# Patient Record
Sex: FEMALE | Race: WHITE | Employment: FULL TIME | URBAN - METROPOLITAN AREA
[De-identification: names, ages, dates, MRNs, and addresses within clinical notes are randomized per-mention and may not be internally consistent; named-entity substitution may affect disease eponyms.]

---

## 2018-07-27 ENCOUNTER — HOSPITAL ENCOUNTER (OUTPATIENT)
Age: 68
Discharge: HOME | End: 2018-07-27
Payer: COMMERCIAL

## 2018-07-27 LAB
ALBUMIN SERPL-MCNC: 3.2 G/DL (ref 3.5–5.2)
ALP BLD-CCNC: 81 U/L (ref 35–104)
ALT SERPL-CCNC: 10 U/L (ref 0–32)
ANION GAP SERPL CALCULATED.3IONS-SCNC: 10 MMOL/L (ref 7–16)
AST SERPL-CCNC: 15 U/L (ref 0–31)
BASOPHILS ABSOLUTE: 0.06 E9/L (ref 0–0.2)
BASOPHILS RELATIVE PERCENT: 0.4 % (ref 0–2)
BILIRUB SERPL-MCNC: <0.2 MG/DL (ref 0–1.2)
BUN BLDV-MCNC: 8 MG/DL (ref 8–23)
CA 125: 14.9 U/ML (ref 0–35)
CALCIUM SERPL-MCNC: 9.4 MG/DL (ref 8.6–10.2)
CEA: 4.5 NG/ML (ref 0–5.2)
CHLORIDE BLD-SCNC: 98 MMOL/L (ref 98–107)
CHOLESTEROL, FASTING: 96 MG/DL (ref 0–199)
CO2: 28 MMOL/L (ref 22–29)
CREAT SERPL-MCNC: 0.8 MG/DL (ref 0.5–1)
EOSINOPHILS ABSOLUTE: 0.34 E9/L (ref 0.05–0.5)
EOSINOPHILS RELATIVE PERCENT: 2.4 % (ref 0–6)
GFR AFRICAN AMERICAN: >60
GFR NON-AFRICAN AMERICAN: >60 ML/MIN/1.73
GLUCOSE FASTING: 90 MG/DL (ref 74–109)
HCT VFR BLD CALC: 37.9 % (ref 34–48)
HDLC SERPL-MCNC: 30 MG/DL
HEMOGLOBIN: 12.8 G/DL (ref 11.5–15.5)
IMMATURE GRANULOCYTES #: 0.1 E9/L
IMMATURE GRANULOCYTES %: 0.7 % (ref 0–5)
LDL CHOLESTEROL CALCULATED: 48 MG/DL (ref 0–99)
LYMPHOCYTES ABSOLUTE: 1.42 E9/L (ref 1.5–4)
LYMPHOCYTES RELATIVE PERCENT: 10.1 % (ref 20–42)
MCH RBC QN AUTO: 29.4 PG (ref 26–35)
MCHC RBC AUTO-ENTMCNC: 33.8 % (ref 32–34.5)
MCV RBC AUTO: 86.9 FL (ref 80–99.9)
MONOCYTES ABSOLUTE: 1.5 E9/L (ref 0.1–0.95)
MONOCYTES RELATIVE PERCENT: 10.7 % (ref 2–12)
NEUTROPHILS ABSOLUTE: 10.58 E9/L (ref 1.8–7.3)
NEUTROPHILS RELATIVE PERCENT: 75.7 % (ref 43–80)
PDW BLD-RTO: 13.1 FL (ref 11.5–15)
PLATELET # BLD: 343 E9/L (ref 130–450)
PMV BLD AUTO: 9.8 FL (ref 7–12)
POTASSIUM SERPL-SCNC: 4.4 MMOL/L (ref 3.5–5)
RBC # BLD: 4.36 E12/L (ref 3.5–5.5)
SODIUM BLD-SCNC: 136 MMOL/L (ref 132–146)
T4 TOTAL: 10.5 MCG/DL (ref 4.5–11.7)
TOTAL PROTEIN: 7.5 G/DL (ref 6.4–8.3)
TRIGLYCERIDE, FASTING: 89 MG/DL (ref 0–149)
TSH SERPL DL<=0.05 MIU/L-ACNC: 0.66 UIU/ML (ref 0.27–4.2)
VLDLC SERPL CALC-MCNC: 18 MG/DL
WBC # BLD: 14 E9/L (ref 4.5–11.5)

## 2018-07-27 PROCEDURE — 84436 ASSAY OF TOTAL THYROXINE: CPT

## 2018-07-27 PROCEDURE — 82378 CARCINOEMBRYONIC ANTIGEN: CPT

## 2018-07-27 PROCEDURE — 36415 COLL VENOUS BLD VENIPUNCTURE: CPT

## 2018-07-27 PROCEDURE — 80061 LIPID PANEL: CPT

## 2018-07-27 PROCEDURE — 84443 ASSAY THYROID STIM HORMONE: CPT

## 2018-07-27 PROCEDURE — 85025 COMPLETE CBC W/AUTO DIFF WBC: CPT

## 2018-07-27 PROCEDURE — 86304 IMMUNOASSAY TUMOR CA 125: CPT

## 2018-07-27 PROCEDURE — 80053 COMPREHEN METABOLIC PANEL: CPT

## 2018-08-03 ENCOUNTER — HOSPITAL ENCOUNTER (INPATIENT)
Age: 68
LOS: 6 days | Discharge: OTHER INSTITUTION | DRG: 392 | End: 2018-08-09
Attending: EMERGENCY MEDICINE | Admitting: INTERNAL MEDICINE
Payer: COMMERCIAL

## 2018-08-03 ENCOUNTER — APPOINTMENT (OUTPATIENT)
Dept: CT IMAGING | Age: 68
DRG: 392 | End: 2018-08-03
Payer: COMMERCIAL

## 2018-08-03 DIAGNOSIS — K57.20 DIVERTICULITIS OF LARGE INTESTINE WITH ABSCESS WITHOUT BLEEDING: Primary | ICD-10-CM

## 2018-08-03 DIAGNOSIS — N73.9 PELVIC ABSCESS IN FEMALE: ICD-10-CM

## 2018-08-03 LAB
ALBUMIN SERPL-MCNC: 3.6 G/DL (ref 3.5–5.2)
ALP BLD-CCNC: 85 U/L (ref 35–104)
ALT SERPL-CCNC: 11 U/L (ref 0–32)
ANION GAP SERPL CALCULATED.3IONS-SCNC: 11 MMOL/L (ref 7–16)
AST SERPL-CCNC: 17 U/L (ref 0–31)
BACTERIA: ABNORMAL /HPF
BASOPHILS ABSOLUTE: 0.09 E9/L (ref 0–0.2)
BASOPHILS RELATIVE PERCENT: 0.6 % (ref 0–2)
BILIRUB SERPL-MCNC: 0.2 MG/DL (ref 0–1.2)
BILIRUBIN URINE: ABNORMAL
BLOOD, URINE: NEGATIVE
BUN BLDV-MCNC: 11 MG/DL (ref 8–23)
CALCIUM SERPL-MCNC: 9.8 MG/DL (ref 8.6–10.2)
CHLORIDE BLD-SCNC: 93 MMOL/L (ref 98–107)
CLARITY: ABNORMAL
CO2: 27 MMOL/L (ref 22–29)
COLOR: YELLOW
CREAT SERPL-MCNC: 1 MG/DL (ref 0.5–1)
EOSINOPHILS ABSOLUTE: 0.09 E9/L (ref 0.05–0.5)
EOSINOPHILS RELATIVE PERCENT: 0.6 % (ref 0–6)
EPITHELIAL CELLS, UA: ABNORMAL /HPF
GFR AFRICAN AMERICAN: >60
GFR NON-AFRICAN AMERICAN: 56 ML/MIN/1.73
GLUCOSE BLD-MCNC: 105 MG/DL (ref 74–109)
GLUCOSE URINE: NEGATIVE MG/DL
HCT VFR BLD CALC: 39 % (ref 34–48)
HEMOGLOBIN: 13 G/DL (ref 11.5–15.5)
IMMATURE GRANULOCYTES #: 0.19 E9/L
IMMATURE GRANULOCYTES %: 1.3 % (ref 0–5)
INR BLD: 1.1
KETONES, URINE: ABNORMAL MG/DL
LACTIC ACID: 1.7 MMOL/L (ref 0.5–2.2)
LEUKOCYTE ESTERASE, URINE: NEGATIVE
LYMPHOCYTES ABSOLUTE: 1.43 E9/L (ref 1.5–4)
LYMPHOCYTES RELATIVE PERCENT: 9.9 % (ref 20–42)
MCH RBC QN AUTO: 28.6 PG (ref 26–35)
MCHC RBC AUTO-ENTMCNC: 33.3 % (ref 32–34.5)
MCV RBC AUTO: 85.9 FL (ref 80–99.9)
MONOCYTES ABSOLUTE: 1.01 E9/L (ref 0.1–0.95)
MONOCYTES RELATIVE PERCENT: 7 % (ref 2–12)
NEUTROPHILS ABSOLUTE: 11.6 E9/L (ref 1.8–7.3)
NEUTROPHILS RELATIVE PERCENT: 80.6 % (ref 43–80)
NITRITE, URINE: NEGATIVE
PDW BLD-RTO: 13.2 FL (ref 11.5–15)
PH UA: 5.5 (ref 5–9)
PLATELET # BLD: 382 E9/L (ref 130–450)
PMV BLD AUTO: 9.6 FL (ref 7–12)
POTASSIUM SERPL-SCNC: 5.4 MMOL/L (ref 3.5–5)
PROTEIN UA: ABNORMAL MG/DL
PROTHROMBIN TIME: 12.6 SEC (ref 9.3–12.4)
RBC # BLD: 4.54 E12/L (ref 3.5–5.5)
RBC UA: ABNORMAL /HPF (ref 0–2)
SODIUM BLD-SCNC: 131 MMOL/L (ref 132–146)
SPECIFIC GRAVITY UA: >=1.03 (ref 1–1.03)
TOTAL PROTEIN: 7.9 G/DL (ref 6.4–8.3)
UROBILINOGEN, URINE: 0.2 E.U./DL
WBC # BLD: 14.4 E9/L (ref 4.5–11.5)
WBC UA: ABNORMAL /HPF (ref 0–5)

## 2018-08-03 PROCEDURE — 81001 URINALYSIS AUTO W/SCOPE: CPT

## 2018-08-03 PROCEDURE — 80053 COMPREHEN METABOLIC PANEL: CPT

## 2018-08-03 PROCEDURE — 74176 CT ABD & PELVIS W/O CONTRAST: CPT

## 2018-08-03 PROCEDURE — 2580000003 HC RX 258: Performed by: EMERGENCY MEDICINE

## 2018-08-03 PROCEDURE — 85025 COMPLETE CBC W/AUTO DIFF WBC: CPT

## 2018-08-03 PROCEDURE — 85610 PROTHROMBIN TIME: CPT

## 2018-08-03 PROCEDURE — 96365 THER/PROPH/DIAG IV INF INIT: CPT

## 2018-08-03 PROCEDURE — 6360000004 HC RX CONTRAST MEDICATION: Performed by: RADIOLOGY

## 2018-08-03 PROCEDURE — 6360000002 HC RX W HCPCS: Performed by: EMERGENCY MEDICINE

## 2018-08-03 PROCEDURE — 1200000000 HC SEMI PRIVATE

## 2018-08-03 PROCEDURE — 87088 URINE BACTERIA CULTURE: CPT

## 2018-08-03 PROCEDURE — C9113 INJ PANTOPRAZOLE SODIUM, VIA: HCPCS | Performed by: INTERNAL MEDICINE

## 2018-08-03 PROCEDURE — 2580000003 HC RX 258: Performed by: INTERNAL MEDICINE

## 2018-08-03 PROCEDURE — 99285 EMERGENCY DEPT VISIT HI MDM: CPT

## 2018-08-03 PROCEDURE — 99254 IP/OBS CNSLTJ NEW/EST MOD 60: CPT | Performed by: SURGERY

## 2018-08-03 PROCEDURE — 96368 THER/DIAG CONCURRENT INF: CPT

## 2018-08-03 PROCEDURE — 6360000002 HC RX W HCPCS: Performed by: INTERNAL MEDICINE

## 2018-08-03 PROCEDURE — 74177 CT ABD & PELVIS W/CONTRAST: CPT

## 2018-08-03 PROCEDURE — 6370000000 HC RX 637 (ALT 250 FOR IP): Performed by: INTERNAL MEDICINE

## 2018-08-03 PROCEDURE — 36415 COLL VENOUS BLD VENIPUNCTURE: CPT

## 2018-08-03 PROCEDURE — 2500000003 HC RX 250 WO HCPCS: Performed by: INTERNAL MEDICINE

## 2018-08-03 PROCEDURE — 96375 TX/PRO/DX INJ NEW DRUG ADDON: CPT

## 2018-08-03 PROCEDURE — 83605 ASSAY OF LACTIC ACID: CPT

## 2018-08-03 PROCEDURE — 2580000003 HC RX 258

## 2018-08-03 PROCEDURE — 2500000003 HC RX 250 WO HCPCS: Performed by: EMERGENCY MEDICINE

## 2018-08-03 RX ORDER — ATORVASTATIN CALCIUM 20 MG/1
20 TABLET, FILM COATED ORAL NIGHTLY
Status: DISCONTINUED | OUTPATIENT
Start: 2018-08-03 | End: 2018-08-09 | Stop reason: HOSPADM

## 2018-08-03 RX ORDER — CIPROFLOXACIN 2 MG/ML
400 INJECTION, SOLUTION INTRAVENOUS EVERY 12 HOURS
Status: DISCONTINUED | OUTPATIENT
Start: 2018-08-03 | End: 2018-08-07

## 2018-08-03 RX ORDER — SODIUM CHLORIDE 0.9 % (FLUSH) 0.9 %
SYRINGE (ML) INJECTION
Status: COMPLETED
Start: 2018-08-03 | End: 2018-08-03

## 2018-08-03 RX ORDER — SODIUM CHLORIDE 9 MG/ML
INJECTION, SOLUTION INTRAVENOUS CONTINUOUS
Status: DISCONTINUED | OUTPATIENT
Start: 2018-08-03 | End: 2018-08-03

## 2018-08-03 RX ORDER — CIPROFLOXACIN 2 MG/ML
400 INJECTION, SOLUTION INTRAVENOUS ONCE
Status: COMPLETED | OUTPATIENT
Start: 2018-08-03 | End: 2018-08-03

## 2018-08-03 RX ORDER — KETOROLAC TROMETHAMINE 15 MG/ML
15 INJECTION, SOLUTION INTRAMUSCULAR; INTRAVENOUS ONCE
Status: COMPLETED | OUTPATIENT
Start: 2018-08-03 | End: 2018-08-03

## 2018-08-03 RX ORDER — PANTOPRAZOLE SODIUM 40 MG/10ML
40 INJECTION, POWDER, LYOPHILIZED, FOR SOLUTION INTRAVENOUS DAILY
Status: DISCONTINUED | OUTPATIENT
Start: 2018-08-03 | End: 2018-08-09 | Stop reason: HOSPADM

## 2018-08-03 RX ORDER — ASPIRIN 325 MG
325 TABLET ORAL DAILY
Status: ON HOLD | COMMUNITY
End: 2018-08-08 | Stop reason: HOSPADM

## 2018-08-03 RX ORDER — ONDANSETRON 2 MG/ML
4 INJECTION INTRAMUSCULAR; INTRAVENOUS ONCE
Status: COMPLETED | OUTPATIENT
Start: 2018-08-03 | End: 2018-08-03

## 2018-08-03 RX ORDER — LEVOTHYROXINE SODIUM 0.1 MG/1
100 TABLET ORAL DAILY
COMMUNITY
End: 2021-05-10 | Stop reason: ALTCHOICE

## 2018-08-03 RX ORDER — SODIUM CHLORIDE, SODIUM LACTATE, POTASSIUM CHLORIDE, CALCIUM CHLORIDE 600; 310; 30; 20 MG/100ML; MG/100ML; MG/100ML; MG/100ML
INJECTION, SOLUTION INTRAVENOUS CONTINUOUS
Status: DISCONTINUED | OUTPATIENT
Start: 2018-08-03 | End: 2018-08-06

## 2018-08-03 RX ORDER — LEVOTHYROXINE SODIUM 0.1 MG/1
100 TABLET ORAL DAILY
Status: DISCONTINUED | OUTPATIENT
Start: 2018-08-04 | End: 2018-08-09 | Stop reason: HOSPADM

## 2018-08-03 RX ORDER — 0.9 % SODIUM CHLORIDE 0.9 %
1000 INTRAVENOUS SOLUTION INTRAVENOUS ONCE
Status: COMPLETED | OUTPATIENT
Start: 2018-08-03 | End: 2018-08-03

## 2018-08-03 RX ORDER — ONDANSETRON 2 MG/ML
4 INJECTION INTRAMUSCULAR; INTRAVENOUS EVERY 6 HOURS PRN
Status: DISCONTINUED | OUTPATIENT
Start: 2018-08-03 | End: 2018-08-09 | Stop reason: HOSPADM

## 2018-08-03 RX ORDER — CHLORAL HYDRATE 500 MG
1000 CAPSULE ORAL 2 TIMES DAILY
COMMUNITY

## 2018-08-03 RX ORDER — CHLORAL HYDRATE 500 MG
1000 CAPSULE ORAL DAILY
Status: DISCONTINUED | OUTPATIENT
Start: 2018-08-03 | End: 2018-08-03 | Stop reason: RX

## 2018-08-03 RX ORDER — MORPHINE SULFATE 2 MG/ML
2 INJECTION, SOLUTION INTRAMUSCULAR; INTRAVENOUS EVERY 4 HOURS PRN
Status: DISCONTINUED | OUTPATIENT
Start: 2018-08-03 | End: 2018-08-09 | Stop reason: HOSPADM

## 2018-08-03 RX ORDER — LISINOPRIL 10 MG/1
10 TABLET ORAL DAILY
Status: DISCONTINUED | OUTPATIENT
Start: 2018-08-04 | End: 2018-08-09 | Stop reason: HOSPADM

## 2018-08-03 RX ORDER — ASPIRIN 325 MG
325 TABLET ORAL DAILY
Status: DISCONTINUED | OUTPATIENT
Start: 2018-08-04 | End: 2018-08-09 | Stop reason: HOSPADM

## 2018-08-03 RX ADMIN — ONDANSETRON 4 MG: 2 INJECTION INTRAMUSCULAR; INTRAVENOUS at 15:00

## 2018-08-03 RX ADMIN — SODIUM CHLORIDE: 9 INJECTION, SOLUTION INTRAVENOUS at 10:14

## 2018-08-03 RX ADMIN — IOHEXOL 50 ML: 240 INJECTION, SOLUTION INTRATHECAL; INTRAVASCULAR; INTRAVENOUS; ORAL at 09:17

## 2018-08-03 RX ADMIN — IOHEXOL 100 ML: 240 INJECTION, SOLUTION INTRATHECAL; INTRAVASCULAR; INTRAVENOUS; ORAL at 15:50

## 2018-08-03 RX ADMIN — SODIUM CHLORIDE, POTASSIUM CHLORIDE, SODIUM LACTATE AND CALCIUM CHLORIDE: 600; 310; 30; 20 INJECTION, SOLUTION INTRAVENOUS at 14:55

## 2018-08-03 RX ADMIN — Medication 10 ML: at 14:55

## 2018-08-03 RX ADMIN — MORPHINE SULFATE 2 MG: 2 INJECTION, SOLUTION INTRAMUSCULAR; INTRAVENOUS at 20:13

## 2018-08-03 RX ADMIN — SODIUM CHLORIDE 1000 ML: 9 INJECTION, SOLUTION INTRAVENOUS at 07:32

## 2018-08-03 RX ADMIN — METRONIDAZOLE 500 MG: 500 INJECTION, SOLUTION INTRAVENOUS at 10:30

## 2018-08-03 RX ADMIN — ONDANSETRON 4 MG: 2 INJECTION INTRAMUSCULAR; INTRAVENOUS at 07:32

## 2018-08-03 RX ADMIN — MORPHINE SULFATE 2 MG: 2 INJECTION, SOLUTION INTRAMUSCULAR; INTRAVENOUS at 15:59

## 2018-08-03 RX ADMIN — ATORVASTATIN CALCIUM 20 MG: 20 TABLET, FILM COATED ORAL at 20:08

## 2018-08-03 RX ADMIN — CIPROFLOXACIN 400 MG: 2 INJECTION, SOLUTION INTRAVENOUS at 10:13

## 2018-08-03 RX ADMIN — METRONIDAZOLE 500 MG: 500 INJECTION, SOLUTION INTRAVENOUS at 18:56

## 2018-08-03 RX ADMIN — CIPROFLOXACIN 400 MG: 2 INJECTION, SOLUTION INTRAVENOUS at 22:05

## 2018-08-03 RX ADMIN — PANTOPRAZOLE SODIUM 40 MG: 40 INJECTION, POWDER, FOR SOLUTION INTRAVENOUS at 14:55

## 2018-08-03 RX ADMIN — IOPAMIDOL 80 ML: 755 INJECTION, SOLUTION INTRAVENOUS at 09:17

## 2018-08-03 RX ADMIN — KETOROLAC TROMETHAMINE 15 MG: 15 INJECTION, SOLUTION INTRAMUSCULAR; INTRAVENOUS at 07:32

## 2018-08-03 RX ADMIN — ENOXAPARIN SODIUM 40 MG: 40 INJECTION, SOLUTION INTRAVENOUS; SUBCUTANEOUS at 14:56

## 2018-08-03 ASSESSMENT — ENCOUNTER SYMPTOMS
BACK PAIN: 0
CONSTIPATION: 0
VOMITING: 0
DIARRHEA: 1
ABDOMINAL DISTENTION: 0
SHORTNESS OF BREATH: 0
BLOOD IN STOOL: 0
EYES NEGATIVE: 1
NAUSEA: 1
ABDOMINAL PAIN: 1

## 2018-08-03 ASSESSMENT — PAIN DESCRIPTION - LOCATION
LOCATION: ABDOMEN

## 2018-08-03 ASSESSMENT — PAIN DESCRIPTION - ONSET
ONSET: ON-GOING
ONSET: ON-GOING

## 2018-08-03 ASSESSMENT — PAIN DESCRIPTION - PROGRESSION
CLINICAL_PROGRESSION: NOT CHANGED
CLINICAL_PROGRESSION: NOT CHANGED

## 2018-08-03 ASSESSMENT — PAIN SCALES - GENERAL
PAINLEVEL_OUTOF10: 5
PAINLEVEL_OUTOF10: 3
PAINLEVEL_OUTOF10: 7
PAINLEVEL_OUTOF10: 5
PAINLEVEL_OUTOF10: 5
PAINLEVEL_OUTOF10: 7
PAINLEVEL_OUTOF10: 0

## 2018-08-03 ASSESSMENT — PAIN DESCRIPTION - PAIN TYPE
TYPE: ACUTE PAIN

## 2018-08-03 ASSESSMENT — PAIN DESCRIPTION - FREQUENCY
FREQUENCY: INTERMITTENT
FREQUENCY: INTERMITTENT
FREQUENCY: CONTINUOUS

## 2018-08-03 ASSESSMENT — PAIN DESCRIPTION - ORIENTATION
ORIENTATION: LOWER
ORIENTATION: LOWER;RIGHT

## 2018-08-03 ASSESSMENT — PAIN DESCRIPTION - DESCRIPTORS
DESCRIPTORS: CRAMPING;DISCOMFORT;TENDER
DESCRIPTORS: BURNING;CRAMPING
DESCRIPTORS: DISCOMFORT;ACHING

## 2018-08-03 NOTE — H&P
excoriations. Denies bruising. Extremities:   Denies any lower extremity swelling or edema. PHYSICAL EXAM:  VITALS:  Vitals:    08/03/18 1120   BP: 107/64   Pulse: 58   Resp: 16   Temp: 97.7 °F (36.5 °C)   SpO2: 96%         CONSTITUTIONAL:    Awake, alert, cooperative, no apparent distress, and appears stated age    EYES:    PERRL, EOMI, sclera clear, conjunctiva normal    ENT:    Normocephalic, atraumatic, sinuses nontender on palpation. External ears without lesions. Oral pharynx with moist mucus membranes. Tonsils without erythema or exudates. NECK:    Supple, symmetrical, trachea midline, no adenopathy, thyroid symmetric, not enlarged and no tenderness, skin normal, no bruits, no JVD    HEMATOLOGIC/LYMPHATICS:    No cervical lymphadenopathy and no supraclavicular lymphadenopathy    LUNGS:    Symmetric. No increased work of breathing, good air exchange, clear to auscultation bilaterally, no wheezes, rhonchi, or rales,     CARDIOVASCULAR:    Normal apical impulse, regular rate and rhythm, normal S1 and S2, no S3 or S4, and no murmur noted    ABDOMEN:    Soft. Tender to palpation diffusely with some localization to the left lower quadrant. Voluntary guarding is elicited. No rebound tenderness. MUSCULOSKELETAL:    There is no redness, warmth, or swelling of the joints. Full range of motion noted. Motor strength is 5 out of 5 all extremities bilaterally. Tone is normal.    NEUROLOGIC:    Awake, alert, oriented to name, place and time. Cranial nerves II-XII are grossly intact. Motor is 5 out of 5 bilaterally. SKIN:    No bruising or bleeding. No redness, warmth, or swelling    EXTREMITIES:    Peripheral pulses present. No edema, cyanosis, or swelling. OSTEOPATHIC:    Examined in seated and supine positions. Normal thoracic kyphosis and lumbar lordosis. No acute somatic dysfunction.     LABORATORY DATA:  CBC with Differential:    Lab Results   Component Value Date    WBC 14.4 08/03/2018    RBC 4.54 08/03/2018    HGB 13.0 08/03/2018    HCT 39.0 08/03/2018     08/03/2018    MCV 85.9 08/03/2018    MCH 28.6 08/03/2018    MCHC 33.3 08/03/2018    RDW 13.2 08/03/2018    SEGSPCT 64 10/31/2013    LYMPHOPCT 9.9 08/03/2018    MONOPCT 7.0 08/03/2018    BASOPCT 0.6 08/03/2018    MONOSABS 1.01 08/03/2018    LYMPHSABS 1.43 08/03/2018    EOSABS 0.09 08/03/2018    BASOSABS 0.09 08/03/2018     BMP:    Lab Results   Component Value Date     08/03/2018    K 5.4 08/03/2018    CL 93 08/03/2018    CO2 27 08/03/2018    BUN 11 08/03/2018    LABALBU 3.6 08/03/2018    LABALBU 4.3 12/13/2011    CREATININE 1.0 08/03/2018    CALCIUM 9.8 08/03/2018    GFRAA >60 08/03/2018    LABGLOM 56 08/03/2018    GLUCOSE 105 08/03/2018    GLUCOSE 94 12/13/2011       ASSESSMENT:  1. Perforated diverticulitis with contained abscess  2. Essential hypertension  3. Hypothyroidism  4. Hyperlipidemia    PLAN:  The patient deals with chronic constipation and I suspect this has led to the progression of her diverticulosis. IV antibiotic therapy will be employed in the interventional radiology team has been asked to move forward with drainage if possible. No plans for surgical intervention currently. IV fluid resuscitation is being undertaken. The patient has been made nothing by mouth. Following resolution of the infection and inflammation, the patient will require colonoscopic evaluation as an outpatient to assess the affected region. We will monitor her chronic comorbidities accordingly. Pain medications are being administered. I anticipate advancing the patient's diet tomorrow.     David Cisneros D.O., FACOI  1:59 PM  8/3/2018    Electronically signed by David Cisneros DO on 8/3/18 at 1:59 PM

## 2018-08-03 NOTE — CONSULTS
GENERAL SURGERY  CONSULT NOTE  8/3/2018    Physician Consulted: Dr. Yrn Us  Reason for Consult: Diverticulitis w/ pelvic abscess  Referring Physician: Dr. Neelam TRAN  Ginger Amezquita is a 74 y.o. female who presents for evaluation of RLQ, suprapubic abdominal pain intermittently for the past six months. She has been treated multiple times as an outpatient for diverticulitis during this time frame. She just completed a week of antibiotics one day ago. Constant, dull ache located in the RLQ. No relieving or worsening factors. Non-radiating. Tolerating PO intake. Passing flatus with daily loose bowel movements. Reported a 10 lb weight loss in the past 2 weeks. No endoscopy hx. Current smoker. She denied fever, chills, cp, sob, n/v.       Past Medical History:   Diagnosis Date    HIGH CHOLESTEROL     Hypertension     Thyroid disease        Past Surgical History:   Procedure Laterality Date    NERVE BLOCK  12 27 2011   [address] BLOCK  1/24/12    lumbar epidural    NERVE BLOCK  02/21/12    lumbar epidural with flouro       Medications Prior to Admission:    Prior to Admission medications    Medication Sig Start Date End Date Taking? Authorizing Provider   aspirin 325 MG tablet Take 325 mg by mouth daily   Yes Historical Provider, MD   levothyroxine (SYNTHROID) 100 MCG tablet Take 100 mcg by mouth Daily   Yes Historical Provider, MD   ZINC PO Take 1 tablet by mouth daily   Yes Historical Provider, MD   Omega-3 Fatty Acids (FISH OIL) 1000 MG CAPS Take 1,000 mg by mouth daily   Yes Historical Provider, MD   lisinopril (PRINIVIL;ZESTRIL) 10 MG tablet Take 10 mg by mouth daily. Yes Historical Provider, MD   bisoprolol-hydrochlorothiazide (ZIAC) 5-6.25 MG per tablet Take 1 tablet by mouth daily. Yes Historical Provider, MD   rosuvastatin (CRESTOR) 10 MG tablet Take 10 mg by mouth daily.      Yes Historical Provider, MD       No Known Allergies    Family History   Problem Relation Age of Onset    Cancer Other     reformatted images were obtained. Automated dose exposure control was used for this exam. FINDINGS: Visualized portion bilateral lung bases are clear. There is no pleural effusion. There is no evidence of pneumoperitoneum. The liver is normal in morphology. There is mild diffuse hypoattenuation, suggestive fatty infiltration. The gallbladder, pancreas, and spleen are unremarkable. There is a small hiatal hernia. The remainder the stomach and duodenum are normal in appearance. The small bowel loops are normal in caliber. The appendix is identified and is unremarkable. There are diverticula of the descending and sigmoid colon. There is a short segment of wall thickening with adjacent fat stranding along the sigmoid colon. There is a peripherally enhancing collection adjacent to the sigmoid colon with a tiny focus of air (series 2, image 69), which measures approximately 26 mm x 36 mm, which also abuts the uterus. Redemonstrated is a calcified right adrenal nodule. The left adrenal gland is unremarkable. Bilateral kidneys are normal morphology and demonstrate symmetric enhancement. There is a fluid attenuation cysts at the superior and lower poles of the right kidney. There is no hydronephrosis or hydroureter bilaterally. Urinary bladder is grossly unremarkable. There is diffuse episodic calcification of the abdominal aorta, which is normal in caliber. There is no abdominal, retroperitoneal, or pelvic lymphadenopathy. There is no suspicious lytic or blastic osseous lesion. There is lower lumbar spondylosis with grade 1 anterolisthesis of L4 on L5.     1. Short segment of diffuse wall thickening of the sigmoid colon with adjacent fat stranding, possibly related to acute diverticulitis versus underlying neoplasm. There is an adjacent loculated collection with tiny focus of air, suggestive of an abscess, which abuts the uterine fundus. 2. Mild diffuse fatty infiltration of the liver.  3. Stable calcified right adrenal consciousness, murmur or orthopnea  Gastrointestinal ROS: negative for - constipation, diarrhea, gas/bloating, heartburn or hematemesis  Genito-Urinary ROS: negative for -  genital discharge, genital ulcers or hematuria  Musculoskeletal ROS: negative for - gait disturbance, muscle pain or muscular weakness  Psych/Soc ROS: Neg for suicidal ideation, auditory/visual hallucinations      General appearance:  NAD  Head: NCAT, PERRLA, EOMI, red conjunctiva  Neck: supple, no masses  Lungs: Equal chest rise bilateral  Heart: Reg rate  Abdomen: soft, nondistended, tender minimal Lower abd  Skin; no lesions  Gu: no cva tenderness  Extremities: extremities normal, atraumatic, no cyanosis or edema  Psych: no tremor, visual or auditory hallucinations    Imaging: CT abd:   Impression   1. Short segment of diffuse wall thickening of the sigmoid colon with   adjacent fat stranding, possibly related to acute diverticulitis   versus underlying neoplasm. There is an adjacent loculated collection   with tiny focus of air, suggestive of an abscess, which abuts the   uterine fundus. 2. Mild diffuse fatty infiltration of the liver. 3. Stable calcified right adrenal nodule. Assessment/Plan:  Magdalena Porter is a 74 y.o. female with complicated perforated diverticulitis with abscess  Patient Active Problem List   Diagnosis    Spinal stenosis    Degeneration of lumbosacral intervertebral disc    Lumbago    Colonic diverticular abscess       IR for drainage  If cannot drain would continue IV abx  Ok for clears after IR drainage  Cont IV abx  No surgery unless worsenign clinically  I have personally participated in a face-to-face history and physical exam on the date of service. Reviewed chart, vitals, labs and radiologic studies. I also participated in medical decision making with the resident/NP on the date of service and I agree with all of the pertinent clinical information, assessment and treatment plan.  I have reviewed and

## 2018-08-03 NOTE — ED PROVIDER NOTES
Mouth/Throat: Oropharynx is clear and moist.   Eyes: Conjunctivae and EOM are normal. Pupils are equal, round, and reactive to light. Neck: Normal range of motion. Neck supple. Cardiovascular: Normal rate, regular rhythm, normal heart sounds and intact distal pulses. No murmur heard. Pulmonary/Chest: Effort normal and breath sounds normal. No respiratory distress. She has no wheezes. She has no rales. Abdominal: Soft. Bowel sounds are normal. There is tenderness (RLQ and LLQ). There is no rebound and no guarding. Musculoskeletal: She exhibits no edema. Lymphadenopathy:     She has no cervical adenopathy. Neurological: She is alert and oriented to person, place, and time. No cranial nerve deficit. Coordination normal.   Skin: Skin is warm and dry. She is not diaphoretic. No pallor. Psychiatric: She has a normal mood and affect. Her behavior is normal. Judgment and thought content normal.   Nursing note and vitals reviewed. Procedures    MDM     8:20 AM  Patient states she is feeling well. She is drinking contrast for her CT. K was elevated and will be rechecked after liter of IVF. 9:50 AM  Patient is comfortable at this time. She has not seen a surgeon before.       --------------------------------------------- PAST HISTORY ---------------------------------------------  Past Medical History:  has a past medical history of HIGH CHOLESTEROL and Hypertension. Past Surgical History:  has a past surgical history that includes Nerve Block (12 27 2011); Nerve Block (1/24/12); and Nerve Block (02/21/12). Social History:  reports that she has been smoking Cigarettes. She has been smoking about 1.00 pack per day. She has never used smokeless tobacco. She reports that she does not drink alcohol or use drugs. Family History: family history includes Cancer in an other family member; Heart Disease in an other family member. The patients home medications have been reviewed.     Allergies: Patient has no known allergies.     -------------------------------------------------- RESULTS -------------------------------------------------    Lab  Results for orders placed or performed during the hospital encounter of 08/03/18   CBC Auto Differential   Result Value Ref Range    WBC 14.4 (H) 4.5 - 11.5 E9/L    RBC 4.54 3.50 - 5.50 E12/L    Hemoglobin 13.0 11.5 - 15.5 g/dL    Hematocrit 39.0 34.0 - 48.0 %    MCV 85.9 80.0 - 99.9 fL    MCH 28.6 26.0 - 35.0 pg    MCHC 33.3 32.0 - 34.5 %    RDW 13.2 11.5 - 15.0 fL    Platelets 990 653 - 220 E9/L    MPV 9.6 7.0 - 12.0 fL    Neutrophils % 80.6 (H) 43.0 - 80.0 %    Immature Granulocytes % 1.3 0.0 - 5.0 %    Lymphocytes % 9.9 (L) 20.0 - 42.0 %    Monocytes % 7.0 2.0 - 12.0 %    Eosinophils % 0.6 0.0 - 6.0 %    Basophils % 0.6 0.0 - 2.0 %    Neutrophils # 11.60 (H) 1.80 - 7.30 E9/L    Immature Granulocytes # 0.19 E9/L    Lymphocytes # 1.43 (L) 1.50 - 4.00 E9/L    Monocytes # 1.01 (H) 0.10 - 0.95 E9/L    Eosinophils # 0.09 0.05 - 0.50 E9/L    Basophils # 0.09 0.00 - 0.20 E9/L   Comprehensive Metabolic Panel   Result Value Ref Range    Sodium 131 (L) 132 - 146 mmol/L    Potassium 5.4 (H) 3.5 - 5.0 mmol/L    Chloride 93 (L) 98 - 107 mmol/L    CO2 27 22 - 29 mmol/L    Anion Gap 11 7 - 16 mmol/L    Glucose 105 74 - 109 mg/dL    BUN 11 8 - 23 mg/dL    CREATININE 1.0 0.5 - 1.0 mg/dL    GFR Non-African American 56 >=60 mL/min/1.73    GFR African American >60     Calcium 9.8 8.6 - 10.2 mg/dL    Total Protein 7.9 6.4 - 8.3 g/dL    Alb 3.6 3.5 - 5.2 g/dL    Total Bilirubin 0.2 0.0 - 1.2 mg/dL    Alkaline Phosphatase 85 35 - 104 U/L    ALT 11 0 - 32 U/L    AST 17 0 - 31 U/L   Lactic Acid, Plasma   Result Value Ref Range    Lactic Acid 1.7 0.5 - 2.2 mmol/L   Urinalysis   Result Value Ref Range    Color, UA Yellow Straw/Yellow    Clarity, UA CLOUDY (A) Clear    Glucose, Ur Negative Negative mg/dL    Bilirubin Urine SMALL (A) Negative    Ketones, Urine TRACE (A) Negative mg/dL Specific Gravity, UA >=1.030 1.005 - 1.030    Blood, Urine Negative Negative    pH, UA 5.5 5.0 - 9.0    Protein, UA TRACE Negative mg/dL    Urobilinogen, Urine 0.2 <2.0 E.U./dL    Nitrite, Urine Negative Negative    Leukocyte Esterase, Urine Negative Negative   Microscopic Urinalysis   Result Value Ref Range    WBC, UA 0-1 0 - 5 /HPF    RBC, UA NONE 0 - 2 /HPF    Epi Cells FEW /HPF    Bacteria, UA RARE (A) /HPF       Radiology  CT ABDOMEN PELVIS W IV CONTRAST Additional Contrast? Oral   Final Result   1. Short segment of diffuse wall thickening of the sigmoid colon with   adjacent fat stranding, possibly related to acute diverticulitis   versus underlying neoplasm. There is an adjacent loculated collection   with tiny focus of air, suggestive of an abscess, which abuts the   uterine fundus. 2. Mild diffuse fatty infiltration of the liver. 3. Stable calcified right adrenal nodule. EKG: This EKG is signed and interpreted by me.          ------------------------- NURSING NOTES AND VITALS REVIEWED ---------------------------  Date / Time Roomed:  8/3/2018  6:48 AM  ED Bed Assignment:  01/01    The nursing notes within the ED encounter and vital signs as below have been reviewed. Patient Vitals for the past 24 hrs:   BP Temp Pulse Resp SpO2 Height Weight   08/03/18 0942 113/63 - 59 15 96 % - -   08/03/18 0600 116/78 97.9 °F (36.6 °C) 83 18 99 % 5' 3\" (1.6 m) 135 lb (61.2 kg)       Oxygen Saturation Interpretation: Normal      ------------------------------------------ PROGRESS NOTES ------------------------------------------  Re-evaluation(s):      I have spoken with the patient and discussed todays results, in addition to providing specific details for the plan of care and counseling regarding the diagnosis and prognosis.   Their questions are answered at this time and they are agreeable with the plan.      --------------------------------- ADDITIONAL PROVIDER NOTES

## 2018-08-03 NOTE — ED NOTES
Floor called, report given to Mary Turner.  Transport for patient to room 340 arranged     Latricia Olguin RN  08/03/18 9849

## 2018-08-04 LAB
ANION GAP SERPL CALCULATED.3IONS-SCNC: 11 MMOL/L (ref 7–16)
BASOPHILS ABSOLUTE: 0 E9/L (ref 0–0.2)
BASOPHILS RELATIVE PERCENT: 0.3 % (ref 0–2)
BUN BLDV-MCNC: 7 MG/DL (ref 8–23)
CALCIUM SERPL-MCNC: 8.3 MG/DL (ref 8.6–10.2)
CHLORIDE BLD-SCNC: 93 MMOL/L (ref 98–107)
CO2: 21 MMOL/L (ref 22–29)
CREAT SERPL-MCNC: 0.7 MG/DL (ref 0.5–1)
EOSINOPHILS ABSOLUTE: 0 E9/L (ref 0.05–0.5)
EOSINOPHILS RELATIVE PERCENT: 0.1 % (ref 0–6)
GFR AFRICAN AMERICAN: >60
GFR NON-AFRICAN AMERICAN: >60 ML/MIN/1.73
GLUCOSE BLD-MCNC: 106 MG/DL (ref 74–109)
HCT VFR BLD CALC: 31.9 % (ref 34–48)
HEMOGLOBIN: 10.9 G/DL (ref 11.5–15.5)
LYMPHOCYTES ABSOLUTE: 0.7 E9/L (ref 1.5–4)
LYMPHOCYTES RELATIVE PERCENT: 2.7 % (ref 20–42)
MAGNESIUM: 1.7 MG/DL (ref 1.6–2.6)
MCH RBC QN AUTO: 28.8 PG (ref 26–35)
MCHC RBC AUTO-ENTMCNC: 34.2 % (ref 32–34.5)
MCV RBC AUTO: 84.2 FL (ref 80–99.9)
MONOCYTES ABSOLUTE: 0.93 E9/L (ref 0.1–0.95)
MONOCYTES RELATIVE PERCENT: 3.6 % (ref 2–12)
NEUTROPHILS ABSOLUTE: 21.81 E9/L (ref 1.8–7.3)
NEUTROPHILS RELATIVE PERCENT: 93.8 % (ref 43–80)
PDW BLD-RTO: 13.2 FL (ref 11.5–15)
PHOSPHORUS: 2.6 MG/DL (ref 2.5–4.5)
PLATELET # BLD: 317 E9/L (ref 130–450)
PMV BLD AUTO: 9.8 FL (ref 7–12)
POTASSIUM SERPL-SCNC: 3.8 MMOL/L (ref 3.5–5)
RBC # BLD: 3.79 E12/L (ref 3.5–5.5)
SODIUM BLD-SCNC: 125 MMOL/L (ref 132–146)
WBC # BLD: 23.2 E9/L (ref 4.5–11.5)

## 2018-08-04 PROCEDURE — 83735 ASSAY OF MAGNESIUM: CPT

## 2018-08-04 PROCEDURE — 2500000003 HC RX 250 WO HCPCS: Performed by: INTERNAL MEDICINE

## 2018-08-04 PROCEDURE — 80048 BASIC METABOLIC PNL TOTAL CA: CPT

## 2018-08-04 PROCEDURE — C9113 INJ PANTOPRAZOLE SODIUM, VIA: HCPCS | Performed by: INTERNAL MEDICINE

## 2018-08-04 PROCEDURE — 6370000000 HC RX 637 (ALT 250 FOR IP): Performed by: INTERNAL MEDICINE

## 2018-08-04 PROCEDURE — 1200000000 HC SEMI PRIVATE

## 2018-08-04 PROCEDURE — 2580000003 HC RX 258: Performed by: INTERNAL MEDICINE

## 2018-08-04 PROCEDURE — 85025 COMPLETE CBC W/AUTO DIFF WBC: CPT

## 2018-08-04 PROCEDURE — 6360000002 HC RX W HCPCS: Performed by: INTERNAL MEDICINE

## 2018-08-04 PROCEDURE — 36415 COLL VENOUS BLD VENIPUNCTURE: CPT

## 2018-08-04 PROCEDURE — 99232 SBSQ HOSP IP/OBS MODERATE 35: CPT | Performed by: SURGERY

## 2018-08-04 PROCEDURE — 84100 ASSAY OF PHOSPHORUS: CPT

## 2018-08-04 RX ADMIN — CIPROFLOXACIN 400 MG: 2 INJECTION, SOLUTION INTRAVENOUS at 10:07

## 2018-08-04 RX ADMIN — SODIUM CHLORIDE, POTASSIUM CHLORIDE, SODIUM LACTATE AND CALCIUM CHLORIDE: 600; 310; 30; 20 INJECTION, SOLUTION INTRAVENOUS at 03:23

## 2018-08-04 RX ADMIN — ONDANSETRON 4 MG: 2 INJECTION INTRAMUSCULAR; INTRAVENOUS at 15:24

## 2018-08-04 RX ADMIN — METRONIDAZOLE 500 MG: 500 INJECTION, SOLUTION INTRAVENOUS at 11:40

## 2018-08-04 RX ADMIN — SODIUM CHLORIDE, POTASSIUM CHLORIDE, SODIUM LACTATE AND CALCIUM CHLORIDE: 600; 310; 30; 20 INJECTION, SOLUTION INTRAVENOUS at 16:08

## 2018-08-04 RX ADMIN — LEVOTHYROXINE SODIUM 100 MCG: 100 TABLET ORAL at 05:47

## 2018-08-04 RX ADMIN — LISINOPRIL 10 MG: 10 TABLET ORAL at 10:10

## 2018-08-04 RX ADMIN — ATORVASTATIN CALCIUM 20 MG: 20 TABLET, FILM COATED ORAL at 20:30

## 2018-08-04 RX ADMIN — CIPROFLOXACIN 400 MG: 2 INJECTION, SOLUTION INTRAVENOUS at 21:01

## 2018-08-04 RX ADMIN — METOPROLOL TARTRATE 25 MG: 25 TABLET ORAL at 10:10

## 2018-08-04 RX ADMIN — PANTOPRAZOLE SODIUM 40 MG: 40 INJECTION, POWDER, FOR SOLUTION INTRAVENOUS at 08:31

## 2018-08-04 RX ADMIN — METOPROLOL TARTRATE 25 MG: 25 TABLET ORAL at 20:30

## 2018-08-04 RX ADMIN — MORPHINE SULFATE 2 MG: 2 INJECTION, SOLUTION INTRAMUSCULAR; INTRAVENOUS at 08:31

## 2018-08-04 RX ADMIN — ENOXAPARIN SODIUM 40 MG: 40 INJECTION, SOLUTION INTRAVENOUS; SUBCUTANEOUS at 10:09

## 2018-08-04 RX ADMIN — ASPIRIN 325 MG ORAL TABLET 325 MG: 325 PILL ORAL at 10:10

## 2018-08-04 RX ADMIN — METRONIDAZOLE 500 MG: 500 INJECTION, SOLUTION INTRAVENOUS at 03:23

## 2018-08-04 RX ADMIN — ONDANSETRON 4 MG: 2 INJECTION INTRAMUSCULAR; INTRAVENOUS at 08:31

## 2018-08-04 RX ADMIN — METRONIDAZOLE 500 MG: 500 INJECTION, SOLUTION INTRAVENOUS at 18:55

## 2018-08-04 ASSESSMENT — PAIN SCALES - GENERAL
PAINLEVEL_OUTOF10: 5
PAINLEVEL_OUTOF10: 0

## 2018-08-04 NOTE — PROGRESS NOTES
supine position, prone position and possibly introduce rectal contrast. The abscess catheter placement procedure was discussed and informed consent was obtained. The patient was initially placed in the supine position and axial images through the pelvis were obtained. Note is made of a deep pelvic abscess along the posterior left lateral aspect of the urinary bladder. On the supine images the traversing sigmoid colon prohibited Access to the abscess cavity. The patient was then placed in the prone position and axial images through the pelvis were obtained. Prior to obtain the axial images I introduced rectal contrast the rectal tube. The abscess is surrounded by the redundant sigmoid colon which prohibits access. I explained these findings the patient and that there is no safe entry into the abscess cavity. A call was placed to Dr. Kelsea Hernandez at 4:15 PM and I explained that I could not access the abscess cavity and asked him to review the supine and prone axial images which included rectal contrast. I discussed Dr. Kelsea Hernandez that if the abscess cavity increases over the weekend we can rescan the patient on Monday a.m. and possibly perform an abscess drainage catheter procedure if the abscess cavity enlarges or moves more peripherally. 1. Given the location and size the abscess which is surrounded by the urinary bladder and sigmoid colon I could not access the abscess cavity under CT guidance. These findings were discussed with Dr. Kelsea Hernandez as stated above. Ct Abdomen Pelvis W Iv Contrast Additional Contrast? Oral    Result Date: 8/3/2018  Location: [facility] Indication: Lower abdominal pain. Comparison: Outside CT abdomen/pelvis from 3/9/2018. Technique: Multidetector CT imaging of the abdomen and pelvis was performed after the administration of intravenous contrast (80 cc of [id number]-[id number]). Oral contrast was administered. Coronal and sagittal reformatted images were obtained.  Automated dose exposure control was used for

## 2018-08-05 ENCOUNTER — APPOINTMENT (OUTPATIENT)
Dept: CT IMAGING | Age: 68
DRG: 392 | End: 2018-08-05
Payer: COMMERCIAL

## 2018-08-05 LAB
ANION GAP SERPL CALCULATED.3IONS-SCNC: 10 MMOL/L (ref 7–16)
BASOPHILS ABSOLUTE: 0 E9/L (ref 0–0.2)
BASOPHILS RELATIVE PERCENT: 0.2 % (ref 0–2)
BUN BLDV-MCNC: 5 MG/DL (ref 8–23)
CALCIUM SERPL-MCNC: 8.2 MG/DL (ref 8.6–10.2)
CHLORIDE BLD-SCNC: 97 MMOL/L (ref 98–107)
CK MB: 4.2 NG/ML (ref 0–4.3)
CK MB: 4.4 NG/ML (ref 0–4.3)
CO2: 25 MMOL/L (ref 22–29)
CREAT SERPL-MCNC: 0.6 MG/DL (ref 0.5–1)
EOSINOPHILS ABSOLUTE: 0 E9/L (ref 0.05–0.5)
EOSINOPHILS RELATIVE PERCENT: 0.1 % (ref 0–6)
GFR AFRICAN AMERICAN: >60
GFR NON-AFRICAN AMERICAN: >60 ML/MIN/1.73
GLUCOSE BLD-MCNC: 104 MG/DL (ref 74–109)
HCT VFR BLD CALC: 31.9 % (ref 34–48)
HEMOGLOBIN: 10.8 G/DL (ref 11.5–15.5)
LYMPHOCYTES ABSOLUTE: 1.64 E9/L (ref 1.5–4)
LYMPHOCYTES RELATIVE PERCENT: 6.1 % (ref 20–42)
MCH RBC QN AUTO: 28.9 PG (ref 26–35)
MCHC RBC AUTO-ENTMCNC: 33.9 % (ref 32–34.5)
MCV RBC AUTO: 85.3 FL (ref 80–99.9)
MONOCYTES ABSOLUTE: 1.36 E9/L (ref 0.1–0.95)
MONOCYTES RELATIVE PERCENT: 5.2 % (ref 2–12)
NEUTROPHILS ABSOLUTE: 24.3 E9/L (ref 1.8–7.3)
NEUTROPHILS RELATIVE PERCENT: 88.7 % (ref 43–80)
PDW BLD-RTO: 13.2 FL (ref 11.5–15)
PLATELET # BLD: 260 E9/L (ref 130–450)
PMV BLD AUTO: 9.8 FL (ref 7–12)
POTASSIUM SERPL-SCNC: 3.8 MMOL/L (ref 3.5–5)
RBC # BLD: 3.74 E12/L (ref 3.5–5.5)
SODIUM BLD-SCNC: 132 MMOL/L (ref 132–146)
TOTAL CK: 48 U/L (ref 20–180)
TOTAL CK: 52 U/L (ref 20–180)
TROPONIN: <0.01 NG/ML (ref 0–0.03)
TROPONIN: <0.01 NG/ML (ref 0–0.03)
URINE CULTURE, ROUTINE: NORMAL
WBC # BLD: 27.3 E9/L (ref 4.5–11.5)

## 2018-08-05 PROCEDURE — C9113 INJ PANTOPRAZOLE SODIUM, VIA: HCPCS | Performed by: INTERNAL MEDICINE

## 2018-08-05 PROCEDURE — 2500000003 HC RX 250 WO HCPCS: Performed by: INTERNAL MEDICINE

## 2018-08-05 PROCEDURE — 6360000002 HC RX W HCPCS: Performed by: INTERNAL MEDICINE

## 2018-08-05 PROCEDURE — 6370000000 HC RX 637 (ALT 250 FOR IP): Performed by: INTERNAL MEDICINE

## 2018-08-05 PROCEDURE — 82550 ASSAY OF CK (CPK): CPT

## 2018-08-05 PROCEDURE — 36415 COLL VENOUS BLD VENIPUNCTURE: CPT

## 2018-08-05 PROCEDURE — 2060000000 HC ICU INTERMEDIATE R&B

## 2018-08-05 PROCEDURE — 2580000003 HC RX 258: Performed by: SURGERY

## 2018-08-05 PROCEDURE — 82553 CREATINE MB FRACTION: CPT

## 2018-08-05 PROCEDURE — 99255 IP/OBS CONSLTJ NEW/EST HI 80: CPT | Performed by: SURGERY

## 2018-08-05 PROCEDURE — 85025 COMPLETE CBC W/AUTO DIFF WBC: CPT

## 2018-08-05 PROCEDURE — 80048 BASIC METABOLIC PNL TOTAL CA: CPT

## 2018-08-05 PROCEDURE — 74176 CT ABD & PELVIS W/O CONTRAST: CPT

## 2018-08-05 PROCEDURE — 84484 ASSAY OF TROPONIN QUANT: CPT

## 2018-08-05 PROCEDURE — 6360000004 HC RX CONTRAST MEDICATION: Performed by: RADIOLOGY

## 2018-08-05 PROCEDURE — 6360000002 HC RX W HCPCS: Performed by: SURGERY

## 2018-08-05 PROCEDURE — 2580000003 HC RX 258: Performed by: INTERNAL MEDICINE

## 2018-08-05 RX ORDER — METOPROLOL TARTRATE 5 MG/5ML
5 INJECTION INTRAVENOUS ONCE
Status: COMPLETED | OUTPATIENT
Start: 2018-08-05 | End: 2018-08-05

## 2018-08-05 RX ORDER — DIGOXIN 0.25 MG/ML
125 INJECTION INTRAMUSCULAR; INTRAVENOUS ONCE
Status: COMPLETED | OUTPATIENT
Start: 2018-08-05 | End: 2018-08-05

## 2018-08-05 RX ADMIN — ATORVASTATIN CALCIUM 20 MG: 20 TABLET, FILM COATED ORAL at 21:05

## 2018-08-05 RX ADMIN — ENOXAPARIN SODIUM 40 MG: 40 INJECTION, SOLUTION INTRAVENOUS; SUBCUTANEOUS at 08:32

## 2018-08-05 RX ADMIN — METRONIDAZOLE 500 MG: 500 INJECTION, SOLUTION INTRAVENOUS at 10:55

## 2018-08-05 RX ADMIN — LEVOTHYROXINE SODIUM 100 MCG: 100 TABLET ORAL at 06:19

## 2018-08-05 RX ADMIN — LISINOPRIL 10 MG: 10 TABLET ORAL at 08:32

## 2018-08-05 RX ADMIN — METRONIDAZOLE 500 MG: 500 INJECTION, SOLUTION INTRAVENOUS at 17:52

## 2018-08-05 RX ADMIN — METOPROLOL TARTRATE 25 MG: 25 TABLET ORAL at 08:32

## 2018-08-05 RX ADMIN — ASPIRIN 325 MG ORAL TABLET 325 MG: 325 PILL ORAL at 08:32

## 2018-08-05 RX ADMIN — PANTOPRAZOLE SODIUM 40 MG: 40 INJECTION, POWDER, FOR SOLUTION INTRAVENOUS at 08:32

## 2018-08-05 RX ADMIN — DIGOXIN 125 MCG: 250 INJECTION, SOLUTION INTRAMUSCULAR; INTRAVENOUS at 17:37

## 2018-08-05 RX ADMIN — PIPERACILLIN SODIUM AND TAZOBACTAM SODIUM 3.38 G: 3; .375 INJECTION, POWDER, LYOPHILIZED, FOR SOLUTION INTRAVENOUS at 14:27

## 2018-08-05 RX ADMIN — SODIUM CHLORIDE, POTASSIUM CHLORIDE, SODIUM LACTATE AND CALCIUM CHLORIDE: 600; 310; 30; 20 INJECTION, SOLUTION INTRAVENOUS at 16:29

## 2018-08-05 RX ADMIN — CIPROFLOXACIN 400 MG: 2 INJECTION, SOLUTION INTRAVENOUS at 09:59

## 2018-08-05 RX ADMIN — PIPERACILLIN SODIUM AND TAZOBACTAM SODIUM 3.38 G: 3; .375 INJECTION, POWDER, LYOPHILIZED, FOR SOLUTION INTRAVENOUS at 21:05

## 2018-08-05 RX ADMIN — METRONIDAZOLE 500 MG: 500 INJECTION, SOLUTION INTRAVENOUS at 01:58

## 2018-08-05 RX ADMIN — METOPROLOL TARTRATE 5 MG: 5 INJECTION, SOLUTION INTRAVENOUS at 16:33

## 2018-08-05 RX ADMIN — METOPROLOL TARTRATE 25 MG: 25 TABLET ORAL at 17:39

## 2018-08-05 RX ADMIN — IOHEXOL 50 ML: 240 INJECTION, SOLUTION INTRATHECAL; INTRAVASCULAR; INTRAVENOUS; ORAL at 18:41

## 2018-08-05 ASSESSMENT — PAIN SCALES - GENERAL
PAINLEVEL_OUTOF10: 0
PAINLEVEL_OUTOF10: 0
PAINLEVEL_OUTOF10: 2
PAINLEVEL_OUTOF10: 0

## 2018-08-05 ASSESSMENT — PAIN DESCRIPTION - PAIN TYPE: TYPE: ACUTE PAIN

## 2018-08-05 ASSESSMENT — PAIN DESCRIPTION - LOCATION: LOCATION: ABDOMEN

## 2018-08-05 ASSESSMENT — PAIN DESCRIPTION - ONSET: ONSET: ON-GOING

## 2018-08-05 ASSESSMENT — PAIN DESCRIPTION - FREQUENCY: FREQUENCY: CONTINUOUS

## 2018-08-05 ASSESSMENT — PAIN DESCRIPTION - PROGRESSION: CLINICAL_PROGRESSION: NOT CHANGED

## 2018-08-05 ASSESSMENT — PAIN DESCRIPTION - DESCRIPTORS: DESCRIPTORS: ACHING;DISCOMFORT;SORE;TENDER

## 2018-08-05 NOTE — PROGRESS NOTES
08/03/18   1130  08/04/18   0501  08/05/18   0447   WBC  14.4*   --   23.2*  27.3*   HGB  13.0   --   10.9*  10.8*   PLT  382   --   317  260   NA  131*   --   125*  132   CL  93*   --   93*  97*   K  5.4*   --   3.8  3.8   BUN  11   --   7*  5*   CREATININE  1.0   --   0.7  0.6   GLUCOSE  105   --   106  104   PROTIME   --   12.6*   --    --    INR   --   1.1   --    --    LABALBU  3.6   --    --    --    PROT  7.9   --    --    --    CALCIUM  9.8   --   8.3*  8.2*   MG   --    --   1.7   --    PHOS   --    --   2.6   --    BILITOT  0.2   --    --    --    ALKPHOS  85   --    --    --    AST  17   --    --    --    ALT  11   --    --    --        Physical exam:   VITALS:  Blood pressure 100/62, pulse 72, temperature 97.9 °F (36.6 °C), resp. rate 16, height 5' 3\" (1.6 m), weight 135 lb (61.2 kg), SpO2 95 %. General appearance: alert, appears stated age and cooperative  Head: Normocephalic, without obvious abnormality, atraumatic  Eyes: PERRL, EOMI  Neck: no adenopathy, no carotid bruit, no JVD, supple, symmetrical, trachea midline and thyroid not enlarged, symmetric, no tenderness/mass/nodules  Lungs: clear to auscultation bilaterally  Heart: regular rate and rhythm,   Abdomen: soft,  Tender LLQ; bowel sounds hypoactive; no hernias palpable  Extremities: extremities normal, atraumatic, no cyanosis or edema    ASSESSMENT: diverticular abscess, leukocytosis worsening    PLAN: will add Zosyn for the abscess. If the bowels don't start working, or the pain gets worse, she will need surgery with colostomy.     Signed: Dr. Payal Ordoñez M.D.

## 2018-08-06 ENCOUNTER — APPOINTMENT (OUTPATIENT)
Dept: CT IMAGING | Age: 68
DRG: 392 | End: 2018-08-06
Payer: COMMERCIAL

## 2018-08-06 LAB
ANION GAP SERPL CALCULATED.3IONS-SCNC: 11 MMOL/L (ref 7–16)
BASOPHILS ABSOLUTE: 0.06 E9/L (ref 0–0.2)
BASOPHILS RELATIVE PERCENT: 0.3 % (ref 0–2)
BUN BLDV-MCNC: 6 MG/DL (ref 8–23)
CALCIUM SERPL-MCNC: 7.9 MG/DL (ref 8.6–10.2)
CHLORIDE BLD-SCNC: 100 MMOL/L (ref 98–107)
CK MB: 4.4 NG/ML (ref 0–4.3)
CO2: 22 MMOL/L (ref 22–29)
CREAT SERPL-MCNC: 0.6 MG/DL (ref 0.5–1)
EKG ATRIAL RATE: 64 BPM
EKG P AXIS: 70 DEGREES
EKG P-R INTERVAL: 144 MS
EKG Q-T INTERVAL: 460 MS
EKG QRS DURATION: 96 MS
EKG QTC CALCULATION (BAZETT): 474 MS
EKG R AXIS: 67 DEGREES
EKG T AXIS: 29 DEGREES
EKG VENTRICULAR RATE: 64 BPM
EOSINOPHILS ABSOLUTE: 0.04 E9/L (ref 0.05–0.5)
EOSINOPHILS RELATIVE PERCENT: 0.2 % (ref 0–6)
GFR AFRICAN AMERICAN: >60
GFR NON-AFRICAN AMERICAN: >60 ML/MIN/1.73
GLUCOSE BLD-MCNC: 78 MG/DL (ref 74–109)
HCT VFR BLD CALC: 31.1 % (ref 34–48)
HEMOGLOBIN: 10.4 G/DL (ref 11.5–15.5)
IMMATURE GRANULOCYTES #: 0.18 E9/L
IMMATURE GRANULOCYTES %: 0.9 % (ref 0–5)
LV EF: 65 %
LVEF MODALITY: NORMAL
LYMPHOCYTES ABSOLUTE: 1.32 E9/L (ref 1.5–4)
LYMPHOCYTES RELATIVE PERCENT: 6.4 % (ref 20–42)
MCH RBC QN AUTO: 28.4 PG (ref 26–35)
MCHC RBC AUTO-ENTMCNC: 33.4 % (ref 32–34.5)
MCV RBC AUTO: 85 FL (ref 80–99.9)
MONOCYTES ABSOLUTE: 1.36 E9/L (ref 0.1–0.95)
MONOCYTES RELATIVE PERCENT: 6.6 % (ref 2–12)
NEUTROPHILS ABSOLUTE: 17.76 E9/L (ref 1.8–7.3)
NEUTROPHILS RELATIVE PERCENT: 85.6 % (ref 43–80)
PDW BLD-RTO: 13.2 FL (ref 11.5–15)
PLATELET # BLD: 274 E9/L (ref 130–450)
PMV BLD AUTO: 9.9 FL (ref 7–12)
POTASSIUM SERPL-SCNC: 3.1 MMOL/L (ref 3.5–5)
RBC # BLD: 3.66 E12/L (ref 3.5–5.5)
SODIUM BLD-SCNC: 133 MMOL/L (ref 132–146)
TOTAL CK: 44 U/L (ref 20–180)
TROPONIN: <0.01 NG/ML (ref 0–0.03)
WBC # BLD: 20.7 E9/L (ref 4.5–11.5)

## 2018-08-06 PROCEDURE — 2580000003 HC RX 258: Performed by: SURGERY

## 2018-08-06 PROCEDURE — 2500000003 HC RX 250 WO HCPCS: Performed by: INTERNAL MEDICINE

## 2018-08-06 PROCEDURE — 6370000000 HC RX 637 (ALT 250 FOR IP): Performed by: INTERNAL MEDICINE

## 2018-08-06 PROCEDURE — 71275 CT ANGIOGRAPHY CHEST: CPT

## 2018-08-06 PROCEDURE — 84484 ASSAY OF TROPONIN QUANT: CPT

## 2018-08-06 PROCEDURE — C9113 INJ PANTOPRAZOLE SODIUM, VIA: HCPCS | Performed by: INTERNAL MEDICINE

## 2018-08-06 PROCEDURE — 93306 TTE W/DOPPLER COMPLETE: CPT

## 2018-08-06 PROCEDURE — 82553 CREATINE MB FRACTION: CPT

## 2018-08-06 PROCEDURE — 2060000000 HC ICU INTERMEDIATE R&B

## 2018-08-06 PROCEDURE — 36415 COLL VENOUS BLD VENIPUNCTURE: CPT

## 2018-08-06 PROCEDURE — 82550 ASSAY OF CK (CPK): CPT

## 2018-08-06 PROCEDURE — 6360000002 HC RX W HCPCS: Performed by: SURGERY

## 2018-08-06 PROCEDURE — 80048 BASIC METABOLIC PNL TOTAL CA: CPT

## 2018-08-06 PROCEDURE — 93005 ELECTROCARDIOGRAM TRACING: CPT | Performed by: INTERNAL MEDICINE

## 2018-08-06 PROCEDURE — 85025 COMPLETE CBC W/AUTO DIFF WBC: CPT

## 2018-08-06 PROCEDURE — 6360000004 HC RX CONTRAST MEDICATION: Performed by: RADIOLOGY

## 2018-08-06 PROCEDURE — 99232 SBSQ HOSP IP/OBS MODERATE 35: CPT | Performed by: SURGERY

## 2018-08-06 PROCEDURE — 6360000002 HC RX W HCPCS: Performed by: INTERNAL MEDICINE

## 2018-08-06 RX ORDER — POTASSIUM CHLORIDE 20 MEQ/1
40 TABLET, EXTENDED RELEASE ORAL ONCE
Status: COMPLETED | OUTPATIENT
Start: 2018-08-06 | End: 2018-08-06

## 2018-08-06 RX ADMIN — ENOXAPARIN SODIUM 40 MG: 40 INJECTION, SOLUTION INTRAVENOUS; SUBCUTANEOUS at 10:00

## 2018-08-06 RX ADMIN — PIPERACILLIN SODIUM AND TAZOBACTAM SODIUM 3.38 G: 3; .375 INJECTION, POWDER, LYOPHILIZED, FOR SOLUTION INTRAVENOUS at 05:30

## 2018-08-06 RX ADMIN — METRONIDAZOLE 500 MG: 500 INJECTION, SOLUTION INTRAVENOUS at 10:36

## 2018-08-06 RX ADMIN — CIPROFLOXACIN 400 MG: 2 INJECTION, SOLUTION INTRAVENOUS at 13:55

## 2018-08-06 RX ADMIN — METOPROLOL TARTRATE 25 MG: 25 TABLET ORAL at 17:41

## 2018-08-06 RX ADMIN — METOPROLOL TARTRATE 25 MG: 25 TABLET ORAL at 01:14

## 2018-08-06 RX ADMIN — POTASSIUM CHLORIDE 40 MEQ: 1500 TABLET, EXTENDED RELEASE ORAL at 13:56

## 2018-08-06 RX ADMIN — METRONIDAZOLE 500 MG: 500 INJECTION, SOLUTION INTRAVENOUS at 02:30

## 2018-08-06 RX ADMIN — PANTOPRAZOLE SODIUM 40 MG: 40 INJECTION, POWDER, FOR SOLUTION INTRAVENOUS at 09:27

## 2018-08-06 RX ADMIN — CIPROFLOXACIN 400 MG: 2 INJECTION, SOLUTION INTRAVENOUS at 01:14

## 2018-08-06 RX ADMIN — PIPERACILLIN SODIUM AND TAZOBACTAM SODIUM 3.38 G: 3; .375 INJECTION, POWDER, LYOPHILIZED, FOR SOLUTION INTRAVENOUS at 21:55

## 2018-08-06 RX ADMIN — METRONIDAZOLE 500 MG: 500 INJECTION, SOLUTION INTRAVENOUS at 17:41

## 2018-08-06 RX ADMIN — PIPERACILLIN SODIUM AND TAZOBACTAM SODIUM 3.38 G: 3; .375 INJECTION, POWDER, LYOPHILIZED, FOR SOLUTION INTRAVENOUS at 13:56

## 2018-08-06 RX ADMIN — ATORVASTATIN CALCIUM 20 MG: 20 TABLET, FILM COATED ORAL at 21:55

## 2018-08-06 RX ADMIN — IOPAMIDOL 80 ML: 755 INJECTION, SOLUTION INTRAVENOUS at 11:22

## 2018-08-06 ASSESSMENT — PAIN DESCRIPTION - ORIENTATION: ORIENTATION: LOWER

## 2018-08-06 ASSESSMENT — PAIN DESCRIPTION - FREQUENCY: FREQUENCY: INTERMITTENT

## 2018-08-06 ASSESSMENT — PAIN DESCRIPTION - PAIN TYPE: TYPE: ACUTE PAIN

## 2018-08-06 ASSESSMENT — PAIN SCALES - GENERAL
PAINLEVEL_OUTOF10: 2
PAINLEVEL_OUTOF10: 0
PAINLEVEL_OUTOF10: 0

## 2018-08-06 ASSESSMENT — PAIN DESCRIPTION - DESCRIPTORS: DESCRIPTORS: SHARP

## 2018-08-06 ASSESSMENT — PAIN DESCRIPTION - LOCATION: LOCATION: ABDOMEN

## 2018-08-06 NOTE — PROGRESS NOTES
Olman Kolb from [address] [place] [address] notified RN that patient's rhythm converted from a-fib to sinus rhythm. RN notified Dr. Hero Mishra. No new orders received at this time.

## 2018-08-06 NOTE — PROGRESS NOTES
Internal Medicine Progress Note    David Forte. González Bernard., & [address] González Bernard., F.A.C.O.I. Dorothea Rowan D.O., F.A.C.O.I. Primary Care Physician: Jen Peck DO   Admitting Physician:  Luisa Parra DO  Admission date and time: 8/3/2018  6:48 AM    Room:  [address] [place] [address]  Admitting diagnosis: Colonic diverticular abscess [K57.20]    Patient Name: Melissa Augustine  MRN: [id number]    Date of Service: 8/6/2018     Subjective:    Arley Castro is a 74 y. o.  female who was seen and examined today,8/6/2018, at the bedside. Yesterday the patient did have new onset paroxysmal atrial fibrillation. She was asymptomatic. Transferred to the 6th floor for closer monitoring and telemetry. Patient is sinus rhythm today. Denies any lightheadedness or dizziness. States she's never had atrial fibrillation before. Regard to abdominal pain she states that is much less and at times she has no pain. We discussed her white blood cell count has trended down. Patient states she didn't have a bowel movement but it was liquid with no formed stool. No family present during my examination. I reviewed the patient's past medical, surgical history and medication. I reviewed the  course of events since last visit. Review of System:  Constitutional:   Negative for fever and chills. HEENT:   Negative for ear pain, sore throat, rhinorrhea and sinus pressure. Negative for eye pain, discharge and redness. Psch:   Denies depression or anxiety. Cardiovascular:   Denies any chest pain, irregular heartbeats, or palpitations. Respiratory:   Denies shortness of breath, coughing, sputum production, hemoptysis, or wheezing. Gastrointestinal:   Denies nausea, vomiting, diarrhea, or constipation. Denies any abdominal pain. Positive for flatus and a liquid bowel movement. Extremities:   Denies any lower extremity swelling or edema.   Neurology:    Denies any headache or focal neurological

## 2018-08-06 NOTE — PROGRESS NOTES
RN spoke with Dr. Cris Miller regarding a diet order for patient. Dr. Dulce Trevino stated she would enter an order for clear liquid diet.

## 2018-08-06 NOTE — CONSULTS
at 08/05/18 2105    ciprofloxacin (CIPRO) IVPB 400 mg, 400 mg, Intravenous, Q12H, Dmitri Roland DO, Stopped at 08/06/18 1500    metronidazole (FLAGYL) 500 mg in NaCl 100 mL IVPB premix, 500 mg, Intravenous, Q8H, Dmitri Roland DO, Stopped at 08/06/18 1136    ondansetron [facility]) injection 4 mg, 4 mg, Intravenous, Q6H PRN, Dmitri Roland DO, 4 mg at 08/04/18 1524    pantoprazole (PROTONIX) injection 40 mg, 40 mg, Intravenous, Daily, Dmitri Roland DO, 40 mg at 08/06/18 9124    enoxaparin (LOVENOX) injection 40 mg, 40 mg, Subcutaneous, Daily, Dmitri Roland DO, 40 mg at 08/06/18 1000    morphine (PF) injection 2 mg, 2 mg, Intravenous, Q4H PRN, Dmitri Roland DO, 2 mg at 08/04/18 0831    Allergies as of 08/03/2018    (No Known Allergies)       Social History     Social History    Marital status:      Spouse name: N/A    Number of children: N/A    Years of education: N/A     Occupational History    Not on file.      Social History Main Topics    Smoking status: Current Every Day Smoker     Packs/day: 1.00     Types: Cigarettes    Smokeless tobacco: Never Used    Alcohol use No    Drug use: No    Sexual activity: Not on file     Other Topics Concern    Not on file     Social History Narrative    No narrative on file       Family History   Problem Relation Age of Onset    Cancer Other     Heart Disease Other        REVIEW OF SYSTEMS:     CONSTITUTIONAL:  negative for  fevers, chills, sweats and fatigue  EYES:  negative for  double vision, blurred vision and blind spots  HEENT:  negative for  tinnitus, earaches, nasal congestion and epistaxis  RESPIRATORY:  negative for  dry cough, cough with sputum, dyspnea, wheezing and hemoptysis  CARDIOVASCULAR: as per HPI  GASTROINTESTINAL:  negative for nausea, vomiting, diarrhea, constipation, pruritus and jaundice  GENITOURINARY:  negative for frequency, dysuria, nocturia, urinary incontinence and hesitancy  HEMATOLOGIC/LYMPHATIC:  negative for easy bruising, pending patients clinical progression and diagnostic test findings     I have reviewed my findings and recommendations with patient  Will discuss with Dr. Elizabeth Burns     Thank you for the consult! Electronically signed by WAQAR Wilder CNP on 8/6/2018 at 3:39 PM  I personally reviewed the history,and reviewed labs, radiology and monitor strips. I have reviewed the key elements of all parts of the encounter with  I agree with the assessment, plan and orders as documented by Marcello Potter CNP. NOTE: This report was transcribed using voice recognition software.  Every effort was made to ensure accuracy; however, inadvertent computerized transcription errors may be present

## 2018-08-06 NOTE — PROGRESS NOTES
GENERAL SURGERY  DAILY PROGRESS NOTE  8/6/2018    Subjective:  No issues overnight. Pain improving. WBC down slightly but still elevated. Was tolerating liquids. NPO this am. AFVSS    Objective:  BP (!) 118/58   Pulse 74   Temp 98.2 °F (36.8 °C) (Oral)   Resp 18   Ht 5' 3\" (1.6 m)   Wt 135 lb (61.2 kg)   SpO2 94%   BMI 23.91 kg/m²     GENERAL:  Sitting up at side of bed, awake, alert, cooperative, no apparent distress  HEAD: Normocephalic, atraumatic  EYES: No sclera icterus, pupils equal  LUNGS:  No increased work of breathing  CARDIOVASCULAR:  Reg rate  ABDOMEN:  Soft, non-tender, non-distended  EXTREMITIES: No edema or swelling  SKIN: Warm and dry    Assessment/Plan:  74 y.o. female with Hinchey II diverticulitis. - trend labs: leukocytosis slightly improved, however still elevated  - continue IV abx, added zosyn  - NPO this morning  - will discuss with IR possible drainage of abscess        Electronically signed by Comfort Lombardo MD on 8/6/2018 at 6:46 AM     General Surgery Progress Note  Megan Ann MD, MS    Patient's Name/Date of Birth: Axel Padilla / 1950    Date: August 6, 2018     Surgeon: Jurgen Hess MD    Chief Complaint: abd pain    Patient Active Problem List   Diagnosis    Spinal stenosis    Degeneration of lumbosacral intervertebral disc    Lumbago    Colonic diverticular abscess       Subjective: As above.  No pain, having diarrhea    Objective:  BP (!) 117/55   Pulse 66   Temp 98.1 °F (36.7 °C) (Oral)   Resp 16   Ht 5' 3\" (1.6 m)   Wt 135 lb (61.2 kg)   SpO2 95%   BMI 23.91 kg/m²   Labs:  Recent Labs      08/04/18   0501  08/05/18   0447  08/06/18   0507   WBC  23.2*  27.3*  20.7*   HGB  10.9*  10.8*  10.4*   HCT  31.9*  31.9*  31.1*     Lab Results   Component Value Date    CREATININE 0.6 08/06/2018    BUN 6 (L) 08/06/2018     08/06/2018    K 3.1 (L) 08/06/2018     08/06/2018    CO2 22 08/06/2018     No results for

## 2018-08-07 ENCOUNTER — APPOINTMENT (OUTPATIENT)
Dept: NUCLEAR MEDICINE | Age: 68
DRG: 392 | End: 2018-08-07
Payer: COMMERCIAL

## 2018-08-07 LAB
ANION GAP SERPL CALCULATED.3IONS-SCNC: 6 MMOL/L (ref 7–16)
BASOPHILS ABSOLUTE: 0.07 E9/L (ref 0–0.2)
BASOPHILS RELATIVE PERCENT: 0.6 % (ref 0–2)
BUN BLDV-MCNC: 4 MG/DL (ref 8–23)
CALCIUM SERPL-MCNC: 7.9 MG/DL (ref 8.6–10.2)
CHLORIDE BLD-SCNC: 101 MMOL/L (ref 98–107)
CO2: 25 MMOL/L (ref 22–29)
CREAT SERPL-MCNC: 0.6 MG/DL (ref 0.5–1)
EOSINOPHILS ABSOLUTE: 0.21 E9/L (ref 0.05–0.5)
EOSINOPHILS RELATIVE PERCENT: 1.7 % (ref 0–6)
GFR AFRICAN AMERICAN: >60
GFR NON-AFRICAN AMERICAN: >60 ML/MIN/1.73
GLUCOSE BLD-MCNC: 89 MG/DL (ref 74–109)
HCT VFR BLD CALC: 30.4 % (ref 34–48)
HEMOGLOBIN: 10.4 G/DL (ref 11.5–15.5)
IMMATURE GRANULOCYTES #: 0.16 E9/L
IMMATURE GRANULOCYTES %: 1.3 % (ref 0–5)
LV EF: 92 %
LVEF MODALITY: NORMAL
LYMPHOCYTES ABSOLUTE: 1.26 E9/L (ref 1.5–4)
LYMPHOCYTES RELATIVE PERCENT: 10.2 % (ref 20–42)
MCH RBC QN AUTO: 28.8 PG (ref 26–35)
MCHC RBC AUTO-ENTMCNC: 34.2 % (ref 32–34.5)
MCV RBC AUTO: 84.2 FL (ref 80–99.9)
MONOCYTES ABSOLUTE: 1.08 E9/L (ref 0.1–0.95)
MONOCYTES RELATIVE PERCENT: 8.8 % (ref 2–12)
NEUTROPHILS ABSOLUTE: 9.56 E9/L (ref 1.8–7.3)
NEUTROPHILS RELATIVE PERCENT: 77.4 % (ref 43–80)
PDW BLD-RTO: 13.3 FL (ref 11.5–15)
PLATELET # BLD: 277 E9/L (ref 130–450)
PMV BLD AUTO: 9.5 FL (ref 7–12)
POTASSIUM SERPL-SCNC: 3.1 MMOL/L (ref 3.5–5)
RBC # BLD: 3.61 E12/L (ref 3.5–5.5)
SODIUM BLD-SCNC: 132 MMOL/L (ref 132–146)
WBC # BLD: 12.3 E9/L (ref 4.5–11.5)

## 2018-08-07 PROCEDURE — 36415 COLL VENOUS BLD VENIPUNCTURE: CPT

## 2018-08-07 PROCEDURE — 2060000000 HC ICU INTERMEDIATE R&B

## 2018-08-07 PROCEDURE — 78452 HT MUSCLE IMAGE SPECT MULT: CPT

## 2018-08-07 PROCEDURE — 85025 COMPLETE CBC W/AUTO DIFF WBC: CPT

## 2018-08-07 PROCEDURE — A9500 TC99M SESTAMIBI: HCPCS | Performed by: RADIOLOGY

## 2018-08-07 PROCEDURE — 80048 BASIC METABOLIC PNL TOTAL CA: CPT

## 2018-08-07 PROCEDURE — 6360000002 HC RX W HCPCS: Performed by: SURGERY

## 2018-08-07 PROCEDURE — 6360000002 HC RX W HCPCS: Performed by: INTERNAL MEDICINE

## 2018-08-07 PROCEDURE — 93017 CV STRESS TEST TRACING ONLY: CPT

## 2018-08-07 PROCEDURE — 3430000000 HC RX DIAGNOSTIC RADIOPHARMACEUTICAL: Performed by: RADIOLOGY

## 2018-08-07 PROCEDURE — 6370000000 HC RX 637 (ALT 250 FOR IP): Performed by: INTERNAL MEDICINE

## 2018-08-07 PROCEDURE — 2580000003 HC RX 258: Performed by: SURGERY

## 2018-08-07 PROCEDURE — C9113 INJ PANTOPRAZOLE SODIUM, VIA: HCPCS | Performed by: INTERNAL MEDICINE

## 2018-08-07 PROCEDURE — 6360000002 HC RX W HCPCS: Performed by: NURSE PRACTITIONER

## 2018-08-07 PROCEDURE — 2500000003 HC RX 250 WO HCPCS: Performed by: INTERNAL MEDICINE

## 2018-08-07 RX ORDER — POTASSIUM CHLORIDE 20 MEQ/1
40 TABLET, EXTENDED RELEASE ORAL ONCE
Status: COMPLETED | OUTPATIENT
Start: 2018-08-07 | End: 2018-08-07

## 2018-08-07 RX ADMIN — PIPERACILLIN SODIUM AND TAZOBACTAM SODIUM 3.38 G: 3; .375 INJECTION, POWDER, LYOPHILIZED, FOR SOLUTION INTRAVENOUS at 20:45

## 2018-08-07 RX ADMIN — PIPERACILLIN SODIUM AND TAZOBACTAM SODIUM 3.38 G: 3; .375 INJECTION, POWDER, LYOPHILIZED, FOR SOLUTION INTRAVENOUS at 06:22

## 2018-08-07 RX ADMIN — ATORVASTATIN CALCIUM 20 MG: 20 TABLET, FILM COATED ORAL at 20:46

## 2018-08-07 RX ADMIN — PANTOPRAZOLE SODIUM 40 MG: 40 INJECTION, POWDER, FOR SOLUTION INTRAVENOUS at 08:19

## 2018-08-07 RX ADMIN — POTASSIUM CHLORIDE 40 MEQ: 20 TABLET, EXTENDED RELEASE ORAL at 17:45

## 2018-08-07 RX ADMIN — REGADENOSON 0.4 MG: 0.08 INJECTION, SOLUTION INTRAVENOUS at 14:55

## 2018-08-07 RX ADMIN — METRONIDAZOLE 500 MG: 500 INJECTION, SOLUTION INTRAVENOUS at 10:37

## 2018-08-07 RX ADMIN — CIPROFLOXACIN 400 MG: 2 INJECTION, SOLUTION INTRAVENOUS at 02:59

## 2018-08-07 RX ADMIN — Medication 10 MILLICURIE: at 08:49

## 2018-08-07 RX ADMIN — METRONIDAZOLE 500 MG: 500 INJECTION, SOLUTION INTRAVENOUS at 03:00

## 2018-08-07 RX ADMIN — METOPROLOL TARTRATE 25 MG: 25 TABLET ORAL at 23:44

## 2018-08-07 RX ADMIN — ENOXAPARIN SODIUM 40 MG: 40 INJECTION, SOLUTION INTRAVENOUS; SUBCUTANEOUS at 08:19

## 2018-08-07 RX ADMIN — METOPROLOL TARTRATE 25 MG: 25 TABLET ORAL at 17:41

## 2018-08-07 RX ADMIN — Medication 30 MILLICURIE: at 14:49

## 2018-08-07 ASSESSMENT — PAIN SCALES - GENERAL
PAINLEVEL_OUTOF10: 0

## 2018-08-07 NOTE — PROGRESS NOTES
Internal Medicine Progress Note    [facility]. Ingris Al, & [address] [facility] [address] Ingris Al, F.A.C.O.I. Kim Barker D.O., F.A.C.O.I. Primary Care Physician: Tobi Parson DO   Admitting Physician:  Tiffani Anderson DO  Admission date and time: 8/3/2018  6:48 AM    Room:  [address] [place] [address]  Admitting diagnosis: Colonic diverticular abscess [K57.20]    Patient Name: Anna Sevilla  MRN: [id number]    Date of Service: 8/7/2018     Subjective:    Senia Alas is a 74 y. o.  female who was seen and examined today,8/7/2018, at the bedside. Patient remains in sinus rhythm- no further atrial fibrillation. For stress test today. States she has no abdominal pain. Stools are more pudding texture an dnot as loose. No melena or hematochezia. Denies any lightheadedness or dizziness. No family present during my examination. I reviewed the patient's past medical, surgical history and medication. I reviewed the  course of events since last visit. Review of System:  Constitutional:   Negative for fever and chills. HEENT:   Negative for ear pain, sore throat, rhinorrhea and sinus pressure. Negative for eye pain, discharge and redness. Psch:   Denies depression or anxiety. Cardiovascular:   Denies any chest pain, irregular heartbeats, or palpitations. Respiratory:   Denies shortness of breath, coughing, sputum production, hemoptysis, or wheezing. Gastrointestinal:   Denies nausea, vomiting, diarrhea, or constipation. Denies any abdominal pain. See subjective. Extremities:   Denies any lower extremity swelling or edema. Neurology:    Denies any headache or focal neurological deficits. No weakness or paresthesia. Derm:   Denies any rashes, ulcers, or excoriations. Denies bruising. Genitourinary:    Denies any urgency, frequency, hematuria. Voiding without difficulty.   Musculoskeletal:  Negative for myalgias, back pain and arthralgias      Allergy:  No Known Allergies Medication:  Scheduled Meds:   piperacillin-tazobactam  3.375 g Intravenous Q8H    metoprolol tartrate  25 mg Oral Q8H    aspirin  325 mg Oral Daily    levothyroxine  100 mcg Oral Daily    lisinopril  10 mg Oral Daily    atorvastatin  20 mg Oral Nightly    ciprofloxacin  400 mg Intravenous Q12H    metroNIDAZOLE  500 mg Intravenous Q8H    pantoprazole  40 mg Intravenous Daily    enoxaparin  40 mg Subcutaneous Daily     Continuous Infusions:      Objective Data:  CBC:   Recent Labs      08/05/18   0447  08/06/18   0507  08/07/18   0514   WBC  27.3*  20.7*  12.3*   HGB  10.8*  10.4*  10.4*   PLT  260  274  277     BMP:    Recent Labs      08/05/18   0447  08/06/18   0507  08/07/18   0514   NA  132  133  132   K  3.8  3.1*  3.1*   CL  97*  100  101   CO2  25  22  25   BUN  5*  6*  4*   CREATININE  0.6  0.6  0.6   GLUCOSE  104  78  89     CMP:    Lab Results   Component Value Date     08/07/2018    K 3.1 08/07/2018     08/07/2018    CO2 25 08/07/2018    BUN 4 08/07/2018    CREATININE 0.6 08/07/2018    GFRAA >60 08/07/2018    LABGLOM >60 08/07/2018    GLUCOSE 89 08/07/2018    GLUCOSE 94 12/13/2011    PROT 7.9 08/03/2018    LABALBU 3.6 08/03/2018    LABALBU 4.3 12/13/2011    CALCIUM 7.9 08/07/2018    BILITOT 0.2 08/03/2018    ALKPHOS 85 08/03/2018    AST 17 08/03/2018    ALT 11 08/03/2018     Hepatic:   No results for input(s): AST, ALT, ALB, BILITOT, ALKPHOS in the last 72 hours. Troponin:   Recent Labs      08/05/18   1743  08/05/18   2219  08/06/18   0507   TROPONINI  <0.01  <0.01  <0.01     BNP: No results for input(s): BNP in the last 72 hours. Lipids: No results for input(s): CHOL, HDL in the last 72 hours. Invalid input(s): LDLCALCU  ABGs: No results found for: PHART, PO2ART, OSA8ODF  INR:   No results for input(s): INR, PROTIME in the last 72 hours.   RAD: Ct Abdomen Pelvis Wo Contrast Additional Contrast? Rectal    Result Date: 8/3/2018  Reading location:  21 : 44-year-old female

## 2018-08-07 NOTE — PROGRESS NOTES
Patient is seen in follow-up for atrial fibrillation    Subjective:     Ms. Chelsey Escudero  denies any chest pain, no shortness of breath  Laying in bed in no apparent distress    ROS:  CONSTITUTIONAL:  negative for  fevers, chills  HEENT:  negative for earaches, nasal congestion and epistaxis  RESPIRATORY:  negative for  dry cough, cough with sputum,wheezing and hemoptysis  GASTROINTESTINAL:  negative for nausea, vomiting  MUSCULOSKELETAL:  negative for  myalgias, arthralgias  NEUROLOGICAL:  negative for visual disturbance, dysphagia    Medication side effects: None    Scheduled Meds:   potassium chloride  40 mEq Oral Once    piperacillin-tazobactam  3.375 g Intravenous Q8H    metoprolol tartrate  25 mg Oral Q8H    aspirin  325 mg Oral Daily    levothyroxine  100 mcg Oral Daily    lisinopril  10 mg Oral Daily    atorvastatin  20 mg Oral Nightly    pantoprazole  40 mg Intravenous Daily    enoxaparin  40 mg Subcutaneous Daily     Continuous Infusions:  PRN Meds:perflutren lipid microspheres, regadenoson, ondansetron, morphine      Objective:      Physical Exam:   /62   Pulse 77   Temp 98.1 °F (36.7 °C) (Oral)   Resp 16   Ht 5' 3\" (1.6 m)   Wt 135 lb (61.2 kg)   SpO2 95%   BMI 23.91 kg/m²   CONSTITUTIONAL:  awake, alert, cooperative, no apparent distress, and appears stated age  HEAD:  normocepalic, without obvious abnormality, atraumatic  NECK:  Supple, symmetrical, trachea midline, no adenopathy, thyroid symmetric, not enlarged and no tenderness, skin normal  LUNGS:  No increased work of breathing, good air exchange, clear to auscultation bilaterally, no crackles or wheezing  CARDIOVASCULAR:  Normal apical impulse, regular rate and rhythm, normal S1 and S2, no S3 or S4, and no murmur noted, no edema, no JVD, no carotid bruit. ABDOMEN:  Soft, no masses, no hepatomegaly, no splenomegaly, BS+  MUSCULOSKELETAL:  No clubbing no cyanosis. there is no redness, warmth, or swelling of the joints  full range of adjacent loculated collection   with tiny focus of air, suggestive of an abscess, which abuts the   uterine fundus. 2. Mild diffuse fatty infiltration of the liver. 3. Stable calcified right adrenal nodule. IR ABSCESS DRAINAGE PERC    (Results Pending)   Stress/Rest NM Myocardial SPECT RX    (Results Pending)       Lab Review   Lab Results   Component Value Date     2018    K 3.1 2018     2018    CO2 25 2018    BUN 4 2018    CREATININE 0.6 2018    GLUCOSE 89 2018    GLUCOSE 94 2011    CALCIUM 7.9 2018     Lab Results   Component Value Date    WBC 12.3 2018    HGB 10.4 2018    HCT 30.4 2018    MCV 84.2 2018     2018     I have personally reviewed the laboratory, cardiac diagnostic and radiographic testing as outlined above:    Assessment:   1. Atrial Fibrillation: Paroxsymal. Currently in SR. Will continue current treatment. [id number] VASc  score of 3, will need to be chronically anticoagulated once okay with general surgery and rest of care team.   2. Hypertension: Essential. Controled. 3. Hypothyroid: TSH normal. On synthroid replacement   4. Abdominal Pain: Colonic diverticular abscess, will follow general surgery  5. Tobacco abuse:  6. Significant family history for early CAD: Father and sister  of heart disease in their early 46s      Recommendations:     1. Continue current treatment  2. For Lexiscan stress test today  3. Further cardiac recommendations will be forthcoming pending her clinical course and diagnostic test findings  Discussed with patient    Electronically signed by Maria Alejandra Malone MD on 2018 at 3:06 PM  NOTE: This report was transcribed using voice recognition software.  Every effort was made to ensure accuracy; however, inadvertent computerized transcription errors may be present

## 2018-08-07 NOTE — PROCEDURES
Procedure: Armin Mealy stress test     Ordering physician: Dr. Oh Penaloza  Referring physician: Dr. Beryle Blakes    Indication: Abnormal EKG  Pretest evaluation no chest pain, no shortness of breath  Resting EKG showed: sinus rhythm with nonspecific ST changes     Protocol: Patient was given 0.4mg of Lexiscan followed by Cardiolite injection    Heart rate response:   Resting heart rate: 87 BPM   Stress heart rate: 116 BPM     Blood pressure response:   Resting blood pressure: 116/67 mmHg   Stress blood pressure: 115/66 mmHg     Symptoms and signs:feeling weird.      EKG changes:  Resting EKG nonischemic   Stress EKG : 1 mm horizontal ST depression in the inferior leads    Impression:   Clinical: nonischemic   EKG: Ischemic    Cardiolite injected and nuclear images are pending

## 2018-08-08 LAB
ANION GAP SERPL CALCULATED.3IONS-SCNC: 7 MMOL/L (ref 7–16)
BASOPHILS ABSOLUTE: 0.09 E9/L (ref 0–0.2)
BASOPHILS RELATIVE PERCENT: 0.8 % (ref 0–2)
BUN BLDV-MCNC: 3 MG/DL (ref 8–23)
CALCIUM SERPL-MCNC: 7.7 MG/DL (ref 8.6–10.2)
CHLORIDE BLD-SCNC: 106 MMOL/L (ref 98–107)
CO2: 24 MMOL/L (ref 22–29)
CREAT SERPL-MCNC: 0.6 MG/DL (ref 0.5–1)
EOSINOPHILS ABSOLUTE: 0.4 E9/L (ref 0.05–0.5)
EOSINOPHILS RELATIVE PERCENT: 3.7 % (ref 0–6)
GFR AFRICAN AMERICAN: >60
GFR NON-AFRICAN AMERICAN: >60 ML/MIN/1.73
GLUCOSE BLD-MCNC: 87 MG/DL (ref 74–109)
HCT VFR BLD CALC: 31.1 % (ref 34–48)
HEMOGLOBIN: 10.5 G/DL (ref 11.5–15.5)
IMMATURE GRANULOCYTES #: 0.26 E9/L
IMMATURE GRANULOCYTES %: 2.4 % (ref 0–5)
LYMPHOCYTES ABSOLUTE: 1.4 E9/L (ref 1.5–4)
LYMPHOCYTES RELATIVE PERCENT: 13.1 % (ref 20–42)
MCH RBC QN AUTO: 28.6 PG (ref 26–35)
MCHC RBC AUTO-ENTMCNC: 33.8 % (ref 32–34.5)
MCV RBC AUTO: 84.7 FL (ref 80–99.9)
MONOCYTES ABSOLUTE: 1.09 E9/L (ref 0.1–0.95)
MONOCYTES RELATIVE PERCENT: 10.2 % (ref 2–12)
NEUTROPHILS ABSOLUTE: 7.47 E9/L (ref 1.8–7.3)
NEUTROPHILS RELATIVE PERCENT: 69.8 % (ref 43–80)
PDW BLD-RTO: 13.4 FL (ref 11.5–15)
PLATELET # BLD: 344 E9/L (ref 130–450)
PMV BLD AUTO: 9.6 FL (ref 7–12)
POTASSIUM SERPL-SCNC: 3.4 MMOL/L (ref 3.5–5)
RBC # BLD: 3.67 E12/L (ref 3.5–5.5)
SODIUM BLD-SCNC: 137 MMOL/L (ref 132–146)
WBC # BLD: 10.7 E9/L (ref 4.5–11.5)

## 2018-08-08 PROCEDURE — 6370000000 HC RX 637 (ALT 250 FOR IP): Performed by: INTERNAL MEDICINE

## 2018-08-08 PROCEDURE — 2580000003 HC RX 258: Performed by: SURGERY

## 2018-08-08 PROCEDURE — 6360000002 HC RX W HCPCS: Performed by: INTERNAL MEDICINE

## 2018-08-08 PROCEDURE — 36415 COLL VENOUS BLD VENIPUNCTURE: CPT

## 2018-08-08 PROCEDURE — 2580000003 HC RX 258: Performed by: INTERNAL MEDICINE

## 2018-08-08 PROCEDURE — 6360000002 HC RX W HCPCS: Performed by: SURGERY

## 2018-08-08 PROCEDURE — 80048 BASIC METABOLIC PNL TOTAL CA: CPT

## 2018-08-08 PROCEDURE — 2060000000 HC ICU INTERMEDIATE R&B

## 2018-08-08 PROCEDURE — C9113 INJ PANTOPRAZOLE SODIUM, VIA: HCPCS | Performed by: INTERNAL MEDICINE

## 2018-08-08 PROCEDURE — 85025 COMPLETE CBC W/AUTO DIFF WBC: CPT

## 2018-08-08 RX ORDER — POTASSIUM CHLORIDE 20 MEQ/1
40 TABLET, EXTENDED RELEASE ORAL ONCE
Status: COMPLETED | OUTPATIENT
Start: 2018-08-08 | End: 2018-08-08

## 2018-08-08 RX ORDER — SODIUM CHLORIDE 9 MG/ML
INJECTION, SOLUTION INTRAVENOUS CONTINUOUS
Status: CANCELLED | OUTPATIENT
Start: 2018-08-09

## 2018-08-08 RX ORDER — SODIUM CHLORIDE 0.9 % (FLUSH) 0.9 %
10 SYRINGE (ML) INJECTION 2 TIMES DAILY
Status: DISCONTINUED | OUTPATIENT
Start: 2018-08-08 | End: 2018-08-09 | Stop reason: HOSPADM

## 2018-08-08 RX ORDER — AMOXICILLIN AND CLAVULANATE POTASSIUM 875; 125 MG/1; MG/1
1 TABLET, FILM COATED ORAL 2 TIMES DAILY
Qty: 10 TABLET | Refills: 0 | Status: SHIPPED | OUTPATIENT
Start: 2018-08-08 | End: 2018-08-13

## 2018-08-08 RX ORDER — SODIUM CHLORIDE 0.9 % (FLUSH) 0.9 %
10 SYRINGE (ML) INJECTION EVERY 12 HOURS SCHEDULED
Status: CANCELLED | OUTPATIENT
Start: 2018-08-08

## 2018-08-08 RX ORDER — SODIUM CHLORIDE 0.9 % (FLUSH) 0.9 %
10 SYRINGE (ML) INJECTION PRN
Status: CANCELLED | OUTPATIENT
Start: 2018-08-08

## 2018-08-08 RX ADMIN — ENOXAPARIN SODIUM 40 MG: 40 INJECTION, SOLUTION INTRAVENOUS; SUBCUTANEOUS at 09:24

## 2018-08-08 RX ADMIN — LISINOPRIL 10 MG: 10 TABLET ORAL at 09:25

## 2018-08-08 RX ADMIN — METOPROLOL TARTRATE 25 MG: 25 TABLET ORAL at 18:12

## 2018-08-08 RX ADMIN — PANTOPRAZOLE SODIUM 40 MG: 40 INJECTION, POWDER, FOR SOLUTION INTRAVENOUS at 09:24

## 2018-08-08 RX ADMIN — ATORVASTATIN CALCIUM 20 MG: 20 TABLET, FILM COATED ORAL at 21:25

## 2018-08-08 RX ADMIN — PIPERACILLIN SODIUM AND TAZOBACTAM SODIUM 3.38 G: 3; .375 INJECTION, POWDER, LYOPHILIZED, FOR SOLUTION INTRAVENOUS at 13:13

## 2018-08-08 RX ADMIN — METOPROLOL TARTRATE 25 MG: 25 TABLET ORAL at 09:24

## 2018-08-08 RX ADMIN — ASPIRIN 325 MG ORAL TABLET 325 MG: 325 PILL ORAL at 09:25

## 2018-08-08 RX ADMIN — Medication 10 ML: at 21:26

## 2018-08-08 RX ADMIN — POTASSIUM CHLORIDE 40 MEQ: 20 TABLET, EXTENDED RELEASE ORAL at 09:10

## 2018-08-08 RX ADMIN — PIPERACILLIN SODIUM AND TAZOBACTAM SODIUM 3.38 G: 3; .375 INJECTION, POWDER, LYOPHILIZED, FOR SOLUTION INTRAVENOUS at 21:25

## 2018-08-08 RX ADMIN — PIPERACILLIN SODIUM AND TAZOBACTAM SODIUM 3.38 G: 3; .375 INJECTION, POWDER, LYOPHILIZED, FOR SOLUTION INTRAVENOUS at 05:51

## 2018-08-08 RX ADMIN — LEVOTHYROXINE SODIUM 100 MCG: 100 TABLET ORAL at 05:51

## 2018-08-08 RX ADMIN — Medication 10 ML: at 13:13

## 2018-08-08 ASSESSMENT — PAIN SCALES - GENERAL: PAINLEVEL_OUTOF10: 0

## 2018-08-08 NOTE — PLAN OF CARE
Problem: ELIMINATION  Goal: Ability to achieve and maintain adequate urine output will improve to an amount within specified parameters  Ability to achieve and maintain adequate urine output will improve to an amount within specified parameters    Outcome: Ongoing
Problem: Falls - Risk of:  Goal: Absence of physical injury  Absence of physical injury   Outcome: Ongoing
Problem: Falls - Risk of:  Goal: Will remain free from falls  Will remain free from falls   Outcome: Met This Shift      Problem: Activity:  Goal: Risk for activity intolerance will decrease  Risk for activity intolerance will decrease  Outcome: Met This Shift      Problem:  Bowel/Gastric:  Goal: Bowel function will improve  Bowel function will improve  Outcome: Ongoing      Problem: Fluid Volume:  Goal: Maintenance of adequate hydration will improve  Maintenance of adequate hydration will improve  Outcome: Met This Shift
Problem: Falls - Risk of:  Goal: Will remain free from falls  Will remain free from falls   Outcome: Met This Shift    Goal: Absence of physical injury  Absence of physical injury   Outcome: Met This Shift      Problem: Activity:  Goal: Risk for activity intolerance will decrease  Risk for activity intolerance will decrease   Outcome: Ongoing      Problem:  Bowel/Gastric:  Goal: Bowel function will improve  Bowel function will improve   Outcome: Ongoing    Goal: Diagnostic test results will improve  Diagnostic test results will improve   Outcome: Ongoing    Goal: Occurrences of nausea will decrease  Occurrences of nausea will decrease   Outcome: Ongoing    Goal: Occurrences of vomiting will decrease  Occurrences of vomiting will decrease   Outcome: Ongoing      Problem: Fluid Volume:  Goal: Maintenance of adequate hydration will improve  Maintenance of adequate hydration will improve   Outcome: Ongoing      Problem: Health Behavior:  Goal: Ability to state signs and symptoms to report to health care provider will improve  Ability to state signs and symptoms to report to health care provider will improve   Outcome: Ongoing      Problem: Physical Regulation:  Goal: Complications related to the disease process, condition or treatment will be avoided or minimized  Complications related to the disease process, condition or treatment will be avoided or minimized   Outcome: Ongoing      Problem: Sensory:  Goal: Ability to identify factors that increase the pain will improve  Ability to identify factors that increase the pain will improve   Outcome: Ongoing    Goal: Ability to notify healthcare provider of pain before it becomes unmanageable or unbearable will improve  Ability to notify healthcare provider of pain before it becomes unmanageable or unbearable will improve   Outcome: Ongoing    Goal: Pain level will decrease  Pain level will decrease    Outcome: Ongoing      Problem: Safety/Fall Precautions  Goal: Knowledge -
Problem: Safety/Fall Precautions  Goal: Knowledge - personal safety  Outcome: Ongoing
to notify healthcare provider of pain before it becomes unmanageable or unbearable will improve   Outcome: Met This Shift    Goal: Pain level will decrease  Pain level will decrease    Outcome: Met This Shift      Problem: Safety/Fall Precautions  Goal: Knowledge - personal safety  Outcome: Met This Shift    Goal: Knowledge - personal safety  Outcome: Met This Shift      Problem: Pain:  Goal: Control of acute pain  Control of acute pain   Outcome: Met This Shift    Goal: Control of chronic pain  Control of chronic pain   Outcome: Met This Shift    Goal: Pain level will decrease  Pain level will decrease    Outcome: Met This Shift      Problem: ELIMINATION  Goal: Ability to achieve and maintain adequate urine output will improve to an amount within specified parameters  Ability to achieve and maintain adequate urine output will improve to an amount within specified parameters     Outcome: Met This Shift

## 2018-08-08 NOTE — PROGRESS NOTES
Isovue-370). Oral contrast was administered. Coronal and sagittal reformatted images were obtained. Automated dose exposure control was used for this exam. FINDINGS: Visualized portion bilateral lung bases are clear. There is no pleural effusion. There is no evidence of pneumoperitoneum. The liver is normal in morphology. There is mild diffuse hypoattenuation, suggestive fatty infiltration. The gallbladder, pancreas, and spleen are unremarkable. There is a small hiatal hernia. The remainder the stomach and duodenum are normal in appearance. The small bowel loops are normal in caliber. The appendix is identified and is unremarkable. There are diverticula of the descending and sigmoid colon. There is a short segment of wall thickening with adjacent fat stranding along the sigmoid colon. There is a peripherally enhancing collection adjacent to the sigmoid colon with a tiny focus of air (series 2, image 69), which measures approximately 26 mm x 36 mm, which also abuts the uterus. Redemonstrated is a calcified right adrenal nodule. The left adrenal gland is unremarkable. Bilateral kidneys are normal morphology and demonstrate symmetric enhancement. There is a fluid attenuation cysts at the superior and lower poles of the right kidney. There is no hydronephrosis or hydroureter bilaterally. Urinary bladder is grossly unremarkable. There is diffuse episodic calcification of the abdominal aorta, which is normal in caliber. There is no abdominal, retroperitoneal, or pelvic lymphadenopathy. There is no suspicious lytic or blastic osseous lesion. There is lower lumbar spondylosis with grade 1 anterolisthesis of L4 on L5.     1. Short segment of diffuse wall thickening of the sigmoid colon with adjacent fat stranding, possibly related to acute diverticulitis versus underlying neoplasm. There is an adjacent loculated collection with tiny focus of air, suggestive of an abscess, which abuts the uterine fundus.  2. Mild diffuse fatty hypertension  4. Hypothyroidism  5. Hyperlipidemia  6. Hyponatremia  7. Leukocytosis secondary to #1  8. Chronic diastolic heart failure  9. Mild aortic stenosis    Plan:   Remains in sinus rhythm. Cardiac stress test discussed with cardiology. Dr. Dick Rahman reviewed and feels there were abnormalities therefore will proceed with cardiac catheterization. Tolerating diet and having bowel movements. Abdominal pain is resolved. General surgery is following the patient recommendations have been reviewed. Patient's medications were reviewed/continued/adjusted. Labs as ordered. Please see orders for further plan of care. More than 50% of my  time was spent counseling and/or coordination of care with the patient and/or family with face to face contact. Time was spent reviewing notes and laboratory data, instructing and counseling the patient  regarding my findings and recommendations and reviewing and answer questions. Mark Llamas DO, F.A.C.O.I.   On 8/8/2018  3:00 PM

## 2018-08-08 NOTE — PROGRESS NOTES
Patient is seen in follow-up for atrial fibrillation    Subjective:     Ms. Erick Otto  denies any chest pain, no shortness of breath. Laying in bed in no apparent distress    ROS:  CONSTITUTIONAL:  negative for  fevers, chills  HEENT:  negative for earaches, nasal congestion and epistaxis  RESPIRATORY:  negative for  dry cough, cough with sputum,wheezing and hemoptysis  GASTROINTESTINAL:  negative for nausea, vomiting  MUSCULOSKELETAL:  negative for  myalgias, arthralgias  NEUROLOGICAL:  negative for visual disturbance, dysphagia    Medication side effects: None    Scheduled Meds:   piperacillin-tazobactam  3.375 g Intravenous Q8H    metoprolol tartrate  25 mg Oral Q8H    aspirin  325 mg Oral Daily    levothyroxine  100 mcg Oral Daily    lisinopril  10 mg Oral Daily    atorvastatin  20 mg Oral Nightly    pantoprazole  40 mg Intravenous Daily    enoxaparin  40 mg Subcutaneous Daily     Continuous Infusions:  PRN Meds:perflutren lipid microspheres, ondansetron, morphine      Objective:      Physical Exam:   /64   Pulse 73   Temp 98.4 °F (36.9 °C)   Resp 18   Ht 5' 3\" (1.6 m)   Wt 135 lb (61.2 kg)   SpO2 97%   BMI 23.91 kg/m²   CONSTITUTIONAL:  awake, alert, cooperative, no apparent distress, and appears stated age  HEAD:  normocepalic, without obvious abnormality, atraumatic  NECK:  Supple, symmetrical, trachea midline, no adenopathy, thyroid symmetric, not enlarged and no tenderness, skin normal  LUNGS:  No increased work of breathing, good air exchange, clear to auscultation bilaterally, no crackles or wheezing  CARDIOVASCULAR:  Normal apical impulse, regular rate and rhythm, normal S1 and S2, no S3 or S4, and no murmur noted, no edema, no JVD, no carotid bruit. ABDOMEN:  Soft, no masses, no hepatomegaly, no splenomegaly, BS+  MUSCULOSKELETAL:  No clubbing no cyanosis. there is no redness, warmth, or swelling of the joints  full range of motion noted  NEUROLOGIC:  Alert, awake,oriented x3  SKIN:

## 2018-08-09 ENCOUNTER — HOSPITAL ENCOUNTER (OUTPATIENT)
Age: 68
Discharge: HOME | End: 2018-08-09
Attending: INTERNAL MEDICINE | Admitting: INTERNAL MEDICINE
Payer: COMMERCIAL

## 2018-08-09 ENCOUNTER — HOSPITAL ENCOUNTER (OUTPATIENT)
Age: 68
Discharge: HOME | End: 2018-08-09
Payer: COMMERCIAL

## 2018-08-09 VITALS
SYSTOLIC BLOOD PRESSURE: 120 MMHG | HEIGHT: 63 IN | OXYGEN SATURATION: 98 % | DIASTOLIC BLOOD PRESSURE: 62 MMHG | TEMPERATURE: 98.4 F | BODY MASS INDEX: 23.92 KG/M2 | HEART RATE: 66 BPM | WEIGHT: 135 LBS | RESPIRATION RATE: 18 BRPM

## 2018-08-09 VITALS
DIASTOLIC BLOOD PRESSURE: 70 MMHG | HEART RATE: 66 BPM | RESPIRATION RATE: 18 BRPM | OXYGEN SATURATION: 98 % | WEIGHT: 135 LBS | SYSTOLIC BLOOD PRESSURE: 139 MMHG | HEIGHT: 63 IN | TEMPERATURE: 96.9 F | BODY MASS INDEX: 23.92 KG/M2

## 2018-08-09 DIAGNOSIS — I25.10 CAD IN NATIVE ARTERY: ICD-10-CM

## 2018-08-09 LAB
ABO/RH: NORMAL
ANION GAP SERPL CALCULATED.3IONS-SCNC: 10 MMOL/L (ref 7–16)
ANTIBODY SCREEN: NORMAL
BASOPHILS ABSOLUTE: 0.1 E9/L (ref 0–0.2)
BASOPHILS RELATIVE PERCENT: 1 % (ref 0–2)
BUN BLDV-MCNC: 4 MG/DL (ref 8–23)
CALCIUM SERPL-MCNC: 8.1 MG/DL (ref 8.6–10.2)
CHLORIDE BLD-SCNC: 104 MMOL/L (ref 98–107)
CO2: 27 MMOL/L (ref 22–29)
CREAT SERPL-MCNC: 0.7 MG/DL (ref 0.5–1)
EOSINOPHILS ABSOLUTE: 0.43 E9/L (ref 0.05–0.5)
EOSINOPHILS RELATIVE PERCENT: 4.3 % (ref 0–6)
GFR AFRICAN AMERICAN: >60
GFR NON-AFRICAN AMERICAN: >60 ML/MIN/1.73
GLUCOSE BLD-MCNC: 103 MG/DL (ref 74–109)
HCT VFR BLD CALC: 33.2 % (ref 34–48)
HEMOGLOBIN: 10.9 G/DL (ref 11.5–15.5)
IMMATURE GRANULOCYTES #: 0.35 E9/L
IMMATURE GRANULOCYTES %: 3.5 % (ref 0–5)
LYMPHOCYTES ABSOLUTE: 1.41 E9/L (ref 1.5–4)
LYMPHOCYTES RELATIVE PERCENT: 14.1 % (ref 20–42)
MCH RBC QN AUTO: 28.2 PG (ref 26–35)
MCHC RBC AUTO-ENTMCNC: 32.8 % (ref 32–34.5)
MCV RBC AUTO: 85.8 FL (ref 80–99.9)
MONOCYTES ABSOLUTE: 0.91 E9/L (ref 0.1–0.95)
MONOCYTES RELATIVE PERCENT: 9.1 % (ref 2–12)
NEUTROPHILS ABSOLUTE: 6.8 E9/L (ref 1.8–7.3)
NEUTROPHILS RELATIVE PERCENT: 68 % (ref 43–80)
PDW BLD-RTO: 13.6 FL (ref 11.5–15)
PLATELET # BLD: 345 E9/L (ref 130–450)
PMV BLD AUTO: 9.3 FL (ref 7–12)
POTASSIUM SERPL-SCNC: 3.2 MMOL/L (ref 3.5–5)
RBC # BLD: 3.87 E12/L (ref 3.5–5.5)
SODIUM BLD-SCNC: 141 MMOL/L (ref 132–146)
WBC # BLD: 10 E9/L (ref 4.5–11.5)

## 2018-08-09 PROCEDURE — 85025 COMPLETE CBC W/AUTO DIFF WBC: CPT

## 2018-08-09 PROCEDURE — 80048 BASIC METABOLIC PNL TOTAL CA: CPT

## 2018-08-09 PROCEDURE — 6370000000 HC RX 637 (ALT 250 FOR IP): Performed by: INTERNAL MEDICINE

## 2018-08-09 PROCEDURE — 2580000003 HC RX 258: Performed by: SURGERY

## 2018-08-09 PROCEDURE — 86900 BLOOD TYPING SEROLOGIC ABO: CPT

## 2018-08-09 PROCEDURE — 2500000003 HC RX 250 WO HCPCS

## 2018-08-09 PROCEDURE — C1769 GUIDE WIRE: HCPCS

## 2018-08-09 PROCEDURE — C1894 INTRO/SHEATH, NON-LASER: HCPCS

## 2018-08-09 PROCEDURE — 86901 BLOOD TYPING SEROLOGIC RH(D): CPT

## 2018-08-09 PROCEDURE — A0425 GROUND MILEAGE: HCPCS

## 2018-08-09 PROCEDURE — 2709999900 HC NON-CHARGEABLE SUPPLY

## 2018-08-09 PROCEDURE — 93458 L HRT ARTERY/VENTRICLE ANGIO: CPT | Performed by: INTERNAL MEDICINE

## 2018-08-09 PROCEDURE — 36415 COLL VENOUS BLD VENIPUNCTURE: CPT

## 2018-08-09 PROCEDURE — A0426 ALS 1: HCPCS

## 2018-08-09 PROCEDURE — 2580000003 HC RX 258: Performed by: INTERNAL MEDICINE

## 2018-08-09 PROCEDURE — 6360000002 HC RX W HCPCS: Performed by: INTERNAL MEDICINE

## 2018-08-09 PROCEDURE — 86850 RBC ANTIBODY SCREEN: CPT

## 2018-08-09 PROCEDURE — 6360000002 HC RX W HCPCS: Performed by: SURGERY

## 2018-08-09 PROCEDURE — 6360000002 HC RX W HCPCS

## 2018-08-09 PROCEDURE — C9113 INJ PANTOPRAZOLE SODIUM, VIA: HCPCS | Performed by: INTERNAL MEDICINE

## 2018-08-09 RX ORDER — ACETAMINOPHEN 325 MG/1
650 TABLET ORAL EVERY 4 HOURS PRN
Status: DISCONTINUED | OUTPATIENT
Start: 2018-08-09 | End: 2018-08-10 | Stop reason: HOSPADM

## 2018-08-09 RX ORDER — SODIUM CHLORIDE 9 MG/ML
INJECTION, SOLUTION INTRAVENOUS ONCE
Status: COMPLETED | OUTPATIENT
Start: 2018-08-09 | End: 2018-08-09

## 2018-08-09 RX ORDER — ONDANSETRON 2 MG/ML
4 INJECTION INTRAMUSCULAR; INTRAVENOUS EVERY 6 HOURS PRN
Status: DISCONTINUED | OUTPATIENT
Start: 2018-08-09 | End: 2018-08-10 | Stop reason: HOSPADM

## 2018-08-09 RX ORDER — SODIUM CHLORIDE 0.9 % (FLUSH) 0.9 %
10 SYRINGE (ML) INJECTION EVERY 12 HOURS SCHEDULED
Status: DISCONTINUED | OUTPATIENT
Start: 2018-08-09 | End: 2018-08-10 | Stop reason: HOSPADM

## 2018-08-09 RX ORDER — SODIUM CHLORIDE 0.9 % (FLUSH) 0.9 %
10 SYRINGE (ML) INJECTION PRN
Status: DISCONTINUED | OUTPATIENT
Start: 2018-08-09 | End: 2018-08-10 | Stop reason: HOSPADM

## 2018-08-09 RX ORDER — POTASSIUM CHLORIDE 20 MEQ/1
40 TABLET, EXTENDED RELEASE ORAL ONCE
Status: COMPLETED | OUTPATIENT
Start: 2018-08-09 | End: 2018-08-09

## 2018-08-09 RX ORDER — SODIUM CHLORIDE 9 MG/ML
INJECTION, SOLUTION INTRAVENOUS CONTINUOUS
Status: DISCONTINUED | OUTPATIENT
Start: 2018-08-09 | End: 2018-08-10 | Stop reason: HOSPADM

## 2018-08-09 RX ORDER — ASPIRIN 81 MG/1
81 TABLET ORAL DAILY
Qty: 30 TABLET | Refills: 3 | Status: SHIPPED | OUTPATIENT
Start: 2018-08-09

## 2018-08-09 RX ORDER — PANTOPRAZOLE SODIUM 40 MG/1
40 TABLET, DELAYED RELEASE ORAL DAILY
Qty: 30 TABLET | Refills: 3 | Status: ON HOLD | OUTPATIENT
Start: 2018-08-09 | End: 2021-09-03 | Stop reason: HOSPADM

## 2018-08-09 RX ORDER — SODIUM CHLORIDE 0.9 % (FLUSH) 0.9 %
10 SYRINGE (ML) INJECTION PRN
Status: DISCONTINUED | OUTPATIENT
Start: 2018-08-09 | End: 2018-08-09 | Stop reason: SDUPTHER

## 2018-08-09 RX ORDER — SODIUM CHLORIDE 0.9 % (FLUSH) 0.9 %
10 SYRINGE (ML) INJECTION EVERY 12 HOURS SCHEDULED
Status: DISCONTINUED | OUTPATIENT
Start: 2018-08-09 | End: 2018-08-09 | Stop reason: SDUPTHER

## 2018-08-09 RX ADMIN — PIPERACILLIN SODIUM AND TAZOBACTAM SODIUM 3.38 G: 3; .375 INJECTION, POWDER, LYOPHILIZED, FOR SOLUTION INTRAVENOUS at 05:20

## 2018-08-09 RX ADMIN — SODIUM CHLORIDE: 9 INJECTION, SOLUTION INTRAVENOUS at 15:17

## 2018-08-09 RX ADMIN — METOPROLOL TARTRATE 25 MG: 25 TABLET ORAL at 08:50

## 2018-08-09 RX ADMIN — Medication 10 ML: at 08:50

## 2018-08-09 RX ADMIN — LISINOPRIL 10 MG: 10 TABLET ORAL at 08:50

## 2018-08-09 RX ADMIN — METOPROLOL TARTRATE 25 MG: 25 TABLET ORAL at 01:22

## 2018-08-09 RX ADMIN — PANTOPRAZOLE SODIUM 40 MG: 40 INJECTION, POWDER, FOR SOLUTION INTRAVENOUS at 08:50

## 2018-08-09 RX ADMIN — ASPIRIN 325 MG ORAL TABLET 325 MG: 325 PILL ORAL at 08:50

## 2018-08-09 RX ADMIN — POTASSIUM CHLORIDE 40 MEQ: 1500 TABLET, EXTENDED RELEASE ORAL at 08:50

## 2018-08-09 RX ADMIN — LEVOTHYROXINE SODIUM 100 MCG: 100 TABLET ORAL at 08:50

## 2018-08-09 ASSESSMENT — PAIN SCALES - GENERAL
PAINLEVEL_OUTOF10: 0
PAINLEVEL_OUTOF10: 0

## 2018-08-09 NOTE — OP NOTE
Indication:  1. Abnormal stress test, shortness of breath  2. AUC score 7  3. AUC indication :  4    Procedure: coronary angiography  Anesthesia: Versed, Fentanyl  Time sedation was administered: 1755. I was present in the room when sedation was administered. Procedure end time: 1815  Time spent with face to face monitoring of moderate sedation: 20 minutes      Findings: Very tortuous coronary arteries  Left main: 0 % stenosis  LAD: 0 % stenosis  Circumflex: Mild disease  RCA: Chronic total occlusion with grade 3 left-to-right collaterals    Hemodynamics:   Ao: 440/71 mmHg      Complication: None   Blood loss: Minimal  Contrast used: 50 mL    Post Op diagnosis:  Chronic total occlusion of RCA    PLAN:  Medical therapy

## 2018-08-10 NOTE — PROCEDURES
[address]                   [address] [facility],  [facility]                              CARDIAC CATHETERIZATION    PATIENT NAME: Sherice Turner                      :        1950  MED REC NO:   [id number]                            ROOM:       7884  ACCOUNT NO:   [de-identified]                           ADMIT DATE: 2018  PROVIDER:     Casey Mcgrath MD    DATE OF PROCEDURE:  2018    PROCEDURES:  1. Coronary angiography. 2.  Right radial approach. 3.  Conscious sedation using Versed and fentanyl. INDICATION:  #4 with score of seven. HISTORY:  The patient is a 45-year-old lady who presented to the hospital  with shortness of breath. She was found to have stress test that was read  as normal, upon personal review of the stress test showed significant  inferior and lateral wall reversible defect, the patient was brought to the  cath lab to evaluate the anatomy of her coronary arteries with the  intention to intervene as warranted. DESCRIPTION OF PROCEDURE:  After the appropriate informed consent, the  right wrist area was prepped and draped in the usual sterile fashion. A  time-out was called. The right wrist area was locally anesthetized with 80  ml of 2% lidocaine. A 21-gauge needle was used to access the right radial  artery. A 6-Angolan introducer sheath was used to cannulate the right  radial artery. A 5-Angolan JL-3.5 and 5-Angolan JR-4 catheter were used for  coronary angiography in multiple projections. ANGIOGRAPHIC FINDINGS:  The left main coronary artery arising from the left  sinus of Valsalva. It is a large vessel that has no significant disease. It trifurcates into left anterior descending artery, left circumflex artery  and ramus intermedius artery.     The left anterior descending artery is a very tortuous vessel, actually all  of her vessels are very tortuous and the overlapped significantly, the LAD  has no significant disease. It gives off a two large diagonal branches. The LAD and its diagonal branches have no significant disease. The second  diagonal branch may be has like 30% disease. The ramus intermedius artery also a tortuous vessel without any significant  disease. The left circumflex artery is a large vessel that gives off a  large OM branch and that has like 30% proximal disease. There was grade 3 left-to-right collaterals. The right coronary artery arising from the right sinus of Valsalva. It has  proximal total occlusion. At the end of the procedure, the catheter and the wire were pulled out from  the body, Vasc band was applied to the right wrist area with effective  hemostasis. MEDICATIONS USED DURING THE PROCEDURE:  Intraarterial verapamil to prevent  vasospasm, intra-arterial heparin for anticoagulation. We used Versed and fentanyl for conscious sedation. First dose was given  at [phone], procedure ended at 1815, there was 20 minutes of direct  face-to-face supervision during conscious sedation administration. Total fluoroscopy time was 1.7 minutes. Total contrast used was 50 mL. IMPRESSION:  1. 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 2.  Significantly tortuous and overlapping left coronary arteries. 3.  Mild disease involving a second diagonal and first OM branch. RECOMMENDATIONS:  1. Aggressive coronary artery disease risk-factor modification. 2.  Smoking cessation. 3.  The patient will be observed for 2 hours, discharged home if she meets  discharge criteria.         Dar Baker MD    D: 08/09/2018 18:44:50       T: 08/09/2018 18:47:18     [email or web address]  Job#: [id number]     Doc#: [id number]    CC:  Katy Nair DO

## 2018-08-10 NOTE — PROGRESS NOTES
Patient verbalized understanding of home going instructions and care of her puncture sites with antibacterial soap. Restrictions on lifting, pushing, pulling, flexing and extending her right wrist for a minimum of 3 days. No pets near her puncture site. Gave her a cardiac diet and talked about stopping smoking. She seemed very positive about everything and I gave her some bandaids to protect her puncture site for the next week or so. Gathered all personal items together and got dressed and left via the [address] exit to a private vehicle parked in the Cardiac Rehab building. Will be picking up her new scripts tomorrow and staying at her sisters overnight. Patient left in stable condition.

## 2018-08-10 NOTE — DISCHARGE SUMMARY
Dictate [id number]
history of hyperlipidemia,  hypertension, and hypothyroidism. PHYSICAL EXAMINATION:  VITAL SIGNS:  Showed blood pressure to be 107/64, pulse 58, respirations  18. Afebrile. GENERAL APPEARANCE:  This is a 59-year-old white female who is alert,  responsive; oriented to person, place, and time. HEENT:  Normal.  LUNGS:  Clear. HEART:  Fairly regular. ABDOMEN:  Soft. Tenderness noted in the left quadrant. EXTREMITIES:  No edema. While the patient was in the hospital, she had the following laboratory  studies performed: At the time of discharge, BUN and creatinine were 4 and  0.7 respectively. Electrolytes were satisfactory. Potassium 3.2. Hemoglobin and hematocrit 10.9 and 33.2 respectively, white count 10,000. HOSPITAL COURSE OF STAY:  The patient presented to the hospital here. It  was noted at this time that a CAT scan performed did show abnormalities on  the CAT scan suggesting evidence of diverticular disease with possible  early abscess formation. The patient was admitted to the hospital to the  general surgical floor. Consultation was obtained with Dr. Nelda Santiago and Dr. Israel Loya. The patient was treated with IV hydration therapy and antibiotic  therapy. Over the next day or two, the patient seemed to be stable, but  worsening leukocytosis was noted. At this time, we obtained a repeat CAT  scan thinking this possibly might require surgery. Repeat CAT scan at this  time was improved. For the most part, the patient at this time  did relatively well. On routine exam on rounds on 08/05, it was noted she  was tachycardic. EKG obtained did show new-onset atrial fibrillation, at  which time she was transferred to the telemetry floor. The patient  underwent further diagnostic workup including echocardiogram in addition to  beta-blocker. She did convert to sinus rhythm. The patient was seen by  Dr. Monika Fontenot.   Stress test was obtained, which was felt to be ischemic in  nature, and she was

## 2018-08-14 NOTE — H&P
Department of Medicine  Section of Cardiology  Attending Procedure History and Physical        Pre-Procedural Diagnosis:  Abnormal stress test, shortness of breath    Indications:  Abnormal stress test and shortness of breath    Procedure Planned:  Cardiac catheterization    History obtained from patient    History of Present Illness: The patient is a 74 y.o. female who presents for the above procedure. History of smoking, family history for CAD, has been having shortness of breath, had abnormal stress test, patient is here for cardiac catheterization    Past Medical History:        Diagnosis Date    CAD in native artery 8/9/2018    HIGH CHOLESTEROL     History of stress test 08/07/2018    Lexiscan stress test    Hypertension     Thyroid disease        Past Surgical History:        Procedure Laterality Date    NERVE BLOCK  12 27 2011    NERVE BLOCK  1/24/12    lumbar epidural    NERVE BLOCK  02/21/12    lumbar epidural with flouro     Medications:    Current Outpatient Rx   Medication Sig Dispense Refill    aspirin EC 81 MG EC tablet Take 1 tablet by mouth daily 30 tablet 3    pantoprazole (PROTONIX) 40 MG tablet Take 1 tablet by mouth daily 30 tablet 3    metoprolol tartrate (LOPRESSOR) 25 MG tablet Take 1 tablet by mouth every 8 hours 90 tablet 0    apixaban (ELIQUIS) 5 MG TABS tablet Take 1 tablet by mouth 2 times daily 60 tablet 0    levothyroxine (SYNTHROID) 100 MCG tablet Take 100 mcg by mouth Daily      ZINC PO Take 1 tablet by mouth daily      Omega-3 Fatty Acids (FISH OIL) 1000 MG CAPS Take 1,000 mg by mouth daily      lisinopril (PRINIVIL;ZESTRIL) 10 MG tablet Take 10 mg by mouth daily.  rosuvastatin (CRESTOR) 10 MG tablet Take 10 mg by mouth daily. Allergies:  Patient has no known allergies.     History of allergic reaction to anesthesia:  no    Social History  Current smoker    REVIEW OF SYSTEMS    CONSTITUTIONAL:  negative for  fevers, chills, sweats and fatigue  EYES:  negative for  double vision, blurred vision and blind spots  HEENT:  negative for  tinnitus, earaches, nasal congestion and epistaxis  RESPIRATORY:  negative for  dry cough, cough with sputum, wheezing and hemoptysis  CARDIOVASCULAR: as per HPI  GASTROINTESTINAL:  negative for nausea, vomiting, diarrhea, constipation, pruritus and jaundice  GENITOURINARY:  negative for frequency, dysuria, nocturia, urinary incontinence and hesitancy  HEMATOLOGIC/LYMPHATIC:  negative for easy bruising, bleeding, lymphadenopathy and petechiae  ALLERGIC/IMMUNOLOGIC:  negative for urticaria, hay fever and angioedema  ENDOCRINE:  negative for heat intolerance, cold intolerance, tremor, hair loss and diabetic symptoms including neither polyuria nor polydipsia nor blurred vision  MUSCULOSKELETAL:  negative for  myalgias, arthralgias, joint swelling, stiff joints and decreased range of motion  NEUROLOGICAL:  negative for memory problems, speech problems, visual disturbance, dysphagia, weakness and numbness      PHYSICAL EXAM    /70   Pulse 66   Temp 96.9 °F (36.1 °C) (Oral)   Resp 18   Ht 5' 3\" (1.6 m)   Wt 135 lb (61.2 kg)   SpO2 98%   BMI 23.91 kg/m²   CONSTITUTIONAL:  awake, alert, cooperative, no apparent distress, and appears stated age  HEAD:  normocepalic, without obvious abnormality, atraumatic  NECK:  Supple, symmetrical, trachea midline, no adenopathy, thyroid symmetric, not enlarged and no tenderness, skin normal  LUNGS:  No increased work of breathing, good air exchange, clear to auscultation bilaterally, no crackles or wheezing  CARDIOVASCULAR:  Normal apical impulse, regular rate and rhythm, normal S1 and S2, no S3 or S4, and no murmur noted, no edema, no JVD, no carotid bruit. ABDOMEN:  Soft, nontender, no masses, no hepatomegaly, no splenomegaly, BS+  MUSCULOSKELETAL:  No clubbing no cyanosis. there is no redness, warmth, or swelling of the joints  full range of motion noted  NEUROLOGIC:  Alert, awake,oriented x3  SKIN:  no bruising or bleeding, normal skin color, texture, turgor and no redness, warmth, or swelling    DATA    CBC:    Lab Results   Component Value Date    WBC 10.0 08/09/2018    RBC 3.87 08/09/2018    HGB 10.9 08/09/2018    HCT 33.2 08/09/2018    MCV 85.8 08/09/2018    RDW 13.6 08/09/2018     08/09/2018     Platelet:    Lab Results   Component Value Date     08/09/2018     BUN/Cr:    Lab Results   Component Value Date    BUN 4 08/09/2018     Potassium:    Lab Results   Component Value Date    K 3.2 08/09/2018     PT/INR:  No results found for: PTINR  PTT:  No results found for: APTT      ASSESSMENT AND PLAN    1. Patient is a 74 y.o. female with above specified procedure planned cardiac catheterization under conscious sedation  Expected Sedation/Anesthesia Type:  conscious sedation    2. ASA ([address] Anesthesiology) Anesthesia Status:  1    3. Procedure options, risks and benefits reviewed with Patient. Patient expresses understanding    4. Patient has not been on anticoagulation medications    5. Consent has been signed:   Yes

## 2018-08-22 ENCOUNTER — INITIAL CONSULT (OUTPATIENT)
Dept: SURGERY | Age: 68
End: 2018-08-22
Payer: COMMERCIAL

## 2018-08-22 ENCOUNTER — TELEPHONE (OUTPATIENT)
Dept: SURGERY | Age: 68
End: 2018-08-22

## 2018-08-22 VITALS
TEMPERATURE: 98.8 F | HEART RATE: 61 BPM | RESPIRATION RATE: 14 BRPM | SYSTOLIC BLOOD PRESSURE: 145 MMHG | DIASTOLIC BLOOD PRESSURE: 86 MMHG | HEIGHT: 63 IN | BODY MASS INDEX: 24.45 KG/M2 | WEIGHT: 138 LBS

## 2018-08-22 DIAGNOSIS — K57.20 DIVERTICULITIS OF LARGE INTESTINE WITH PERFORATION AND ABSCESS WITHOUT BLEEDING: Primary | ICD-10-CM

## 2018-08-22 PROCEDURE — 99213 OFFICE O/P EST LOW 20 MIN: CPT | Performed by: SURGERY

## 2018-08-22 NOTE — PROGRESS NOTES
General Surgery History and Physical  [facility]    Patient's Name/Date of Birth: Edwige Nguyen / 1950    Date: August 22, 2018     Surgeon: Marie Singh M.D.    PCP: Patrick Crowe DO     Chief Complaint: LOwer abdominal pain    HPI:   Edwige Nguyen is a 74 y.o. female who presents for evaluation of lower abdomina lpain. Timing is resolving, was constant, radiation to lower abd, alleviated by time and abx and started several weeks ago and severity is 7/10. History of admission for complicated diverticulitis two weeks ago. Has completed her course of abx and feeling better. Denies hx of colonoscopy in the past. No bleeding. Her appetite has improved. Patient Active Problem List   Diagnosis    Spinal stenosis    Degeneration of lumbosacral intervertebral disc    Lumbago    Colonic diverticular abscess    CAD in native artery       Past Medical History:   Diagnosis Date    CAD in native artery 8/9/2018    HIGH CHOLESTEROL     History of stress test 08/07/2018    Lexiscan stress test    Hypertension     Thyroid disease        Past Surgical History:   Procedure Laterality Date    NERVE BLOCK  12 27 2011   [address] BLOCK  1/24/12    lumbar epidural    NERVE BLOCK  02/21/12    lumbar epidural with flouro       No Known Allergies    The patient has a family history that is negative for severe cardiovascular or respiratory issues, negative for reaction to anesthesia. Time spent reviewing past medical, surgical, social and family history, vitals, nursing assessment and images. No changes from above documented history. Social History     Social History    Marital status:      Spouse name: N/A    Number of children: N/A    Years of education: N/A     Occupational History    Not on file.      Social History Main Topics    Smoking status: Current Every Day Smoker     Packs/day: 1.00     Types: Cigarettes    Smokeless tobacco: Never Used    Alcohol use No    Drug There is, however,, residual fat stranding in the region of the   sigmoid colon but no free air, new abnormal fluid collection, or other   complicating feature   3. No new intra-abdominal or intrapelvic abnormality.          Assessment:  Brandon Ames is a 74 y.o. female with complicated diverticulitis, resolving  Patient Active Problem List   Diagnosis    Spinal stenosis    Degeneration of lumbosacral intervertebral disc    Lumbago    Colonic diverticular abscess    CAD in native artery         Plan:  Colonoscopy in 6 weeks for diagnostic purposes  Resume high fiber diet  Resume miralax prn  We discussed pending colonoscopy regarding elective sigmoid resection  Golytely prep        Estee Ceron MD  3:42 PM  8/22/2018

## 2018-08-22 NOTE — TELEPHONE ENCOUNTER
Per the order of Dr. Dima Ambrocio, patient has been scheduled for Colonoscopy on 10.4. 18. Patient provided with verbal instructions during office visit and verbalized understanding. Patient informed that the hospital will be contacting them with the exact time to arrive day of surgery. Patient instructed to please contact our office with any questions. Surgery scheduling form faxed to [facility] surgery scheduling and fax confirmation received. Dr. Dima Ambrocio to enter orders. Chart forwarded to MA to follow up on surgery scheduling, check for pre cert and to schedule post op appointment. Patient instructed by Dr. Dima Ambrocio to hold eliquis on October 2nd and October 3rd and informed that she may restart her eliquis on 10.5. 18. Patient provided this information and verbalized understanding.    Electronically signed by Korey Quarles on 8/22/18 at 3:15 PM

## 2018-09-05 ENCOUNTER — OFFICE VISIT (OUTPATIENT)
Dept: SURGERY | Age: 68
End: 2018-09-05
Payer: COMMERCIAL

## 2018-09-05 VITALS — HEART RATE: 65 BPM | OXYGEN SATURATION: 97 % | SYSTOLIC BLOOD PRESSURE: 130 MMHG | DIASTOLIC BLOOD PRESSURE: 78 MMHG

## 2018-09-05 DIAGNOSIS — K57.20 DIVERTICULITIS OF LARGE INTESTINE WITH PERFORATION AND ABSCESS WITHOUT BLEEDING: Primary | ICD-10-CM

## 2018-09-05 PROCEDURE — 99214 OFFICE O/P EST MOD 30 MIN: CPT | Performed by: SURGERY

## 2018-10-02 RX ORDER — FERROUS SULFATE 325(65) MG
325 TABLET ORAL
COMMUNITY
End: 2019-03-20

## 2018-10-02 NOTE — PROGRESS NOTES
_x__Antibacterial soap ___Hibiclens. 15. Remember to bring Blood Bank bracelet to the hospital on the day of surgery. 14. If you have a Living Will and Durable Power of  for Healthcare, please bring in a copy. 15. If appropriate bring crutches, inspirex, etc... 12. Notify your Surgeon if you develop any illness between now and surgery time, cough, cold, fever, sore throat, nausea, vomiting, etc.  Please notify your surgeon if you experience dizziness, shortness of breath or blurred vision between now & the time of your surgery. 17. If you have _x__dentures, they will be removed before going to the OR; we will provide you a container. If you wear ___contact lenses or ___glasses, they will be removed; please bring a case for them. 18. To provide excellent care visitors will be limited to one in the room at any given time. 19. Please bring picture ID and insurance card. 20. Sleep apnea patients need to bring CPAP to hospital on day of surgery. 21. Visit our web site for additional information: [email or web address]    22. During flu season no children under the age of 15 are permitted in the hospital for the safety of all patients. 23. Other               Please call pre admission testing if you have any further questions.    [address] [facility]     [address]

## 2018-10-04 ENCOUNTER — TELEPHONE (OUTPATIENT)
Dept: SURGERY | Age: 68
End: 2018-10-04

## 2018-10-04 ENCOUNTER — ANESTHESIA (OUTPATIENT)
Dept: ENDOSCOPY | Age: 68
End: 2018-10-04
Payer: COMMERCIAL

## 2018-10-04 ENCOUNTER — HOSPITAL ENCOUNTER (OUTPATIENT)
Age: 68
Setting detail: OUTPATIENT SURGERY
Discharge: HOME | End: 2018-10-04
Attending: SURGERY | Admitting: SURGERY
Payer: COMMERCIAL

## 2018-10-04 ENCOUNTER — ANESTHESIA EVENT (OUTPATIENT)
Dept: ENDOSCOPY | Age: 68
End: 2018-10-04
Payer: COMMERCIAL

## 2018-10-04 VITALS
BODY MASS INDEX: 22.92 KG/M2 | RESPIRATION RATE: 14 BRPM | SYSTOLIC BLOOD PRESSURE: 138 MMHG | TEMPERATURE: 97.1 F | DIASTOLIC BLOOD PRESSURE: 76 MMHG | HEIGHT: 64 IN | WEIGHT: 134.25 LBS | HEART RATE: 80 BPM | OXYGEN SATURATION: 97 %

## 2018-10-04 VITALS — DIASTOLIC BLOOD PRESSURE: 63 MMHG | OXYGEN SATURATION: 95 % | SYSTOLIC BLOOD PRESSURE: 93 MMHG

## 2018-10-04 DIAGNOSIS — K57.20 DIVERTICULITIS OF LARGE INTESTINE WITH PERFORATION AND ABSCESS WITHOUT BLEEDING: Primary | ICD-10-CM

## 2018-10-04 PROCEDURE — 2709999900 HC NON-CHARGEABLE SUPPLY: Performed by: SURGERY

## 2018-10-04 PROCEDURE — 7100000010 HC PHASE II RECOVERY - FIRST 15 MIN: Performed by: SURGERY

## 2018-10-04 PROCEDURE — 2580000003 HC RX 258: Performed by: SURGERY

## 2018-10-04 PROCEDURE — 45378 DIAGNOSTIC COLONOSCOPY: CPT | Performed by: SURGERY

## 2018-10-04 PROCEDURE — 3700000001 HC ADD 15 MINUTES (ANESTHESIA): Performed by: SURGERY

## 2018-10-04 PROCEDURE — 6360000002 HC RX W HCPCS: Performed by: NURSE ANESTHETIST, CERTIFIED REGISTERED

## 2018-10-04 PROCEDURE — 3609027000 HC COLONOSCOPY: Performed by: SURGERY

## 2018-10-04 PROCEDURE — 3700000000 HC ANESTHESIA ATTENDED CARE: Performed by: SURGERY

## 2018-10-04 PROCEDURE — 7100000011 HC PHASE II RECOVERY - ADDTL 15 MIN: Performed by: SURGERY

## 2018-10-04 RX ORDER — SODIUM CHLORIDE 9 MG/ML
INJECTION, SOLUTION INTRAVENOUS CONTINUOUS
Status: DISCONTINUED | OUTPATIENT
Start: 2018-10-04 | End: 2018-10-04 | Stop reason: HOSPADM

## 2018-10-04 RX ORDER — SODIUM CHLORIDE 0.9 % (FLUSH) 0.9 %
10 SYRINGE (ML) INJECTION EVERY 12 HOURS SCHEDULED
Status: DISCONTINUED | OUTPATIENT
Start: 2018-10-04 | End: 2018-10-04 | Stop reason: HOSPADM

## 2018-10-04 RX ORDER — SODIUM CHLORIDE 0.9 % (FLUSH) 0.9 %
10 SYRINGE (ML) INJECTION PRN
Status: DISCONTINUED | OUTPATIENT
Start: 2018-10-04 | End: 2018-10-04 | Stop reason: HOSPADM

## 2018-10-04 RX ORDER — FENTANYL CITRATE 50 UG/ML
INJECTION, SOLUTION INTRAMUSCULAR; INTRAVENOUS PRN
Status: DISCONTINUED | OUTPATIENT
Start: 2018-10-04 | End: 2018-10-04 | Stop reason: SDUPTHER

## 2018-10-04 RX ORDER — PROPOFOL 10 MG/ML
INJECTION, EMULSION INTRAVENOUS PRN
Status: DISCONTINUED | OUTPATIENT
Start: 2018-10-04 | End: 2018-10-04 | Stop reason: SDUPTHER

## 2018-10-04 RX ADMIN — SODIUM CHLORIDE: 9 INJECTION, SOLUTION INTRAVENOUS at 09:40

## 2018-10-04 RX ADMIN — PROPOFOL 100 MG: 10 INJECTION, EMULSION INTRAVENOUS at 11:45

## 2018-10-04 RX ADMIN — FENTANYL CITRATE 50 MCG: 50 INJECTION, SOLUTION INTRAMUSCULAR; INTRAVENOUS at 11:40

## 2018-10-04 RX ADMIN — PROPOFOL 40 MG: 10 INJECTION, EMULSION INTRAVENOUS at 11:40

## 2018-10-04 RX ADMIN — FENTANYL CITRATE 50 MCG: 50 INJECTION, SOLUTION INTRAMUSCULAR; INTRAVENOUS at 11:38

## 2018-10-04 RX ADMIN — PROPOFOL 100 MG: 10 INJECTION, EMULSION INTRAVENOUS at 11:38

## 2018-10-04 ASSESSMENT — PAIN SCALES - GENERAL
PAINLEVEL_OUTOF10: 0
PAINLEVEL_OUTOF10: 0

## 2018-10-04 ASSESSMENT — PAIN - FUNCTIONAL ASSESSMENT: PAIN_FUNCTIONAL_ASSESSMENT: 0-10

## 2018-10-04 ASSESSMENT — LIFESTYLE VARIABLES: SMOKING_STATUS: 1

## 2018-10-04 ASSESSMENT — COPD QUESTIONNAIRES: CAT_SEVERITY: MODERATE

## 2018-10-04 ASSESSMENT — ENCOUNTER SYMPTOMS: SHORTNESS OF BREATH: 0

## 2018-10-04 NOTE — ANESTHESIA PRE PROCEDURE
Department of Anesthesiology  Preprocedure Note       Name:  Conway Lesches   Age:  74 y.o.  :  1950                                          MRN:  [id number]         Date:  10/4/2018      Surgeon: Miguel Alegre):  Julia Villegas MD    Procedure: Procedure(s):  COLONOSCOPY    Medications prior to admission:   Prior to Admission medications    Medication Sig Start Date End Date Taking? Authorizing Provider   ferrous sulfate 325 (65 Fe) MG tablet Take 325 mg by mouth daily (with breakfast)   Yes Historical Provider, MD   aspirin EC 81 MG EC tablet Take 1 tablet by mouth daily 18  Yes Annemarie Chung MD   pantoprazole (PROTONIX) 40 MG tablet Take 1 tablet by mouth daily 18  Yes Annemarie Chung MD   metoprolol tartrate (LOPRESSOR) 25 MG tablet Take 1 tablet by mouth every 8 hours 18  Yes Selene Llamas DO   apixaban (ELIQUIS) 5 MG TABS tablet Take 1 tablet by mouth 2 times daily 18  Yes Selene Llamas DO   levothyroxine (SYNTHROID) 100 MCG tablet Take 100 mcg by mouth Daily   Yes Historical Provider, MD   ZINC PO Take 1 tablet by mouth daily   Yes Historical Provider, MD   Omega-3 Fatty Acids (FISH OIL) 1000 MG CAPS Take 1,000 mg by mouth daily   Yes Historical Provider, MD   lisinopril (PRINIVIL;ZESTRIL) 10 MG tablet Take 10 mg by mouth daily. Yes Historical Provider, MD   rosuvastatin (CRESTOR) 10 MG tablet Take 10 mg by mouth daily.      Yes Historical Provider, MD       Current medications:    Current Facility-Administered Medications   Medication Dose Route Frequency Provider Last Rate Last Dose    0.9 % sodium chloride infusion   Intravenous Continuous Julia Villegas MD        sodium chloride flush 0.9 % injection 10 mL  10 mL Intravenous 2 times per day Julia Villegas MD        sodium chloride flush 0.9 % injection 10 mL  10 mL Intravenous PRN Julia Villegas MD           Allergies:  No Known Allergies    Problem List:    Patient Active Problem List   Diagnosis Code    Spinal

## 2018-10-04 NOTE — ANESTHESIA POSTPROCEDURE EVALUATION
Department of Anesthesiology  Postprocedure Note    Patient: Mason Villegas  MRN: [id number]  YOB: 1950  Date of evaluation: 10/4/2018  Time:  1:50 PM     Procedure Summary     Date:  10/04/18 Room / Location:  [place] [address] / [facility] ENDOSCOPY    Anesthesia Start:  1134 Anesthesia Stop:  5228    Procedure:  COLONOSCOPY (N/A ) Diagnosis:  (DIVERTICULITIS)    Surgeon:  Jaz Pool MD Responsible Provider:  Eden Acosta MD    Anesthesia Type:  MAC ASA Status:  3          Anesthesia Type: MAC    Brett Phase I: Brett Score: 10    Brett Phase II: Brett Score: 10    Last vitals: Reviewed and per EMR flowsheets.        Anesthesia Post Evaluation    Patient location during evaluation: PACU  Patient participation: complete - patient participated  Level of consciousness: awake and alert  Airway patency: patent  Nausea & Vomiting: no vomiting and no nausea  Complications: no  Cardiovascular status: hemodynamically stable  Respiratory status: acceptable  Hydration status: stable

## 2018-10-04 NOTE — H&P
negative for severe cardiovascular or respiratory issues, negative for reaction to anesthesia.      Time spent reviewing past medical, surgical, social and family history, vitals, nursing assessment and images.  No changes from above documented history.     Social History   Social History            Social History    Marital status:        Spouse name: N/A    Number of children: N/A    Years of education: N/A          Occupational History    Not on file.            Social History Main Topics    Smoking status: Current Every Day Smoker       Packs/day: 0.50       Types: Cigarettes    Smokeless tobacco: Never Used    Alcohol use No    Drug use: No    Sexual activity: Not on file           Other Topics Concern    Not on file          Social History Narrative    No narrative on file                  Review of Systems  Review of Systems -  General ROS: negative for - chills, fatigue or malaise  ENT ROS: negative for - hearing change, nasal congestion or nasal discharge  Allergy and Immunology ROS: negative for - hives, itchy/watery eyes or nasal congestion  Hematological and Lymphatic ROS: negative for - blood clots, blood transfusions, bruising or fatigue  Endocrine ROS: negative for - malaise/lethargy, mood swings, palpitations or polydipsia/polyuria  Respiratory ROS: negative for - sputum changes, stridor, tachypnea or wheezing  Cardiovascular ROS: negative for - irregular heartbeat, loss of consciousness, murmur or orthopnea  Gastrointestinal ROS: negative for - constipation, diarrhea, gas/bloating, heartburn or hematemesis  Genito-Urinary ROS: negative for -  genital discharge, genital ulcers or hematuria  Musculoskeletal ROS: negative for - gait disturbance, muscle pain or muscular weakness  Psych/Soc ROS: Neg suicidal ideation or visual/auditory hallucinations     Physical exam:   /78   Pulse 65   SpO2 97%   General appearance:  NAD  Head: NCAT, PERRLA, EOMI, red conjunctiva  Neck:

## 2018-10-05 ENCOUNTER — HOSPITAL ENCOUNTER (OUTPATIENT)
Dept: GENERAL RADIOLOGY | Age: 68
Discharge: HOME | End: 2018-10-07
Payer: COMMERCIAL

## 2018-10-05 DIAGNOSIS — K57.20 DIVERTICULITIS OF LARGE INTESTINE WITH PERFORATION AND ABSCESS WITHOUT BLEEDING: ICD-10-CM

## 2018-10-05 PROCEDURE — 74018 RADEX ABDOMEN 1 VIEW: CPT

## 2018-10-17 ENCOUNTER — OFFICE VISIT (OUTPATIENT)
Dept: SURGERY | Age: 68
End: 2018-10-17
Payer: COMMERCIAL

## 2018-10-17 VITALS
HEIGHT: 64 IN | BODY MASS INDEX: 22.88 KG/M2 | TEMPERATURE: 97.6 F | RESPIRATION RATE: 14 BRPM | DIASTOLIC BLOOD PRESSURE: 82 MMHG | SYSTOLIC BLOOD PRESSURE: 122 MMHG | WEIGHT: 134 LBS | HEART RATE: 84 BPM

## 2018-10-17 DIAGNOSIS — K56.699 SIGMOID STRICTURE (HCC): Primary | ICD-10-CM

## 2018-10-17 PROCEDURE — 99213 OFFICE O/P EST LOW 20 MIN: CPT | Performed by: SURGERY

## 2018-10-17 RX ORDER — DOCUSATE SODIUM 100 MG/1
100 CAPSULE, LIQUID FILLED ORAL DAILY PRN
Qty: 30 CAPSULE | Refills: 0 | Status: SHIPPED | OUTPATIENT
Start: 2018-10-17 | End: 2019-03-20

## 2018-10-17 RX ORDER — NEOMYCIN SULFATE 500 MG/1
1000 TABLET ORAL 3 TIMES DAILY
Qty: 3 TABLET | Refills: 0 | Status: SHIPPED | OUTPATIENT
Start: 2018-10-17 | End: 2018-10-18

## 2018-10-17 RX ORDER — ERYTHROMYCIN 500 MG/1
500 TABLET, COATED ORAL 3 TIMES DAILY
Qty: 6 TABLET | Refills: 0 | Status: SHIPPED | OUTPATIENT
Start: 2018-10-17 | End: 2018-10-18

## 2018-10-17 NOTE — PROGRESS NOTES
conjunctiva  Neck: supple, no masses  Lungs:Equal chest rise bilateral  Heart: Reg rate  Abdomen: soft, nontender, nondistended  Rectal: No bleeding  Skin; no lesions  Gu: no cva tenderness  Extremities: extremities normal, atraumatic, no cyanosis or edema  Psychosocial: No auditory or visual hallucinations    Cscope: Incomplete due to stricture at sigmoid    BE:   Impression   Relative high grade obstruction mid sigmoid segment of   colon. Malignant versus benign stricture. Assessment:  Alvaro Batres is a 74 y.o. female with sigmoid stricture  Patient Active Problem List   Diagnosis    Spinal stenosis    Degeneration of lumbosacral intervertebral disc    Lumbago    Colonic diverticular abscess    CAD in native artery         Plan:  OR for lap robot assisted sigmoid colon resection  Discussed the risk, benefits and alternatives of surgery including wound infections, bleeding, scar, leak, repeat surgery, colostomy and hernia formation and the risks of general anesthetic including MI, CVA, sudden death or reactions to anesthetic medications. The patient understands the risks and alternatives and the possibility of converting to an open procedure. All questions were answered to the patient's satisfaction and they freely signed the consent.      Mag citrate and dulcolax prep  Neomycin and Emycin prep    She would like to wait until after holidays  As she is asymtomatic now and feeling well we will schedule for later   Advised to return if develops signs of pain or obstruction      Daisy Lan MD  5:27 PM  10/17/2018

## 2018-10-19 ENCOUNTER — TELEPHONE (OUTPATIENT)
Dept: SURGERY | Age: 68
End: 2018-10-19

## 2018-10-19 NOTE — TELEPHONE ENCOUNTER
Per Lizette Espitia at [facility] ([phone]) Prior Auth required for procedure Lap robot assisted sigmoid colectomy, CPT # C3678132, at [facility] on 11/13/18. Auth number [id number]. Three day stay has been approved. If more days is needed info is to be faxed to [phone]. Information faxed to Liban Drake 2 Week post op appointment scheduled for 11/28/18.  Electronically signed by Aline Don on 10/19/2018 at 1:29 PM

## 2018-11-09 ENCOUNTER — HOSPITAL ENCOUNTER (OUTPATIENT)
Dept: PREADMISSION TESTING | Age: 68
Discharge: HOME | End: 2018-11-09
Payer: COMMERCIAL

## 2018-11-09 ENCOUNTER — TELEPHONE (OUTPATIENT)
Dept: SURGERY | Age: 68
End: 2018-11-09

## 2018-11-09 VITALS
SYSTOLIC BLOOD PRESSURE: 126 MMHG | HEIGHT: 64 IN | WEIGHT: 132.2 LBS | DIASTOLIC BLOOD PRESSURE: 80 MMHG | RESPIRATION RATE: 24 BRPM | OXYGEN SATURATION: 99 % | BODY MASS INDEX: 22.57 KG/M2 | HEART RATE: 77 BPM | TEMPERATURE: 98.1 F

## 2018-11-09 DIAGNOSIS — Z01.818 PRE-OP TESTING: Primary | ICD-10-CM

## 2018-11-09 LAB
ANION GAP SERPL CALCULATED.3IONS-SCNC: 12 MMOL/L (ref 7–16)
BUN BLDV-MCNC: 8 MG/DL (ref 8–23)
CALCIUM SERPL-MCNC: 8.7 MG/DL (ref 8.6–10.2)
CHLORIDE BLD-SCNC: 101 MMOL/L (ref 98–107)
CO2: 25 MMOL/L (ref 22–29)
CREAT SERPL-MCNC: 0.5 MG/DL (ref 0.5–1)
GFR AFRICAN AMERICAN: >60
GFR NON-AFRICAN AMERICAN: >60 ML/MIN/1.73
GLUCOSE BLD-MCNC: 98 MG/DL (ref 74–99)
HCT VFR BLD CALC: 36.2 % (ref 34–48)
HEMOGLOBIN: 11.9 G/DL (ref 11.5–15.5)
MAGNESIUM: 1.9 MG/DL (ref 1.6–2.6)
MCH RBC QN AUTO: 28.4 PG (ref 26–35)
MCHC RBC AUTO-ENTMCNC: 32.9 % (ref 32–34.5)
MCV RBC AUTO: 86.4 FL (ref 80–99.9)
PDW BLD-RTO: 16.4 FL (ref 11.5–15)
PLATELET # BLD: 236 E9/L (ref 130–450)
PMV BLD AUTO: 10.8 FL (ref 7–12)
POTASSIUM REFLEX MAGNESIUM: 3.5 MMOL/L (ref 3.5–5)
RBC # BLD: 4.19 E12/L (ref 3.5–5.5)
SODIUM BLD-SCNC: 138 MMOL/L (ref 132–146)
WBC # BLD: 15.3 E9/L (ref 4.5–11.5)

## 2018-11-09 PROCEDURE — 87081 CULTURE SCREEN ONLY: CPT

## 2018-11-09 PROCEDURE — 83735 ASSAY OF MAGNESIUM: CPT

## 2018-11-09 PROCEDURE — 85027 COMPLETE CBC AUTOMATED: CPT

## 2018-11-09 PROCEDURE — 36415 COLL VENOUS BLD VENIPUNCTURE: CPT

## 2018-11-09 PROCEDURE — 80048 BASIC METABOLIC PNL TOTAL CA: CPT

## 2018-11-09 NOTE — TELEPHONE ENCOUNTER
Jeri from [facility] called in stating patient wanted to know if patient should stop taking Aspirin 81mg or Eliquis. Pt states she took the 5am dose but has not taken the 5pm dose. Per Dr Licea Care is to be stopped 5 days before surgery and Ok to stop today for surgery Tuesday. Izabela Hernandez from pretesting was reached and states Erlin Matthew is away from her desk, message was given to her to give to Erlin Castro in regards to patient.  Electronically signed by Star Collins MA on 11/9/18 at 2:47 PM

## 2018-11-11 LAB — MRSA CULTURE ONLY: NORMAL

## 2018-11-13 ENCOUNTER — HOSPITAL ENCOUNTER (INPATIENT)
Age: 68
LOS: 3 days | Discharge: HOME HEALTH | DRG: 854 | End: 2018-11-16
Attending: SURGERY | Admitting: SURGERY
Payer: COMMERCIAL

## 2018-11-13 ENCOUNTER — ANESTHESIA EVENT (OUTPATIENT)
Dept: OPERATING ROOM | Age: 68
DRG: 854 | End: 2018-11-13
Payer: COMMERCIAL

## 2018-11-13 ENCOUNTER — ANESTHESIA (OUTPATIENT)
Dept: OPERATING ROOM | Age: 68
DRG: 854 | End: 2018-11-13
Payer: COMMERCIAL

## 2018-11-13 VITALS
RESPIRATION RATE: 10 BRPM | DIASTOLIC BLOOD PRESSURE: 54 MMHG | OXYGEN SATURATION: 100 % | TEMPERATURE: 97 F | SYSTOLIC BLOOD PRESSURE: 100 MMHG

## 2018-11-13 DIAGNOSIS — G89.18 POSTOPERATIVE PAIN: Primary | ICD-10-CM

## 2018-11-13 PROBLEM — Z90.49 S/P COLON RESECTION: Status: ACTIVE | Noted: 2018-11-13

## 2018-11-13 LAB
BASOPHILS ABSOLUTE: 0 E9/L (ref 0–0.2)
BASOPHILS RELATIVE PERCENT: 0.2 % (ref 0–2)
EOSINOPHILS ABSOLUTE: 0 E9/L (ref 0.05–0.5)
EOSINOPHILS RELATIVE PERCENT: 0 % (ref 0–6)
HCT VFR BLD CALC: 38.3 % (ref 34–48)
HEMOGLOBIN: 13 G/DL (ref 11.5–15.5)
LYMPHOCYTES ABSOLUTE: 0.98 E9/L (ref 1.5–4)
LYMPHOCYTES RELATIVE PERCENT: 4.3 % (ref 20–42)
MCH RBC QN AUTO: 29 PG (ref 26–35)
MCHC RBC AUTO-ENTMCNC: 33.9 % (ref 32–34.5)
MCV RBC AUTO: 85.5 FL (ref 80–99.9)
METAMYELOCYTES RELATIVE PERCENT: 0.9 % (ref 0–1)
MONOCYTES ABSOLUTE: 0.98 E9/L (ref 0.1–0.95)
MONOCYTES RELATIVE PERCENT: 4.3 % (ref 2–12)
NEUTROPHILS ABSOLUTE: 22.3 E9/L (ref 1.8–7.3)
NEUTROPHILS RELATIVE PERCENT: 90.5 % (ref 43–80)
PDW BLD-RTO: 16.2 FL (ref 11.5–15)
PLATELET # BLD: 334 E9/L (ref 130–450)
PMV BLD AUTO: 9.7 FL (ref 7–12)
RBC # BLD: 4.48 E12/L (ref 3.5–5.5)
RBC # BLD: NORMAL 10*6/UL
WBC # BLD: 24.5 E9/L (ref 4.5–11.5)

## 2018-11-13 PROCEDURE — 0DTN4ZZ RESECTION OF SIGMOID COLON, PERCUTANEOUS ENDOSCOPIC APPROACH: ICD-10-PCS | Performed by: SURGERY

## 2018-11-13 PROCEDURE — 88307 TISSUE EXAM BY PATHOLOGIST: CPT

## 2018-11-13 PROCEDURE — 3600000009 HC SURGERY ROBOT BASE: Performed by: SURGERY

## 2018-11-13 PROCEDURE — C9113 INJ PANTOPRAZOLE SODIUM, VIA: HCPCS | Performed by: SURGERY

## 2018-11-13 PROCEDURE — 3600000019 HC SURGERY ROBOT ADDTL 15MIN: Performed by: SURGERY

## 2018-11-13 PROCEDURE — 87186 SC STD MICRODIL/AGAR DIL: CPT

## 2018-11-13 PROCEDURE — 8E0W4CZ ROBOTIC ASSISTED PROCEDURE OF TRUNK REGION, PERCUTANEOUS ENDOSCOPIC APPROACH: ICD-10-PCS | Performed by: SURGERY

## 2018-11-13 PROCEDURE — 2780000010 HC IMPLANT OTHER: Performed by: SURGERY

## 2018-11-13 PROCEDURE — 2700000000 HC OXYGEN THERAPY PER DAY

## 2018-11-13 PROCEDURE — 3700000000 HC ANESTHESIA ATTENDED CARE: Performed by: SURGERY

## 2018-11-13 PROCEDURE — 2709999900 HC NON-CHARGEABLE SUPPLY: Performed by: SURGERY

## 2018-11-13 PROCEDURE — 7100000000 HC PACU RECOVERY - FIRST 15 MIN: Performed by: SURGERY

## 2018-11-13 PROCEDURE — 2580000003 HC RX 258: Performed by: SURGERY

## 2018-11-13 PROCEDURE — 85025 COMPLETE CBC W/AUTO DIFF WBC: CPT

## 2018-11-13 PROCEDURE — 2500000003 HC RX 250 WO HCPCS: Performed by: NURSE ANESTHETIST, CERTIFIED REGISTERED

## 2018-11-13 PROCEDURE — 6360000002 HC RX W HCPCS: Performed by: ANESTHESIOLOGY

## 2018-11-13 PROCEDURE — 6360000002 HC RX W HCPCS: Performed by: NURSE ANESTHETIST, CERTIFIED REGISTERED

## 2018-11-13 PROCEDURE — 7100000001 HC PACU RECOVERY - ADDTL 15 MIN: Performed by: SURGERY

## 2018-11-13 PROCEDURE — 1200000000 HC SEMI PRIVATE

## 2018-11-13 PROCEDURE — 0BH17EZ INSERTION OF ENDOTRACHEAL AIRWAY INTO TRACHEA, VIA NATURAL OR ARTIFICIAL OPENING: ICD-10-PCS | Performed by: ANESTHESIOLOGY

## 2018-11-13 PROCEDURE — 6360000002 HC RX W HCPCS: Performed by: SURGERY

## 2018-11-13 PROCEDURE — 2500000003 HC RX 250 WO HCPCS: Performed by: SURGERY

## 2018-11-13 PROCEDURE — 0W9J4ZZ DRAINAGE OF PELVIC CAVITY, PERCUTANEOUS ENDOSCOPIC APPROACH: ICD-10-PCS | Performed by: SURGERY

## 2018-11-13 PROCEDURE — 87077 CULTURE AEROBIC IDENTIFY: CPT

## 2018-11-13 PROCEDURE — 6370000000 HC RX 637 (ALT 250 FOR IP): Performed by: SURGERY

## 2018-11-13 PROCEDURE — 87070 CULTURE OTHR SPECIMN AEROBIC: CPT

## 2018-11-13 PROCEDURE — S2900 ROBOTIC SURGICAL SYSTEM: HCPCS | Performed by: SURGERY

## 2018-11-13 PROCEDURE — 2720000010 HC SURG SUPPLY STERILE: Performed by: SURGERY

## 2018-11-13 PROCEDURE — 36415 COLL VENOUS BLD VENIPUNCTURE: CPT

## 2018-11-13 PROCEDURE — 87205 SMEAR GRAM STAIN: CPT

## 2018-11-13 PROCEDURE — 6360000002 HC RX W HCPCS

## 2018-11-13 PROCEDURE — 0D1M0Z4 BYPASS DESCENDING COLON TO CUTANEOUS, OPEN APPROACH: ICD-10-PCS | Performed by: SURGERY

## 2018-11-13 PROCEDURE — 3700000001 HC ADD 15 MINUTES (ANESTHESIA): Performed by: SURGERY

## 2018-11-13 PROCEDURE — 5A1935Z RESPIRATORY VENTILATION, LESS THAN 24 CONSECUTIVE HOURS: ICD-10-PCS | Performed by: ANESTHESIOLOGY

## 2018-11-13 RX ORDER — SODIUM CHLORIDE 0.9 % (FLUSH) 0.9 %
10 SYRINGE (ML) INJECTION PRN
Status: DISCONTINUED | OUTPATIENT
Start: 2018-11-13 | End: 2018-11-16 | Stop reason: HOSPADM

## 2018-11-13 RX ORDER — GLYCOPYRROLATE 1 MG/5 ML
SYRINGE (ML) INTRAVENOUS PRN
Status: DISCONTINUED | OUTPATIENT
Start: 2018-11-13 | End: 2018-11-13 | Stop reason: SDUPTHER

## 2018-11-13 RX ORDER — LABETALOL HYDROCHLORIDE 5 MG/ML
5 INJECTION, SOLUTION INTRAVENOUS EVERY 10 MIN PRN
Status: DISCONTINUED | OUTPATIENT
Start: 2018-11-13 | End: 2018-11-13 | Stop reason: HOSPADM

## 2018-11-13 RX ORDER — BUPIVACAINE HYDROCHLORIDE AND EPINEPHRINE 2.5; 5 MG/ML; UG/ML
INJECTION, SOLUTION EPIDURAL; INFILTRATION; INTRACAUDAL; PERINEURAL PRN
Status: DISCONTINUED | OUTPATIENT
Start: 2018-11-13 | End: 2018-11-13 | Stop reason: HOSPADM

## 2018-11-13 RX ORDER — NALOXONE HYDROCHLORIDE 0.4 MG/ML
0.06 INJECTION, SOLUTION INTRAMUSCULAR; INTRAVENOUS; SUBCUTANEOUS ONCE
Status: DISCONTINUED | OUTPATIENT
Start: 2018-11-13 | End: 2018-11-13 | Stop reason: ALTCHOICE

## 2018-11-13 RX ORDER — ACETAMINOPHEN 325 MG/1
650 TABLET ORAL EVERY 4 HOURS PRN
Status: DISCONTINUED | OUTPATIENT
Start: 2018-11-13 | End: 2018-11-16 | Stop reason: HOSPADM

## 2018-11-13 RX ORDER — SODIUM CHLORIDE, SODIUM LACTATE, POTASSIUM CHLORIDE, CALCIUM CHLORIDE 600; 310; 30; 20 MG/100ML; MG/100ML; MG/100ML; MG/100ML
INJECTION, SOLUTION INTRAVENOUS CONTINUOUS
Status: DISCONTINUED | OUTPATIENT
Start: 2018-11-13 | End: 2018-11-15

## 2018-11-13 RX ORDER — OXYCODONE HYDROCHLORIDE AND ACETAMINOPHEN 5; 325 MG/1; MG/1
1 TABLET ORAL EVERY 4 HOURS PRN
Status: DISCONTINUED | OUTPATIENT
Start: 2018-11-13 | End: 2018-11-16 | Stop reason: HOSPADM

## 2018-11-13 RX ORDER — LEVOTHYROXINE SODIUM 0.1 MG/1
100 TABLET ORAL DAILY
Status: DISCONTINUED | OUTPATIENT
Start: 2018-11-14 | End: 2018-11-16 | Stop reason: HOSPADM

## 2018-11-13 RX ORDER — MEPERIDINE HYDROCHLORIDE 25 MG/ML
12.5 INJECTION INTRAMUSCULAR; INTRAVENOUS; SUBCUTANEOUS EVERY 5 MIN PRN
Status: DISCONTINUED | OUTPATIENT
Start: 2018-11-13 | End: 2018-11-13 | Stop reason: HOSPADM

## 2018-11-13 RX ORDER — FERROUS SULFATE 325(65) MG
325 TABLET ORAL
Status: DISCONTINUED | OUTPATIENT
Start: 2018-11-14 | End: 2018-11-16 | Stop reason: HOSPADM

## 2018-11-13 RX ORDER — PROPOFOL 10 MG/ML
INJECTION, EMULSION INTRAVENOUS PRN
Status: DISCONTINUED | OUTPATIENT
Start: 2018-11-13 | End: 2018-11-13 | Stop reason: SDUPTHER

## 2018-11-13 RX ORDER — KETOROLAC TROMETHAMINE 15 MG/ML
15 INJECTION, SOLUTION INTRAMUSCULAR; INTRAVENOUS EVERY 6 HOURS
Status: COMPLETED | OUTPATIENT
Start: 2018-11-13 | End: 2018-11-15

## 2018-11-13 RX ORDER — OXYCODONE HYDROCHLORIDE AND ACETAMINOPHEN 5; 325 MG/1; MG/1
2 TABLET ORAL EVERY 4 HOURS PRN
Status: DISCONTINUED | OUTPATIENT
Start: 2018-11-13 | End: 2018-11-16 | Stop reason: HOSPADM

## 2018-11-13 RX ORDER — SODIUM CHLORIDE 0.9 % (FLUSH) 0.9 %
SYRINGE (ML) INJECTION
Status: DISPENSED
Start: 2018-11-13 | End: 2018-11-14

## 2018-11-13 RX ORDER — ONDANSETRON 2 MG/ML
4 INJECTION INTRAMUSCULAR; INTRAVENOUS EVERY 6 HOURS PRN
Status: DISCONTINUED | OUTPATIENT
Start: 2018-11-13 | End: 2018-11-16 | Stop reason: HOSPADM

## 2018-11-13 RX ORDER — HEPARIN SODIUM 5000 [USP'U]/ML
5000 INJECTION, SOLUTION INTRAVENOUS; SUBCUTANEOUS ONCE
Status: COMPLETED | OUTPATIENT
Start: 2018-11-13 | End: 2018-11-13

## 2018-11-13 RX ORDER — ONDANSETRON 2 MG/ML
INJECTION INTRAMUSCULAR; INTRAVENOUS PRN
Status: DISCONTINUED | OUTPATIENT
Start: 2018-11-13 | End: 2018-11-13 | Stop reason: SDUPTHER

## 2018-11-13 RX ORDER — SODIUM CHLORIDE 0.9 % (FLUSH) 0.9 %
10 SYRINGE (ML) INJECTION EVERY 12 HOURS SCHEDULED
Status: DISCONTINUED | OUTPATIENT
Start: 2018-11-13 | End: 2018-11-13 | Stop reason: HOSPADM

## 2018-11-13 RX ORDER — LISINOPRIL 10 MG/1
10 TABLET ORAL DAILY
Status: DISCONTINUED | OUTPATIENT
Start: 2018-11-13 | End: 2018-11-14

## 2018-11-13 RX ORDER — PANTOPRAZOLE SODIUM 40 MG/10ML
40 INJECTION, POWDER, LYOPHILIZED, FOR SOLUTION INTRAVENOUS DAILY
Status: DISCONTINUED | OUTPATIENT
Start: 2018-11-13 | End: 2018-11-16 | Stop reason: HOSPADM

## 2018-11-13 RX ORDER — SODIUM CHLORIDE 0.9 % (FLUSH) 0.9 %
10 SYRINGE (ML) INJECTION EVERY 12 HOURS SCHEDULED
Status: DISCONTINUED | OUTPATIENT
Start: 2018-11-13 | End: 2018-11-16 | Stop reason: HOSPADM

## 2018-11-13 RX ORDER — ROCURONIUM BROMIDE 10 MG/ML
INJECTION, SOLUTION INTRAVENOUS PRN
Status: DISCONTINUED | OUTPATIENT
Start: 2018-11-13 | End: 2018-11-13 | Stop reason: SDUPTHER

## 2018-11-13 RX ORDER — FENTANYL CITRATE 50 UG/ML
INJECTION, SOLUTION INTRAMUSCULAR; INTRAVENOUS PRN
Status: DISCONTINUED | OUTPATIENT
Start: 2018-11-13 | End: 2018-11-13 | Stop reason: SDUPTHER

## 2018-11-13 RX ORDER — NALOXONE HYDROCHLORIDE 0.4 MG/ML
INJECTION, SOLUTION INTRAMUSCULAR; INTRAVENOUS; SUBCUTANEOUS
Status: COMPLETED
Start: 2018-11-13 | End: 2018-11-13

## 2018-11-13 RX ORDER — DEXAMETHASONE SODIUM PHOSPHATE 10 MG/ML
4 INJECTION, SOLUTION INTRAMUSCULAR; INTRAVENOUS ONCE
Status: COMPLETED | OUTPATIENT
Start: 2018-11-13 | End: 2018-11-13

## 2018-11-13 RX ORDER — MORPHINE SULFATE 2 MG/ML
2 INJECTION, SOLUTION INTRAMUSCULAR; INTRAVENOUS
Status: DISCONTINUED | OUTPATIENT
Start: 2018-11-13 | End: 2018-11-16 | Stop reason: HOSPADM

## 2018-11-13 RX ORDER — DEXAMETHASONE SODIUM PHOSPHATE 10 MG/ML
INJECTION, SOLUTION INTRAMUSCULAR; INTRAVENOUS
Status: COMPLETED
Start: 2018-11-13 | End: 2018-11-13

## 2018-11-13 RX ORDER — NEOSTIGMINE METHYLSULFATE 0.5 MG/ML
INJECTION, SOLUTION INTRAVENOUS PRN
Status: DISCONTINUED | OUTPATIENT
Start: 2018-11-13 | End: 2018-11-13 | Stop reason: SDUPTHER

## 2018-11-13 RX ORDER — MIDAZOLAM HYDROCHLORIDE 1 MG/ML
INJECTION INTRAMUSCULAR; INTRAVENOUS PRN
Status: DISCONTINUED | OUTPATIENT
Start: 2018-11-13 | End: 2018-11-13 | Stop reason: SDUPTHER

## 2018-11-13 RX ORDER — CIPROFLOXACIN 2 MG/ML
400 INJECTION, SOLUTION INTRAVENOUS ONCE
Status: COMPLETED | OUTPATIENT
Start: 2018-11-13 | End: 2018-11-13

## 2018-11-13 RX ORDER — DEXAMETHASONE SODIUM PHOSPHATE 10 MG/ML
INJECTION INTRAMUSCULAR; INTRAVENOUS PRN
Status: DISCONTINUED | OUTPATIENT
Start: 2018-11-13 | End: 2018-11-13 | Stop reason: SDUPTHER

## 2018-11-13 RX ORDER — PROMETHAZINE HYDROCHLORIDE 25 MG/ML
25 INJECTION, SOLUTION INTRAMUSCULAR; INTRAVENOUS PRN
Status: DISCONTINUED | OUTPATIENT
Start: 2018-11-13 | End: 2018-11-13 | Stop reason: HOSPADM

## 2018-11-13 RX ORDER — SODIUM CHLORIDE, SODIUM LACTATE, POTASSIUM CHLORIDE, CALCIUM CHLORIDE 600; 310; 30; 20 MG/100ML; MG/100ML; MG/100ML; MG/100ML
INJECTION, SOLUTION INTRAVENOUS CONTINUOUS
Status: DISCONTINUED | OUTPATIENT
Start: 2018-11-13 | End: 2018-11-13

## 2018-11-13 RX ORDER — MORPHINE SULFATE 4 MG/ML
4 INJECTION, SOLUTION INTRAMUSCULAR; INTRAVENOUS
Status: DISCONTINUED | OUTPATIENT
Start: 2018-11-13 | End: 2018-11-16 | Stop reason: HOSPADM

## 2018-11-13 RX ORDER — 0.9 % SODIUM CHLORIDE 0.9 %
10 VIAL (ML) INJECTION DAILY
Status: DISCONTINUED | OUTPATIENT
Start: 2018-11-13 | End: 2018-11-16 | Stop reason: HOSPADM

## 2018-11-13 RX ORDER — ROSUVASTATIN CALCIUM 10 MG/1
10 TABLET, COATED ORAL DAILY
Status: DISCONTINUED | OUTPATIENT
Start: 2018-11-13 | End: 2018-11-16 | Stop reason: HOSPADM

## 2018-11-13 RX ORDER — SODIUM CHLORIDE 0.9 % (FLUSH) 0.9 %
10 SYRINGE (ML) INJECTION PRN
Status: DISCONTINUED | OUTPATIENT
Start: 2018-11-13 | End: 2018-11-13 | Stop reason: HOSPADM

## 2018-11-13 RX ORDER — NALOXONE HYDROCHLORIDE 0.4 MG/ML
0.2 INJECTION, SOLUTION INTRAMUSCULAR; INTRAVENOUS; SUBCUTANEOUS PRN
Status: DISCONTINUED | OUTPATIENT
Start: 2018-11-13 | End: 2018-11-16 | Stop reason: HOSPADM

## 2018-11-13 RX ADMIN — NALOXONE HYDROCHLORIDE 0.2 MG: 0.4 INJECTION, SOLUTION INTRAMUSCULAR; INTRAVENOUS; SUBCUTANEOUS at 14:40

## 2018-11-13 RX ADMIN — MIDAZOLAM 2 MG: 1 INJECTION INTRAMUSCULAR; INTRAVENOUS at 15:00

## 2018-11-13 RX ADMIN — HYDROMORPHONE HYDROCHLORIDE 0.25 MG: 1 INJECTION, SOLUTION INTRAMUSCULAR; INTRAVENOUS; SUBCUTANEOUS at 18:38

## 2018-11-13 RX ADMIN — HEPARIN SODIUM 5000 UNITS: 5000 INJECTION, SOLUTION INTRAVENOUS; SUBCUTANEOUS at 13:36

## 2018-11-13 RX ADMIN — NALOXONE HYDROCHLORIDE 0.06 MG: 0.4 INJECTION, SOLUTION INTRAMUSCULAR; INTRAVENOUS; SUBCUTANEOUS at 14:45

## 2018-11-13 RX ADMIN — SODIUM CHLORIDE, POTASSIUM CHLORIDE, SODIUM LACTATE AND CALCIUM CHLORIDE: 600; 310; 30; 20 INJECTION, SOLUTION INTRAVENOUS at 21:45

## 2018-11-13 RX ADMIN — DEXAMETHASONE SODIUM PHOSPHATE 4 MG: 10 INJECTION INTRAMUSCULAR; INTRAVENOUS at 18:00

## 2018-11-13 RX ADMIN — METRONIDAZOLE 500 MG: 500 INJECTION, SOLUTION INTRAVENOUS at 13:13

## 2018-11-13 RX ADMIN — SODIUM CHLORIDE, POTASSIUM CHLORIDE, SODIUM LACTATE AND CALCIUM CHLORIDE: 600; 310; 30; 20 INJECTION, SOLUTION INTRAVENOUS at 13:04

## 2018-11-13 RX ADMIN — LIDOCAINE HYDROCHLORIDE 80 MG: 20 INJECTION, SOLUTION INTRAVENOUS at 15:02

## 2018-11-13 RX ADMIN — PANTOPRAZOLE SODIUM 40 MG: 40 INJECTION, POWDER, FOR SOLUTION INTRAVENOUS at 21:32

## 2018-11-13 RX ADMIN — SODIUM CHLORIDE, POTASSIUM CHLORIDE, SODIUM LACTATE AND CALCIUM CHLORIDE: 600; 310; 30; 20 INJECTION, SOLUTION INTRAVENOUS at 15:00

## 2018-11-13 RX ADMIN — NEOSTIGMINE METHYLSULFATE 3 MG: 0.5 INJECTION INTRAVENOUS at 17:10

## 2018-11-13 RX ADMIN — ROSUVASTATIN CALCIUM 10 MG: 10 TABLET, FILM COATED ORAL at 21:32

## 2018-11-13 RX ADMIN — Medication 0.6 MG: at 17:10

## 2018-11-13 RX ADMIN — PROPOFOL 180 MG: 10 INJECTION, EMULSION INTRAVENOUS at 15:02

## 2018-11-13 RX ADMIN — TAZOBACTAM SODIUM AND PIPERACILLIN SODIUM 3.38 G: 375; 3 INJECTION, SOLUTION INTRAVENOUS at 21:32

## 2018-11-13 RX ADMIN — HYDROMORPHONE HYDROCHLORIDE 0.25 MG: 1 INJECTION, SOLUTION INTRAMUSCULAR; INTRAVENOUS; SUBCUTANEOUS at 18:48

## 2018-11-13 RX ADMIN — FENTANYL CITRATE 100 MCG: 50 INJECTION, SOLUTION INTRAMUSCULAR; INTRAVENOUS at 15:02

## 2018-11-13 RX ADMIN — DEXAMETHASONE SODIUM PHOSPHATE 10 MG: 10 INJECTION INTRAMUSCULAR; INTRAVENOUS at 15:02

## 2018-11-13 RX ADMIN — ONDANSETRON 4 MG: 2 INJECTION INTRAMUSCULAR; INTRAVENOUS at 16:43

## 2018-11-13 RX ADMIN — CIPROFLOXACIN 400 MG: 2 INJECTION INTRAVENOUS at 15:00

## 2018-11-13 RX ADMIN — ROCURONIUM BROMIDE 40 MG: 10 INJECTION, SOLUTION INTRAVENOUS at 15:02

## 2018-11-13 RX ADMIN — DEXAMETHASONE SODIUM PHOSPHATE 4 MG: 10 INJECTION, SOLUTION INTRAMUSCULAR; INTRAVENOUS at 18:00

## 2018-11-13 RX ADMIN — KETOROLAC TROMETHAMINE 15 MG: 15 INJECTION, SOLUTION INTRAMUSCULAR; INTRAVENOUS at 21:32

## 2018-11-13 RX ADMIN — Medication 10 ML: at 21:45

## 2018-11-13 ASSESSMENT — PAIN SCALES - GENERAL
PAINLEVEL_OUTOF10: 0
PAINLEVEL_OUTOF10: 3
PAINLEVEL_OUTOF10: 6
PAINLEVEL_OUTOF10: 0
PAINLEVEL_OUTOF10: 5

## 2018-11-13 ASSESSMENT — PULMONARY FUNCTION TESTS
PIF_VALUE: 24
PIF_VALUE: 33
PIF_VALUE: 39
PIF_VALUE: 27
PIF_VALUE: 25
PIF_VALUE: 19
PIF_VALUE: 29
PIF_VALUE: 20
PIF_VALUE: 25
PIF_VALUE: 27
PIF_VALUE: 34
PIF_VALUE: 25
PIF_VALUE: 28
PIF_VALUE: 18
PIF_VALUE: 29
PIF_VALUE: 32
PIF_VALUE: 27
PIF_VALUE: 28
PIF_VALUE: 30
PIF_VALUE: 26
PIF_VALUE: 32
PIF_VALUE: 31
PIF_VALUE: 25
PIF_VALUE: 27
PIF_VALUE: 19
PIF_VALUE: 35
PIF_VALUE: 29
PIF_VALUE: 30
PIF_VALUE: 29
PIF_VALUE: 27
PIF_VALUE: 25
PIF_VALUE: 26
PIF_VALUE: 36
PIF_VALUE: 27
PIF_VALUE: 28
PIF_VALUE: 29
PIF_VALUE: 35
PIF_VALUE: 25
PIF_VALUE: 27
PIF_VALUE: 25
PIF_VALUE: 19
PIF_VALUE: 0
PIF_VALUE: 19
PIF_VALUE: 29
PIF_VALUE: 32
PIF_VALUE: 26
PIF_VALUE: 27
PIF_VALUE: 25
PIF_VALUE: 27
PIF_VALUE: 28
PIF_VALUE: 26
PIF_VALUE: 27
PIF_VALUE: 29
PIF_VALUE: 41
PIF_VALUE: 25
PIF_VALUE: 29
PIF_VALUE: 22
PIF_VALUE: 25
PIF_VALUE: 21
PIF_VALUE: 2
PIF_VALUE: 41
PIF_VALUE: 29
PIF_VALUE: 32
PIF_VALUE: 20
PIF_VALUE: 28
PIF_VALUE: 27
PIF_VALUE: 25
PIF_VALUE: 27
PIF_VALUE: 27
PIF_VALUE: 24
PIF_VALUE: 29
PIF_VALUE: 27
PIF_VALUE: 33
PIF_VALUE: 19
PIF_VALUE: 23
PIF_VALUE: 39
PIF_VALUE: 29
PIF_VALUE: 33
PIF_VALUE: 25
PIF_VALUE: 33
PIF_VALUE: 27
PIF_VALUE: 27
PIF_VALUE: 26
PIF_VALUE: 21
PIF_VALUE: 35
PIF_VALUE: 26
PIF_VALUE: 24
PIF_VALUE: 0
PIF_VALUE: 31
PIF_VALUE: 19
PIF_VALUE: 32
PIF_VALUE: 27
PIF_VALUE: 29
PIF_VALUE: 29
PIF_VALUE: 26
PIF_VALUE: 27
PIF_VALUE: 25
PIF_VALUE: 27
PIF_VALUE: 29
PIF_VALUE: 27
PIF_VALUE: 2
PIF_VALUE: 3
PIF_VALUE: 27
PIF_VALUE: 27
PIF_VALUE: 34
PIF_VALUE: 20
PIF_VALUE: 29
PIF_VALUE: 33
PIF_VALUE: 33
PIF_VALUE: 25
PIF_VALUE: 27
PIF_VALUE: 29
PIF_VALUE: 28
PIF_VALUE: 18
PIF_VALUE: 27
PIF_VALUE: 29
PIF_VALUE: 25
PIF_VALUE: 0
PIF_VALUE: 24
PIF_VALUE: 25
PIF_VALUE: 27
PIF_VALUE: 34
PIF_VALUE: 33
PIF_VALUE: 27
PIF_VALUE: 19
PIF_VALUE: 19
PIF_VALUE: 0
PIF_VALUE: 27
PIF_VALUE: 30
PIF_VALUE: 20
PIF_VALUE: 27
PIF_VALUE: 27
PIF_VALUE: 24
PIF_VALUE: 29
PIF_VALUE: 18
PIF_VALUE: 34
PIF_VALUE: 29

## 2018-11-13 ASSESSMENT — COPD QUESTIONNAIRES: CAT_SEVERITY: MODERATE

## 2018-11-13 ASSESSMENT — PAIN DESCRIPTION - ORIENTATION: ORIENTATION: LOWER

## 2018-11-13 ASSESSMENT — PAIN DESCRIPTION - PAIN TYPE: TYPE: CHRONIC PAIN

## 2018-11-13 ASSESSMENT — ENCOUNTER SYMPTOMS: SHORTNESS OF BREATH: 0

## 2018-11-13 ASSESSMENT — PAIN DESCRIPTION - DESCRIPTORS: DESCRIPTORS: ACHING;DISCOMFORT

## 2018-11-13 ASSESSMENT — PAIN DESCRIPTION - LOCATION: LOCATION: BACK

## 2018-11-13 ASSESSMENT — PAIN - FUNCTIONAL ASSESSMENT: PAIN_FUNCTIONAL_ASSESSMENT: 0-10

## 2018-11-13 ASSESSMENT — LIFESTYLE VARIABLES: SMOKING_STATUS: 1

## 2018-11-13 NOTE — ANESTHESIA PRE PROCEDURE
Department of Anesthesiology  Preprocedure Note       Name:  Irl Liner   Age:  79 y.o.  :  1950                                          MRN:  [id number]         Date:  2018      Surgeon: Darlynn Oppenheim):  Scott Santos MD    Procedure: Procedure(s):  LAPAROSCOPIC ROBOT XI ASSISTED SIGMOID COLECTOMY    Medications prior to admission:   Prior to Admission medications    Medication Sig Start Date End Date Taking? Authorizing Provider   magnesium citrate solution Take 296 mLs by mouth once for 1 dose 10/17/18 10/17/18  Scott Santos MD   docusate sodium (COLACE) 100 MG capsule Take 1 capsule by mouth daily as needed for Constipation 10/17/18   Scott Santos MD   ferrous sulfate 325 (65 Fe) MG tablet Take 325 mg by mouth daily (with breakfast)    Historical Provider, MD   aspirin EC 81 MG EC tablet Take 1 tablet by mouth daily 18   Vincent Junior MD   pantoprazole (PROTONIX) 40 MG tablet Take 1 tablet by mouth daily 18   Vincent Junior MD   metoprolol tartrate (LOPRESSOR) 25 MG tablet Take 1 tablet by mouth every 8 hours 18   Vinicio Shelia Bisel, DO   apixaban (ELIQUIS) 5 MG TABS tablet Take 1 tablet by mouth 2 times daily 18   Vinicio Shelia Bisel, DO   levothyroxine (SYNTHROID) 100 MCG tablet Take 100 mcg by mouth Daily    Historical Provider, MD   ZINC PO Take 1 tablet by mouth daily    Historical Provider, MD   Omega-3 Fatty Acids (FISH OIL) 1000 MG CAPS Take 1,000 mg by mouth daily    Historical Provider, MD   lisinopril (PRINIVIL;ZESTRIL) 10 MG tablet Take 10 mg by mouth daily. Historical Provider, MD   rosuvastatin (CRESTOR) 10 MG tablet Take 10 mg by mouth daily. Historical Provider, MD       Current medications:    No current outpatient prescriptions on file. No current facility-administered medications for this visit.         Allergies:  No Known Allergies    Problem List:    Patient Active Problem List   Diagnosis Code    Spinal stenosis M48.00    Degeneration of 11/09/2018    LABGLOM >60 11/09/2018    GLUCOSE 98 11/09/2018    GLUCOSE 94 12/13/2011    PROT 7.9 08/03/2018    CALCIUM 8.7 11/09/2018    BILITOT 0.2 08/03/2018    ALKPHOS 85 08/03/2018    AST 17 08/03/2018    ALT 11 08/03/2018       POC Tests: No results for input(s): POCGLU, POCNA, POCK, POCCL, POCBUN, POCHEMO, POCHCT in the last 72 hours. Coags:   Lab Results   Component Value Date    PROTIME 12.6 08/03/2018    INR 1.1 08/03/2018       HCG (If Applicable): No results found for: PREGTESTUR, PREGSERUM, HCG, HCGQUANT     ABGs: No results found for: PHART, PO2ART, RLW1BVD, IJE2ZRV, BEART, Z7DYDGGA     Type & Screen (If Applicable):  No results found for: LABABO, [address]    Anesthesia Evaluation  Patient summary reviewed no history of anesthetic complications:   Airway: Mallampati: II  TM distance: >3 FB   Neck ROM: full  Mouth opening: > = 3 FB Dental:    (+) upper dentures      Pulmonary: breath sounds clear to auscultation  (+) COPD: moderate,  current smoker    (-) shortness of breath                           Cardiovascular:  Exercise tolerance: good (>4 METS),   (+) hypertension: moderate, CAD: obstructive, CABG/stent: no interval change, dysrhythmias: atrial fibrillation, hyperlipidemia      ECG reviewed  Rhythm: regular  Rate: normal  Echocardiogram reviewed  Stress test reviewed                Neuro/Psych:   (+) neuromuscular disease:,             GI/Hepatic/Renal:        (-) liver disease and no renal disease       Endo/Other:    (+) hypothyroidism, blood dyscrasia: anticoagulation therapy:., .    (-) diabetes mellitus               Abdominal:         (-) obese     Vascular:                                          Anesthesia Plan      general     ASA 3       Induction: intravenous. MIPS: Postoperative opioids intended and Prophylactic antiemetics administered. Anesthetic plan and risks discussed with patient. Plan discussed with CRNA.                   Veronica Lion MD   11/13/2018

## 2018-11-13 NOTE — H&P
loss of consciousness, murmur or orthopnea  Gastrointestinal ROS: negative for - constipation, diarrhea, gas/bloating, heartburn or hematemesis  Genito-Urinary ROS: negative for -  genital discharge, genital ulcers or hematuria  Musculoskeletal ROS: negative for - gait disturbance, muscle pain or muscular weakness  Psych/Soc ROS: Neg suicidal ideation or visual/auditory hallucinations     Physical exam:   /82 (Site: Right Upper Arm, Position: Sitting, Cuff Size: Medium Adult)   Pulse 84   Temp 97.6 °F (36.4 °C) (Temporal)   Resp 14   Ht 5' 3.5\" (1.613 m)   Wt 134 lb (60.8 kg)   BMI 23.36 kg/m²   General appearance:  NAD  Head: NCAT, PERRLA, EOMI, red conjunctiva  Neck: supple, no masses  Lungs:Equal chest rise bilateral  Heart: Reg rate  Abdomen: soft, nontender, nondistended  Rectal: No bleeding  Skin; no lesions  Gu: no cva tenderness  Extremities: extremities normal, atraumatic, no cyanosis or edema  Psychosocial: No auditory or visual hallucinations     Cscope: Incomplete due to stricture at sigmoid     BE:   Impression   Relative high grade obstruction mid sigmoid segment of   colon. Malignant versus benign stricture.            Assessment:  Odell Oliveros is a 74 y.o. female with sigmoid stricture      Patient Active Problem List   Diagnosis    Spinal stenosis    Degeneration of lumbosacral intervertebral disc    Lumbago    Colonic diverticular abscess    CAD in native artery            Plan:  OR for lap robot assisted sigmoid colon resection  Discussed the risk, benefits and alternatives of surgery including wound infections, bleeding, scar, leak, repeat surgery, colostomy and hernia formation and the risks of general anesthetic including MI, CVA, sudden death or reactions to anesthetic medications. The patient understands the risks and alternatives and the possibility of converting to an open procedure.  All questions were answered to the patient's satisfaction and they freely signed the

## 2018-11-14 LAB
ANION GAP SERPL CALCULATED.3IONS-SCNC: 13 MMOL/L (ref 7–16)
ANISOCYTOSIS: ABNORMAL
BASOPHILS ABSOLUTE: 0 E9/L (ref 0–0.2)
BASOPHILS RELATIVE PERCENT: 0.2 % (ref 0–2)
BUN BLDV-MCNC: 22 MG/DL (ref 8–23)
BURR CELLS: ABNORMAL
CALCIUM SERPL-MCNC: 8.5 MG/DL (ref 8.6–10.2)
CHLORIDE BLD-SCNC: 97 MMOL/L (ref 98–107)
CO2: 25 MMOL/L (ref 22–29)
CREAT SERPL-MCNC: 0.8 MG/DL (ref 0.5–1)
EOSINOPHILS ABSOLUTE: 0 E9/L (ref 0.05–0.5)
EOSINOPHILS RELATIVE PERCENT: 0 % (ref 0–6)
GFR AFRICAN AMERICAN: >60
GFR NON-AFRICAN AMERICAN: >60 ML/MIN/1.73
GLUCOSE BLD-MCNC: 122 MG/DL (ref 74–99)
HCT VFR BLD CALC: 33 % (ref 34–48)
HEMOGLOBIN: 10.8 G/DL (ref 11.5–15.5)
LYMPHOCYTES ABSOLUTE: 0.37 E9/L (ref 1.5–4)
LYMPHOCYTES RELATIVE PERCENT: 1.7 % (ref 20–42)
MCH RBC QN AUTO: 28 PG (ref 26–35)
MCHC RBC AUTO-ENTMCNC: 32.7 % (ref 32–34.5)
MCV RBC AUTO: 85.5 FL (ref 80–99.9)
MONOCYTES ABSOLUTE: 0 E9/L (ref 0.1–0.95)
MONOCYTES RELATIVE PERCENT: 1.6 % (ref 2–12)
NEUTROPHILS ABSOLUTE: 18.03 E9/L (ref 1.8–7.3)
NEUTROPHILS RELATIVE PERCENT: 98.3 % (ref 43–80)
OVALOCYTES: ABNORMAL
PDW BLD-RTO: 16 FL (ref 11.5–15)
PLATELET # BLD: 253 E9/L (ref 130–450)
PMV BLD AUTO: 10 FL (ref 7–12)
POIKILOCYTES: ABNORMAL
POTASSIUM REFLEX MAGNESIUM: 3.7 MMOL/L (ref 3.5–5)
RBC # BLD: 3.86 E12/L (ref 3.5–5.5)
SODIUM BLD-SCNC: 135 MMOL/L (ref 132–146)
TOXIC GRANULATION: ABNORMAL
VACUOLATED NEUTROPHILS: ABNORMAL
WBC # BLD: 18.4 E9/L (ref 4.5–11.5)

## 2018-11-14 PROCEDURE — 49020 DRAINAGE ABDOM ABSCESS OPEN: CPT | Performed by: SURGERY

## 2018-11-14 PROCEDURE — 36415 COLL VENOUS BLD VENIPUNCTURE: CPT

## 2018-11-14 PROCEDURE — 2580000003 HC RX 258: Performed by: SURGERY

## 2018-11-14 PROCEDURE — 6370000000 HC RX 637 (ALT 250 FOR IP): Performed by: INTERNAL MEDICINE

## 2018-11-14 PROCEDURE — 1200000000 HC SEMI PRIVATE

## 2018-11-14 PROCEDURE — 85025 COMPLETE CBC W/AUTO DIFF WBC: CPT

## 2018-11-14 PROCEDURE — 2500000003 HC RX 250 WO HCPCS: Performed by: SURGERY

## 2018-11-14 PROCEDURE — 6370000000 HC RX 637 (ALT 250 FOR IP): Performed by: SURGERY

## 2018-11-14 PROCEDURE — 6360000002 HC RX W HCPCS: Performed by: SURGERY

## 2018-11-14 PROCEDURE — 80048 BASIC METABOLIC PNL TOTAL CA: CPT

## 2018-11-14 PROCEDURE — 44206 LAP PART COLECTOMY W/STOMA: CPT | Performed by: SURGERY

## 2018-11-14 PROCEDURE — 2700000000 HC OXYGEN THERAPY PER DAY

## 2018-11-14 PROCEDURE — C9113 INJ PANTOPRAZOLE SODIUM, VIA: HCPCS | Performed by: SURGERY

## 2018-11-14 RX ORDER — ASPIRIN 81 MG/1
81 TABLET ORAL DAILY
Status: DISCONTINUED | OUTPATIENT
Start: 2018-11-14 | End: 2018-11-16 | Stop reason: HOSPADM

## 2018-11-14 RX ADMIN — POLYETHYLENE GLYCOL AND PROPYLENE GLYCOL 1 DROP: 4; 3 SOLUTION/ DROPS OPHTHALMIC at 04:09

## 2018-11-14 RX ADMIN — TAZOBACTAM SODIUM AND PIPERACILLIN SODIUM 3.38 G: 375; 3 INJECTION, SOLUTION INTRAVENOUS at 04:07

## 2018-11-14 RX ADMIN — LEVOTHYROXINE SODIUM 100 MCG: 100 TABLET ORAL at 06:24

## 2018-11-14 RX ADMIN — POLYETHYLENE GLYCOL AND PROPYLENE GLYCOL 1 DROP: 4; 3 SOLUTION/ DROPS OPHTHALMIC at 10:08

## 2018-11-14 RX ADMIN — ACETAMINOPHEN 650 MG: 325 TABLET, FILM COATED ORAL at 17:27

## 2018-11-14 RX ADMIN — APIXABAN 5 MG: 5 TABLET, FILM COATED ORAL at 20:11

## 2018-11-14 RX ADMIN — KETOROLAC TROMETHAMINE 15 MG: 15 INJECTION, SOLUTION INTRAMUSCULAR; INTRAVENOUS at 14:59

## 2018-11-14 RX ADMIN — ROSUVASTATIN CALCIUM 10 MG: 10 TABLET, FILM COATED ORAL at 09:08

## 2018-11-14 RX ADMIN — KETOROLAC TROMETHAMINE 15 MG: 15 INJECTION, SOLUTION INTRAMUSCULAR; INTRAVENOUS at 08:38

## 2018-11-14 RX ADMIN — TAZOBACTAM SODIUM AND PIPERACILLIN SODIUM 3.38 G: 375; 3 INJECTION, SOLUTION INTRAVENOUS at 14:59

## 2018-11-14 RX ADMIN — TAZOBACTAM SODIUM AND PIPERACILLIN SODIUM 3.38 G: 375; 3 INJECTION, SOLUTION INTRAVENOUS at 22:33

## 2018-11-14 RX ADMIN — PANTOPRAZOLE SODIUM 40 MG: 40 INJECTION, POWDER, FOR SOLUTION INTRAVENOUS at 08:38

## 2018-11-14 RX ADMIN — KETOROLAC TROMETHAMINE 15 MG: 15 INJECTION, SOLUTION INTRAMUSCULAR; INTRAVENOUS at 20:11

## 2018-11-14 RX ADMIN — SODIUM CHLORIDE, POTASSIUM CHLORIDE, SODIUM LACTATE AND CALCIUM CHLORIDE: 600; 310; 30; 20 INJECTION, SOLUTION INTRAVENOUS at 04:07

## 2018-11-14 RX ADMIN — FERROUS SULFATE TAB 325 MG (65 MG ELEMENTAL FE) 325 MG: 325 (65 FE) TAB at 09:12

## 2018-11-14 RX ADMIN — ASPIRIN 81 MG: 81 TABLET ORAL at 09:09

## 2018-11-14 RX ADMIN — Medication 10 ML: at 08:38

## 2018-11-14 RX ADMIN — KETOROLAC TROMETHAMINE 15 MG: 15 INJECTION, SOLUTION INTRAMUSCULAR; INTRAVENOUS at 02:28

## 2018-11-14 RX ADMIN — APIXABAN 5 MG: 5 TABLET, FILM COATED ORAL at 09:09

## 2018-11-14 ASSESSMENT — PAIN DESCRIPTION - ORIENTATION
ORIENTATION: LOWER
ORIENTATION: MID;LOWER

## 2018-11-14 ASSESSMENT — PAIN SCALES - GENERAL
PAINLEVEL_OUTOF10: 3
PAINLEVEL_OUTOF10: 3
PAINLEVEL_OUTOF10: 2
PAINLEVEL_OUTOF10: 3

## 2018-11-14 ASSESSMENT — PAIN DESCRIPTION - PROGRESSION
CLINICAL_PROGRESSION: NOT CHANGED
CLINICAL_PROGRESSION: GRADUALLY WORSENING

## 2018-11-14 ASSESSMENT — PAIN DESCRIPTION - FREQUENCY
FREQUENCY: CONTINUOUS
FREQUENCY: INTERMITTENT

## 2018-11-14 ASSESSMENT — PAIN DESCRIPTION - DESCRIPTORS
DESCRIPTORS: ACHING;DISCOMFORT;SORE;TENDER
DESCRIPTORS: ACHING

## 2018-11-14 ASSESSMENT — PAIN DESCRIPTION - ONSET
ONSET: GRADUAL
ONSET: ON-GOING

## 2018-11-14 ASSESSMENT — PAIN DESCRIPTION - LOCATION
LOCATION: ABDOMEN
LOCATION: BACK

## 2018-11-14 ASSESSMENT — PAIN DESCRIPTION - PAIN TYPE
TYPE: CHRONIC PAIN
TYPE: SURGICAL PAIN

## 2018-11-14 NOTE — CONSULTS
appears stated age and cooperative; no apparent distress no labored breathing 3L NC O2  HEENT:  NCAT; PERRL; conjunctiva pink, sclera clear  Neck:  no adenopathy, bruit, JVD, tenderness, masses, or nodules; supple, symmetrical, trachea midline, thyroid not enlarged  Lung:  clear to auscultation bilaterally; no use of accessory muscles; no rhonchi, rales, or crackles  Heart:  regular rate and regular rhythm without murmur, rub, or gallop  Abdomen:  soft, nontender, nondistended; normoactive bowel sounds; no organomegaly postoperative abdomen with ostomy  Extremities:  extremities normal, atraumatic, no cyanosis or edema  Musculokeletal:  no joint swelling, no muscle tenderness. ROM normal in all joints of extremities.    Neurologic:  mental status A&Ox3, thought content appropriate; CN II-XII grossly intact; sensation intact, motor strength 5/5 globally; no slurred speech  Osteopathic:  Patient examined in seated position; normal lumbar lordosis and thoracic kyphosis; no TART changes to paraspinal musculature    Laboratory Data  Recent Results (from the past 24 hour(s))   CBC Auto Differential    Collection Time: 11/13/18  1:00 PM   Result Value Ref Range    WBC 24.5 (H) 4.5 - 11.5 E9/L    RBC 4.48 3.50 - 5.50 E12/L    Hemoglobin 13.0 11.5 - 15.5 g/dL    Hematocrit 38.3 34.0 - 48.0 %    MCV 85.5 80.0 - 99.9 fL    MCH 29.0 26.0 - 35.0 pg    MCHC 33.9 32.0 - 34.5 %    RDW 16.2 (H) 11.5 - 15.0 fL    Platelets 274 866 - 477 E9/L    MPV 9.7 7.0 - 12.0 fL    Neutrophils % 90.5 (H) 43.0 - 80.0 %    Lymphocytes % 4.3 (L) 20.0 - 42.0 %    Monocytes % 4.3 2.0 - 12.0 %    Eosinophils % 0.0 0.0 - 6.0 %    Basophils % 0.2 0.0 - 2.0 %    Neutrophils # 22.30 (H) 1.80 - 7.30 E9/L    Lymphocytes # 0.98 (L) 1.50 - 4.00 E9/L    Monocytes # 0.98 (H) 0.10 - 0.95 E9/L    Eosinophils # 0.00 (L) 0.05 - 0.50 E9/L    Basophils # 0.00 0.00 - 0.20 E9/L    Metamyelocytes Relative 0.9 0.0 - 1.0 %    RBC Morphology Normal        Imaging  No results

## 2018-11-14 NOTE — OP NOTE
[address]                                OPERATIVE REPORT    PATIENT NAME: More Mcdonough                      :        1950  MED REC NO:   [id number]                            ROOM:       0321  ACCOUNT NO:   [de-identified]                           ADMIT DATE: 2018  PROVIDER:     Dayna Persaud MD    DATE OF PROCEDURE:  2018    PREOPERATIVE DIAGNOSES:  Recurrent complicated diverticulitis, sigmoid  stricture, inability to perform barium enema, colonoscopy, and  leukocytosis. POSTOPERATIVE DIAGNOSIS:  Perforated recurrent complicated  diverticulitis with pelvic abscess. PROCEDURE PERFORMED:  A laparoscopic robotic-assisted low anterior  resection with drainage of pelvic abscess, and end colostomy. ANESTHESIA:  General endotracheal.    SURGEON:  Dayna Persaud MD    ASSISTANT:  None. COMPLICATION:  None. ESTIMATED BLOOD LOSS:  50 mL. FLUIDS:  Crystalloid. SPECIMEN:  Culture and sigmoid colon. DESCRIPTION OF PROCEDURE:  This 61-year-old female with a history of  recurrent diverticulitis. She had admission and CT scan showing  interloop abscesses, which appeared to have resolved and then she six  weeks later had attempted colonoscopy. We were unable to pass the scope  past 40 cm. She had barium enema, which confirmed a tight junction and  possible near obstruction of the sigmoid colon. After being explained  the risks, benefits, and alternatives of procedure, she agreed to  proceed for surgical intervention. We explained extensively with  regards to the risk of colostomy secondary to the persistent elevation  of her white blood cells and CT findings. She was taken to the  operating room, placed supine and administered general anesthesia and  intubated. Once the airway was secured and she was adequately sedated,  she was prepped and draped in a normal sterile fashion.

## 2018-11-14 NOTE — PLAN OF CARE
Problem: Falls - Risk of:  Goal: Will remain free from falls  Will remain free from falls   Outcome: Ongoing      Problem: Pain - Acute:  Goal: Pain control  Patient will demonstrate personal actions to control pain.     Outcome: Ongoing

## 2018-11-15 LAB
ANION GAP SERPL CALCULATED.3IONS-SCNC: 12 MMOL/L (ref 7–16)
ANISOCYTOSIS: ABNORMAL
BASOPHILS ABSOLUTE: 0 E9/L (ref 0–0.2)
BASOPHILS RELATIVE PERCENT: 0.1 % (ref 0–2)
BUN BLDV-MCNC: 25 MG/DL (ref 8–23)
CALCIUM SERPL-MCNC: 8.4 MG/DL (ref 8.6–10.2)
CHLORIDE BLD-SCNC: 95 MMOL/L (ref 98–107)
CO2: 26 MMOL/L (ref 22–29)
CREAT SERPL-MCNC: 1 MG/DL (ref 0.5–1)
CULTURE SURGICAL: ABNORMAL
EOSINOPHILS ABSOLUTE: 0 E9/L (ref 0.05–0.5)
EOSINOPHILS RELATIVE PERCENT: 0 % (ref 0–6)
GFR AFRICAN AMERICAN: >60
GFR NON-AFRICAN AMERICAN: 55 ML/MIN/1.73
GLUCOSE BLD-MCNC: 119 MG/DL (ref 74–99)
GRAM STAIN RESULT: ABNORMAL
HCT VFR BLD CALC: 28.8 % (ref 34–48)
HEMOGLOBIN: 9.7 G/DL (ref 11.5–15.5)
HYPOCHROMIA: ABNORMAL
LYMPHOCYTES ABSOLUTE: 0.74 E9/L (ref 1.5–4)
LYMPHOCYTES RELATIVE PERCENT: 2.6 % (ref 20–42)
MCH RBC QN AUTO: 28.4 PG (ref 26–35)
MCHC RBC AUTO-ENTMCNC: 33.7 % (ref 32–34.5)
MCV RBC AUTO: 84.2 FL (ref 80–99.9)
MONOCYTES ABSOLUTE: 0.99 E9/L (ref 0.1–0.95)
MONOCYTES RELATIVE PERCENT: 4.3 % (ref 2–12)
NEUTROPHILS ABSOLUTE: 23.06 E9/L (ref 1.8–7.3)
NEUTROPHILS RELATIVE PERCENT: 93 % (ref 43–80)
ORGANISM: ABNORMAL
ORGANISM: ABNORMAL
OVALOCYTES: ABNORMAL
PDW BLD-RTO: 16.2 FL (ref 11.5–15)
PLATELET # BLD: 255 E9/L (ref 130–450)
PMV BLD AUTO: 10.4 FL (ref 7–12)
POIKILOCYTES: ABNORMAL
POLYCHROMASIA: ABNORMAL
POTASSIUM SERPL-SCNC: 4 MMOL/L (ref 3.5–5)
RBC # BLD: 3.42 E12/L (ref 3.5–5.5)
SODIUM BLD-SCNC: 133 MMOL/L (ref 132–146)
WBC # BLD: 24.8 E9/L (ref 4.5–11.5)

## 2018-11-15 PROCEDURE — 1200000000 HC SEMI PRIVATE

## 2018-11-15 PROCEDURE — 85025 COMPLETE CBC W/AUTO DIFF WBC: CPT

## 2018-11-15 PROCEDURE — 80048 BASIC METABOLIC PNL TOTAL CA: CPT

## 2018-11-15 PROCEDURE — C9113 INJ PANTOPRAZOLE SODIUM, VIA: HCPCS | Performed by: SURGERY

## 2018-11-15 PROCEDURE — 2500000003 HC RX 250 WO HCPCS: Performed by: SURGERY

## 2018-11-15 PROCEDURE — 6370000000 HC RX 637 (ALT 250 FOR IP): Performed by: INTERNAL MEDICINE

## 2018-11-15 PROCEDURE — 2580000003 HC RX 258: Performed by: SURGERY

## 2018-11-15 PROCEDURE — 6370000000 HC RX 637 (ALT 250 FOR IP): Performed by: SURGERY

## 2018-11-15 PROCEDURE — 87040 BLOOD CULTURE FOR BACTERIA: CPT

## 2018-11-15 PROCEDURE — 6360000002 HC RX W HCPCS: Performed by: SURGERY

## 2018-11-15 PROCEDURE — 36415 COLL VENOUS BLD VENIPUNCTURE: CPT

## 2018-11-15 RX ADMIN — ASPIRIN 81 MG: 81 TABLET ORAL at 08:38

## 2018-11-15 RX ADMIN — LEVOTHYROXINE SODIUM 100 MCG: 100 TABLET ORAL at 07:01

## 2018-11-15 RX ADMIN — KETOROLAC TROMETHAMINE 15 MG: 15 INJECTION, SOLUTION INTRAMUSCULAR; INTRAVENOUS at 14:32

## 2018-11-15 RX ADMIN — SODIUM CHLORIDE, POTASSIUM CHLORIDE, SODIUM LACTATE AND CALCIUM CHLORIDE: 600; 310; 30; 20 INJECTION, SOLUTION INTRAVENOUS at 04:47

## 2018-11-15 RX ADMIN — APIXABAN 5 MG: 5 TABLET, FILM COATED ORAL at 08:38

## 2018-11-15 RX ADMIN — Medication 10 ML: at 21:04

## 2018-11-15 RX ADMIN — FERROUS SULFATE TAB 325 MG (65 MG ELEMENTAL FE) 325 MG: 325 (65 FE) TAB at 08:38

## 2018-11-15 RX ADMIN — ROSUVASTATIN CALCIUM 10 MG: 10 TABLET, FILM COATED ORAL at 09:05

## 2018-11-15 RX ADMIN — KETOROLAC TROMETHAMINE 15 MG: 15 INJECTION, SOLUTION INTRAMUSCULAR; INTRAVENOUS at 02:31

## 2018-11-15 RX ADMIN — Medication 10 ML: at 08:39

## 2018-11-15 RX ADMIN — APIXABAN 5 MG: 5 TABLET, FILM COATED ORAL at 21:03

## 2018-11-15 RX ADMIN — TAZOBACTAM SODIUM AND PIPERACILLIN SODIUM 3.38 G: 375; 3 INJECTION, SOLUTION INTRAVENOUS at 14:38

## 2018-11-15 RX ADMIN — KETOROLAC TROMETHAMINE 15 MG: 15 INJECTION, SOLUTION INTRAMUSCULAR; INTRAVENOUS at 08:52

## 2018-11-15 RX ADMIN — PANTOPRAZOLE SODIUM 40 MG: 40 INJECTION, POWDER, FOR SOLUTION INTRAVENOUS at 08:38

## 2018-11-15 RX ADMIN — TAZOBACTAM SODIUM AND PIPERACILLIN SODIUM 3.38 G: 375; 3 INJECTION, SOLUTION INTRAVENOUS at 07:01

## 2018-11-15 RX ADMIN — OXYCODONE AND ACETAMINOPHEN 1 TABLET: 5; 325 TABLET ORAL at 00:29

## 2018-11-15 ASSESSMENT — ENCOUNTER SYMPTOMS
NAUSEA: 0
WHEEZING: 0
EYE DISCHARGE: 0
SINUS PRESSURE: 0
SHORTNESS OF BREATH: 0
DIARRHEA: 0
CONSTIPATION: 0
COUGH: 0
ABDOMINAL PAIN: 1
BACK PAIN: 0
VOMITING: 0
EYE PAIN: 0
SORE THROAT: 0

## 2018-11-15 ASSESSMENT — PAIN DESCRIPTION - ONSET
ONSET: ON-GOING

## 2018-11-15 ASSESSMENT — PAIN DESCRIPTION - PAIN TYPE
TYPE: CHRONIC PAIN

## 2018-11-15 ASSESSMENT — PAIN DESCRIPTION - DESCRIPTORS
DESCRIPTORS: ACHING;DISCOMFORT
DESCRIPTORS: CONSTANT;DISCOMFORT;ACHING
DESCRIPTORS: ACHING;DISCOMFORT;DULL

## 2018-11-15 ASSESSMENT — PAIN DESCRIPTION - LOCATION
LOCATION: BACK

## 2018-11-15 ASSESSMENT — PAIN DESCRIPTION - FREQUENCY
FREQUENCY: CONTINUOUS

## 2018-11-15 ASSESSMENT — PAIN SCALES - GENERAL
PAINLEVEL_OUTOF10: 2
PAINLEVEL_OUTOF10: 1
PAINLEVEL_OUTOF10: 1
PAINLEVEL_OUTOF10: 0
PAINLEVEL_OUTOF10: 5
PAINLEVEL_OUTOF10: 1
PAINLEVEL_OUTOF10: 5

## 2018-11-15 ASSESSMENT — PAIN DESCRIPTION - PROGRESSION
CLINICAL_PROGRESSION: NOT CHANGED
CLINICAL_PROGRESSION: GRADUALLY IMPROVING
CLINICAL_PROGRESSION: GRADUALLY IMPROVING

## 2018-11-15 NOTE — PROGRESS NOTES
Patient unresponsive narcan given at 1440   Dr byrd intubated patient at 21  Sats %  patient awake and alert Patient extubated at [phone] placed on 3l nc sats 96-98%
findings and recommendations and reviewing and answerquestions. Mark Llamas DO, F.A.C.OFRIEDA   On 11/15/2018  2:49 PM

## 2018-11-16 VITALS
TEMPERATURE: 97.7 F | SYSTOLIC BLOOD PRESSURE: 98 MMHG | HEIGHT: 64 IN | WEIGHT: 129 LBS | HEART RATE: 65 BPM | RESPIRATION RATE: 18 BRPM | BODY MASS INDEX: 22.02 KG/M2 | OXYGEN SATURATION: 95 % | DIASTOLIC BLOOD PRESSURE: 64 MMHG

## 2018-11-16 LAB
ANION GAP SERPL CALCULATED.3IONS-SCNC: 9 MMOL/L (ref 7–16)
BASOPHILS ABSOLUTE: 0.01 E9/L (ref 0–0.2)
BASOPHILS RELATIVE PERCENT: 0.1 % (ref 0–2)
BUN BLDV-MCNC: 18 MG/DL (ref 8–23)
CALCIUM SERPL-MCNC: 8.4 MG/DL (ref 8.6–10.2)
CHLORIDE BLD-SCNC: 103 MMOL/L (ref 98–107)
CO2: 29 MMOL/L (ref 22–29)
CREAT SERPL-MCNC: 0.9 MG/DL (ref 0.5–1)
EOSINOPHILS ABSOLUTE: 0 E9/L (ref 0.05–0.5)
EOSINOPHILS RELATIVE PERCENT: 0 % (ref 0–6)
GFR AFRICAN AMERICAN: >60
GFR NON-AFRICAN AMERICAN: >60 ML/MIN/1.73
GLUCOSE BLD-MCNC: 91 MG/DL (ref 74–99)
HCT VFR BLD CALC: 29.7 % (ref 34–48)
HEMOGLOBIN: 9.9 G/DL (ref 11.5–15.5)
IMMATURE GRANULOCYTES #: 0.16 E9/L
IMMATURE GRANULOCYTES %: 1 % (ref 0–5)
LYMPHOCYTES ABSOLUTE: 1.08 E9/L (ref 1.5–4)
LYMPHOCYTES RELATIVE PERCENT: 6.5 % (ref 20–42)
MCH RBC QN AUTO: 28.4 PG (ref 26–35)
MCHC RBC AUTO-ENTMCNC: 33.3 % (ref 32–34.5)
MCV RBC AUTO: 85.1 FL (ref 80–99.9)
MONOCYTES ABSOLUTE: 1.19 E9/L (ref 0.1–0.95)
MONOCYTES RELATIVE PERCENT: 7.1 % (ref 2–12)
NEUTROPHILS ABSOLUTE: 14.29 E9/L (ref 1.8–7.3)
NEUTROPHILS RELATIVE PERCENT: 85.3 % (ref 43–80)
PDW BLD-RTO: 16.6 FL (ref 11.5–15)
PLATELET # BLD: 287 E9/L (ref 130–450)
PMV BLD AUTO: 9.4 FL (ref 7–12)
POTASSIUM SERPL-SCNC: 3.8 MMOL/L (ref 3.5–5)
RBC # BLD: 3.49 E12/L (ref 3.5–5.5)
SODIUM BLD-SCNC: 141 MMOL/L (ref 132–146)
WBC # BLD: 16.7 E9/L (ref 4.5–11.5)

## 2018-11-16 PROCEDURE — 2580000003 HC RX 258: Performed by: SURGERY

## 2018-11-16 PROCEDURE — 6370000000 HC RX 637 (ALT 250 FOR IP): Performed by: INTERNAL MEDICINE

## 2018-11-16 PROCEDURE — 85025 COMPLETE CBC W/AUTO DIFF WBC: CPT

## 2018-11-16 PROCEDURE — C9113 INJ PANTOPRAZOLE SODIUM, VIA: HCPCS | Performed by: SURGERY

## 2018-11-16 PROCEDURE — 36415 COLL VENOUS BLD VENIPUNCTURE: CPT

## 2018-11-16 PROCEDURE — 6370000000 HC RX 637 (ALT 250 FOR IP): Performed by: SURGERY

## 2018-11-16 PROCEDURE — 6360000002 HC RX W HCPCS: Performed by: SURGERY

## 2018-11-16 PROCEDURE — 80048 BASIC METABOLIC PNL TOTAL CA: CPT

## 2018-11-16 PROCEDURE — 2500000003 HC RX 250 WO HCPCS: Performed by: SURGERY

## 2018-11-16 RX ORDER — OXYCODONE HYDROCHLORIDE AND ACETAMINOPHEN 5; 325 MG/1; MG/1
1 TABLET ORAL EVERY 6 HOURS PRN
Qty: 20 TABLET | Refills: 0 | Status: SHIPPED | OUTPATIENT
Start: 2018-11-16 | End: 2018-11-21

## 2018-11-16 RX ORDER — METRONIDAZOLE 500 MG/1
500 TABLET ORAL 3 TIMES DAILY
Qty: 30 TABLET | Refills: 0 | Status: SHIPPED | OUTPATIENT
Start: 2018-11-16 | End: 2018-11-26

## 2018-11-16 RX ORDER — LEVOFLOXACIN 500 MG/1
500 TABLET, FILM COATED ORAL DAILY
Qty: 10 TABLET | Refills: 0 | Status: SHIPPED | OUTPATIENT
Start: 2018-11-16 | End: 2018-11-26

## 2018-11-16 RX ORDER — IBUPROFEN 200 MG
800 TABLET ORAL EVERY 6 HOURS PRN
Qty: 90 TABLET | Refills: 0 | Status: SHIPPED | OUTPATIENT
Start: 2018-11-16 | End: 2019-03-20

## 2018-11-16 RX ADMIN — ROSUVASTATIN CALCIUM 10 MG: 10 TABLET, FILM COATED ORAL at 09:14

## 2018-11-16 RX ADMIN — TAZOBACTAM SODIUM AND PIPERACILLIN SODIUM 3.38 G: 375; 3 INJECTION, SOLUTION INTRAVENOUS at 00:03

## 2018-11-16 RX ADMIN — TAZOBACTAM SODIUM AND PIPERACILLIN SODIUM 3.38 G: 375; 3 INJECTION, SOLUTION INTRAVENOUS at 06:21

## 2018-11-16 RX ADMIN — FERROUS SULFATE TAB 325 MG (65 MG ELEMENTAL FE) 325 MG: 325 (65 FE) TAB at 08:54

## 2018-11-16 RX ADMIN — APIXABAN 5 MG: 5 TABLET, FILM COATED ORAL at 08:54

## 2018-11-16 RX ADMIN — PANTOPRAZOLE SODIUM 40 MG: 40 INJECTION, POWDER, FOR SOLUTION INTRAVENOUS at 08:54

## 2018-11-16 RX ADMIN — LEVOTHYROXINE SODIUM 100 MCG: 100 TABLET ORAL at 06:21

## 2018-11-16 RX ADMIN — ASPIRIN 81 MG: 81 TABLET ORAL at 08:54

## 2018-11-16 RX ADMIN — Medication 10 ML: at 08:55

## 2018-11-16 ASSESSMENT — PAIN DESCRIPTION - LOCATION: LOCATION: ABDOMEN

## 2018-11-16 ASSESSMENT — ENCOUNTER SYMPTOMS
SINUS PRESSURE: 0
COUGH: 0
WHEEZING: 0
EYE DISCHARGE: 0
VOMITING: 0
SHORTNESS OF BREATH: 0
SORE THROAT: 0
CONSTIPATION: 0
EYE PAIN: 0
ABDOMINAL PAIN: 1
DIARRHEA: 0
NAUSEA: 0
BACK PAIN: 0

## 2018-11-16 ASSESSMENT — PAIN SCALES - GENERAL
PAINLEVEL_OUTOF10: 2
PAINLEVEL_OUTOF10: 0

## 2018-11-16 ASSESSMENT — PAIN DESCRIPTION - DESCRIPTORS: DESCRIPTORS: ACHING

## 2018-11-16 ASSESSMENT — PAIN DESCRIPTION - PAIN TYPE: TYPE: SURGICAL PAIN

## 2018-11-16 NOTE — PLAN OF CARE
Problem: Infection - Surgical Site:  Goal: Will show no infection signs and symptoms  Will show no infection signs and symptoms   Outcome: Ongoing      Problem:  Bowel/Gastric:  Goal: Complications related to the disease process, condition or treatment will be avoided or minimized  Outcome: Ongoing

## 2018-11-20 LAB
BLOOD CULTURE, ROUTINE: NORMAL
CULTURE, BLOOD 2: NORMAL

## 2018-11-28 ENCOUNTER — OFFICE VISIT (OUTPATIENT)
Dept: SURGERY | Age: 68
End: 2018-11-28

## 2018-11-28 VITALS
RESPIRATION RATE: 14 BRPM | WEIGHT: 128 LBS | BODY MASS INDEX: 22.68 KG/M2 | HEIGHT: 63 IN | HEART RATE: 84 BPM | SYSTOLIC BLOOD PRESSURE: 118 MMHG | DIASTOLIC BLOOD PRESSURE: 83 MMHG

## 2018-11-28 DIAGNOSIS — K56.699 SIGMOID STRICTURE (HCC): ICD-10-CM

## 2018-11-28 DIAGNOSIS — K57.20 DIVERTICULITIS OF LARGE INTESTINE WITH PERFORATION AND ABSCESS WITHOUT BLEEDING: Primary | ICD-10-CM

## 2018-11-28 PROCEDURE — 99024 POSTOP FOLLOW-UP VISIT: CPT | Performed by: SURGERY

## 2018-12-20 ENCOUNTER — TELEPHONE (OUTPATIENT)
Dept: SURGERY | Age: 68
End: 2018-12-20

## 2019-01-17 ENCOUNTER — HOSPITAL ENCOUNTER (OUTPATIENT)
Dept: WOUND CARE | Age: 69
Discharge: HOME | End: 2019-01-17
Payer: COMMERCIAL

## 2019-01-17 PROCEDURE — 99212 OFFICE O/P EST SF 10 MIN: CPT

## 2019-01-25 ENCOUNTER — HOSPITAL ENCOUNTER (EMERGENCY)
Age: 69
Discharge: HOME | End: 2019-01-26
Attending: EMERGENCY MEDICINE
Payer: COMMERCIAL

## 2019-01-25 DIAGNOSIS — I48.91 ATRIAL FIBRILLATION WITH RAPID VENTRICULAR RESPONSE (HCC): Primary | ICD-10-CM

## 2019-01-25 DIAGNOSIS — E03.9 HYPOTHYROIDISM, UNSPECIFIED TYPE: ICD-10-CM

## 2019-01-25 LAB
EKG ATRIAL RATE: 129 BPM
EKG Q-T INTERVAL: 320 MS
EKG QRS DURATION: 92 MS
EKG QTC CALCULATION (BAZETT): 474 MS
EKG R AXIS: 61 DEGREES
EKG T AXIS: 26 DEGREES
EKG VENTRICULAR RATE: 132 BPM

## 2019-01-25 PROCEDURE — 99285 EMERGENCY DEPT VISIT HI MDM: CPT

## 2019-01-25 PROCEDURE — 93005 ELECTROCARDIOGRAM TRACING: CPT | Performed by: EMERGENCY MEDICINE

## 2019-01-25 RX ORDER — DILTIAZEM HYDROCHLORIDE 5 MG/ML
10 INJECTION INTRAVENOUS ONCE
Status: DISCONTINUED | OUTPATIENT
Start: 2019-01-26 | End: 2019-01-25

## 2019-01-25 RX ORDER — DILTIAZEM HYDROCHLORIDE 5 MG/ML
20 INJECTION INTRAVENOUS ONCE
Status: DISCONTINUED | OUTPATIENT
Start: 2019-01-26 | End: 2019-01-26

## 2019-01-25 RX ORDER — 0.9 % SODIUM CHLORIDE 0.9 %
1000 INTRAVENOUS SOLUTION INTRAVENOUS ONCE
Status: COMPLETED | OUTPATIENT
Start: 2019-01-26 | End: 2019-01-26

## 2019-01-26 ENCOUNTER — APPOINTMENT (OUTPATIENT)
Dept: GENERAL RADIOLOGY | Age: 69
End: 2019-01-26
Payer: COMMERCIAL

## 2019-01-26 VITALS
BODY MASS INDEX: 22.67 KG/M2 | OXYGEN SATURATION: 100 % | WEIGHT: 128 LBS | TEMPERATURE: 98 F | RESPIRATION RATE: 12 BRPM | SYSTOLIC BLOOD PRESSURE: 161 MMHG | HEART RATE: 73 BPM | DIASTOLIC BLOOD PRESSURE: 91 MMHG

## 2019-01-26 LAB
ANION GAP SERPL CALCULATED.3IONS-SCNC: 14 MMOL/L (ref 7–16)
BUN BLDV-MCNC: 6 MG/DL (ref 8–23)
CALCIUM SERPL-MCNC: 9.3 MG/DL (ref 8.6–10.2)
CHLORIDE BLD-SCNC: 103 MMOL/L (ref 98–107)
CO2: 28 MMOL/L (ref 22–29)
CREAT SERPL-MCNC: 0.7 MG/DL (ref 0.5–1)
GFR AFRICAN AMERICAN: >60
GFR NON-AFRICAN AMERICAN: >60 ML/MIN/1.73
GLUCOSE BLD-MCNC: 109 MG/DL (ref 74–99)
HCT VFR BLD CALC: 45.6 % (ref 34–48)
HEMOGLOBIN: 14.9 G/DL (ref 11.5–15.5)
MCH RBC QN AUTO: 29.6 PG (ref 26–35)
MCHC RBC AUTO-ENTMCNC: 32.7 % (ref 32–34.5)
MCV RBC AUTO: 90.7 FL (ref 80–99.9)
PDW BLD-RTO: 14.6 FL (ref 11.5–15)
PLATELET # BLD: 243 E9/L (ref 130–450)
PMV BLD AUTO: 11.5 FL (ref 7–12)
POTASSIUM SERPL-SCNC: 3.2 MMOL/L (ref 3.5–5)
RBC # BLD: 5.03 E12/L (ref 3.5–5.5)
SODIUM BLD-SCNC: 145 MMOL/L (ref 132–146)
TROPONIN: <0.01 NG/ML (ref 0–0.03)
TSH SERPL DL<=0.05 MIU/L-ACNC: 14.04 UIU/ML (ref 0.27–4.2)
WBC # BLD: 10.4 E9/L (ref 4.5–11.5)

## 2019-01-26 PROCEDURE — 71046 X-RAY EXAM CHEST 2 VIEWS: CPT

## 2019-01-26 PROCEDURE — 84443 ASSAY THYROID STIM HORMONE: CPT

## 2019-01-26 PROCEDURE — 84484 ASSAY OF TROPONIN QUANT: CPT

## 2019-01-26 PROCEDURE — 80048 BASIC METABOLIC PNL TOTAL CA: CPT

## 2019-01-26 PROCEDURE — 36415 COLL VENOUS BLD VENIPUNCTURE: CPT

## 2019-01-26 PROCEDURE — 6370000000 HC RX 637 (ALT 250 FOR IP): Performed by: STUDENT IN AN ORGANIZED HEALTH CARE EDUCATION/TRAINING PROGRAM

## 2019-01-26 PROCEDURE — 85027 COMPLETE CBC AUTOMATED: CPT

## 2019-01-26 PROCEDURE — 2580000003 HC RX 258: Performed by: STUDENT IN AN ORGANIZED HEALTH CARE EDUCATION/TRAINING PROGRAM

## 2019-01-26 RX ORDER — POTASSIUM CHLORIDE 20 MEQ/1
20 TABLET, EXTENDED RELEASE ORAL ONCE
Status: COMPLETED | OUTPATIENT
Start: 2019-01-26 | End: 2019-01-26

## 2019-01-26 RX ADMIN — SODIUM CHLORIDE 1000 ML: 9 INJECTION, SOLUTION INTRAVENOUS at 00:08

## 2019-01-26 RX ADMIN — POTASSIUM CHLORIDE 20 MEQ: 20 TABLET, EXTENDED RELEASE ORAL at 01:46

## 2019-01-26 ASSESSMENT — ENCOUNTER SYMPTOMS
BACK PAIN: 0
ORTHOPNEA: 0
COUGH: 0
EYE REDNESS: 0
DIARRHEA: 0
TROUBLE SWALLOWING: 0
SHORTNESS OF BREATH: 0
HEMOPTYSIS: 0
VOMITING: 0
ABDOMINAL PAIN: 0
SORE THROAT: 0
NAUSEA: 0

## 2019-03-20 ENCOUNTER — TELEPHONE (OUTPATIENT)
Dept: SURGERY | Age: 69
End: 2019-03-20

## 2019-03-20 ENCOUNTER — OFFICE VISIT (OUTPATIENT)
Dept: SURGERY | Age: 69
End: 2019-03-20
Payer: COMMERCIAL

## 2019-03-20 VITALS
SYSTOLIC BLOOD PRESSURE: 142 MMHG | HEIGHT: 64 IN | HEART RATE: 54 BPM | WEIGHT: 130 LBS | OXYGEN SATURATION: 100 % | DIASTOLIC BLOOD PRESSURE: 84 MMHG | BODY MASS INDEX: 22.2 KG/M2

## 2019-03-20 DIAGNOSIS — Z93.3 COLOSTOMY IN PLACE (HCC): Primary | ICD-10-CM

## 2019-03-20 DIAGNOSIS — K57.20 DIVERTICULITIS OF LARGE INTESTINE WITH PERFORATION AND ABSCESS WITHOUT BLEEDING: ICD-10-CM

## 2019-03-20 DIAGNOSIS — K56.699 SIGMOID STRICTURE (HCC): ICD-10-CM

## 2019-03-20 PROCEDURE — 99214 OFFICE O/P EST MOD 30 MIN: CPT | Performed by: SURGERY

## 2019-03-20 RX ORDER — NEOMYCIN SULFATE 500 MG/1
1000 TABLET ORAL 3 TIMES DAILY
Qty: 6 TABLET | Refills: 0 | Status: SHIPPED | OUTPATIENT
Start: 2019-03-20 | End: 2019-03-21

## 2019-03-20 RX ORDER — DOCUSATE SODIUM 100 MG/1
100 CAPSULE, LIQUID FILLED ORAL DAILY PRN
Qty: 30 CAPSULE | Refills: 0 | Status: SHIPPED | OUTPATIENT
Start: 2019-03-20 | End: 2019-04-19

## 2019-03-20 RX ORDER — ERYTHROMYCIN 500 MG/1
1000 TABLET, COATED ORAL 3 TIMES DAILY
Qty: 6 TABLET | Refills: 0 | Status: SHIPPED | OUTPATIENT
Start: 2019-03-20 | End: 2019-03-21

## 2019-03-22 RX ORDER — SODIUM CHLORIDE 0.9 % (FLUSH) 0.9 %
10 SYRINGE (ML) INJECTION PRN
Status: CANCELLED | OUTPATIENT
Start: 2019-03-22

## 2019-03-22 RX ORDER — SODIUM CHLORIDE, SODIUM LACTATE, POTASSIUM CHLORIDE, CALCIUM CHLORIDE 600; 310; 30; 20 MG/100ML; MG/100ML; MG/100ML; MG/100ML
INJECTION, SOLUTION INTRAVENOUS CONTINUOUS
Status: CANCELLED | OUTPATIENT
Start: 2019-03-22

## 2019-03-22 RX ORDER — HEPARIN SODIUM 5000 [USP'U]/ML
5000 INJECTION, SOLUTION INTRAVENOUS; SUBCUTANEOUS ONCE
Status: CANCELLED | OUTPATIENT
Start: 2019-03-22 | End: 2019-03-22

## 2019-03-22 RX ORDER — SODIUM CHLORIDE 0.9 % (FLUSH) 0.9 %
10 SYRINGE (ML) INJECTION EVERY 12 HOURS SCHEDULED
Status: CANCELLED | OUTPATIENT
Start: 2019-03-22

## 2019-04-01 ENCOUNTER — HOSPITAL ENCOUNTER (OUTPATIENT)
Dept: PREADMISSION TESTING | Age: 69
Discharge: HOME | End: 2019-04-01
Payer: COMMERCIAL

## 2019-04-01 VITALS
DIASTOLIC BLOOD PRESSURE: 90 MMHG | HEART RATE: 60 BPM | BODY MASS INDEX: 22.25 KG/M2 | HEIGHT: 64 IN | WEIGHT: 130.31 LBS | SYSTOLIC BLOOD PRESSURE: 170 MMHG | OXYGEN SATURATION: 98 % | RESPIRATION RATE: 16 BRPM | TEMPERATURE: 98 F

## 2019-04-01 DIAGNOSIS — Z93.3 COLOSTOMY IN PLACE (HCC): Primary | ICD-10-CM

## 2019-04-01 LAB
ANION GAP SERPL CALCULATED.3IONS-SCNC: 10 MMOL/L (ref 7–16)
BUN BLDV-MCNC: 9 MG/DL (ref 8–23)
CALCIUM SERPL-MCNC: 9.4 MG/DL (ref 8.6–10.2)
CHLORIDE BLD-SCNC: 104 MMOL/L (ref 98–107)
CO2: 28 MMOL/L (ref 22–29)
CREAT SERPL-MCNC: 0.7 MG/DL (ref 0.5–1)
GFR AFRICAN AMERICAN: >60
GFR NON-AFRICAN AMERICAN: >60 ML/MIN/1.73
GLUCOSE BLD-MCNC: 84 MG/DL (ref 74–99)
HCT VFR BLD CALC: 41.5 % (ref 34–48)
HEMOGLOBIN: 13.8 G/DL (ref 11.5–15.5)
MCH RBC QN AUTO: 30.4 PG (ref 26–35)
MCHC RBC AUTO-ENTMCNC: 33.3 % (ref 32–34.5)
MCV RBC AUTO: 91.4 FL (ref 80–99.9)
PDW BLD-RTO: 13.3 FL (ref 11.5–15)
PLATELET # BLD: 215 E9/L (ref 130–450)
PMV BLD AUTO: 10.8 FL (ref 7–12)
POTASSIUM REFLEX MAGNESIUM: 4 MMOL/L (ref 3.5–5)
RBC # BLD: 4.54 E12/L (ref 3.5–5.5)
SODIUM BLD-SCNC: 142 MMOL/L (ref 132–146)
WBC # BLD: 7.9 E9/L (ref 4.5–11.5)

## 2019-04-01 PROCEDURE — 80048 BASIC METABOLIC PNL TOTAL CA: CPT

## 2019-04-01 PROCEDURE — 36415 COLL VENOUS BLD VENIPUNCTURE: CPT

## 2019-04-01 PROCEDURE — 87081 CULTURE SCREEN ONLY: CPT

## 2019-04-01 PROCEDURE — 85027 COMPLETE CBC AUTOMATED: CPT

## 2019-04-01 NOTE — PROGRESS NOTES
[address] [facility]                                                                                                                    PRE OP INSTRUCTIONS FOR  Denisse Day        Date: 4/1/2019    Date of surgery: 4/9/19   Arrival Time: Hospital will call you between 5pm and 7pm with your final arrival time for surgery    1. Do not  drink anything after midnight  prior to surgery. This includes no water, chewing gum, mints or ice chips. Do bowel prep and take antibiotics as instructed    2. Take the following medications with a small sip of water on the morning of Surgery: pantoprazole, metoprolol     3. Diabetics may take evening dose of insulin but none after midnight. If you feel symptomatic or low blood sugar morning of surgery drink 1-2 ounces of apple juice only. 4. Aspirin, Ibuprofen, Advil, Naproxen, Vitamin E and other Anti-inflammatory products should be stopped  before surgery  as directed by your physician. Take Tylenol only unless instructed otherwise by your surgeon. 5. Check with your Doctor regarding stopping Plavix, Coumadin, Lovenox, Eliquis, Effient, or other blood thinners. 6. Do not smoke,use illicit drugs and do not drink any alcoholic beverages 24 hours prior to surgery. 7. You may brush your teeth the morning of surgery. DO NOT SWALLOW WATER    8. You MUST make arrangements for a responsible adult to take you home after your surgery. You will not be allowed to leave alone or drive yourself home. It is strongly suggested someone stay with you the first 24 hrs. Your surgery will be cancelled if you do not have a ride home. 9. PEDIATRIC PATIENTS ONLY:  A parent/legal guardian must accompany a child scheduled for surgery and plan to stay at the hospital until the child is discharged. Please do not bring other children with you.     10. Please wear simple, loose fitting clothing to the hospital.  Do not bring valuables (money, credit cards, checkbooks, etc.) Do not wear any makeup (including no eye makeup) or nail polish on your fingers or toes. 11. DO NOT wear any jewelry or piercings on day of surgery. All body piercing jewelry must be removed. 12. Shower the night before surgery with _x__Antibacterial soap /SKY WIPES___x_____    13. TOTAL JOINT REPLACEMENT/HYSTERECTOMY PATIENTS ONLY---Remember to bring Blood Bank bracelet to the hospital on the day of surgery. 14. If you have a Living Will and Durable Power of  for Healthcare, please bring in a copy. 15. If appropriate bring crutches, inspirex, WALKER, CANE etc... 12. Notify your Surgeon if you develop any illness between now and surgery time, cough, cold, fever, sore throat, nausea, vomiting, etc.  Please notify your surgeon if you experience dizziness, shortness of breath or blurred vision between now & the time of your surgery. 17. If you have _x__dentures, they will be removed before going to the OR; we will provide you a container. If you wear ___contact lenses or ___glasses, they will be removed; please bring a case for them. 18. To provide excellent care visitors will be limited to 2 in the room at any given time. 19. Please bring picture ID and insurance card. 20. Sleep apnea patients need to bring CPAP AND SETTINGS to hospital on day of surgery. 21. During flu season no children under the age of 15 are permitted in the hospital for the safety of all patients. 22. Other come in main lobby and stop at                  Please call AMBULATORY CARE if you have any further questions.    [address] [facility]     [address]

## 2019-04-03 LAB — MRSA CULTURE ONLY: NORMAL

## 2019-04-05 NOTE — PROGRESS NOTES
[address] [facility]                                                                                                                    PRE OP INSTRUCTIONS FOR  Becky Hernandez        Date: 4/5/2019    Date of surgery: 4/8/19   Arrival Time: Hospital will call you between 5pm and 7pm with your final arrival time for surgery    1. Do not eat or drink anything after  prior to surgery. This includes no water, chewing gum, mints or ice chips. 2. Take the following medications with a small sip of water on the morning of Surgery:  Metoprolol    3. Diabetics may take evening dose of insulin but none after midnight. If you feel symptomatic or low blood sugar morning of surgery drink 1-2 ounces of apple juice only. 4. Aspirin, Ibuprofen, Advil, Naproxen, Vitamin E and other Anti-inflammatory products should be stopped  before surgery  as directed by your physician. Take Tylenol only unless instructed otherwise by your surgeon. 5. Check with your Doctor regarding stopping Plavix, Coumadin, Lovenox, Eliquis, Effient, or other blood thinners. 6. Do not smoke,use illicit drugs and do not drink any alcoholic beverages 24 hours prior to surgery. 7. You may brush your teeth the morning of surgery. DO NOT SWALLOW WATER    8. You MUST make arrangements for a responsible adult to take you home after your surgery. You will not be allowed to leave alone or drive yourself home. It is strongly suggested someone stay with you the first 24 hrs. Your surgery will be cancelled if you do not have a ride home. 9. PEDIATRIC PATIENTS ONLY:  A parent/legal guardian must accompany a child scheduled for surgery and plan to stay at the hospital until the child is discharged. Please do not bring other children with you.     10. Please wear simple, loose fitting clothing to the hospital.  Debe Sprain not bring valuables (money, credit cards, checkbooks, etc.) Do not wear any makeup (including no eye makeup) or nail polish on your fingers or toes. 11. DO NOT wear any jewelry or piercings on day of surgery. All body piercing jewelry must be removed. 12. Shower the night before surgery with _x__Antibacterial soap /SKY WIPES________    13. TOTAL JOINT REPLACEMENT/HYSTERECTOMY PATIENTS ONLY---Remember to bring Blood Bank bracelet to the hospital on the day of surgery. 14. If you have a Living Will and Durable Power of  for Healthcare, please bring in a copy. 15. If appropriate bring crutches, inspirex, WALKER, CANE etc... 12. Notify your Surgeon if you develop any illness between now and surgery time, cough, cold, fever, sore throat, nausea, vomiting, etc.  Please notify your surgeon if you experience dizziness, shortness of breath or blurred vision between now & the time of your surgery. 17. If you have _x__dentures, they will be removed before going to the OR; we will provide you a container. If you wear ___contact lenses or ___glasses, they will be removed; please bring a case for them. 18. To provide excellent care visitors will be limited to 2 in the room at any given time. 19. Please bring picture ID and insurance card. 20. Sleep apnea patients need to bring CPAP AND SETTINGS to hospital on day of surgery. 21. During flu season no children under the age of 15 are permitted in the hospital for the safety of all patients. 22. Other                  Please call AMBULATORY CARE if you have any further questions.    [address] [facility]     [address]

## 2019-04-08 ENCOUNTER — ANESTHESIA EVENT (OUTPATIENT)
Dept: ENDOSCOPY | Age: 69
DRG: 346 | End: 2019-04-08
Payer: COMMERCIAL

## 2019-04-08 ENCOUNTER — ANESTHESIA (OUTPATIENT)
Dept: ENDOSCOPY | Age: 69
DRG: 346 | End: 2019-04-08
Payer: COMMERCIAL

## 2019-04-08 ENCOUNTER — HOSPITAL ENCOUNTER (OUTPATIENT)
Age: 69
Setting detail: OUTPATIENT SURGERY
Discharge: HOME | DRG: 346 | End: 2019-04-08
Attending: SURGERY | Admitting: SURGERY
Payer: COMMERCIAL

## 2019-04-08 VITALS
DIASTOLIC BLOOD PRESSURE: 80 MMHG | RESPIRATION RATE: 16 BRPM | TEMPERATURE: 96.4 F | HEART RATE: 57 BPM | WEIGHT: 125.8 LBS | SYSTOLIC BLOOD PRESSURE: 132 MMHG | OXYGEN SATURATION: 97 % | BODY MASS INDEX: 21.93 KG/M2

## 2019-04-08 VITALS
RESPIRATION RATE: 21 BRPM | DIASTOLIC BLOOD PRESSURE: 57 MMHG | OXYGEN SATURATION: 95 % | SYSTOLIC BLOOD PRESSURE: 97 MMHG

## 2019-04-08 PROCEDURE — 6360000002 HC RX W HCPCS: Performed by: NURSE ANESTHETIST, CERTIFIED REGISTERED

## 2019-04-08 PROCEDURE — 7100000010 HC PHASE II RECOVERY - FIRST 15 MIN: Performed by: SURGERY

## 2019-04-08 PROCEDURE — 3700000000 HC ANESTHESIA ATTENDED CARE: Performed by: SURGERY

## 2019-04-08 PROCEDURE — 7100000011 HC PHASE II RECOVERY - ADDTL 15 MIN: Performed by: SURGERY

## 2019-04-08 PROCEDURE — 2580000003 HC RX 258: Performed by: SURGERY

## 2019-04-08 PROCEDURE — 3609027000 HC COLONOSCOPY: Performed by: SURGERY

## 2019-04-08 PROCEDURE — 2709999900 HC NON-CHARGEABLE SUPPLY: Performed by: SURGERY

## 2019-04-08 PROCEDURE — 3700000001 HC ADD 15 MINUTES (ANESTHESIA): Performed by: SURGERY

## 2019-04-08 PROCEDURE — 45378 DIAGNOSTIC COLONOSCOPY: CPT | Performed by: SURGERY

## 2019-04-08 PROCEDURE — 0DJD8ZZ INSPECTION OF LOWER INTESTINAL TRACT, VIA NATURAL OR ARTIFICIAL OPENING ENDOSCOPIC: ICD-10-PCS | Performed by: SURGERY

## 2019-04-08 RX ORDER — SODIUM CHLORIDE 0.9 % (FLUSH) 0.9 %
10 SYRINGE (ML) INJECTION PRN
Status: DISCONTINUED | OUTPATIENT
Start: 2019-04-08 | End: 2019-04-08 | Stop reason: HOSPADM

## 2019-04-08 RX ORDER — SODIUM CHLORIDE 9 MG/ML
INJECTION, SOLUTION INTRAVENOUS CONTINUOUS
Status: DISCONTINUED | OUTPATIENT
Start: 2019-04-08 | End: 2019-04-08 | Stop reason: SDUPTHER

## 2019-04-08 RX ORDER — SODIUM PHOSPHATE, DIBASIC AND SODIUM PHOSPHATE, MONOBASIC 7; 19 G/133ML; G/133ML
1 ENEMA RECTAL 2 TIMES DAILY
Qty: 2 BOTTLE | Refills: 0 | Status: SHIPPED | OUTPATIENT
Start: 2019-04-08 | End: 2019-04-22 | Stop reason: ALTCHOICE

## 2019-04-08 RX ORDER — SODIUM CHLORIDE 0.9 % (FLUSH) 0.9 %
10 SYRINGE (ML) INJECTION EVERY 12 HOURS SCHEDULED
Status: DISCONTINUED | OUTPATIENT
Start: 2019-04-08 | End: 2019-04-08 | Stop reason: SDUPTHER

## 2019-04-08 RX ORDER — SODIUM CHLORIDE 0.9 % (FLUSH) 0.9 %
10 SYRINGE (ML) INJECTION EVERY 12 HOURS SCHEDULED
Status: DISCONTINUED | OUTPATIENT
Start: 2019-04-08 | End: 2019-04-08 | Stop reason: HOSPADM

## 2019-04-08 RX ORDER — FENTANYL CITRATE 50 UG/ML
INJECTION, SOLUTION INTRAMUSCULAR; INTRAVENOUS PRN
Status: DISCONTINUED | OUTPATIENT
Start: 2019-04-08 | End: 2019-04-08 | Stop reason: SDUPTHER

## 2019-04-08 RX ORDER — PROPOFOL 10 MG/ML
INJECTION, EMULSION INTRAVENOUS PRN
Status: DISCONTINUED | OUTPATIENT
Start: 2019-04-08 | End: 2019-04-08 | Stop reason: SDUPTHER

## 2019-04-08 RX ORDER — MIDAZOLAM HYDROCHLORIDE 1 MG/ML
INJECTION INTRAMUSCULAR; INTRAVENOUS PRN
Status: DISCONTINUED | OUTPATIENT
Start: 2019-04-08 | End: 2019-04-08 | Stop reason: SDUPTHER

## 2019-04-08 RX ORDER — SODIUM CHLORIDE 9 MG/ML
INJECTION, SOLUTION INTRAVENOUS CONTINUOUS
Status: DISCONTINUED | OUTPATIENT
Start: 2019-04-08 | End: 2019-04-08 | Stop reason: HOSPADM

## 2019-04-08 RX ORDER — PROPOFOL 10 MG/ML
INJECTION, EMULSION INTRAVENOUS CONTINUOUS PRN
Status: DISCONTINUED | OUTPATIENT
Start: 2019-04-08 | End: 2019-04-08 | Stop reason: SDUPTHER

## 2019-04-08 RX ORDER — SODIUM CHLORIDE 0.9 % (FLUSH) 0.9 %
10 SYRINGE (ML) INJECTION PRN
Status: DISCONTINUED | OUTPATIENT
Start: 2019-04-08 | End: 2019-04-08 | Stop reason: SDUPTHER

## 2019-04-08 RX ADMIN — PROPOFOL 100 MCG/KG/MIN: 10 INJECTION, EMULSION INTRAVENOUS at 13:28

## 2019-04-08 RX ADMIN — MIDAZOLAM 2 MG: 1 INJECTION INTRAMUSCULAR; INTRAVENOUS at 13:25

## 2019-04-08 RX ADMIN — FENTANYL CITRATE 50 MCG: 50 INJECTION, SOLUTION INTRAMUSCULAR; INTRAVENOUS at 13:33

## 2019-04-08 RX ADMIN — SODIUM CHLORIDE: 9 INJECTION, SOLUTION INTRAVENOUS at 13:25

## 2019-04-08 RX ADMIN — PROPOFOL 70 MG: 10 INJECTION, EMULSION INTRAVENOUS at 13:28

## 2019-04-08 RX ADMIN — FENTANYL CITRATE 50 MCG: 50 INJECTION, SOLUTION INTRAMUSCULAR; INTRAVENOUS at 13:28

## 2019-04-08 RX ADMIN — SODIUM CHLORIDE: 9 INJECTION, SOLUTION INTRAVENOUS at 12:23

## 2019-04-08 ASSESSMENT — ENCOUNTER SYMPTOMS: SHORTNESS OF BREATH: 0

## 2019-04-08 ASSESSMENT — PAIN - FUNCTIONAL ASSESSMENT: PAIN_FUNCTIONAL_ASSESSMENT: 0-10

## 2019-04-08 ASSESSMENT — COPD QUESTIONNAIRES: CAT_SEVERITY: MODERATE

## 2019-04-08 ASSESSMENT — LIFESTYLE VARIABLES: SMOKING_STATUS: 1

## 2019-04-08 ASSESSMENT — PAIN SCALES - GENERAL
PAINLEVEL_OUTOF10: 0
PAINLEVEL_OUTOF10: 0

## 2019-04-08 NOTE — OP NOTE
[place] [address] [facility]  Colonoscopy Operative Report    DATE OF PROCEDURE: 4/8/2019     PREOPERATIVE DIAGNOSIS: Colostomy in place, Diverticulitis    POSTOPERATIVE DIAGNOSIS/FINDINGS: Rectal length 15cm; pandiverticulosis    SURGEON: Sravani Art MD    ASSISTANT: None    OPERATION: Total Colonoscopy to the cecum with rectal pouchoscopy    ANESTHESIA: Local monitored anesthesia. COMPLICATIONS: None. PRIOR TO THE EXAM: Gen: comfortable, no distress, awake and alert; Lungs: Clear;  Heart:regular rate and rhythm, normal S1S2     BRIEF HISTORY:  This is a 79 y.o. female who presents for eval for closure of colostomy. The patient has no personal and family history of colon cancer. The patient never had a colonoscopy. My recommendation is to proceed with colonoscopy. The patient was advised of the risks, benefits, complications and options including the risk of bleeding and perforation. The patient understood and agreed to proceed. PROCEDURE:  In the left side down decubitus position, a digital rectal exam was performed. There were no masses or abnormalities noted. The scope was lubricated and inserted into the anus. The scope was then passed under direct visualization in a retrograde fashion to the end of the pouch at 15cm. There was moderate mucus in the rectal vault. No active colitis or polyps. The scope is retroflexed at the anal verge. No abnormalities are seen at the anal verge. The scope was then straightened and removed. The patient was then placed supine and the scope . The scope was passed in retrograde fashion to the cecum. The terminal ileum was intubated. The light was seen in the patient's right lower quadrant. The ileocecal valve was photographed. The prep was adequate. No pressure/positioning manuevers were required for complete passage. As the scope is withdrawn, visualization of the ascending and transverse colon were unremarkable.  She had pandiverticulosis throughout the colon. .The scope was straightened and removed.     The patient tolerated the procedure well        SPECIMENS:  non    PLAN:  OR tomorrow for reversal of her colostomy  Enema x2 for tonight  Wallace prep tonight  Clear liquids for remainder of the day  NPO after midnight    Nile Lord MD, MS  Minimally Invasive and Bariatric Surgeon  4/8/2019  1:46 PM

## 2019-04-08 NOTE — ANESTHESIA POSTPROCEDURE EVALUATION
Department of Anesthesiology  Postprocedure Note    Patient: Uday Burnett  MRN: [id number]  YOB: 1950  Date of evaluation: 4/8/2019  Time:  2:38 PM     Procedure Summary     Date:  04/08/19 Room / Location:  [facility] ENDO 01 / [facility] ENDOSCOPY    Anesthesia Start:  1325 Anesthesia Stop:  6786    Procedure:  COLONOSCOPY (N/A ) Diagnosis:  (COLOSTOMY IN PLACE)    Surgeon:  Mendez Yeh MD Responsible Provider:  Raymond Spencer MD    Anesthesia Type:  MAC ASA Status:  3          Anesthesia Type: MAC    Brett Phase I: Brett Score: 10    Brett Phase II: Brett Score: 10    Last vitals: Reviewed and per EMR flowsheets.        Anesthesia Post Evaluation    Patient location during evaluation: PACU  Patient participation: complete - patient participated  Level of consciousness: awake and alert  Airway patency: patent  Nausea & Vomiting: no nausea and no vomiting  Complications: no  Cardiovascular status: hemodynamically stable  Respiratory status: acceptable  Hydration status: euvolemic

## 2019-04-08 NOTE — ANESTHESIA PRE PROCEDURE
per day Rakel Butcher MD        sodium chloride flush 0.9 % injection 10 mL  10 mL Intravenous PRN Rakel Butcher MD           Allergies:  No Known Allergies    Problem List:    Patient Active Problem List   Diagnosis Code    Spinal stenosis M48.00    Degeneration of lumbosacral intervertebral disc M51.37    Lumbago M54.5    Colonic diverticular abscess K57.20    CAD in native artery I25.10    S/P colon resection Z90.49    Diverticulitis of large intestine with perforation and abscess without bleeding K57.20    Pelvic abscess in female N73.9       Past Medical History:        Diagnosis Date    CAD in native artery 8/9/2018    Diverticulitis 08/03/2018    HIGH CHOLESTEROL     History of atrial fibrillation 08/2018    with episode of diverticulosis    History of stress test 08/07/2018    Lexiscan stress test    Hypertension     Thyroid disease        Past Surgical History:        Procedure Laterality Date    CARDIAC SURGERY  08/11/2018    cardiac cath  Elena Ale[facility] E's    COLONOSCOPY      NERVE BLOCK  12 27 2011    NERVE BLOCK  1/24/12    lumbar epidural    NERVE BLOCK  02/21/12    lumbar epidural with flouro    ND COLONOSCOPY FLX DX W/COLLJ SPEC WHEN PFRMD N/A 10/4/2018    COLONOSCOPY performed by Rakel Butcher MD at [facility], [facility], Amanda Oquendo N/A 11/13/2018    LAPAROSCOPIC ROBOT XI ASSISTED SIGMOID COLON RESECTION WITH OSTOMY performed by Rakel Butcher MD at [facility] History:    Social History     Tobacco Use    Smoking status: Current Every Day Smoker     Packs/day: 0.50     Types: Cigarettes    Smokeless tobacco: Never Used   Substance Use Topics    Alcohol use:  No                                Ready to quit: Not Answered  Counseling given: Not Answered      Vital Signs (Current):   Vitals:    04/08/19 1205   BP: (!) 164/87   Pulse: 73   Resp: 16   Temp: 96.9 °F (36.1 °C)   TempSrc: Temporal   SpO2: 98%   Weight: 125 lb 12.8 oz (57.1 kg)                                              BP Readings from Last 3 Encounters:   04/08/19 (!) 164/87   04/01/19 (!) 170/90   03/20/19 (!) 142/84       NPO Status: Time of last liquid consumption: 2100                        Time of last solid consumption: 1800                        Date of last liquid consumption: 04/07/19                        Date of last solid food consumption: 04/05/19    BMI:   Wt Readings from Last 3 Encounters:   04/08/19 125 lb 12.8 oz (57.1 kg)   04/01/19 130 lb 5 oz (59.1 kg)   03/20/19 130 lb (59 kg)     Body mass index is 21.93 kg/m². CBC:   Lab Results   Component Value Date    WBC 7.9 04/01/2019    RBC 4.54 04/01/2019    HGB 13.8 04/01/2019    HCT 41.5 04/01/2019    MCV 91.4 04/01/2019    RDW 13.3 04/01/2019     04/01/2019       CMP:   Lab Results   Component Value Date     04/01/2019    K 4.0 04/01/2019     04/01/2019    CO2 28 04/01/2019    BUN 9 04/01/2019    CREATININE 0.7 04/01/2019    GFRAA >60 04/01/2019    LABGLOM >60 04/01/2019    GLUCOSE 84 04/01/2019    GLUCOSE 94 12/13/2011    PROT 7.9 08/03/2018    CALCIUM 9.4 04/01/2019    BILITOT 0.2 08/03/2018    ALKPHOS 85 08/03/2018    AST 17 08/03/2018    ALT 11 08/03/2018       POC Tests: No results for input(s): POCGLU, POCNA, POCK, POCCL, POCBUN, POCHEMO, POCHCT in the last 72 hours.     Coags:   Lab Results   Component Value Date    PROTIME 12.6 08/03/2018    INR 1.1 08/03/2018       HCG (If Applicable): No results found for: PREGTESTUR, PREGSERUM, HCG, HCGQUANT     ABGs: No results found for: PHART, PO2ART, OCL7HCK, GXG8EKP, BEART, Z3MKLSUW     Type & Screen (If Applicable):  No results found for: KIKE [facility]    Anesthesia Evaluation  Patient summary reviewed no history of anesthetic complications:   Airway: Mallampati: II  TM distance: >3 FB   Neck ROM: full  Mouth opening: > = 3 FB Dental:    (+) upper dentures      Pulmonary: breath sounds clear to auscultation  (+) COPD: moderate, current smoker    (-) shortness of breath                           Cardiovascular:  Exercise tolerance: good (>4 METS),   (+) hypertension: moderate, CAD: obstructive, CABG/stent: no interval change, dysrhythmias: atrial fibrillation, hyperlipidemia      ECG reviewed  Rhythm: regular  Rate: normal  Echocardiogram reviewed  Stress test reviewed                Neuro/Psych:   (+) neuromuscular disease:,             GI/Hepatic/Renal:   (+) bowel prep,      (-) liver disease and no renal disease       Endo/Other:    (+) hypothyroidism, blood dyscrasia: anticoagulation therapy:., .    (-) diabetes mellitus               Abdominal:         (-) obese     Vascular: negative vascular ROS. Anesthesia Plan      MAC     ASA 3       Induction: intravenous. Anesthetic plan and risks discussed with patient. Plan discussed with CRNA. DOS STAFF ADDENDUM:    Pt seen and examined, chart reviewed (including anesthesia, drug and allergy history). Anesthetic plan, risks, benefits, alternatives, and personnel involved discussed with patient. Patient verbalized an understanding and agrees to proceed. Plan discussed with care team members and agreed upon.     Lauren Robledo MD  Staff Anesthesiologist  12:46 PM      Lauren Robledo MD   4/8/2019

## 2019-04-08 NOTE — H&P
General Surgery History and Physical  [facility]    Patient's Name/Date of Birth: Swati Trevino / 1950    Date: April 8, 2019     Surgeon: Ant Schultz M.D.    PCP: Mychal Rain DO     Chief Complaint: colostomy in place    HPI:   Swati Trevino is a 79 y.o. female who presents for evaluation of colostomy in place. Timing is constant, radiation to LLQ, alleviated by none and started november and severity is 8/10. Hx perforated diverticulitis with abscess in Nov with lap robot Hartmanns procedure. Now wants reversed. Has been doing well. Patient Active Problem List   Diagnosis    Spinal stenosis    Degeneration of lumbosacral intervertebral disc    Lumbago    Colonic diverticular abscess    CAD in native artery    S/P colon resection    Diverticulitis of large intestine with perforation and abscess without bleeding    Pelvic abscess in female       Past Medical History:   Diagnosis Date    CAD in native artery 8/9/2018    Diverticulitis 08/03/2018    HIGH CHOLESTEROL     History of atrial fibrillation 08/2018    with episode of diverticulosis    History of stress test 08/07/2018    Lexiscan stress test    Hypertension     Thyroid disease        Past Surgical History:   Procedure Laterality Date    CARDIAC SURGERY  08/11/2018    cardiac cath  Nilsa Franc[facility] E's    COLONOSCOPY      NERVE BLOCK  12 27 2011    NERVE BLOCK  1/24/12    lumbar epidural    NERVE BLOCK  02/21/12    lumbar epidural with flouro    VT COLONOSCOPY FLX DX W/COLLJ SPEC WHEN PFRMD N/A 10/4/2018    COLONOSCOPY performed by Ant Schultz MD at [facility], PARTIAL, W/ANAST N/A 11/13/2018    LAPAROSCOPIC ROBOT XI ASSISTED SIGMOID COLON RESECTION WITH OSTOMY performed by Ant Schultz MD at [address]       No Known Allergies    The patient has a family history that is negative for severe cardiovascular or respiratory issues, negative for reaction to anesthesia. Time spent reviewing past medical, surgical, social and family history, vitals, nursing assessment and images. No changes from above documented history. Social History     Socioeconomic History    Marital status:      Spouse name: Not on file    Number of children: Not on file    Years of education: Not on file    Highest education level: Not on file   Occupational History    Not on file   Social Needs    Financial resource strain: Not on file    Food insecurity:     Worry: Not on file     Inability: Not on file    Transportation needs:     Medical: Not on file     Non-medical: Not on file   Tobacco Use    Smoking status: Current Every Day Smoker     Packs/day: 0.50     Types: Cigarettes    Smokeless tobacco: Never Used   Substance and Sexual Activity    Alcohol use: No    Drug use: No    Sexual activity: Not on file   Lifestyle    Physical activity:     Days per week: Not on file     Minutes per session: Not on file    Stress: Not on file   Relationships    Social connections:     Talks on phone: Not on file     Gets together: Not on file     Attends Hinduism service: Not on file     Active member of club or organization: Not on file     Attends meetings of clubs or organizations: Not on file     Relationship status: Not on file    Intimate partner violence:     Fear of current or ex partner: Not on file     Emotionally abused: Not on file     Physically abused: Not on file     Forced sexual activity: Not on file   Other Topics Concern    Not on file   Social History Narrative    Not on file       I have reviewed relevant labs from this admission and interpretation is included in my assessment and plan    Review of Systems    A complete 10 system review was performed and are otherwise negative unless mentioned in the above HPI. Specific negatives are listed below but may not include all those reviewed.     General ROS: negative obtundation, AMS  ENT ROS: negative rhinorrhea, to reversal  Nicols prep  OR for lap robot assist takedown colostomy, poss stoma  Discussed the risk, benefits and alternatives of surgery including wound infections, bleeding, scar and hernia formation, stoma permanent and temporary, repeat procedures and the risks of general anesthetic including MI, CVA, sudden death or reactions to anesthetic medications. The patient understands the risks and alternatives and the possibility of converting to an open procedure. All questions were answered to the patient's satisfaction and they freely signed the consent.            Madi Christensen MD  12:06 PM  4/8/2019

## 2019-04-09 ENCOUNTER — ANESTHESIA EVENT (OUTPATIENT)
Dept: OPERATING ROOM | Age: 69
DRG: 346 | End: 2019-04-09
Payer: COMMERCIAL

## 2019-04-09 ENCOUNTER — ANESTHESIA (OUTPATIENT)
Dept: OPERATING ROOM | Age: 69
DRG: 346 | End: 2019-04-09
Payer: COMMERCIAL

## 2019-04-09 ENCOUNTER — HOSPITAL ENCOUNTER (INPATIENT)
Age: 69
LOS: 2 days | Discharge: HOME | DRG: 346 | End: 2019-04-11
Attending: SURGERY | Admitting: SURGERY
Payer: COMMERCIAL

## 2019-04-09 VITALS
RESPIRATION RATE: 2 BRPM | TEMPERATURE: 97 F | SYSTOLIC BLOOD PRESSURE: 111 MMHG | DIASTOLIC BLOOD PRESSURE: 69 MMHG | OXYGEN SATURATION: 100 %

## 2019-04-09 DIAGNOSIS — G89.18 POSTOPERATIVE PAIN: Primary | ICD-10-CM

## 2019-04-09 PROBLEM — K57.92 DIVERTICULITIS: Status: ACTIVE | Noted: 2019-04-09

## 2019-04-09 PROCEDURE — 6360000002 HC RX W HCPCS: Performed by: SURGERY

## 2019-04-09 PROCEDURE — 0DSM4ZZ REPOSITION DESCENDING COLON, PERCUTANEOUS ENDOSCOPIC APPROACH: ICD-10-PCS | Performed by: SURGERY

## 2019-04-09 PROCEDURE — 6360000002 HC RX W HCPCS: Performed by: NURSE ANESTHETIST, CERTIFIED REGISTERED

## 2019-04-09 PROCEDURE — 2500000003 HC RX 250 WO HCPCS: Performed by: SURGERY

## 2019-04-09 PROCEDURE — 2580000003 HC RX 258: Performed by: SURGERY

## 2019-04-09 PROCEDURE — 7100000000 HC PACU RECOVERY - FIRST 15 MIN: Performed by: SURGERY

## 2019-04-09 PROCEDURE — 2500000003 HC RX 250 WO HCPCS: Performed by: NURSE ANESTHETIST, CERTIFIED REGISTERED

## 2019-04-09 PROCEDURE — 1200000000 HC SEMI PRIVATE

## 2019-04-09 PROCEDURE — 2709999900 HC NON-CHARGEABLE SUPPLY: Performed by: SURGERY

## 2019-04-09 PROCEDURE — 3600000019 HC SURGERY ROBOT ADDTL 15MIN: Performed by: SURGERY

## 2019-04-09 PROCEDURE — 88304 TISSUE EXAM BY PATHOLOGIST: CPT

## 2019-04-09 PROCEDURE — 7100000001 HC PACU RECOVERY - ADDTL 15 MIN: Performed by: SURGERY

## 2019-04-09 PROCEDURE — S2900 ROBOTIC SURGICAL SYSTEM: HCPCS | Performed by: SURGERY

## 2019-04-09 PROCEDURE — 3700000001 HC ADD 15 MINUTES (ANESTHESIA): Performed by: SURGERY

## 2019-04-09 PROCEDURE — C9113 INJ PANTOPRAZOLE SODIUM, VIA: HCPCS | Performed by: SURGERY

## 2019-04-09 PROCEDURE — 44227 LAP CLOSE ENTEROSTOMY: CPT | Performed by: SURGERY

## 2019-04-09 PROCEDURE — 3600000009 HC SURGERY ROBOT BASE: Performed by: SURGERY

## 2019-04-09 PROCEDURE — 3700000000 HC ANESTHESIA ATTENDED CARE: Performed by: SURGERY

## 2019-04-09 PROCEDURE — 8E0W4CZ ROBOTIC ASSISTED PROCEDURE OF TRUNK REGION, PERCUTANEOUS ENDOSCOPIC APPROACH: ICD-10-PCS | Performed by: SURGERY

## 2019-04-09 PROCEDURE — 2720000010 HC SURG SUPPLY STERILE: Performed by: SURGERY

## 2019-04-09 PROCEDURE — 6370000000 HC RX 637 (ALT 250 FOR IP): Performed by: SURGERY

## 2019-04-09 PROCEDURE — 6360000002 HC RX W HCPCS: Performed by: ANESTHESIOLOGY

## 2019-04-09 RX ORDER — ONDANSETRON 2 MG/ML
4 INJECTION INTRAMUSCULAR; INTRAVENOUS EVERY 6 HOURS PRN
Status: DISCONTINUED | OUTPATIENT
Start: 2019-04-09 | End: 2019-04-11 | Stop reason: HOSPADM

## 2019-04-09 RX ORDER — DEXAMETHASONE SODIUM PHOSPHATE 10 MG/ML
INJECTION, SOLUTION INTRAMUSCULAR; INTRAVENOUS PRN
Status: DISCONTINUED | OUTPATIENT
Start: 2019-04-09 | End: 2019-04-09 | Stop reason: SDUPTHER

## 2019-04-09 RX ORDER — OXYCODONE HYDROCHLORIDE AND ACETAMINOPHEN 5; 325 MG/1; MG/1
2 TABLET ORAL EVERY 4 HOURS PRN
Status: DISCONTINUED | OUTPATIENT
Start: 2019-04-09 | End: 2019-04-11 | Stop reason: HOSPADM

## 2019-04-09 RX ORDER — SODIUM CHLORIDE, SODIUM LACTATE, POTASSIUM CHLORIDE, CALCIUM CHLORIDE 600; 310; 30; 20 MG/100ML; MG/100ML; MG/100ML; MG/100ML
INJECTION, SOLUTION INTRAVENOUS CONTINUOUS
Status: DISCONTINUED | OUTPATIENT
Start: 2019-04-09 | End: 2019-04-10

## 2019-04-09 RX ORDER — ROSUVASTATIN CALCIUM 10 MG/1
10 TABLET, COATED ORAL DAILY
Status: DISCONTINUED | OUTPATIENT
Start: 2019-04-09 | End: 2019-04-11 | Stop reason: HOSPADM

## 2019-04-09 RX ORDER — FENTANYL CITRATE 50 UG/ML
INJECTION, SOLUTION INTRAMUSCULAR; INTRAVENOUS PRN
Status: DISCONTINUED | OUTPATIENT
Start: 2019-04-09 | End: 2019-04-09 | Stop reason: SDUPTHER

## 2019-04-09 RX ORDER — NEOSTIGMINE METHYLSULFATE 1 MG/ML
INJECTION, SOLUTION INTRAVENOUS PRN
Status: DISCONTINUED | OUTPATIENT
Start: 2019-04-09 | End: 2019-04-09 | Stop reason: SDUPTHER

## 2019-04-09 RX ORDER — SODIUM CHLORIDE, SODIUM LACTATE, POTASSIUM CHLORIDE, CALCIUM CHLORIDE 600; 310; 30; 20 MG/100ML; MG/100ML; MG/100ML; MG/100ML
INJECTION, SOLUTION INTRAVENOUS CONTINUOUS
Status: DISCONTINUED | OUTPATIENT
Start: 2019-04-09 | End: 2019-04-09

## 2019-04-09 RX ORDER — HEPARIN SODIUM 5000 [USP'U]/ML
5000 INJECTION, SOLUTION INTRAVENOUS; SUBCUTANEOUS ONCE
Status: COMPLETED | OUTPATIENT
Start: 2019-04-09 | End: 2019-04-09

## 2019-04-09 RX ORDER — PANTOPRAZOLE SODIUM 40 MG/10ML
40 INJECTION, POWDER, LYOPHILIZED, FOR SOLUTION INTRAVENOUS DAILY
Status: DISCONTINUED | OUTPATIENT
Start: 2019-04-09 | End: 2019-04-10

## 2019-04-09 RX ORDER — BUPIVACAINE HYDROCHLORIDE 5 MG/ML
INJECTION, SOLUTION EPIDURAL; INTRACAUDAL PRN
Status: DISCONTINUED | OUTPATIENT
Start: 2019-04-09 | End: 2019-04-09 | Stop reason: ALTCHOICE

## 2019-04-09 RX ORDER — SODIUM CHLORIDE 0.9 % (FLUSH) 0.9 %
10 SYRINGE (ML) INJECTION EVERY 12 HOURS SCHEDULED
Status: DISCONTINUED | OUTPATIENT
Start: 2019-04-09 | End: 2019-04-11 | Stop reason: HOSPADM

## 2019-04-09 RX ORDER — MEPERIDINE HYDROCHLORIDE 50 MG/ML
12.5 INJECTION INTRAMUSCULAR; INTRAVENOUS; SUBCUTANEOUS EVERY 5 MIN PRN
Status: DISCONTINUED | OUTPATIENT
Start: 2019-04-09 | End: 2019-04-09 | Stop reason: HOSPADM

## 2019-04-09 RX ORDER — HYDROMORPHONE HYDROCHLORIDE 1 MG/ML
0.5 INJECTION, SOLUTION INTRAMUSCULAR; INTRAVENOUS; SUBCUTANEOUS EVERY 5 MIN PRN
Status: DISCONTINUED | OUTPATIENT
Start: 2019-04-09 | End: 2019-04-09 | Stop reason: HOSPADM

## 2019-04-09 RX ORDER — MORPHINE SULFATE 2 MG/ML
2 INJECTION, SOLUTION INTRAMUSCULAR; INTRAVENOUS EVERY 4 HOURS PRN
Status: DISCONTINUED | OUTPATIENT
Start: 2019-04-09 | End: 2019-04-11 | Stop reason: HOSPADM

## 2019-04-09 RX ORDER — GLYCOPYRROLATE 1 MG/5 ML
SYRINGE (ML) INTRAVENOUS PRN
Status: DISCONTINUED | OUTPATIENT
Start: 2019-04-09 | End: 2019-04-09 | Stop reason: SDUPTHER

## 2019-04-09 RX ORDER — PROPOFOL 10 MG/ML
INJECTION, EMULSION INTRAVENOUS PRN
Status: DISCONTINUED | OUTPATIENT
Start: 2019-04-09 | End: 2019-04-09 | Stop reason: SDUPTHER

## 2019-04-09 RX ORDER — OXYCODONE HYDROCHLORIDE AND ACETAMINOPHEN 5; 325 MG/1; MG/1
1 TABLET ORAL EVERY 4 HOURS PRN
Status: DISCONTINUED | OUTPATIENT
Start: 2019-04-09 | End: 2019-04-11 | Stop reason: HOSPADM

## 2019-04-09 RX ORDER — SODIUM CHLORIDE 0.9 % (FLUSH) 0.9 %
10 SYRINGE (ML) INJECTION PRN
Status: DISCONTINUED | OUTPATIENT
Start: 2019-04-09 | End: 2019-04-11 | Stop reason: HOSPADM

## 2019-04-09 RX ORDER — SODIUM CHLORIDE 0.9 % (FLUSH) 0.9 %
10 SYRINGE (ML) INJECTION EVERY 12 HOURS SCHEDULED
Status: DISCONTINUED | OUTPATIENT
Start: 2019-04-09 | End: 2019-04-09 | Stop reason: HOSPADM

## 2019-04-09 RX ORDER — MIDAZOLAM HYDROCHLORIDE 1 MG/ML
INJECTION INTRAMUSCULAR; INTRAVENOUS PRN
Status: DISCONTINUED | OUTPATIENT
Start: 2019-04-09 | End: 2019-04-09 | Stop reason: SDUPTHER

## 2019-04-09 RX ORDER — MORPHINE SULFATE 4 MG/ML
4 INJECTION, SOLUTION INTRAMUSCULAR; INTRAVENOUS EVERY 4 HOURS PRN
Status: DISCONTINUED | OUTPATIENT
Start: 2019-04-09 | End: 2019-04-10

## 2019-04-09 RX ORDER — 0.9 % SODIUM CHLORIDE 0.9 %
10 VIAL (ML) INJECTION DAILY
Status: DISCONTINUED | OUTPATIENT
Start: 2019-04-09 | End: 2019-04-10

## 2019-04-09 RX ORDER — ONDANSETRON 2 MG/ML
INJECTION INTRAMUSCULAR; INTRAVENOUS PRN
Status: DISCONTINUED | OUTPATIENT
Start: 2019-04-09 | End: 2019-04-09 | Stop reason: SDUPTHER

## 2019-04-09 RX ORDER — ROCURONIUM BROMIDE 10 MG/ML
INJECTION, SOLUTION INTRAVENOUS PRN
Status: DISCONTINUED | OUTPATIENT
Start: 2019-04-09 | End: 2019-04-09 | Stop reason: SDUPTHER

## 2019-04-09 RX ORDER — LEVOTHYROXINE SODIUM 0.1 MG/1
100 TABLET ORAL DAILY
Status: DISCONTINUED | OUTPATIENT
Start: 2019-04-09 | End: 2019-04-11 | Stop reason: HOSPADM

## 2019-04-09 RX ORDER — SODIUM CHLORIDE 0.9 % (FLUSH) 0.9 %
10 SYRINGE (ML) INJECTION PRN
Status: DISCONTINUED | OUTPATIENT
Start: 2019-04-09 | End: 2019-04-09 | Stop reason: HOSPADM

## 2019-04-09 RX ORDER — MEPERIDINE HYDROCHLORIDE 25 MG/ML
INJECTION INTRAMUSCULAR; INTRAVENOUS; SUBCUTANEOUS
Status: DISCONTINUED
Start: 2019-04-09 | End: 2019-04-10

## 2019-04-09 RX ADMIN — METRONIDAZOLE 500 MG: 500 INJECTION, SOLUTION INTRAVENOUS at 13:32

## 2019-04-09 RX ADMIN — METOPROLOL TARTRATE 25 MG: 25 TABLET ORAL at 21:20

## 2019-04-09 RX ADMIN — Medication 3 MG: at 15:15

## 2019-04-09 RX ADMIN — SODIUM CHLORIDE, POTASSIUM CHLORIDE, SODIUM LACTATE AND CALCIUM CHLORIDE: 600; 310; 30; 20 INJECTION, SOLUTION INTRAVENOUS at 09:54

## 2019-04-09 RX ADMIN — FENTANYL CITRATE 50 MCG: 50 INJECTION, SOLUTION INTRAMUSCULAR; INTRAVENOUS at 14:00

## 2019-04-09 RX ADMIN — MIDAZOLAM 2 MG: 1 INJECTION INTRAMUSCULAR; INTRAVENOUS at 13:27

## 2019-04-09 RX ADMIN — SODIUM CHLORIDE, POTASSIUM CHLORIDE, SODIUM LACTATE AND CALCIUM CHLORIDE: 600; 310; 30; 20 INJECTION, SOLUTION INTRAVENOUS at 13:27

## 2019-04-09 RX ADMIN — Medication 35 MG: at 13:32

## 2019-04-09 RX ADMIN — Medication 0.6 MG: at 15:15

## 2019-04-09 RX ADMIN — MEPERIDINE HYDROCHLORIDE 12.5 MG: 50 INJECTION, SOLUTION INTRAMUSCULAR; INTRAVENOUS; SUBCUTANEOUS at 16:10

## 2019-04-09 RX ADMIN — ROSUVASTATIN CALCIUM 10 MG: 10 TABLET, FILM COATED ORAL at 17:29

## 2019-04-09 RX ADMIN — DEXAMETHASONE SODIUM PHOSPHATE 10 MG: 10 INJECTION, SOLUTION INTRAMUSCULAR; INTRAVENOUS at 13:47

## 2019-04-09 RX ADMIN — LEVOTHYROXINE SODIUM 100 MCG: 100 TABLET ORAL at 17:29

## 2019-04-09 RX ADMIN — FENTANYL CITRATE 50 MCG: 50 INJECTION, SOLUTION INTRAMUSCULAR; INTRAVENOUS at 14:31

## 2019-04-09 RX ADMIN — HEPARIN SODIUM 5000 UNITS: 5000 INJECTION, SOLUTION INTRAVENOUS; SUBCUTANEOUS at 10:00

## 2019-04-09 RX ADMIN — ONDANSETRON HYDROCHLORIDE 4 MG: 2 INJECTION, SOLUTION INTRAMUSCULAR; INTRAVENOUS at 13:47

## 2019-04-09 RX ADMIN — SODIUM CHLORIDE, POTASSIUM CHLORIDE, SODIUM LACTATE AND CALCIUM CHLORIDE: 600; 310; 30; 20 INJECTION, SOLUTION INTRAVENOUS at 17:30

## 2019-04-09 RX ADMIN — OXYCODONE AND ACETAMINOPHEN 2 TABLET: 5; 325 TABLET ORAL at 17:29

## 2019-04-09 RX ADMIN — PROPOFOL 180 MG: 10 INJECTION, EMULSION INTRAVENOUS at 13:32

## 2019-04-09 RX ADMIN — OXYCODONE AND ACETAMINOPHEN 1 TABLET: 5; 325 TABLET ORAL at 21:27

## 2019-04-09 RX ADMIN — PANTOPRAZOLE SODIUM 40 MG: 40 INJECTION, POWDER, LYOPHILIZED, FOR SOLUTION INTRAVENOUS at 17:33

## 2019-04-09 RX ADMIN — FENTANYL CITRATE 150 MCG: 50 INJECTION, SOLUTION INTRAMUSCULAR; INTRAVENOUS at 13:32

## 2019-04-09 RX ADMIN — ENOXAPARIN SODIUM 40 MG: 100 INJECTION SUBCUTANEOUS at 17:29

## 2019-04-09 RX ADMIN — Medication 10 ML: at 17:33

## 2019-04-09 RX ADMIN — LIDOCAINE HYDROCHLORIDE 60 MG: 20 INJECTION INTRAVENOUS at 13:32

## 2019-04-09 RX ADMIN — CEFTRIAXONE SODIUM 1 G: 1 INJECTION, POWDER, FOR SOLUTION INTRAMUSCULAR; INTRAVENOUS at 13:27

## 2019-04-09 RX ADMIN — PHENYLEPHRINE HYDROCHLORIDE 200 MCG: 10 INJECTION INTRAVENOUS at 13:52

## 2019-04-09 ASSESSMENT — PULMONARY FUNCTION TESTS
PIF_VALUE: 18
PIF_VALUE: 30
PIF_VALUE: 14
PIF_VALUE: 33
PIF_VALUE: 0
PIF_VALUE: 20
PIF_VALUE: 31
PIF_VALUE: 31
PIF_VALUE: 32
PIF_VALUE: 19
PIF_VALUE: 20
PIF_VALUE: 33
PIF_VALUE: 20
PIF_VALUE: 33
PIF_VALUE: 30
PIF_VALUE: 15
PIF_VALUE: 16
PIF_VALUE: 32
PIF_VALUE: 32
PIF_VALUE: 20
PIF_VALUE: 2
PIF_VALUE: 12
PIF_VALUE: 32
PIF_VALUE: 1
PIF_VALUE: 33
PIF_VALUE: 12
PIF_VALUE: 0
PIF_VALUE: 33
PIF_VALUE: 13
PIF_VALUE: 20
PIF_VALUE: 31
PIF_VALUE: 28
PIF_VALUE: 16
PIF_VALUE: 20
PIF_VALUE: 20
PIF_VALUE: 32
PIF_VALUE: 13
PIF_VALUE: 16
PIF_VALUE: 34
PIF_VALUE: 19
PIF_VALUE: 13
PIF_VALUE: 20
PIF_VALUE: 26
PIF_VALUE: 29
PIF_VALUE: 26
PIF_VALUE: 30
PIF_VALUE: 13
PIF_VALUE: 13
PIF_VALUE: 25
PIF_VALUE: 18
PIF_VALUE: 15
PIF_VALUE: 31
PIF_VALUE: 12
PIF_VALUE: 31
PIF_VALUE: 19
PIF_VALUE: 12
PIF_VALUE: 32
PIF_VALUE: 26
PIF_VALUE: 19
PIF_VALUE: 22
PIF_VALUE: 19
PIF_VALUE: 30
PIF_VALUE: 26
PIF_VALUE: 19
PIF_VALUE: 30
PIF_VALUE: 25
PIF_VALUE: 19
PIF_VALUE: 19
PIF_VALUE: 32
PIF_VALUE: 15
PIF_VALUE: 12
PIF_VALUE: 32
PIF_VALUE: 32
PIF_VALUE: 20
PIF_VALUE: 30
PIF_VALUE: 0
PIF_VALUE: 29
PIF_VALUE: 20
PIF_VALUE: 12
PIF_VALUE: 32
PIF_VALUE: 19
PIF_VALUE: 12
PIF_VALUE: 19
PIF_VALUE: 26
PIF_VALUE: 32
PIF_VALUE: 32
PIF_VALUE: 2
PIF_VALUE: 13
PIF_VALUE: 33
PIF_VALUE: 14
PIF_VALUE: 20
PIF_VALUE: 13
PIF_VALUE: 31
PIF_VALUE: 32
PIF_VALUE: 0
PIF_VALUE: 32
PIF_VALUE: 33
PIF_VALUE: 19
PIF_VALUE: 22
PIF_VALUE: 3
PIF_VALUE: 18
PIF_VALUE: 0
PIF_VALUE: 13
PIF_VALUE: 1
PIF_VALUE: 0
PIF_VALUE: 18
PIF_VALUE: 20
PIF_VALUE: 19
PIF_VALUE: 32
PIF_VALUE: 17

## 2019-04-09 ASSESSMENT — PAIN SCALES - GENERAL
PAINLEVEL_OUTOF10: 9
PAINLEVEL_OUTOF10: 0
PAINLEVEL_OUTOF10: 7
PAINLEVEL_OUTOF10: 4
PAINLEVEL_OUTOF10: 0
PAINLEVEL_OUTOF10: 7
PAINLEVEL_OUTOF10: 9
PAINLEVEL_OUTOF10: 0

## 2019-04-09 ASSESSMENT — PAIN DESCRIPTION - LOCATION
LOCATION: ABDOMEN
LOCATION: ABDOMEN

## 2019-04-09 ASSESSMENT — PAIN DESCRIPTION - PAIN TYPE
TYPE: SURGICAL PAIN
TYPE: ACUTE PAIN

## 2019-04-09 ASSESSMENT — LIFESTYLE VARIABLES: SMOKING_STATUS: 1

## 2019-04-09 ASSESSMENT — PAIN - FUNCTIONAL ASSESSMENT: PAIN_FUNCTIONAL_ASSESSMENT: 0-10

## 2019-04-09 ASSESSMENT — PAIN DESCRIPTION - DESCRIPTORS: DESCRIPTORS: ACHING;DISCOMFORT;SORE

## 2019-04-09 NOTE — H&P
Time spent reviewing past medical, surgical, social and family history, vitals, nursing assessment and images. No changes from above documented history. Social History     Socioeconomic History    Marital status:      Spouse name: Not on file    Number of children: Not on file    Years of education: Not on file    Highest education level: Not on file   Occupational History    Not on file   Social Needs    Financial resource strain: Not on file    Food insecurity:     Worry: Not on file     Inability: Not on file    Transportation needs:     Medical: Not on file     Non-medical: Not on file   Tobacco Use    Smoking status: Current Every Day Smoker     Packs/day: 0.50     Types: Cigarettes    Smokeless tobacco: Never Used   Substance and Sexual Activity    Alcohol use: No    Drug use: No    Sexual activity: Not on file   Lifestyle    Physical activity:     Days per week: Not on file     Minutes per session: Not on file    Stress: Not on file   Relationships    Social connections:     Talks on phone: Not on file     Gets together: Not on file     Attends Restorationist service: Not on file     Active member of club or organization: Not on file     Attends meetings of clubs or organizations: Not on file     Relationship status: Not on file    Intimate partner violence:     Fear of current or ex partner: Not on file     Emotionally abused: Not on file     Physically abused: Not on file     Forced sexual activity: Not on file   Other Topics Concern    Not on file   Social History Narrative    Not on file       I have reviewed relevant labs from this admission and interpretation is included in my assessment and plan    Review of Systems    A complete 10 system review was performed and are otherwise negative unless mentioned in the above HPI. Specific negatives are listed below but may not include all those reviewed.     General ROS: negative obtundation, AMS  ENT ROS: negative rhinorrhea, epistaxis  Allergy and Immunology ROS: negative itchy/watery eyes or nasal congestion  Hematological and Lymphatic ROS: negative spontaneous bleeding or bruising  Endocrine ROS: negative  lethargy, mood swings, palpitations or polydipsia/polyuria  Respiratory ROS: negative sputum changes, stridor, tachypnea or wheezing  Cardiovascular ROS: negative for - loss of consciousness, murmur or orthopnea  Gastrointestinal ROS: negative for - hematochezia or hematemesis  Genito-Urinary ROS: negative for -  genital discharge or hematuria  Musculoskeletal ROS: negative for - focal weakness, gangrene  Psych/Neuro ROS: negative for - visual or auditory hallucinations, suicidal ideation    Physical exam:   There were no vitals taken for this visit.   General appearance:  NAD, appears stated age  Head: NCAT, PERRLA, EOMI, red conjunctiva  Neck: supple, no masses, trachea midline  Lungs: Equal chest rise bilateral, no retractions, no wheezing  Heart: Reg rate  Abdomen: soft, nontender, colstomy LLQ clean and pink, stool in bag, nondistended  Skin; warm and dry, no cyanosis  Gu: no cva tenderness  Extremities: atraumatic, no focal motor deficits, no open wounds  Psych: No tremor, visual hallucinations      Radiology: I reviewed relevant abdominal imaging from this admission and that available in the EMR including CT abd/pel from Nov. My assessment is colstomy in place    Assessment:  Osamn Borrero is a 79 y.o. female with colostomy in place, hx pelvic abscess and sigmoid diverticulitis  Patient Active Problem List   Diagnosis    Spinal stenosis    Degeneration of lumbosacral intervertebral disc    Lumbago    Colonic diverticular abscess    CAD in native artery    S/P colon resection    Diverticulitis of large intestine with perforation and abscess without bleeding    Pelvic abscess in female         Plan:  Golytely prep  Cscope with 15cm distal rectum  Nicols prep done  OR for lap robot assist takedown colostomy, poss stoma  Discussed the risk, benefits and alternatives of surgery including wound infections, bleeding, scar and hernia formation, stoma permanent and temporary, repeat procedures and the risks of general anesthetic including MI, CVA, sudden death or reactions to anesthetic medications. The patient understands the risks and alternatives and the possibility of converting to an open procedure. All questions were answered to the patient's satisfaction and they freely signed the consent.            Leticia Boss MD  7:33 AM  4/9/2019

## 2019-04-09 NOTE — ANESTHESIA PRE PROCEDURE
Department of Anesthesiology  Preprocedure Note       Name:  Jose J Capone   Age:  79 y.o.  :  1950                                          MRN:  [id number]         Date:  2019      Surgeon: Raiza Reyes):  Latasha Morales MD    Procedure: COLOSTOMY CLOSURE LAPAROSCOPIC ROBOTIC XI (N/A Abdomen)    Medications prior to admission:   Prior to Admission medications    Medication Sig Start Date End Date Taking? Authorizing Provider   pantoprazole (PROTONIX) 40 MG tablet Take 1 tablet by mouth daily 18  Yes Brennen Lemos MD   metoprolol tartrate (LOPRESSOR) 25 MG tablet Take 1 tablet by mouth every 8 hours 18  Yes Kee Blbenjamin,    Sodium Phosphates (FLEET) 7-19 GM/118ML Place 1 enema rectally 2 times daily 19   Latasha Morales MD   magnesium citrate solution Take 296 mLs by mouth 2 times daily for 1 dose 3/20/19 3/21/19  Latasha Morales MD   docusate sodium (COLACE) 100 MG capsule Take 1 capsule by mouth daily as needed for Constipation 3/20/19 4/19/19  Latasha Morales MD   aspirin EC 81 MG EC tablet Take 1 tablet by mouth daily 18   Brennen Lemos MD   apixaban (ELIQUIS) 5 MG TABS tablet Take 1 tablet by mouth 2 times daily  Patient taking differently: Take 5 mg by mouth 2 times daily Indications: last dose 19  pm  18   Mark Llamas DO   levothyroxine (SYNTHROID) 100 MCG tablet Take 100 mcg by mouth Daily    Historical Provider, MD   ZINC PO Take 1 tablet by mouth daily    Historical Provider, MD   Omega-3 Fatty Acids (FISH OIL) 1000 MG CAPS Take 1,000 mg by mouth daily    Historical Provider, MD   lisinopril (PRINIVIL;ZESTRIL) 10 MG tablet Take 10 mg by mouth daily. Historical Provider, MD   rosuvastatin (CRESTOR) 10 MG tablet Take 10 mg by mouth daily.       Historical Provider, MD       Current medications:    Current Facility-Administered Medications   Medication Dose Route Frequency Provider Last Rate Last Dose    lactated ringers infusion   Intravenous Continuous Social History     Tobacco Use    Smoking status: Current Every Day Smoker     Packs/day: 0.50     Types: Cigarettes    Smokeless tobacco: Never Used   Substance Use Topics    Alcohol use: No                                Ready to quit: Not Answered  Counseling given: Not Answered      Vital Signs (Current):   Vitals:    04/09/19 0937   BP: (!) 141/82   Pulse: 62   Resp: 16   Temp: 97.7 °F (36.5 °C)   TempSrc: Temporal   SpO2: 97%   Weight: 127 lb (57.6 kg)   Height: 5' 3.5\" (1.613 m)                                              BP Readings from Last 3 Encounters:   04/09/19 (!) 141/82   04/08/19 132/80   04/08/19 (!) 97/57       NPO Status: Time of last liquid consumption: 2200                        Time of last solid consumption: 1800                        Date of last liquid consumption: 04/08/19                        Date of last solid food consumption: 04/05/19    BMI:   Wt Readings from Last 3 Encounters:   04/09/19 127 lb (57.6 kg)   04/08/19 125 lb 12.8 oz (57.1 kg)   04/01/19 130 lb 5 oz (59.1 kg)     Body mass index is 22.14 kg/m². CBC:   Lab Results   Component Value Date    WBC 7.9 04/01/2019    RBC 4.54 04/01/2019    HGB 13.8 04/01/2019    HCT 41.5 04/01/2019    MCV 91.4 04/01/2019    RDW 13.3 04/01/2019     04/01/2019       CMP:   Lab Results   Component Value Date     04/01/2019    K 4.0 04/01/2019     04/01/2019    CO2 28 04/01/2019    BUN 9 04/01/2019    CREATININE 0.7 04/01/2019    GFRAA >60 04/01/2019    LABGLOM >60 04/01/2019    GLUCOSE 84 04/01/2019    GLUCOSE 94 12/13/2011    PROT 7.9 08/03/2018    CALCIUM 9.4 04/01/2019    BILITOT 0.2 08/03/2018    ALKPHOS 85 08/03/2018    AST 17 08/03/2018    ALT 11 08/03/2018       POC Tests: No results for input(s): POCGLU, POCNA, POCK, POCCL, POCBUN, POCHEMO, POCHCT in the last 72 hours.     Coags:   Lab Results   Component Value Date    PROTIME 12.6 08/03/2018    INR 1.1 08/03/2018       HCG (If Applicable): No results found for: PREGTESTUR, PREGSERUM, HCG, HCGQUANT     ABGs: No results found for: PHART, PO2ART, WHJ2MTT, MRB7TGB, BEART, A9NZKCBY     Type & Screen (If Applicable):  No results found for: LABABO, [address]    Anesthesia Evaluation  Patient summary reviewed  Airway: Mallampati: III  TM distance: >3 FB   Neck ROM: full  Mouth opening: > = 3 FB Dental:    (+) upper dentures      Pulmonary: breath sounds clear to auscultation  (+) current smoker (0.50 ppd.)                           Cardiovascular:    (+) hypertension:, CAD:, dysrhythmias: atrial fibrillation, hyperlipidemia        Rhythm: regular  Rate: normal           Beta Blocker:  Dose within 24 Hrs         Neuro/Psych:   Negative Neuro/Psych ROS              GI/Hepatic/Renal: Neg GI/Hepatic/Renal ROS           ROS comment: Diverticulitis. .   Endo/Other:    (+) hypothyroidism::., .                 Abdominal:           Vascular: negative vascular ROS. Anesthesia Plan      general     ASA 3       Induction: intravenous. BIS  MIPS: Postoperative opioids intended. Anesthetic plan and risks discussed with patient. Plan discussed with CRNA.     Attending anesthesiologist reviewed and agrees with Raul Harrison MD   4/9/2019

## 2019-04-09 NOTE — PLAN OF CARE
Problem: Pain:  Goal: Pain level will decrease  Description  Pain level will decrease  Outcome: Met This Shift     Problem: Pain:  Goal: Control of acute pain  Description  Control of acute pain  Outcome: Met This Shift     Problem: Falls - Risk of:  Goal: Will remain free from falls  Description  Will remain free from falls  Outcome: Met This Shift     Problem: Skin Integrity:  Goal: Will show no infection signs and symptoms  Description  Will show no infection signs and symptoms  Outcome: Met This Shift

## 2019-04-09 NOTE — BRIEF OP NOTE
Brief Postoperative Note  ______________________________________________________________    Patient: Emile Leahy  YOB: 1950  MRN: [id number]  Date of Procedure: 4/9/2019    Pre-Op Diagnosis: COLOSTOMY IN PLACE, DIVERTICULITIS    Post-Op Diagnosis: Same       Procedure(s):  Laparoscopic robotic assisted colostomy reversal with mobilization splenix flexure    Anesthesia: General    Surgeon(s):  Марина Holland MD    Assistant: Gael Collier    Estimated Blood Loss (mL): 50    Complications: None    Specimens:   ID Type Source Tests Collected by Time Destination   A : Colostomy Tissue Tissue SURGICAL PATHOLOGY Марина Holland MD 4/9/2019 1415        Implants:  * No implants in log *      Drains:   Colostomy LUQ (Active)       Urethral Catheter Non-latex 16 fr (Active)       Findings: see op note    [id number]    Марина Holland MD  Date: 4/9/2019  Time: 3:06 PM

## 2019-04-09 NOTE — ANESTHESIA POSTPROCEDURE EVALUATION
Department of Anesthesiology  Postprocedure Note    Patient: Fabian David  MRN: [id number]  YOB: 1950  Date of evaluation: 4/9/2019  Time:  3:41 PM     Procedure Summary     Date:  04/09/19 Room / Location:  [facility] / [address]    Anesthesia Start:  1327 Anesthesia Stop:  1525    Procedure:  COLOSTOMY CLOSURE LAPAROSCOPIC ROBOTIC XI (N/A Abdomen) Diagnosis:  (COLOSTOMY IN PLACE)    Surgeon:  Dk Bass MD Responsible Provider:  Davina Steve MD    Anesthesia Type:  general ASA Status:  3          Anesthesia Type: general    Brett Phase I: Brett Score: 8    Brett Phase II:      Last vitals: Reviewed and per EMR flowsheets.        Anesthesia Post Evaluation    Patient location during evaluation: PACU  Patient participation: complete - patient participated  Level of consciousness: awake  Pain score: 0  Airway patency: patent  Nausea & Vomiting: no nausea  Complications: no  Cardiovascular status: blood pressure returned to baseline  Respiratory status: acceptable  Hydration status: euvolemic

## 2019-04-10 LAB
ANION GAP SERPL CALCULATED.3IONS-SCNC: 10 MMOL/L (ref 7–16)
BASOPHILS ABSOLUTE: 0.01 E9/L (ref 0–0.2)
BASOPHILS RELATIVE PERCENT: 0.1 % (ref 0–2)
BUN BLDV-MCNC: 10 MG/DL (ref 8–23)
CALCIUM SERPL-MCNC: 8.6 MG/DL (ref 8.6–10.2)
CHLORIDE BLD-SCNC: 103 MMOL/L (ref 98–107)
CO2: 22 MMOL/L (ref 22–29)
CREAT SERPL-MCNC: 0.7 MG/DL (ref 0.5–1)
EOSINOPHILS ABSOLUTE: 0 E9/L (ref 0.05–0.5)
EOSINOPHILS RELATIVE PERCENT: 0 % (ref 0–6)
GFR AFRICAN AMERICAN: >60
GFR NON-AFRICAN AMERICAN: >60 ML/MIN/1.73
GLUCOSE BLD-MCNC: 121 MG/DL (ref 74–99)
HCT VFR BLD CALC: 35 % (ref 34–48)
HEMOGLOBIN: 11.6 G/DL (ref 11.5–15.5)
IMMATURE GRANULOCYTES #: 0.07 E9/L
IMMATURE GRANULOCYTES %: 0.6 % (ref 0–5)
LYMPHOCYTES ABSOLUTE: 0.65 E9/L (ref 1.5–4)
LYMPHOCYTES RELATIVE PERCENT: 5.6 % (ref 20–42)
MCH RBC QN AUTO: 29.9 PG (ref 26–35)
MCHC RBC AUTO-ENTMCNC: 33.1 % (ref 32–34.5)
MCV RBC AUTO: 90.2 FL (ref 80–99.9)
MONOCYTES ABSOLUTE: 0.29 E9/L (ref 0.1–0.95)
MONOCYTES RELATIVE PERCENT: 2.5 % (ref 2–12)
NEUTROPHILS ABSOLUTE: 10.52 E9/L (ref 1.8–7.3)
NEUTROPHILS RELATIVE PERCENT: 91.2 % (ref 43–80)
PDW BLD-RTO: 13.2 FL (ref 11.5–15)
PLATELET # BLD: 189 E9/L (ref 130–450)
PMV BLD AUTO: 11 FL (ref 7–12)
POTASSIUM REFLEX MAGNESIUM: 4 MMOL/L (ref 3.5–5)
RBC # BLD: 3.88 E12/L (ref 3.5–5.5)
SODIUM BLD-SCNC: 135 MMOL/L (ref 132–146)
WBC # BLD: 11.5 E9/L (ref 4.5–11.5)

## 2019-04-10 PROCEDURE — 6370000000 HC RX 637 (ALT 250 FOR IP): Performed by: INTERNAL MEDICINE

## 2019-04-10 PROCEDURE — 6370000000 HC RX 637 (ALT 250 FOR IP): Performed by: SURGERY

## 2019-04-10 PROCEDURE — 2580000003 HC RX 258: Performed by: SURGERY

## 2019-04-10 PROCEDURE — 36415 COLL VENOUS BLD VENIPUNCTURE: CPT

## 2019-04-10 PROCEDURE — 80048 BASIC METABOLIC PNL TOTAL CA: CPT

## 2019-04-10 PROCEDURE — 6360000002 HC RX W HCPCS: Performed by: INTERNAL MEDICINE

## 2019-04-10 PROCEDURE — 1200000000 HC SEMI PRIVATE

## 2019-04-10 PROCEDURE — 85025 COMPLETE CBC W/AUTO DIFF WBC: CPT

## 2019-04-10 RX ORDER — ASPIRIN 81 MG/1
81 TABLET ORAL DAILY
Status: DISCONTINUED | OUTPATIENT
Start: 2019-04-10 | End: 2019-04-11 | Stop reason: HOSPADM

## 2019-04-10 RX ORDER — DOCUSATE SODIUM 100 MG/1
100 CAPSULE, LIQUID FILLED ORAL DAILY PRN
Status: DISCONTINUED | OUTPATIENT
Start: 2019-04-10 | End: 2019-04-11 | Stop reason: HOSPADM

## 2019-04-10 RX ORDER — DIPHENHYDRAMINE HYDROCHLORIDE 50 MG/ML
25 INJECTION INTRAMUSCULAR; INTRAVENOUS ONCE
Status: COMPLETED | OUTPATIENT
Start: 2019-04-10 | End: 2019-04-10

## 2019-04-10 RX ORDER — LISINOPRIL 10 MG/1
10 TABLET ORAL DAILY
Status: DISCONTINUED | OUTPATIENT
Start: 2019-04-10 | End: 2019-04-11 | Stop reason: HOSPADM

## 2019-04-10 RX ORDER — ACETAMINOPHEN 325 MG/1
650 TABLET ORAL EVERY 4 HOURS PRN
Status: DISCONTINUED | OUTPATIENT
Start: 2019-04-10 | End: 2019-04-11 | Stop reason: HOSPADM

## 2019-04-10 RX ORDER — PANTOPRAZOLE SODIUM 40 MG/1
40 TABLET, DELAYED RELEASE ORAL DAILY
Status: DISCONTINUED | OUTPATIENT
Start: 2019-04-10 | End: 2019-04-11 | Stop reason: HOSPADM

## 2019-04-10 RX ADMIN — METOPROLOL TARTRATE 25 MG: 25 TABLET ORAL at 15:27

## 2019-04-10 RX ADMIN — METOPROLOL TARTRATE 25 MG: 25 TABLET ORAL at 05:29

## 2019-04-10 RX ADMIN — PANTOPRAZOLE SODIUM 40 MG: 40 TABLET, DELAYED RELEASE ORAL at 09:17

## 2019-04-10 RX ADMIN — LEVOTHYROXINE SODIUM 100 MCG: 100 TABLET ORAL at 05:29

## 2019-04-10 RX ADMIN — METOPROLOL TARTRATE 25 MG: 25 TABLET ORAL at 20:51

## 2019-04-10 RX ADMIN — ROSUVASTATIN CALCIUM 10 MG: 10 TABLET, FILM COATED ORAL at 09:17

## 2019-04-10 RX ADMIN — APIXABAN 5 MG: 5 TABLET, FILM COATED ORAL at 20:51

## 2019-04-10 RX ADMIN — ASPIRIN 81 MG: 81 TABLET, COATED ORAL at 09:17

## 2019-04-10 RX ADMIN — OXYCODONE AND ACETAMINOPHEN 1 TABLET: 5; 325 TABLET ORAL at 15:27

## 2019-04-10 RX ADMIN — Medication 10 ML: at 20:51

## 2019-04-10 RX ADMIN — DIPHENHYDRAMINE HYDROCHLORIDE 25 MG: 50 INJECTION INTRAMUSCULAR; INTRAVENOUS at 21:16

## 2019-04-10 RX ADMIN — ACETAMINOPHEN 650 MG: 325 TABLET ORAL at 13:00

## 2019-04-10 RX ADMIN — LISINOPRIL 10 MG: 10 TABLET ORAL at 09:17

## 2019-04-10 RX ADMIN — APIXABAN 5 MG: 5 TABLET, FILM COATED ORAL at 09:17

## 2019-04-10 ASSESSMENT — PAIN DESCRIPTION - DESCRIPTORS: DESCRIPTORS: ACHING;DISCOMFORT

## 2019-04-10 ASSESSMENT — PAIN SCALES - GENERAL
PAINLEVEL_OUTOF10: 5
PAINLEVEL_OUTOF10: 6
PAINLEVEL_OUTOF10: 0

## 2019-04-10 ASSESSMENT — PAIN DESCRIPTION - FREQUENCY: FREQUENCY: INTERMITTENT

## 2019-04-10 ASSESSMENT — PAIN DESCRIPTION - PAIN TYPE: TYPE: ACUTE PAIN;SURGICAL PAIN

## 2019-04-10 ASSESSMENT — PAIN DESCRIPTION - ORIENTATION: ORIENTATION: MID

## 2019-04-10 ASSESSMENT — PAIN DESCRIPTION - LOCATION: LOCATION: ABDOMEN

## 2019-04-10 NOTE — CONSULTS
INTERNAL MEDICINE  CONSULT NOTE  4/9/2019    Physician Consulted: Chirag  Reason for Consult: mm  Referring Physician: Kaylen Singh is a 79 y.o. female who is s/p lap robotic colostomy reversal with Dr. Beth Brock today. She tolerated the procedure well. She denies any chest pain, SOB, shoulder pain. Her abd is appropriately tender. She has a kemp. She has a hx of Hartmanns procedure, drainage of pelvic abscess in Nov. 2018. She appears to be in no acute distress. She is tolerating drinking water. All questions answered. Past Medical History:   Diagnosis Date    CAD in native artery 8/9/2018    Diverticulitis 08/03/2018    HIGH CHOLESTEROL     History of atrial fibrillation 08/2018    with episode of diverticulosis    History of stress test 08/07/2018    Lexiscan stress test    Hypertension     Thyroid disease        Past Surgical History:   Procedure Laterality Date    CARDIAC SURGERY  08/11/2018    cardiac cath  Nolan VARGAS's    COLONOSCOPY      COLONOSCOPY N/A 4/8/2019    COLONOSCOPY performed by Mark Veronica MD at [address] [facility]  12  2011   [address]  1/24/12    lumbar epidural    NERVE BLOCK  02/21/12    lumbar epidural with flouro    DE COLONOSCOPY FLX DX W/COLLJ SPEC WHEN PFRMD N/A 10/4/2018    COLONOSCOPY performed by Mark Veronica MD at [facility], PARTIAL, Yoanna Punch N/A 11/13/2018    LAPAROSCOPIC ROBOT XI ASSISTED SIGMOID COLON RESECTION WITH OSTOMY performed by Mark Veronica MD at [address]       Medications Prior to Admission:    Prior to Admission medications    Medication Sig Start Date End Date Taking?  Authorizing Provider   pantoprazole (PROTONIX) 40 MG tablet Take 1 tablet by mouth daily 8/9/18  Yes Carolyn Wadsworth MD   metoprolol tartrate (LOPRESSOR) 25 MG tablet Take 1 tablet by mouth every 8 hours 8/8/18  Yes Mark Llamas,    Sodium Phosphates (FLEET) 7-19 GM/118ML Place 1 enema rectally 2 times daily 4/8/19   Mendez Yeh MD   magnesium citrate solution Take 296 mLs by mouth 2 times daily for 1 dose 3/20/19 3/21/19  Mendez Yeh MD   docusate sodium (COLACE) 100 MG capsule Take 1 capsule by mouth daily as needed for Constipation 3/20/19 4/19/19  Mendez Yeh MD   aspirin EC 81 MG EC tablet Take 1 tablet by mouth daily 8/9/18   Alan Griffith MD   apixaban (ELIQUIS) 5 MG TABS tablet Take 1 tablet by mouth 2 times daily  Patient taking differently: Take 5 mg by mouth 2 times daily Indications: last dose 4/5/19  pm  8/8/18   Mark Llamas DO   levothyroxine (SYNTHROID) 100 MCG tablet Take 100 mcg by mouth Daily    Historical Provider, MD   ZINC PO Take 1 tablet by mouth daily    Historical Provider, MD   Omega-3 Fatty Acids (FISH OIL) 1000 MG CAPS Take 1,000 mg by mouth daily    Historical Provider, MD   lisinopril (PRINIVIL;ZESTRIL) 10 MG tablet Take 10 mg by mouth daily. Historical Provider, MD   rosuvastatin (CRESTOR) 10 MG tablet Take 10 mg by mouth daily.       Historical Provider, MD       No Known Allergies    Family History   Problem Relation Age of Onset    Cancer Other     Heart Disease Other     Cancer Mother         brain    Heart Attack Father         age 48     Heart Attack Sister         age 46        Social History     Tobacco Use    Smoking status: Current Every Day Smoker     Packs/day: 0.50     Types: Cigarettes    Smokeless tobacco: Never Used   Substance Use Topics    Alcohol use: No    Drug use: No       Review of Systems   General ROS: negative  Hematological and Lymphatic ROS: negative  Respiratory ROS: negative  Cardiovascular ROS: negative  Gastrointestinal ROS: negative  Genito-Urinary ROS: negative  Musculoskeletal ROS: negative    PHYSICAL EXAM:  Vitals:    04/09/19 1645   BP: (!) 142/72   Pulse: 59   Resp: 16   Temp: 97.5 °F (36.4 °C)   SpO2: 97%     General Appearance:  awake, alert, oriented, in no acute distress  Skin:  Skin color, texture, turgor normal. No rashes or lesions. Head/face:  NCAT  Eyes:  No gross abnormalities. and PERRL  Lungs:  Normal expansion. Clear to auscultation. No rales, rhonchi, or wheezing. Heart:  Heart sounds are normal.  Regular rate and rhythm without murmur, gallop or rub. Abdomen:  Incisions c/d/i with dermabond, ABD pad without strike through, appropriately tender, soft  Extremities: Extremities warm to touch, pink, with no edema. RADIOLOGY  No results found.         ASSESSMENT/PLAN:  79 y.o. female s/p lap robotic colostomy reversal with Dr. Hermelinda Obrien  HTN  Thyroid ds  CAD  A fib  Spinal stenosis      Will follow with surgery  AM labs  Monitor abd  Home meds reviewed  Electronically signed by Jolanta Hickey DO on 4/9/2019 at 8:32 PM

## 2019-04-10 NOTE — PROGRESS NOTES
[facility] Quality Flow/Interdisciplinary Rounds Progress Note        Quality Flow Rounds held on April 10, 2019    Disciplines Attending:  Bedside Nurse, ,  and Nursing Unit Leadership    Emile Leahy was admitted on 4/9/2019  9:21 AM    Anticipated Discharge Date:  Expected Discharge Date: 04/10/19    Disposition:    Reid Score:  Reid Scale Score: 20    Readmission Risk              Risk of Unplanned Readmission:        11           Discussed patient goal for the day, patient clinical progression, and barriers to discharge.   The following Goal(s) of the Day/Commitment(s) have been identified:  Continue to monitor      [facility]  April 10, 2019

## 2019-04-10 NOTE — PLAN OF CARE
Problem: Pain:  Goal: Pain level will decrease  Description  Pain level will decrease  4/10/2019 1713 by Mello Anand RN  Outcome: Met This Shift     Problem: Pain:  Goal: Control of acute pain  Description  Control of acute pain  4/10/2019 1713 by Mello Anand RN  Outcome: Met This Shift     Problem: Falls - Risk of:  Goal: Will remain free from falls  Description  Will remain free from falls  4/10/2019 1713 by Mello Anand RN  Outcome: Met This Shift     Problem: Pain:  Goal: Control of chronic pain  Description  Control of chronic pain  Outcome: Met This Shift     Problem: Falls - Risk of:  Goal: Absence of physical injury  Description  Absence of physical injury  Outcome: Met This Shift     Problem: Skin Integrity:  Goal: Will show no infection signs and symptoms  Description  Will show no infection signs and symptoms  Outcome: Met This Shift

## 2019-04-10 NOTE — PLAN OF CARE
Problem: Pain:  Goal: Pain level will decrease  Description  Pain level will decrease  4/10/2019 0111 by René Lombardo RN  Outcome: Met This Shift  4/9/2019 1956 by Jc Carbajal RN  Outcome: Met This Shift  Goal: Control of acute pain  Description  Control of acute pain  4/10/2019 0111 by René Lombardo RN  Outcome: Met This Shift  4/9/2019 1956 by Jc Carbajal RN  Outcome: Met This Shift  Goal: Control of chronic pain  Description  Control of chronic pain  Outcome: Met This Shift     Problem: Falls - Risk of:  Goal: Will remain free from falls  Description  Will remain free from falls  4/10/2019 0111 by René Lombardo RN  Outcome: Met This Shift  4/9/2019 1956 by Jc Carbajal RN  Outcome: Met This Shift  Goal: Absence of physical injury  Description  Absence of physical injury  Outcome: Met This Shift     Problem: Skin Integrity:  Goal: Will show no infection signs and symptoms  Description  Will show no infection signs and symptoms  4/10/2019 0111 by René Lombardo RN  Outcome: Met This Shift  4/9/2019 1956 by Jc Carbajal RN  Outcome: Met This Shift

## 2019-04-10 NOTE — OP NOTE
dressing over the  top. Each one of the trocar sites were closed with 4-0 Monocryl and  surgical glue. The patient was then awoken, extubated and transferred  to the postoperative care unit in stable condition. All instrument  counts, lap counts and needle counts were correct at the completion of  the procedure.         Isaac Sanchez MD    D: 04/09/2019 15:20:14       T: 04/09/2019 15:23:37     [email or web address]  Job#: [id number]     Doc#: [id number]:  William Mcmahon DO

## 2019-04-10 NOTE — PROGRESS NOTES
HPI:  Jolene Granados underwent colostomy reversal yesterday and tolerated this without complication. She admits to very mild postoperative pain today. She has been ambulatory. She is not yet passing flatus nor has she had a bowel movement. She tolerated a clear liquid diet yesterday but admits to a decreased appetite. Patient voiced no new complaints since hospital admission. Questions, answers, and tests reviewed. ROS:  Cardiovascular:   Denies any chest pain, irregular heartbeats, or palpitations. Respiratory:   Denies shortness of breath, coughing, sputum production, hemoptysis, or wheezing. Gastrointestinal:   Very mild postoperative abdominal pain. No significant nausea. Extremities:   Denies any lower extremity swelling or edema. Neurology:    Denies any headache or focal neurological deficits. No weakness or paresthesia. Derm:    Denies any rashes, ulcers, or excoriations. Denies bruising. Genitourinary:    Denies any urgency, frequency, hematuria. Voiding without difficulty. Physical Exam:    Vitals:    04/10/19 0529   BP: 112/64   Pulse: 68   Resp:    Temp:    SpO2:        HEENT:  PERRLA. EOMI. Sclera clear. Buccal mucosa moist.    Neck:  Supple. Trachea midline. No thyromegaly. No JVD. No bruits. Heart:  Rhythm regular, rate controlled. No murmurs. Lungs:  Symmetrical. Clear to auscultation bilaterally. No wheezes. No rhonchi. No rales. Abdomen: Soft. Mildly tender to palpation diffusely. No distention. Bowel sounds are hypoactive. Extremities:  Peripheral pulses present. No peripheral edema. No ulcers. Neurologic:  Alert x 3. No focal deficit. Cranial nerves grossly intact. Skin:  No petechia. No hemorrhage. No wounds.     CBC with Differential:    Lab Results   Component Value Date    WBC 11.5 04/10/2019    RBC 3.88 04/10/2019    HGB 11.6 04/10/2019    HCT 35.0 04/10/2019     04/10/2019    MCV 90.2 04/10/2019    MCH 29.9 04/10/2019    MCHC 33.1 04/10/2019    RDW

## 2019-04-10 NOTE — PROGRESS NOTES
GENERAL SURGERY  DAILY PROGRESS NOTE  4/10/2019    Subjective:  Feels good. Minimal pain. Taking only PO pain medications. Tolerating liquids. No BM or flatus yet.      Objective:  /64   Pulse 68   Temp 97.9 °F (36.6 °C) (Oral)   Resp 16   Ht 5' 3.5\" (1.613 m)   Wt 127 lb (57.6 kg)   SpO2 94%   BMI 22.14 kg/m²     GENERAL:  Laying in bed, awake, alert, cooperative, no apparent distress  LUNGS:  No increased work of breathin  CARDIOVASCULAR:  RR  ABDOMEN:  Soft, minimally tender, dressing CDI  EXTREMITIES: No edema or swelling  SKIN: Warm and dry    Assessment/Plan:  79 y.o. female POD1 robotic colostomy reversal    Clear liquid [place]  DC IVF if continues to take adequate PO  PO pain meds with IV breakthrough as needed  Remove kemp  OOB, ambulate  Awaiting bowel function    Electronically signed by Ami Roa DO on 4/10/2019 at 6:35 AM     As above  Full liquids  Tylenol for pain as requested  Kemp out  Ambulate  Await return of bowel function, low fiber once flatus  Rebekah Holloway MD, MS  Minimally Invasive and Bariatric Surgery  [phone] (p)  4/10/2019  12:46 PM

## 2019-04-11 VITALS
TEMPERATURE: 98.9 F | RESPIRATION RATE: 16 BRPM | DIASTOLIC BLOOD PRESSURE: 87 MMHG | HEART RATE: 63 BPM | OXYGEN SATURATION: 95 % | BODY MASS INDEX: 21.68 KG/M2 | WEIGHT: 127 LBS | SYSTOLIC BLOOD PRESSURE: 150 MMHG | HEIGHT: 64 IN

## 2019-04-11 LAB
ANION GAP SERPL CALCULATED.3IONS-SCNC: 8 MMOL/L (ref 7–16)
BASOPHILS ABSOLUTE: 0.03 E9/L (ref 0–0.2)
BASOPHILS RELATIVE PERCENT: 0.3 % (ref 0–2)
BUN BLDV-MCNC: 8 MG/DL (ref 8–23)
CALCIUM SERPL-MCNC: 8.6 MG/DL (ref 8.6–10.2)
CHLORIDE BLD-SCNC: 108 MMOL/L (ref 98–107)
CO2: 28 MMOL/L (ref 22–29)
CREAT SERPL-MCNC: 0.8 MG/DL (ref 0.5–1)
EOSINOPHILS ABSOLUTE: 0.01 E9/L (ref 0.05–0.5)
EOSINOPHILS RELATIVE PERCENT: 0.1 % (ref 0–6)
GFR AFRICAN AMERICAN: >60
GFR NON-AFRICAN AMERICAN: >60 ML/MIN/1.73
GLUCOSE BLD-MCNC: 87 MG/DL (ref 74–99)
HCT VFR BLD CALC: 34.2 % (ref 34–48)
HEMOGLOBIN: 11.3 G/DL (ref 11.5–15.5)
IMMATURE GRANULOCYTES #: 0.04 E9/L
IMMATURE GRANULOCYTES %: 0.4 % (ref 0–5)
LYMPHOCYTES ABSOLUTE: 2.05 E9/L (ref 1.5–4)
LYMPHOCYTES RELATIVE PERCENT: 18.5 % (ref 20–42)
MCH RBC QN AUTO: 30.1 PG (ref 26–35)
MCHC RBC AUTO-ENTMCNC: 33 % (ref 32–34.5)
MCV RBC AUTO: 91.2 FL (ref 80–99.9)
MONOCYTES ABSOLUTE: 1.09 E9/L (ref 0.1–0.95)
MONOCYTES RELATIVE PERCENT: 9.9 % (ref 2–12)
NEUTROPHILS ABSOLUTE: 7.84 E9/L (ref 1.8–7.3)
NEUTROPHILS RELATIVE PERCENT: 70.8 % (ref 43–80)
PDW BLD-RTO: 13.6 FL (ref 11.5–15)
PLATELET # BLD: 164 E9/L (ref 130–450)
PMV BLD AUTO: 11.3 FL (ref 7–12)
POTASSIUM SERPL-SCNC: 3.6 MMOL/L (ref 3.5–5)
RBC # BLD: 3.75 E12/L (ref 3.5–5.5)
SODIUM BLD-SCNC: 144 MMOL/L (ref 132–146)
WBC # BLD: 11.1 E9/L (ref 4.5–11.5)

## 2019-04-11 PROCEDURE — 80048 BASIC METABOLIC PNL TOTAL CA: CPT

## 2019-04-11 PROCEDURE — 6370000000 HC RX 637 (ALT 250 FOR IP): Performed by: SURGERY

## 2019-04-11 PROCEDURE — 85025 COMPLETE CBC W/AUTO DIFF WBC: CPT

## 2019-04-11 PROCEDURE — 36415 COLL VENOUS BLD VENIPUNCTURE: CPT

## 2019-04-11 PROCEDURE — 2580000003 HC RX 258: Performed by: SURGERY

## 2019-04-11 PROCEDURE — 6370000000 HC RX 637 (ALT 250 FOR IP): Performed by: INTERNAL MEDICINE

## 2019-04-11 RX ORDER — OXYCODONE HYDROCHLORIDE AND ACETAMINOPHEN 5; 325 MG/1; MG/1
1 TABLET ORAL EVERY 6 HOURS PRN
Qty: 15 TABLET | Refills: 0 | Status: SHIPPED | OUTPATIENT
Start: 2019-04-11 | End: 2019-04-16

## 2019-04-11 RX ORDER — DOCUSATE SODIUM 100 MG/1
100 CAPSULE, LIQUID FILLED ORAL 2 TIMES DAILY
Qty: 30 CAPSULE | Refills: 0 | Status: SHIPPED | OUTPATIENT
Start: 2019-04-11 | End: 2019-04-26

## 2019-04-11 RX ADMIN — ASPIRIN 81 MG: 81 TABLET, COATED ORAL at 08:53

## 2019-04-11 RX ADMIN — Medication 10 ML: at 09:00

## 2019-04-11 RX ADMIN — APIXABAN 5 MG: 5 TABLET, FILM COATED ORAL at 08:52

## 2019-04-11 RX ADMIN — METOPROLOL TARTRATE 25 MG: 25 TABLET ORAL at 14:12

## 2019-04-11 RX ADMIN — METOPROLOL TARTRATE 25 MG: 25 TABLET ORAL at 05:41

## 2019-04-11 RX ADMIN — PANTOPRAZOLE SODIUM 40 MG: 40 TABLET, DELAYED RELEASE ORAL at 08:52

## 2019-04-11 RX ADMIN — LEVOTHYROXINE SODIUM 100 MCG: 100 TABLET ORAL at 05:41

## 2019-04-11 RX ADMIN — ROSUVASTATIN CALCIUM 10 MG: 10 TABLET, FILM COATED ORAL at 11:04

## 2019-04-11 RX ADMIN — LISINOPRIL 10 MG: 10 TABLET ORAL at 08:52

## 2019-04-11 RX ADMIN — DOCUSATE SODIUM 100 MG: 100 CAPSULE, LIQUID FILLED ORAL at 08:52

## 2019-04-11 ASSESSMENT — PAIN SCALES - GENERAL
PAINLEVEL_OUTOF10: 3
PAINLEVEL_OUTOF10: 0

## 2019-04-11 ASSESSMENT — PAIN DESCRIPTION - DESCRIPTORS: DESCRIPTORS: DISCOMFORT;DULL

## 2019-04-11 ASSESSMENT — PAIN - FUNCTIONAL ASSESSMENT: PAIN_FUNCTIONAL_ASSESSMENT: ACTIVITIES ARE NOT PREVENTED

## 2019-04-11 ASSESSMENT — PAIN DESCRIPTION - PROGRESSION: CLINICAL_PROGRESSION: GRADUALLY IMPROVING

## 2019-04-11 ASSESSMENT — PAIN DESCRIPTION - FREQUENCY: FREQUENCY: INTERMITTENT

## 2019-04-11 ASSESSMENT — PAIN DESCRIPTION - PAIN TYPE: TYPE: SURGICAL PAIN

## 2019-04-11 ASSESSMENT — PAIN DESCRIPTION - LOCATION: LOCATION: ABDOMEN;BACK

## 2019-04-11 ASSESSMENT — PAIN DESCRIPTION - ONSET: ONSET: GRADUAL

## 2019-04-11 ASSESSMENT — PAIN DESCRIPTION - ORIENTATION: ORIENTATION: UPPER;LOWER

## 2019-04-11 NOTE — PROGRESS NOTES
Internal Medicine Progress Note      Fayne Marbin. Darek Silverio., & [address] [address] [facility] [address] Darek Silverio., F.A.C.O.I. Debora Alves D.O. , ORLY.C.O.I. Primary Care Physician: Pedro Luis Cruz DO   Admitting Physician:  Rakel Butcher MD  Admission date and time: 4/9/2019  9:21 AM    Room:  [address] [place] [address]  Admitting diagnosis: Diverticulitis [K57.92]    Patient Name: Patricia Thomas  MRN: [id number]    Date of Service: 4/11/2019     Subjective:    Damaris Toledo is a 79 y. o.  female who was seen and examined today,4/11/2019, at the bedside. HPI:  Damaris Toledo underwent colostomy reversal this admission. She was sitting up in a chair today. States she is still not had any flatus. States she has been ambulating trying to increase bowel function. Admits to surgical abdominal pain. States relatively well controlled with current pain medication but admits it does worsen with movement and laughing or coughing. No bowel movement. States she is tolerating her diet. Questions, answers, and tests reviewed. ROS:  Cardiovascular:   Denies any chest pain, irregular heartbeats, or palpitations. Respiratory:   Denies shortness of breath, coughing, sputum production, hemoptysis, or wheezing. Gastrointestinal:   Postoperative abdominal pain-C HPI. No significant nausea. Extremities:   Denies any lower extremity swelling or edema. Neurology:    Denies any headache or focal neurological deficits. No weakness or paresthesia. Derm:    Denies any rashes, ulcers, or excoriations. Denies bruising. Genitourinary:    Denies any urgency, frequency, hematuria. Voiding without difficulty. Physical Exam:    Blood pressure (!) 158/90, pulse 62, temperature 98.4 °F (36.9 °C), temperature source Oral, resp. rate 18, height 5' 3.5\" (1.613 m), weight 127 lb (57.6 kg), SpO2 94 %, not currently breastfeeding. HEENT:  PERRLA. EOMI. Sclera clear. Buccal mucosa moist. Impaired vision. Neck:  Supple. Trachea midline.  No thyromegaly. No JVD. No bruits. Heart:  Rhythm regular, rate controlled. No murmurs. Lungs:  Symmetrical. Clear to auscultation bilaterally. No wheezes. No rhonchi. No rales. Abdomen: Soft. Mildly tender to palpation diffusely. Surgical changes noted. Abdominal dressing is dry and intact. Incisions are dry and well intact. Surgical noted. No distention. Bowel sounds are present    Extremities:  Peripheral pulses present. No peripheral edema. No ulcers. Neurologic:  Alert x 3. No focal deficit. Cranial nerves grossly intact. Skin:  No petechia. No hemorrhage. No wounds.     CBC with Differential:    Lab Results   Component Value Date    WBC 11.1 04/11/2019    RBC 3.75 04/11/2019    HGB 11.3 04/11/2019    HCT 34.2 04/11/2019     04/11/2019    MCV 91.2 04/11/2019    MCH 30.1 04/11/2019    MCHC 33.0 04/11/2019    RDW 13.6 04/11/2019    SEGSPCT 64 10/31/2013    METASPCT 0.9 11/13/2018    LYMPHOPCT 18.5 04/11/2019    MONOPCT 9.9 04/11/2019    BASOPCT 0.3 04/11/2019    MONOSABS 1.09 04/11/2019    LYMPHSABS 2.05 04/11/2019    EOSABS 0.01 04/11/2019    BASOSABS 0.03 04/11/2019     BMP:    Lab Results   Component Value Date     04/11/2019    K 3.6 04/11/2019    K 4.0 04/10/2019     04/11/2019    CO2 28 04/11/2019    BUN 8 04/11/2019    LABALBU 3.6 08/03/2018    LABALBU 4.3 12/13/2011    CREATININE 0.8 04/11/2019    CALCIUM 8.6 04/11/2019    GFRAA >60 04/11/2019    LABGLOM >60 04/11/2019    GLUCOSE 87 04/11/2019    GLUCOSE 94 12/13/2011       Other Data:      Intake/Output Summary (Last 24 hours) at 4/11/2019 1212  Last data filed at 4/11/2019 0800  Gross per 24 hour   Intake 720 ml   Output 475 ml   Net 245 ml         Scheduled Medications:   apixaban  5 mg Oral BID    aspirin EC  81 mg Oral Daily    lisinopril  10 mg Oral Daily    pantoprazole  40 mg Oral Daily    levothyroxine  100 mcg Oral Daily    metoprolol tartrate  25 mg Oral 3 times per day    rosuvastatin  10 mg Oral

## 2019-04-11 NOTE — PLAN OF CARE
Problem: Pain:  Goal: Pain level will decrease  Description  Pain level will decrease  4/11/2019 1126 by Ian Ferrara  Outcome: Met This Shift  Note:   Pt stated she has slight discomfort due to surgical incision, but 0 on scale (0-10)  4/10/2019 2327 by Gustavo Bernstein RN  Outcome: Met This Shift  Goal: Control of acute pain  Description  Control of acute pain  4/11/2019 1126 by Ian Ferrara  Outcome: Met This Shift  4/10/2019 2327 by Gustavo Bernstein RN  Outcome: Met This Shift  Goal: Control of chronic pain  Description  Control of chronic pain  4/11/2019 1126 by Ian Ferrara  Outcome: Met This Shift  4/10/2019 2327 by Gustavo Bernstein RN  Outcome: Met This Shift     Problem: Falls - Risk of:  Goal: Will remain free from falls  Description  Will remain free from falls  4/11/2019 1126 by Alanna Ferrara  Outcome: Met This Shift  4/10/2019 2327 by Gustavo Bernstein RN  Outcome: Met This Shift  Goal: Absence of physical injury  Description  Absence of physical injury  4/11/2019 1126 by Ian Ferrara  Outcome: Met This Shift  4/10/2019 2327 by Gustavo Bernstein RN  Outcome: Met This Shift     Problem: Skin Integrity:  Goal: Will show no infection signs and symptoms  Description  Will show no infection signs and symptoms  4/11/2019 1126 by Ian Ferrara  Outcome: Met This Shift  Note:   Pt turns self   4/10/2019 2327 by Gustavo Bernstein RN  Outcome: Met This Shift

## 2019-04-11 NOTE — PLAN OF CARE
Problem: Pain:  Goal: Pain level will decrease  Description  Pain level will decrease  4/10/2019 2327 by Deo Butcher RN  Outcome: Met This Shift  4/10/2019 1713 by Evelia Fong RN  Outcome: Met This Shift  4/10/2019 1052 by Sanya Fenton RN  Outcome: Met This Shift  Goal: Control of acute pain  Description  Control of acute pain  4/10/2019 2327 by Deo Butcher RN  Outcome: Met This Shift  4/10/2019 1713 by Evelia Fong RN  Outcome: Met This Shift  4/10/2019 1052 by Sanya Fenton RN  Outcome: Met This Shift  Goal: Control of chronic pain  Description  Control of chronic pain  4/10/2019 2327 by Deo Butcher RN  Outcome: Met This Shift  4/10/2019 1713 by Evelia Fong RN  Outcome: Met This Shift     Problem: Falls - Risk of:  Goal: Will remain free from falls  Description  Will remain free from falls  4/10/2019 2327 by Deo Butcher RN  Outcome: Met This Shift  4/10/2019 1713 by Evelia Fong RN  Outcome: Met This Shift  4/10/2019 1052 by Sanya Fenton RN  Outcome: Met This Shift  Goal: Absence of physical injury  Description  Absence of physical injury  4/10/2019 2327 by Deo Butcher RN  Outcome: Met This Shift  4/10/2019 1713 by Evelia Fong RN  Outcome: Met This Shift     Problem: Skin Integrity:  Goal: Will show no infection signs and symptoms  Description  Will show no infection signs and symptoms  4/10/2019 2327 by Deo Butcher RN  Outcome: Met This Shift  4/10/2019 1713 by Evelia Fong RN  Outcome: Met This Shift

## 2019-04-11 NOTE — PROGRESS NOTES
General Surgery Progress Note  Seble Cavanaugh MD, MS    Patient's Name/Date of Birth: Kilo Miller / 1950    Date: April 11, 2019     Surgeon: Humaira Casiano MD    Chief Complaint: s/p colostomy reversal    Patient Active Problem List   Diagnosis    Spinal stenosis    Degeneration of lumbosacral intervertebral disc    Lumbago    Colonic diverticular abscess    CAD in native artery    S/P colon resection    Diverticulitis of large intestine with perforation and abscess without bleeding    Pelvic abscess in female    Colostomy reversal       Subjective: doing well, no flatus, min pain    Objective:  BP (!) 140/70   Pulse 60   Temp 98.1 °F (36.7 °C) (Oral)   Resp 16   Ht 5' 3.5\" (1.613 m)   Wt 127 lb (57.6 kg)   SpO2 94%   BMI 22.14 kg/m²   Labs:  Recent Labs     04/10/19  0452 04/11/19  0457   WBC 11.5 11.1   HGB 11.6 11.3*   HCT 35.0 34.2     Lab Results   Component Value Date    CREATININE 0.8 04/11/2019    BUN 8 04/11/2019     04/11/2019    K 3.6 04/11/2019     (H) 04/11/2019    CO2 28 04/11/2019     No results for input(s): LIPASE, AMYLASE in the last 72 hours.       General appearance:  NAD  Head: NCAT, PERRLA, EOMI, red conjunctiva  Neck: supple, no masses  Lungs: CTAB, equal chest rise bilateral  Heart: Reg rate  Abdomen: soft, nondistended, tender appropriately, incision C/D/I  Skin; no lesions  Gu: no cva tenderness  Extremities: extremities normal, atraumatic, no cyanosis or edema      Assessment/Plan:  Kilo Miller is a 79 y.o. female POD 2 lap takedown colostomy    Low fiber  Bowel regimen  Await flatus  Remove sally today and dry dressing    Physician Signature: Electronically signed by Dr. Humaira Casiano  [phone] (p)  4/11/2019  10:43 AM

## 2019-04-11 NOTE — DISCHARGE SUMMARY
Physician Discharge Summary     Patient ID:  Barbara West  [id number]  38 y.o.  1950    Admit date: 4/9/2019    Discharge date and time: 4/11/2019    Admitting Physician: Nile Lord MD     Admission Diagnoses:   Patient Active Problem List   Diagnosis    Spinal stenosis    Degeneration of lumbosacral intervertebral disc    Lumbago    Colonic diverticular abscess    CAD in native artery    S/P colon resection    Diverticulitis of large intestine with perforation and abscess without bleeding    Pelvic abscess in female    Colostomy reversal       Discharge Diagnoses:   Patient Active Problem List   Diagnosis    Spinal stenosis    Degeneration of lumbosacral intervertebral disc    Lumbago    Colonic diverticular abscess    CAD in native artery    S/P colon resection    Diverticulitis of large intestine with perforation and abscess without bleeding    Pelvic abscess in female    Colostomy reversal       Admission Condition: good    Discharged Condition: good    Indication for Admission: s/p takedown colostomy    Hospital Course: Barbara West is a 79 y.o. female admitted following takedown colostomy. She did well postoperatively, diet was advanced, they were ambulating independently and pain was controlled. They were discharged with appropriate medication, instructions and follow up.      Consults: Hospitalist      Treatments: IV hydration, antibiotics: ceftriaxone and metronidazole, analgesia: acetaminophen and Morphine and surgery: Lap robot takedown colostomy    In process/preliminary results:  Outstanding Order Results     Date and Time Order Name Status Description    4/10/2019 0809 Surgical Pathology In process           Patient Instructions:   Current Discharge Medication List      START taking these medications    Details   !! docusate sodium (COLACE) 100 MG capsule Take 1 capsule by mouth 2 times daily for 15 days  Qty: 30 capsule, Refills: 0      oxyCODONE-acetaminophen (PERCOCET) Activity: no lifting, Driving, or Strenuous exercise for 2 weeks  Diet: low fiber  Wound Care: keep wound clean and dry, dry dressing to llq incision    Follow-up with Dr Margarita Erazo in 2 weeks.     Tolu Casillas  4/11/2019  6:31 PM

## 2019-04-22 ENCOUNTER — OFFICE VISIT (OUTPATIENT)
Dept: SURGERY | Age: 69
End: 2019-04-22

## 2019-04-22 VITALS
SYSTOLIC BLOOD PRESSURE: 128 MMHG | BODY MASS INDEX: 22.5 KG/M2 | OXYGEN SATURATION: 95 % | HEIGHT: 63 IN | WEIGHT: 127 LBS | TEMPERATURE: 97.8 F | HEART RATE: 78 BPM | DIASTOLIC BLOOD PRESSURE: 78 MMHG

## 2019-04-22 DIAGNOSIS — Z98.890 HISTORY OF COLOSTOMY REVERSAL: Primary | ICD-10-CM

## 2019-04-22 PROCEDURE — 99024 POSTOP FOLLOW-UP VISIT: CPT | Performed by: SURGERY

## 2019-04-22 NOTE — PROGRESS NOTES
General Surgery Office Note  Toyin Rubio MD, MS    Patient's Name/Date of Birth: Swati Trevino / 1950    Date: April 22, 2019     Surgeon: Lionel Roldan MD    Chief Complaint: s/p reversal colostomy    Patient Active Problem List   Diagnosis    Spinal stenosis    Degeneration of lumbosacral intervertebral disc    Lumbago    Colonic diverticular abscess    CAD in native artery    S/P colon resection    Diverticulitis of large intestine with perforation and abscess without bleeding    Pelvic abscess in female    Colostomy reversal       Subjective: doing well, no pain, no nausea, having regular bowel movements    Objective:  /78 (Site: Left Upper Arm, Position: Sitting, Cuff Size: Small Adult)   Pulse 78   Temp 97.8 °F (36.6 °C) (Oral)   Ht 5' 3\" (1.6 m)   Wt 127 lb (57.6 kg)   SpO2 95%   BMI 22.50 kg/m²   Labs:  No results for input(s): WBC, HGB, HCT in the last 72 hours. Invalid input(s): PLR  Lab Results   Component Value Date    CREATININE 0.8 04/11/2019    BUN 8 04/11/2019     04/11/2019    K 3.6 04/11/2019     (H) 04/11/2019    CO2 28 04/11/2019     No results for input(s): LIPASE, AMYLASE in the last 72 hours. General appearance: AA, NAD  HEENT: NCAT, PERRLA, EOMI  Lungs: Clear, equal rise bilateral  Heart: Reg  Abdomen: soft, nondistended, nontender, incisions well healed, no signs of infection, no cellulitis, no hematoma, sutures removed from stoma incision  Skin: No lesions, incisions well healed  Psych: No distress, conversive, no hallucinations  : No ulcers or lesions  Rectal: No bleeding    A complete 10 system review was performed and are otherwise negative unless mentioned in the above HPI. Specific negatives are listed below but may not include all those reviewed.     General ROS: negative obtundation, AMS  ENT ROS: negative rhinorrhea, epistaxis  Allergy and Immunology ROS: negative itchy/watery eyes or nasal congestion  Hematological and Lymphatic ROS: negative spontaneous bleeding or bruising  Endocrine ROS: negative  lethargy, mood swings, palpitations or polydipsia/polyuria  Respiratory ROS: negative sputum changes, stridor, tachypnea or wheezing  Cardiovascular ROS: negative for - loss of consciousness, murmur or orthopnea  Gastrointestinal ROS: negative for - hematochezia or hematemesis  Genito-Urinary ROS: negative for -  genital discharge or hematuria  Musculoskeletal ROS: negative for - focal weakness, gangrene  Psych/Neuro ROS: negative for - visual or auditory hallucinations, suicidal ideation      Time spent reviewing past medical, surgical, social and family history, vitals, nursing assessment and images. Pathology: Diagnosis:  Colostomy site, excision:     * Histologic findings consistent with colostomy site identified.     * Associated active colitis (see comment).     * Two benign lymph nodes identified with focal multi nucleated giant       cells.     Assessment/Plan:  Candi Mcbride is a 79 y.o. female 2 weeks s/p colostomy reversal, doing well    Resume activity gradually   Follow up as needed  No heavy lifting more than 20lbs for 4 weeks total  Pathology reviewed and copy provided      Physician Signature: Electronically signed by Dr. Nelda Santiago  [phone] (p)  4/22/2019  9:47 AM

## 2019-07-15 ENCOUNTER — HOSPITAL ENCOUNTER (OUTPATIENT)
Age: 69
Discharge: HOME | End: 2019-07-15
Payer: COMMERCIAL

## 2019-07-15 LAB
ALBUMIN SERPL-MCNC: 4.4 G/DL (ref 3.5–5.2)
ALP BLD-CCNC: 94 U/L (ref 35–104)
ALT SERPL-CCNC: 16 U/L (ref 0–32)
ANION GAP SERPL CALCULATED.3IONS-SCNC: 10 MMOL/L (ref 7–16)
AST SERPL-CCNC: 22 U/L (ref 0–31)
BASOPHILS ABSOLUTE: 0.08 E9/L (ref 0–0.2)
BASOPHILS RELATIVE PERCENT: 0.9 % (ref 0–2)
BILIRUB SERPL-MCNC: 0.3 MG/DL (ref 0–1.2)
BUN BLDV-MCNC: 9 MG/DL (ref 8–23)
CA 125: 12.3 U/ML (ref 0–35)
CALCIUM SERPL-MCNC: 9.5 MG/DL (ref 8.6–10.2)
CEA: 3.9 NG/ML (ref 0–5.2)
CHLORIDE BLD-SCNC: 101 MMOL/L (ref 98–107)
CHOLESTEROL, TOTAL: 137 MG/DL (ref 0–199)
CO2: 30 MMOL/L (ref 22–29)
CREAT SERPL-MCNC: 0.8 MG/DL (ref 0.5–1)
EOSINOPHILS ABSOLUTE: 0.21 E9/L (ref 0.05–0.5)
EOSINOPHILS RELATIVE PERCENT: 2.4 % (ref 0–6)
GFR AFRICAN AMERICAN: >60
GFR NON-AFRICAN AMERICAN: >60 ML/MIN/1.73
GLUCOSE BLD-MCNC: 85 MG/DL (ref 74–99)
HCT VFR BLD CALC: 47.3 % (ref 34–48)
HDLC SERPL-MCNC: 49 MG/DL
HEMOGLOBIN: 15.4 G/DL (ref 11.5–15.5)
IMMATURE GRANULOCYTES #: 0.04 E9/L
IMMATURE GRANULOCYTES %: 0.4 % (ref 0–5)
LDL CHOLESTEROL CALCULATED: 71 MG/DL (ref 0–99)
LYMPHOCYTES ABSOLUTE: 1.96 E9/L (ref 1.5–4)
LYMPHOCYTES RELATIVE PERCENT: 21.9 % (ref 20–42)
MCH RBC QN AUTO: 30.3 PG (ref 26–35)
MCHC RBC AUTO-ENTMCNC: 32.6 % (ref 32–34.5)
MCV RBC AUTO: 92.9 FL (ref 80–99.9)
MONOCYTES ABSOLUTE: 0.75 E9/L (ref 0.1–0.95)
MONOCYTES RELATIVE PERCENT: 8.4 % (ref 2–12)
NEUTROPHILS ABSOLUTE: 5.89 E9/L (ref 1.8–7.3)
NEUTROPHILS RELATIVE PERCENT: 66 % (ref 43–80)
PDW BLD-RTO: 14.6 FL (ref 11.5–15)
PLATELET # BLD: 205 E9/L (ref 130–450)
PMV BLD AUTO: 10.2 FL (ref 7–12)
POTASSIUM SERPL-SCNC: 4.1 MMOL/L (ref 3.5–5)
RBC # BLD: 5.09 E12/L (ref 3.5–5.5)
SODIUM BLD-SCNC: 141 MMOL/L (ref 132–146)
T4 TOTAL: 8.7 MCG/DL (ref 4.5–11.7)
TOTAL PROTEIN: 7.4 G/DL (ref 6.4–8.3)
TRIGL SERPL-MCNC: 86 MG/DL (ref 0–149)
TSH SERPL DL<=0.05 MIU/L-ACNC: 9.51 UIU/ML (ref 0.27–4.2)
VLDLC SERPL CALC-MCNC: 17 MG/DL
WBC # BLD: 8.9 E9/L (ref 4.5–11.5)

## 2019-07-15 PROCEDURE — 84436 ASSAY OF TOTAL THYROXINE: CPT

## 2019-07-15 PROCEDURE — 84443 ASSAY THYROID STIM HORMONE: CPT

## 2019-07-15 PROCEDURE — 80053 COMPREHEN METABOLIC PANEL: CPT

## 2019-07-15 PROCEDURE — 36415 COLL VENOUS BLD VENIPUNCTURE: CPT

## 2019-07-15 PROCEDURE — 80061 LIPID PANEL: CPT

## 2019-07-15 PROCEDURE — 86304 IMMUNOASSAY TUMOR CA 125: CPT

## 2019-07-15 PROCEDURE — 85025 COMPLETE CBC W/AUTO DIFF WBC: CPT

## 2019-07-15 PROCEDURE — 82378 CARCINOEMBRYONIC ANTIGEN: CPT

## 2019-08-19 ENCOUNTER — HOSPITAL ENCOUNTER (OUTPATIENT)
Age: 69
Discharge: HOME | End: 2019-08-19
Payer: COMMERCIAL

## 2019-08-19 LAB
T4 FREE: 1.55 NG/DL (ref 0.93–1.7)
TSH SERPL DL<=0.05 MIU/L-ACNC: 3.42 UIU/ML (ref 0.27–4.2)

## 2019-08-19 PROCEDURE — 84443 ASSAY THYROID STIM HORMONE: CPT

## 2019-08-19 PROCEDURE — 84439 ASSAY OF FREE THYROXINE: CPT

## 2019-08-19 PROCEDURE — 36415 COLL VENOUS BLD VENIPUNCTURE: CPT

## 2020-01-24 VITALS
HEIGHT: 63 IN | SYSTOLIC BLOOD PRESSURE: 120 MMHG | WEIGHT: 128 LBS | DIASTOLIC BLOOD PRESSURE: 84 MMHG | BODY MASS INDEX: 22.68 KG/M2 | HEART RATE: 56 BPM

## 2020-03-08 NOTE — PROGRESS NOTES
[facility] Cardiology Progress Note  Dr. Juma Sher      Referring Physician: aNtalia Murdock DO  CHIEF COMPLAINT:   Chief Complaint   Patient presents with    Coronary Artery Disease     6 month follow-up visit. PAtient denies any cp sob or dizziness. HISTORY OF PRESENT ILLNESS:   Patient is 81year old female with CAD, paroxysmal atrial fibrillation hypertension, hypothyroidism, hyperlipidemia, chronic diastolic heart failure, mild aortic stenosis, is here for follow-up appointment. Patient denies any chest pain, no shortness of breath, no lightheadedness, no dizziness, no palpitations, no pedal edema, no PND, no orthopnea, no syncope, no presyncopal episodes.       Past Medical History:   Diagnosis Date    CAD in native artery 0/6/5820    Diastolic heart failure ([address]     Diverticulitis 08/03/2018    HIGH CHOLESTEROL     History of atrial fibrillation 08/2018    with episode of diverticulosis    History of stress test 08/07/2018    Lexiscan stress test    Hypertension     Thyroid disease          Past Surgical History:   Procedure Laterality Date    COLONOSCOPY      COLONOSCOPY N/A 4/8/2019    COLONOSCOPY performed by Lauren Malin MD at [facility] CATH LAB PROCEDURE  08/09/2018    NERVE BLOCK  12 27 2011    NERVE BLOCK  1/24/12    lumbar epidural    NERVE BLOCK  02/21/12    lumbar epidural with flouro    ID COLONOSCOPY FLX DX W/COLLJ SPEC WHEN PFRMD N/A 10/4/2018    COLONOSCOPY performed by Lauren Malin MD at [facility] N/A 11/13/2018    LAPAROSCOPIC ROBOT XI ASSISTED SIGMOID COLON RESECTION WITH OSTOMY performed by Lauren Malin MD at [facility] N/A 4/9/2019    COLOSTOMY CLOSURE LAPAROSCOPIC ROBOTIC XI performed by Lauren Malin MD at [address] [address] [place], [place] [address]e W         Current Outpatient Medications   Medication Sig Dispense Refill    amoxicillin-clavulanate (AUGMENTIN) 875-125 MG per tablet Take 1 tablet by mouth 2 times daily      aspirin EC 81 MG EC tablet Take 1 tablet by mouth daily 30 tablet 3    pantoprazole (PROTONIX) 40 MG tablet Take 1 tablet by mouth daily 30 tablet 3    metoprolol tartrate (LOPRESSOR) 25 MG tablet Take 1 tablet by mouth every 8 hours 90 tablet 0    apixaban (ELIQUIS) 5 MG TABS tablet Take 1 tablet by mouth 2 times daily 60 tablet 0    levothyroxine (SYNTHROID) 100 MCG tablet Take 100 mcg by mouth Daily      ZINC PO Take 1 tablet by mouth daily      Omega-3 Fatty Acids (FISH OIL) 1000 MG CAPS Take 1,000 mg by mouth daily      lisinopril (PRINIVIL;ZESTRIL) 10 MG tablet Take 10 mg by mouth daily.  rosuvastatin (CRESTOR) 10 MG tablet Take 10 mg by mouth daily. No current facility-administered medications for this visit.           Allergies as of 03/12/2020    (No Known Allergies)       Social History     Socioeconomic History    Marital status:      Spouse name: Not on file    Number of children: Not on file    Years of education: Not on file    Highest education level: Not on file   Occupational History    Not on file   Social Needs    Financial resource strain: Not on file    Food insecurity     Worry: Not on file     Inability: Not on file    Transportation needs     Medical: Not on file     Non-medical: Not on file   Tobacco Use    Smoking status: Current Every Day Smoker     Packs/day: 0.50     Types: Cigarettes    Smokeless tobacco: Never Used   Substance and Sexual Activity    Alcohol use: No     Comment: 5 cups of coffee decaf/regular    Drug use: No    Sexual activity: Not on file   Lifestyle    Physical activity     Days per week: Not on file     Minutes per session: Not on file    Stress: Not on file   Relationships    Social connections     Talks on phone: Not on file     Gets together: Not on file     Attends Bahai service: Not on file     Active member of club or organization: Not on file     Attends meetings of clubs or organizations: Not on file     Relationship status: Not on file    Intimate partner violence     Fear of current or ex partner: Not on file     Emotionally abused: Not on file     Physically abused: Not on file     Forced sexual activity: Not on file   Other Topics Concern    Not on file   Social History Narrative    Not on file       Family History   Problem Relation Age of Onset    Cancer Other     Heart Disease Other     Cancer Mother         brain    Heart Attack Father         age 48     Heart Attack Sister         age 46        REVIEW OF SYSTEMS:     CONSTITUTIONAL:  negative for  fevers, chills, sweats and fatigue  HEENT:  negative for  tinnitus, earaches, nasal congestion and epistaxis  RESPIRATORY:  negative for  dry cough, cough with sputum, dyspnea, wheezing and hemoptysis  GASTROINTESTINAL:  negative for nausea, vomiting, diarrhea, constipation, pruritus and jaundice  HEMATOLOGIC/LYMPHATIC:  negative for easy bruising, bleeding, lymphadenopathy and petechiae  ENDOCRINE:  negative for heat intolerance, cold intolerance, tremor, hair loss and diabetic symptoms including neither polyuria nor polydipsia nor blurred vision  MUSCULOSKELETAL:  negative for  myalgias, arthralgias, joint swelling, stiff joints and decreased range of motion  NEUROLOGICAL:  negative for memory problems, speech problems, visual disturbance, dysphagia, weakness and numbness      PHYSICAL EXAM:   CONSTITUTIONAL:  awake, alert, cooperative, no apparent distress, and appears stated age  HEENT:  Moist and pink mucous membranes, normocephalic, without obvious abnormality, atraumatic  NECK:  Supple, symmetrical, trachea midline, no adenopathy, thyroid symmetric, not enlarged and no tenderness, skin normal  LUNGS:  No increased work of breathing, good air exchange, clear to auscultation bilaterally, no crackles or wheezing  CARDIOVASCULAR:  Normal apical impulse, regular rate and rhythm, normal S1 and S2, no S3 or S4, 2 to

## 2020-03-12 ENCOUNTER — OFFICE VISIT (OUTPATIENT)
Dept: CARDIOLOGY CLINIC | Age: 70
End: 2020-03-12
Payer: COMMERCIAL

## 2020-03-12 VITALS
SYSTOLIC BLOOD PRESSURE: 122 MMHG | HEART RATE: 63 BPM | DIASTOLIC BLOOD PRESSURE: 82 MMHG | HEIGHT: 63 IN | WEIGHT: 128 LBS | BODY MASS INDEX: 22.68 KG/M2

## 2020-03-12 PROCEDURE — 99214 OFFICE O/P EST MOD 30 MIN: CPT | Performed by: INTERNAL MEDICINE

## 2020-03-12 PROCEDURE — 93000 ELECTROCARDIOGRAM COMPLETE: CPT | Performed by: INTERNAL MEDICINE

## 2020-03-12 RX ORDER — AMOXICILLIN AND CLAVULANATE POTASSIUM 875; 125 MG/1; MG/1
1 TABLET, FILM COATED ORAL 2 TIMES DAILY
COMMUNITY
End: 2020-08-20 | Stop reason: ALTCHOICE

## 2020-06-26 ENCOUNTER — HOSPITAL ENCOUNTER (OUTPATIENT)
Age: 70
Discharge: HOME | End: 2020-06-26
Payer: COMMERCIAL

## 2020-06-26 LAB
ALBUMIN SERPL-MCNC: 3.9 G/DL (ref 3.5–5.2)
ALP BLD-CCNC: 88 U/L (ref 35–104)
ALT SERPL-CCNC: 15 U/L (ref 0–32)
ANION GAP SERPL CALCULATED.3IONS-SCNC: 10 MMOL/L (ref 7–16)
AST SERPL-CCNC: 20 U/L (ref 0–31)
BASOPHILS ABSOLUTE: 0.1 E9/L (ref 0–0.2)
BASOPHILS RELATIVE PERCENT: 1.1 % (ref 0–2)
BILIRUB SERPL-MCNC: 0.3 MG/DL (ref 0–1.2)
BUN BLDV-MCNC: 8 MG/DL (ref 8–23)
CALCIUM SERPL-MCNC: 9.2 MG/DL (ref 8.6–10.2)
CHLORIDE BLD-SCNC: 104 MMOL/L (ref 98–107)
CHOLESTEROL, FASTING: 132 MG/DL (ref 0–199)
CO2: 28 MMOL/L (ref 22–29)
CREAT SERPL-MCNC: 0.7 MG/DL (ref 0.5–1)
EOSINOPHILS ABSOLUTE: 0.23 E9/L (ref 0.05–0.5)
EOSINOPHILS RELATIVE PERCENT: 2.5 % (ref 0–6)
GFR AFRICAN AMERICAN: >60
GFR NON-AFRICAN AMERICAN: >60 ML/MIN/1.73
GLUCOSE FASTING: 100 MG/DL (ref 74–99)
HCT VFR BLD CALC: 45.6 % (ref 34–48)
HDLC SERPL-MCNC: 45 MG/DL
HEMOGLOBIN: 14.7 G/DL (ref 11.5–15.5)
IMMATURE GRANULOCYTES #: 0.06 E9/L
IMMATURE GRANULOCYTES %: 0.6 % (ref 0–5)
LDL CHOLESTEROL CALCULATED: 72 MG/DL (ref 0–99)
LYMPHOCYTES ABSOLUTE: 1.54 E9/L (ref 1.5–4)
LYMPHOCYTES RELATIVE PERCENT: 16.5 % (ref 20–42)
MCH RBC QN AUTO: 31.1 PG (ref 26–35)
MCHC RBC AUTO-ENTMCNC: 32.2 % (ref 32–34.5)
MCV RBC AUTO: 96.6 FL (ref 80–99.9)
MONOCYTES ABSOLUTE: 0.99 E9/L (ref 0.1–0.95)
MONOCYTES RELATIVE PERCENT: 10.6 % (ref 2–12)
NEUTROPHILS ABSOLUTE: 6.42 E9/L (ref 1.8–7.3)
NEUTROPHILS RELATIVE PERCENT: 68.7 % (ref 43–80)
PDW BLD-RTO: 12.7 FL (ref 11.5–15)
PHOSPHORUS: 3.9 MG/DL (ref 2.5–4.5)
PLATELET # BLD: 224 E9/L (ref 130–450)
PMV BLD AUTO: 10.4 FL (ref 7–12)
POTASSIUM SERPL-SCNC: 4.7 MMOL/L (ref 3.5–5)
RBC # BLD: 4.72 E12/L (ref 3.5–5.5)
SODIUM BLD-SCNC: 142 MMOL/L (ref 132–146)
T4 TOTAL: 10.1 MCG/DL (ref 4.5–11.7)
TOTAL PROTEIN: 6.7 G/DL (ref 6.4–8.3)
TRIGLYCERIDE, FASTING: 73 MG/DL (ref 0–149)
TSH SERPL DL<=0.05 MIU/L-ACNC: 1.34 UIU/ML (ref 0.27–4.2)
VLDLC SERPL CALC-MCNC: 15 MG/DL
WBC # BLD: 9.3 E9/L (ref 4.5–11.5)

## 2020-06-26 PROCEDURE — 36415 COLL VENOUS BLD VENIPUNCTURE: CPT

## 2020-06-26 PROCEDURE — 80053 COMPREHEN METABOLIC PANEL: CPT

## 2020-06-26 PROCEDURE — 80061 LIPID PANEL: CPT

## 2020-06-26 PROCEDURE — 85025 COMPLETE CBC W/AUTO DIFF WBC: CPT

## 2020-06-26 PROCEDURE — 84436 ASSAY OF TOTAL THYROXINE: CPT

## 2020-06-26 PROCEDURE — 84443 ASSAY THYROID STIM HORMONE: CPT

## 2020-06-26 PROCEDURE — 84100 ASSAY OF PHOSPHORUS: CPT

## 2020-07-04 ENCOUNTER — APPOINTMENT (OUTPATIENT)
Dept: GENERAL RADIOLOGY | Age: 70
End: 2020-07-04
Payer: COMMERCIAL

## 2020-07-04 ENCOUNTER — HOSPITAL ENCOUNTER (EMERGENCY)
Age: 70
Discharge: HOME | End: 2020-07-04
Attending: EMERGENCY MEDICINE
Payer: COMMERCIAL

## 2020-07-04 VITALS
DIASTOLIC BLOOD PRESSURE: 89 MMHG | HEART RATE: 83 BPM | HEIGHT: 63 IN | SYSTOLIC BLOOD PRESSURE: 160 MMHG | BODY MASS INDEX: 22.68 KG/M2 | RESPIRATION RATE: 18 BRPM | TEMPERATURE: 99.9 F | WEIGHT: 128 LBS | OXYGEN SATURATION: 97 %

## 2020-07-04 LAB
ANION GAP SERPL CALCULATED.3IONS-SCNC: 12 MMOL/L (ref 7–16)
BACTERIA: ABNORMAL /HPF
BILIRUBIN URINE: NEGATIVE
BLOOD, URINE: ABNORMAL
BUN BLDV-MCNC: 5 MG/DL (ref 8–23)
CALCIUM SERPL-MCNC: 8.7 MG/DL (ref 8.6–10.2)
CHLORIDE BLD-SCNC: 103 MMOL/L (ref 98–107)
CLARITY: CLEAR
CO2: 25 MMOL/L (ref 22–29)
COLOR: YELLOW
CREAT SERPL-MCNC: 0.6 MG/DL (ref 0.5–1)
EKG ATRIAL RATE: 66 BPM
EKG P AXIS: 75 DEGREES
EKG P-R INTERVAL: 150 MS
EKG Q-T INTERVAL: 420 MS
EKG QRS DURATION: 92 MS
EKG QTC CALCULATION (BAZETT): 440 MS
EKG R AXIS: 61 DEGREES
EKG T AXIS: 66 DEGREES
EKG VENTRICULAR RATE: 66 BPM
EPITHELIAL CELLS, UA: ABNORMAL /HPF
GFR AFRICAN AMERICAN: >60
GFR NON-AFRICAN AMERICAN: >60 ML/MIN/1.73
GLUCOSE BLD-MCNC: 134 MG/DL (ref 74–99)
GLUCOSE URINE: NEGATIVE MG/DL
HCT VFR BLD CALC: 43.6 % (ref 34–48)
HEMOGLOBIN: 15.2 G/DL (ref 11.5–15.5)
KETONES, URINE: NEGATIVE MG/DL
LEUKOCYTE ESTERASE, URINE: NEGATIVE
MCH RBC QN AUTO: 32.7 PG (ref 26–35)
MCHC RBC AUTO-ENTMCNC: 34.9 % (ref 32–34.5)
MCV RBC AUTO: 93.8 FL (ref 80–99.9)
NITRITE, URINE: NEGATIVE
PDW BLD-RTO: 12.7 FL (ref 11.5–15)
PH UA: 7 (ref 5–9)
PLATELET # BLD: 199 E9/L (ref 130–450)
PMV BLD AUTO: 11.4 FL (ref 7–12)
POTASSIUM SERPL-SCNC: 3.4 MMOL/L (ref 3.5–5)
PROTEIN UA: NEGATIVE MG/DL
RBC # BLD: 4.65 E12/L (ref 3.5–5.5)
RBC UA: ABNORMAL /HPF (ref 0–2)
SODIUM BLD-SCNC: 140 MMOL/L (ref 132–146)
SPECIFIC GRAVITY UA: <=1.005 (ref 1–1.03)
TROPONIN: <0.01 NG/ML (ref 0–0.03)
UROBILINOGEN, URINE: 0.2 E.U./DL
WBC # BLD: 12.4 E9/L (ref 4.5–11.5)
WBC UA: ABNORMAL /HPF (ref 0–5)

## 2020-07-04 PROCEDURE — 85027 COMPLETE CBC AUTOMATED: CPT

## 2020-07-04 PROCEDURE — 80048 BASIC METABOLIC PNL TOTAL CA: CPT

## 2020-07-04 PROCEDURE — 99284 EMERGENCY DEPT VISIT MOD MDM: CPT

## 2020-07-04 PROCEDURE — 93010 ELECTROCARDIOGRAM REPORT: CPT | Performed by: INTERNAL MEDICINE

## 2020-07-04 PROCEDURE — 93005 ELECTROCARDIOGRAM TRACING: CPT | Performed by: EMERGENCY MEDICINE

## 2020-07-04 PROCEDURE — 81001 URINALYSIS AUTO W/SCOPE: CPT

## 2020-07-04 PROCEDURE — 71046 X-RAY EXAM CHEST 2 VIEWS: CPT

## 2020-07-04 PROCEDURE — 84484 ASSAY OF TROPONIN QUANT: CPT

## 2020-07-04 RX ORDER — SODIUM CHLORIDE 0.9 % (FLUSH) 0.9 %
10 SYRINGE (ML) INJECTION PRN
Status: DISCONTINUED | OUTPATIENT
Start: 2020-07-04 | End: 2020-07-04 | Stop reason: HOSPADM

## 2020-07-04 ASSESSMENT — ENCOUNTER SYMPTOMS
BACK PAIN: 0
SHORTNESS OF BREATH: 0
RHINORRHEA: 0
EYE DISCHARGE: 0
VOMITING: 0
WHEEZING: 0
SINUS PRESSURE: 0
SORE THROAT: 0
COUGH: 0
NAUSEA: 0
ABDOMINAL DISTENTION: 0
DIARRHEA: 0
EYE REDNESS: 0
EYE PAIN: 0

## 2020-07-04 ASSESSMENT — PAIN SCALES - GENERAL: PAINLEVEL_OUTOF10: 1

## 2020-07-04 ASSESSMENT — PAIN DESCRIPTION - LOCATION: LOCATION: HEAD

## 2020-07-04 NOTE — ED PROVIDER NOTES
This patient reports that yesterday, she saw her primary care doctor and receive the shingles vaccine as well as tetanus vaccine. She then spent all day outside working in the yard. Was quite hot yesterday. She felt a little fatigued last night and attributed it to her work in the sun. She took her temperature at home and noted that to be 100.1. This morning, she thought she should come to the hospital to be checked. She denies any symptoms including shortness of breath, chest pain, nausea, vomiting, diarrhea or dysuria. No known exposure to anyone who is sick. The history is provided by the patient. Fever   Max temp prior to arrival:  100.1  Temp source:  Oral  Severity:  Mild  Onset quality:  Sudden  Duration:  1 day  Timing:  Constant  Progression:  Improving  Chronicity:  New  Relieved by:  None tried  Worsened by:  Nothing  Ineffective treatments:  None tried  Associated symptoms: no chest pain, no chills, no confusion, no congestion, no cough, no diarrhea, no dysuria, no ear pain, no headaches, no myalgias, no nausea, no rash, no rhinorrhea, no somnolence, no sore throat and no vomiting         Review of Systems   Constitutional: Positive for fatigue and fever. Negative for chills. HENT: Negative for congestion, ear pain, rhinorrhea, sinus pressure and sore throat. Eyes: Negative for pain, discharge and redness. Respiratory: Negative for cough, shortness of breath and wheezing. Cardiovascular: Negative for chest pain. Gastrointestinal: Negative for abdominal distention, diarrhea, nausea and vomiting. Genitourinary: Negative for dysuria and frequency. Musculoskeletal: Negative for arthralgias, back pain and myalgias. Skin: Negative for rash and wound. Neurological: Negative for weakness and headaches. Hematological: Negative for adenopathy. Psychiatric/Behavioral: Negative for confusion. All other systems reviewed and are negative.        Physical Exam  Vitals signs and nursing note reviewed. Constitutional:       Appearance: She is well-developed. HENT:      Head: Normocephalic and atraumatic. Eyes:      Pupils: Pupils are equal, round, and reactive to light. Neck:      Musculoskeletal: Normal range of motion and neck supple. Cardiovascular:      Rate and Rhythm: Normal rate. Rhythm irregular. Heart sounds: Normal heart sounds. No murmur. Pulmonary:      Effort: Pulmonary effort is normal. Prolonged expiration present. No respiratory distress. Breath sounds: Decreased breath sounds present. No wheezing or rales. Comments: Lung sounds are decreased and coarse. Long-term history of smoking. No diagnosis of COPD. Abdominal:      General: Bowel sounds are normal.      Palpations: Abdomen is soft. Tenderness: There is no abdominal tenderness. There is no guarding or rebound. Skin:     General: Skin is warm and dry. Neurological:      Mental Status: She is alert and oriented to person, place, and time. Cranial Nerves: No cranial nerve deficit. Coordination: Coordination normal.          Procedures     MDM       EKG: This EKG is signed and interpreted by me. Rate: 66  Rhythm: Sinus  Interpretation: no acute changes  Comparison: was normal           --------------------------------------------- PAST HISTORY ---------------------------------------------  Past Medical History:  has a past medical history of CAD in native artery, Diastolic heart failure ([facility] [address] Diverticulitis, HIGH CHOLESTEROL, History of atrial fibrillation, History of stress test, Hypertension, and Thyroid disease. Past Surgical History:  has a past surgical history that includes Nerve Block (12 27 2011); Nerve Block (1/24/12); Nerve Block (02/21/12); pr colonoscopy flx dx w/collj spec when pfrmd (N/A, 10/4/2018); pr lap,surg,colectomy, partial, w/anast (N/A, 11/13/2018); Colonoscopy; Colonoscopy (N/A, 4/8/2019);  Small intestine surgery (N/A, 4/9/2019); and Diagnostic Cardiac Cath Lab Procedure (08/09/2018). Social History:  reports that she has been smoking cigarettes. She has been smoking about 0.50 packs per day. She has never used smokeless tobacco. She reports that she does not drink alcohol or use drugs. Family History: family history includes Cancer in her mother and another family member; Heart Attack in her father and sister; Heart Disease in an other family member. The patients home medications have been reviewed. Allergies: Patient has no known allergies.     -------------------------------------------------- RESULTS -------------------------------------------------  Labs:  Results for orders placed or performed during the hospital encounter of 05/28/92   Basic metabolic panel   Result Value Ref Range    Glucose 134 (H) 74 - 99 mg/dL   CBC   Result Value Ref Range    WBC 12.4 (H) 4.5 - 11.5 E9/L    RBC 4.65 3.50 - 5.50 E12/L    Hemoglobin 15.2 11.5 - 15.5 g/dL    Hematocrit 43.6 34.0 - 48.0 %    MCV 93.8 80.0 - 99.9 fL    MCH 32.7 26.0 - 35.0 pg    MCHC 34.9 (H) 32.0 - 34.5 %    RDW 12.7 11.5 - 15.0 fL    Platelets 268 586 - 297 E9/L    MPV 11.4 7.0 - 12.0 fL   Troponin   Result Value Ref Range    Troponin <0.01 0.00 - 0.03 ng/mL   Urinalysis   Result Value Ref Range    Color, UA Yellow Straw/Yellow    Clarity, UA Clear Clear    Glucose, Ur Negative Negative mg/dL    Bilirubin Urine Negative Negative    Ketones, Urine Negative Negative mg/dL    Specific Gravity, UA <=1.005 1.005 - 1.030    Blood, Urine SMALL (A) Negative    pH, UA 7.0 5.0 - 9.0    Protein, UA Negative Negative mg/dL    Urobilinogen, Urine 0.2 <2.0 E.U./dL    Nitrite, Urine Negative Negative    Leukocyte Esterase, Urine Negative Negative   Microscopic Urinalysis   Result Value Ref Range    WBC, UA 0-1 0 - 5 /HPF    RBC, UA 2-5 0 - 2 /HPF    Epithelial Cells, UA FEW /HPF    Bacteria, UA FEW (A) None Seen /HPF   EKG 12 Lead   Result Value Ref Range    Ventricular Rate 66 BPM    Atrial Rate 66 BPM    P-R Interval 150 ms    QRS Duration 92 ms    Q-T Interval 420 ms    QTc Calculation (Bazett) 440 ms    P Axis 75 degrees    R Axis 61 degrees    T Axis 66 degrees       Radiology:  XR CHEST STANDARD (2 VW)   Final Result   No acute process. ------------------------- NURSING NOTES AND VITALS REVIEWED ---------------------------  Date / Time Roomed:  7/4/2020  6:59 AM  ED Bed Assignment:  07/07    The nursing notes within the ED encounter and vital signs as below have been reviewed. BP (!) 160/89   Pulse 83   Temp 99.9 °F (37.7 °C) (Temporal)   Resp 18   Ht 5' 3\" (1.6 m)   Wt 128 lb (58.1 kg)   SpO2 97%   BMI 22.67 kg/m²   Oxygen Saturation Interpretation: Normal      ------------------------------------------ PROGRESS NOTES ------------------------------------------  8:27 AM EDT  I have spoken with the patient and discussed todays results, in addition to providing specific details for the plan of care and counseling regarding the diagnosis and prognosis. Their questions are answered at this time and they are agreeable with the plan. I discussed at length with them reasons for immediate return here for re evaluation. They will followup with their primary care physician by calling their office on Monday. I discussed with patient no obvious signs of infection at this time. Likelihood of COVID infection seems low based on symptoms and physical findings. She does understand that this still could be possible and she will take precautions. Should her symptoms worsen, she will return for reevaluation. Otherwise, she will follow-up with her family doctor on Monday.    --------------------------------- ADDITIONAL PROVIDER NOTES ---------------------------------  At this time the patient is without objective evidence of an acute process requiring hospitalization or inpatient management.   They have remained hemodynamically stable throughout their entire ED visit and are stable for discharge with outpatient follow-up. The plan has been discussed in detail and they are aware of the specific conditions for emergent return, as well as the importance of follow-up. New Prescriptions    No medications on file       Diagnosis:  1. Fever, unspecified fever cause        Disposition:  Patient's disposition: Discharge to home  Patient's condition is stable.              Dia Balderas, [address] [place] [address]  07/04/20 0679

## 2020-07-06 ENCOUNTER — CARE COORDINATION (OUTPATIENT)
Dept: CARE COORDINATION | Age: 70
End: 2020-07-06

## 2020-07-06 NOTE — CARE COORDINATION
Patient contacted regarding Isela Ill. Discussed COVID-19 related testing which was not done at this time. Test results were not done. Patient informed of results, if available? No    Care Transition Nurse/ Ambulatory Care Manager contacted the patient by telephone to perform post discharge assessment. Verified name and  with patient as identifiers. Provided introduction to self, and explanation of the CTN/ACM role, and reason for call due to risk factors for infection and/or exposure to COVID-19. Symptoms reviewed with patient who verbalized the following symptoms: fever. Due to new onset of symptoms encounter was not routed to provider for escalation. Discussed follow-up appointments. If no appointment was previously scheduled, appointment scheduling offered: [facility] follow up appointment(s):   Future Appointments   Date Time Provider Teresa Gonsalez   12/15/2020  8:00 AM Barbra Borrero MD [facility] follow up appointment(s): na     Patient has following risk factors of: no known risk factors. CTN/ACM reviewed discharge instructions, medical action plan and red flags such as increased shortness of breath, increasing fever and signs of decompensation with patient who verbalized understanding. Discussed exposure protocols and quarantine with CDC Guidelines What to do if you are sick with coronavirus disease .  Patient was given an opportunity for questions and concerns. The patient agrees to contact the [facility] exposure line [phone], [facility] department [place] Department [facility] Health: ([phone]) and PCP office for questions related to their healthcare. CTN/ACM provided contact information for future needs.     Reviewed and educated patient on any new and changed medications related to discharge diagnosis     Patient/family/caregiver given information for Kiki Harvey and agrees to enroll no  Patient's preferred e-mail: NA   Patient's preferred phone number: NA  Based on Loop alert triggers, patient will be contacted by nurse care manager for worsening symptoms. Plan for follow-up call in 3-5 days based on severity of symptoms and risk factors.

## 2020-07-09 ENCOUNTER — CARE COORDINATION (OUTPATIENT)
Dept: CARE COORDINATION | Age: 70
End: 2020-07-09

## 2020-07-13 ENCOUNTER — CARE COORDINATION (OUTPATIENT)
Dept: CARE COORDINATION | Age: 70
End: 2020-07-13

## 2020-08-09 ENCOUNTER — APPOINTMENT (OUTPATIENT)
Dept: ULTRASOUND IMAGING | Age: 70
DRG: 336 | End: 2020-08-09
Payer: COMMERCIAL

## 2020-08-09 ENCOUNTER — APPOINTMENT (OUTPATIENT)
Dept: CT IMAGING | Age: 70
DRG: 336 | End: 2020-08-09
Payer: COMMERCIAL

## 2020-08-09 ENCOUNTER — HOSPITAL ENCOUNTER (INPATIENT)
Age: 70
LOS: 6 days | Discharge: HOME | DRG: 336 | End: 2020-08-15
Attending: EMERGENCY MEDICINE | Admitting: SURGERY
Payer: COMMERCIAL

## 2020-08-09 PROBLEM — K56.609 SBO (SMALL BOWEL OBSTRUCTION) (HCC): Status: ACTIVE | Noted: 2020-08-09

## 2020-08-09 LAB
ALBUMIN SERPL-MCNC: 4 G/DL (ref 3.5–5.2)
ALP BLD-CCNC: 90 U/L (ref 35–104)
ALT SERPL-CCNC: 11 U/L (ref 0–32)
ANION GAP SERPL CALCULATED.3IONS-SCNC: 15 MMOL/L (ref 7–16)
AST SERPL-CCNC: 19 U/L (ref 0–31)
BACTERIA: ABNORMAL /HPF
BASOPHILS ABSOLUTE: 0.06 E9/L (ref 0–0.2)
BASOPHILS RELATIVE PERCENT: 0.3 % (ref 0–2)
BILIRUB SERPL-MCNC: 0.6 MG/DL (ref 0–1.2)
BILIRUBIN URINE: ABNORMAL
BLOOD, URINE: ABNORMAL
BUN BLDV-MCNC: 13 MG/DL (ref 8–23)
CALCIUM SERPL-MCNC: 9.8 MG/DL (ref 8.6–10.2)
CHLORIDE BLD-SCNC: 96 MMOL/L (ref 98–107)
CLARITY: ABNORMAL
CO2: 32 MMOL/L (ref 22–29)
COLOR: ABNORMAL
CREAT SERPL-MCNC: 0.8 MG/DL (ref 0.5–1)
EOSINOPHILS ABSOLUTE: 0.07 E9/L (ref 0.05–0.5)
EOSINOPHILS RELATIVE PERCENT: 0.4 % (ref 0–6)
EPITHELIAL CELLS, UA: ABNORMAL /HPF
GFR AFRICAN AMERICAN: >60
GFR NON-AFRICAN AMERICAN: >60 ML/MIN/1.73
GLUCOSE BLD-MCNC: 124 MG/DL (ref 74–99)
GLUCOSE URINE: NEGATIVE MG/DL
HCT VFR BLD CALC: 51.1 % (ref 34–48)
HEMOGLOBIN: 17.3 G/DL (ref 11.5–15.5)
IMMATURE GRANULOCYTES #: 0.09 E9/L
IMMATURE GRANULOCYTES %: 0.5 % (ref 0–5)
KETONES, URINE: 15 MG/DL
LACTIC ACID: 1.6 MMOL/L (ref 0.5–2.2)
LEUKOCYTE ESTERASE, URINE: ABNORMAL
LIPASE: 11 U/L (ref 13–60)
LYMPHOCYTES ABSOLUTE: 1.78 E9/L (ref 1.5–4)
LYMPHOCYTES RELATIVE PERCENT: 8.9 % (ref 20–42)
MCH RBC QN AUTO: 31.9 PG (ref 26–35)
MCHC RBC AUTO-ENTMCNC: 33.9 % (ref 32–34.5)
MCV RBC AUTO: 94.1 FL (ref 80–99.9)
MONOCYTES ABSOLUTE: 1.36 E9/L (ref 0.1–0.95)
MONOCYTES RELATIVE PERCENT: 6.8 % (ref 2–12)
MUCUS: PRESENT /LPF
NEUTROPHILS ABSOLUTE: 16.53 E9/L (ref 1.8–7.3)
NEUTROPHILS RELATIVE PERCENT: 83.1 % (ref 43–80)
NITRITE, URINE: NEGATIVE
PDW BLD-RTO: 12.7 FL (ref 11.5–15)
PH UA: 7 (ref 5–9)
PLATELET # BLD: 260 E9/L (ref 130–450)
PMV BLD AUTO: 10.4 FL (ref 7–12)
POTASSIUM SERPL-SCNC: 3.8 MMOL/L (ref 3.5–5)
PROTEIN UA: 100 MG/DL
RBC # BLD: 5.43 E12/L (ref 3.5–5.5)
RBC UA: ABNORMAL /HPF (ref 0–2)
SODIUM BLD-SCNC: 143 MMOL/L (ref 132–146)
SPECIFIC GRAVITY UA: 1.02 (ref 1–1.03)
TOTAL PROTEIN: 7.2 G/DL (ref 6.4–8.3)
UROBILINOGEN, URINE: 1 E.U./DL
WBC # BLD: 19.9 E9/L (ref 4.5–11.5)
WBC UA: ABNORMAL /HPF (ref 0–5)

## 2020-08-09 PROCEDURE — 83605 ASSAY OF LACTIC ACID: CPT

## 2020-08-09 PROCEDURE — 96375 TX/PRO/DX INJ NEW DRUG ADDON: CPT

## 2020-08-09 PROCEDURE — 74177 CT ABD & PELVIS W/CONTRAST: CPT

## 2020-08-09 PROCEDURE — 80053 COMPREHEN METABOLIC PANEL: CPT

## 2020-08-09 PROCEDURE — 83690 ASSAY OF LIPASE: CPT

## 2020-08-09 PROCEDURE — 1200000000 HC SEMI PRIVATE

## 2020-08-09 PROCEDURE — 99284 EMERGENCY DEPT VISIT MOD MDM: CPT

## 2020-08-09 PROCEDURE — 85025 COMPLETE CBC W/AUTO DIFF WBC: CPT

## 2020-08-09 PROCEDURE — 2580000003 HC RX 258: Performed by: PHYSICIAN ASSISTANT

## 2020-08-09 PROCEDURE — 6360000002 HC RX W HCPCS: Performed by: PHYSICIAN ASSISTANT

## 2020-08-09 PROCEDURE — 6360000004 HC RX CONTRAST MEDICATION: Performed by: RADIOLOGY

## 2020-08-09 PROCEDURE — 99285 EMERGENCY DEPT VISIT HI MDM: CPT

## 2020-08-09 PROCEDURE — 96374 THER/PROPH/DIAG INJ IV PUSH: CPT

## 2020-08-09 PROCEDURE — 76705 ECHO EXAM OF ABDOMEN: CPT

## 2020-08-09 PROCEDURE — 2580000003 HC RX 258: Performed by: SURGERY

## 2020-08-09 PROCEDURE — 81001 URINALYSIS AUTO W/SCOPE: CPT

## 2020-08-09 RX ORDER — DIPHENHYDRAMINE HYDROCHLORIDE 50 MG/ML
25 INJECTION INTRAMUSCULAR; INTRAVENOUS ONCE
Status: COMPLETED | OUTPATIENT
Start: 2020-08-09 | End: 2020-08-09

## 2020-08-09 RX ORDER — 0.9 % SODIUM CHLORIDE 0.9 %
1000 INTRAVENOUS SOLUTION INTRAVENOUS ONCE
Status: COMPLETED | OUTPATIENT
Start: 2020-08-09 | End: 2020-08-09

## 2020-08-09 RX ORDER — ONDANSETRON 2 MG/ML
4 INJECTION INTRAMUSCULAR; INTRAVENOUS EVERY 6 HOURS PRN
Status: DISCONTINUED | OUTPATIENT
Start: 2020-08-09 | End: 2020-08-15 | Stop reason: HOSPADM

## 2020-08-09 RX ORDER — SODIUM CHLORIDE 0.9 % (FLUSH) 0.9 %
10 SYRINGE (ML) INJECTION EVERY 12 HOURS SCHEDULED
Status: DISCONTINUED | OUTPATIENT
Start: 2020-08-09 | End: 2020-08-14

## 2020-08-09 RX ORDER — MORPHINE SULFATE 4 MG/ML
4 INJECTION, SOLUTION INTRAMUSCULAR; INTRAVENOUS
Status: DISCONTINUED | OUTPATIENT
Start: 2020-08-09 | End: 2020-08-14

## 2020-08-09 RX ORDER — MORPHINE SULFATE 2 MG/ML
2 INJECTION, SOLUTION INTRAMUSCULAR; INTRAVENOUS
Status: DISCONTINUED | OUTPATIENT
Start: 2020-08-09 | End: 2020-08-14

## 2020-08-09 RX ORDER — SODIUM CHLORIDE 0.9 % (FLUSH) 0.9 %
10 SYRINGE (ML) INJECTION PRN
Status: DISCONTINUED | OUTPATIENT
Start: 2020-08-09 | End: 2020-08-15 | Stop reason: HOSPADM

## 2020-08-09 RX ORDER — METOCLOPRAMIDE HYDROCHLORIDE 5 MG/ML
10 INJECTION INTRAMUSCULAR; INTRAVENOUS ONCE
Status: COMPLETED | OUTPATIENT
Start: 2020-08-09 | End: 2020-08-09

## 2020-08-09 RX ORDER — MORPHINE SULFATE 10 MG/ML
6 INJECTION, SOLUTION INTRAMUSCULAR; INTRAVENOUS ONCE
Status: COMPLETED | OUTPATIENT
Start: 2020-08-09 | End: 2020-08-09

## 2020-08-09 RX ORDER — SODIUM CHLORIDE 9 MG/ML
10 INJECTION INTRAVENOUS DAILY
Status: DISCONTINUED | OUTPATIENT
Start: 2020-08-10 | End: 2020-08-14

## 2020-08-09 RX ORDER — DEXTROSE AND SODIUM CHLORIDE 5; .45 G/100ML; G/100ML
INJECTION, SOLUTION INTRAVENOUS CONTINUOUS
Status: DISCONTINUED | OUTPATIENT
Start: 2020-08-09 | End: 2020-08-12

## 2020-08-09 RX ORDER — KETOROLAC TROMETHAMINE 15 MG/ML
15 INJECTION, SOLUTION INTRAMUSCULAR; INTRAVENOUS ONCE
Status: COMPLETED | OUTPATIENT
Start: 2020-08-09 | End: 2020-08-09

## 2020-08-09 RX ORDER — ACETAMINOPHEN 325 MG/1
650 TABLET ORAL EVERY 4 HOURS PRN
Status: DISCONTINUED | OUTPATIENT
Start: 2020-08-09 | End: 2020-08-15 | Stop reason: HOSPADM

## 2020-08-09 RX ORDER — SODIUM CHLORIDE 0.9 % (FLUSH) 0.9 %
10 SYRINGE (ML) INJECTION EVERY 12 HOURS SCHEDULED
Status: DISCONTINUED | OUTPATIENT
Start: 2020-08-09 | End: 2020-08-09 | Stop reason: SDUPTHER

## 2020-08-09 RX ORDER — OXYCODONE HYDROCHLORIDE 5 MG/1
5 TABLET ORAL EVERY 4 HOURS PRN
Status: DISCONTINUED | OUTPATIENT
Start: 2020-08-09 | End: 2020-08-15 | Stop reason: HOSPADM

## 2020-08-09 RX ORDER — OXYCODONE HYDROCHLORIDE 5 MG/1
10 TABLET ORAL EVERY 4 HOURS PRN
Status: DISCONTINUED | OUTPATIENT
Start: 2020-08-09 | End: 2020-08-15 | Stop reason: HOSPADM

## 2020-08-09 RX ORDER — PANTOPRAZOLE SODIUM 40 MG/10ML
40 INJECTION, POWDER, LYOPHILIZED, FOR SOLUTION INTRAVENOUS DAILY
Status: DISCONTINUED | OUTPATIENT
Start: 2020-08-10 | End: 2020-08-14

## 2020-08-09 RX ORDER — SODIUM CHLORIDE 0.9 % (FLUSH) 0.9 %
10 SYRINGE (ML) INJECTION PRN
Status: DISCONTINUED | OUTPATIENT
Start: 2020-08-09 | End: 2020-08-09 | Stop reason: SDUPTHER

## 2020-08-09 RX ORDER — ONDANSETRON 2 MG/ML
4 INJECTION INTRAMUSCULAR; INTRAVENOUS ONCE
Status: COMPLETED | OUTPATIENT
Start: 2020-08-09 | End: 2020-08-09

## 2020-08-09 RX ADMIN — DIPHENHYDRAMINE HYDROCHLORIDE 25 MG: 50 INJECTION, SOLUTION INTRAMUSCULAR; INTRAVENOUS at 18:48

## 2020-08-09 RX ADMIN — SODIUM CHLORIDE 1000 ML: 9 INJECTION, SOLUTION INTRAVENOUS at 16:25

## 2020-08-09 RX ADMIN — IOPAMIDOL 75 ML: 755 INJECTION, SOLUTION INTRAVENOUS at 18:04

## 2020-08-09 RX ADMIN — DEXTROSE AND SODIUM CHLORIDE: 5; 450 INJECTION, SOLUTION INTRAVENOUS at 21:51

## 2020-08-09 RX ADMIN — ONDANSETRON 4 MG: 2 INJECTION INTRAMUSCULAR; INTRAVENOUS at 16:26

## 2020-08-09 RX ADMIN — METOCLOPRAMIDE HYDROCHLORIDE 10 MG: 5 INJECTION INTRAMUSCULAR; INTRAVENOUS at 18:49

## 2020-08-09 RX ADMIN — MORPHINE SULFATE 6 MG: 10 INJECTION INTRAVENOUS at 18:49

## 2020-08-09 RX ADMIN — IOHEXOL 50 ML: 240 INJECTION, SOLUTION INTRATHECAL; INTRAVASCULAR; INTRAVENOUS; ORAL at 18:04

## 2020-08-09 RX ADMIN — SODIUM CHLORIDE, PRESERVATIVE FREE 10 ML: 5 INJECTION INTRAVENOUS at 21:54

## 2020-08-09 RX ADMIN — KETOROLAC TROMETHAMINE 15 MG: 15 INJECTION, SOLUTION INTRAMUSCULAR; INTRAVENOUS at 16:26

## 2020-08-09 ASSESSMENT — PAIN DESCRIPTION - LOCATION
LOCATION: ABDOMEN
LOCATION: ABDOMEN

## 2020-08-09 ASSESSMENT — PAIN SCALES - GENERAL
PAINLEVEL_OUTOF10: 5
PAINLEVEL_OUTOF10: 0

## 2020-08-09 NOTE — ED NOTES
Iv established and labs drawn/sent, urine sent, medicated per orders, liter ns bolus infusing, bed low/locked with side rail up and call light within reach, pt ready for ultrasound, will need to drink for ct     Yajaira Diez RN  08/09/20 [id number]

## 2020-08-09 NOTE — ED PROVIDER NOTES
ED Attending  CC: Marly       Department of Emergency Medicine   ED  Provider Note  Admit Date/RoomTime: 8/9/2020  3:58 PM  ED Room: 21/21  Chief Complaint:       Emesis (with epigastric pain x 3 days, unable to keep down liquids; hx of gallbladder issues )    History of Present Illness   Source of history provided by:  patient. History/Exam Limitations: none. Sasha Chi is a 81 y.o. old female who has a past medical history of:   Past Medical History:   Diagnosis Date    CAD in native artery 7/9/9119    Diastolic heart failure ([address]     Diverticulitis 08/03/2018    HIGH CHOLESTEROL     History of atrial fibrillation 08/2018    with episode of diverticulosis    History of stress test 08/07/2018    Lexiscan stress test    Hypertension     Thyroid disease       presents to the emergency department by private vehicle, for complaints of gradual onset, still present, constant cramping pain in the epigastrium with radiation to the back which began last night prior to arrival.   There has been no similar episodes in the past.  Since onset the symptoms have been persistent and worsening. The pain is associated with nausea and vomiting. The pain is aggravated by p.o. intake and pressure on her abdomen and relieved by nothing. There has been NO chills, cloudy urine, constipation, diarrhea, dysuria, headache, hematuria, sweating, urinary frequency, urinary incontinence, urinary urgency, vaginal discharge, vaginal itching or hematemesis. ROS   Pertinent positives and negatives are stated within HPI, all other systems reviewed and are negative.     Past Surgical History:   Procedure Laterality Date    COLONOSCOPY      COLONOSCOPY N/A 4/8/2019    COLONOSCOPY performed by Colette Garnica MD at [facility] CATH LAB PROCEDURE  08/09/2018    NERVE BLOCK  12 27 2011    NERVE BLOCK  1/24/12    lumbar epidural    NERVE BLOCK  02/21/12    lumbar epidural with flouro    CT COLONOSCOPY FLX DX W/COLLJ SPEC WHEN PFRMD N/A 10/4/2018    COLONOSCOPY performed by Julien Shabazz MD at [address], PARTIAL, W/ANAST N/A 11/13/2018    LAPAROSCOPIC ROBOT XI ASSISTED SIGMOID COLON RESECTION WITH OSTOMY performed by Julien Shabazz MD at [address] N/A 4/9/2019    COLOSTOMY CLOSURE LAPAROSCOPIC ROBOTIC XI performed by Julien Shabazz MD at [place] [address] History:  reports that she has been smoking cigarettes. She has been smoking about 0.50 packs per day. She has never used smokeless tobacco. She reports that she does not drink alcohol or use drugs. Family History: family history includes Cancer in her mother and another family member; Heart Attack in her father and sister; Heart Disease in an other family member. Allergies: Patient has no known allergies. Physical Exam          ED Triage Vitals [08/09/20 1553]   BP Temp Temp Source Pulse Resp SpO2 Height Weight   127/80 97.1 °F (36.2 °C) Infrared 80 16 93 % 5' 3\" (1.6 m) 128 lb (58.1 kg)      Oxygen Saturation Interpretation: Normal.    · General Appearance/Constitutional:  Alert, development consistent with age. · HEENT:  NC/NT. Airway patent. · Neck:  Supple. No lymphadenopathy. · Respiratory:  No retractions. Lungs Clear to auscultation and breath sounds equal.  · CV:  Regular rate and rhythm. · GI:  General Appearance: normal.          Bowel sounds: normal bowel sounds. Distension:  None. Tenderness: moderate tenderness is present in the epigastrium, no rebound tenderness, no guarding. Negative Real sign. No McBurney's point tenderness. Liver: non-tender. Spleen:  non-tender. Pulsatile Mass: absent. · Back: CVA Tenderness: No.  · Integument:  Normal turgor. Warm, dry, without visible rash, unless noted elsewhere. · Lymphatics: No edema, cap.refill <3sec. · Neurological:  Orientation age-appropriate.   Motor functions intact.     Lab / Imaging Results   (All laboratory and radiology results have been personally reviewed by myself)  Labs:  Results for orders placed or performed during the hospital encounter of 08/09/20   Comprehensive Metabolic Panel   Result Value Ref Range    Sodium 143 132 - 146 mmol/L    Potassium 3.8 3.5 - 5.0 mmol/L    Chloride 96 (L) 98 - 107 mmol/L    CO2 32 (H) 22 - 29 mmol/L    Anion Gap 15 7 - 16 mmol/L    Glucose 124 (H) 74 - 99 mg/dL    BUN 13 8 - 23 mg/dL    CREATININE 0.8 0.5 - 1.0 mg/dL    GFR Non-African American >60 >=60 mL/min/1.73    GFR African American >60     Calcium 9.8 8.6 - 10.2 mg/dL    Total Protein 7.2 6.4 - 8.3 g/dL    Alb 4.0 3.5 - 5.2 g/dL    Total Bilirubin 0.6 0.0 - 1.2 mg/dL    Alkaline Phosphatase 90 35 - 104 U/L    ALT 11 0 - 32 U/L    AST 19 0 - 31 U/L   CBC Auto Differential   Result Value Ref Range    WBC 19.9 (H) 4.5 - 11.5 E9/L    RBC 5.43 3.50 - 5.50 E12/L    Hemoglobin 17.3 (H) 11.5 - 15.5 g/dL    Hematocrit 51.1 (H) 34.0 - 48.0 %    MCV 94.1 80.0 - 99.9 fL    MCH 31.9 26.0 - 35.0 pg    MCHC 33.9 32.0 - 34.5 %    RDW 12.7 11.5 - 15.0 fL    Platelets 681 878 - 710 E9/L    MPV 10.4 7.0 - 12.0 fL    Neutrophils % 83.1 (H) 43.0 - 80.0 %    Immature Granulocytes % 0.5 0.0 - 5.0 %    Lymphocytes % 8.9 (L) 20.0 - 42.0 %    Monocytes % 6.8 2.0 - 12.0 %    Eosinophils % 0.4 0.0 - 6.0 %    Basophils % 0.3 0.0 - 2.0 %    Neutrophils Absolute 16.53 (H) 1.80 - 7.30 E9/L    Immature Granulocytes # 0.09 E9/L    Lymphocytes Absolute 1.78 1.50 - 4.00 E9/L    Monocytes Absolute 1.36 (H) 0.10 - 0.95 E9/L    Eosinophils Absolute 0.07 0.05 - 0.50 E9/L    Basophils Absolute 0.06 0.00 - 0.20 E9/L   Lipase   Result Value Ref Range    Lipase 11 (L) 13 - 60 U/L   Urinalysis   Result Value Ref Range    Color, UA DKYELLOW Straw/Yellow    Clarity, UA SLCLOUDY Clear    Glucose, Ur Negative Negative mg/dL    Bilirubin Urine SMALL (A) Negative    Ketones, Urine 15 (A) Negative mg/dL Specific Gravity, UA 1.020 1.005 - 1.030    Blood, Urine TRACE (A) Negative    pH, UA 7.0 5.0 - 9.0    Protein,  (A) Negative mg/dL    Urobilinogen, Urine 1.0 <2.0 E.U./dL    Nitrite, Urine Negative Negative    Leukocyte Esterase, Urine TRACE (A) Negative   Lactic Acid, Plasma   Result Value Ref Range    Lactic Acid 1.6 0.5 - 2.2 mmol/L   Microscopic Urinalysis   Result Value Ref Range    Mucus, UA Present (A) None Seen /LPF    WBC, UA 2-5 0 - 5 /HPF    RBC, UA 5-10 (A) 0 - 2 /HPF    Epithelial Cells, UA MANY /HPF    Bacteria, UA MANY (A) None Seen /HPF     Imaging: All Radiology results interpreted by Radiologist unless otherwise noted. CT ABDOMEN PELVIS W IV CONTRAST Additional Contrast? Oral   Preliminary Result   1. Multiple fluid-filled distended loops of small bowel with suspected   transition point in the pelvis. Differential includes early small bowel   obstruction versus partially small-bowel obstruction. 2. Mild distention of the stomach. There is no visible enteric catheter. 3. Small amount of free fluid in the pelvis. 4. Hepatic steatosis. US ABDOMEN LIMITED   Preliminary Result   1. No acute sonographic finding to account for patient's right upper quadrant   abdominal pain. Specifically, there is no evidence of cholelithiasis or   cholecystitis. 2. No evidence of intrahepatic ductal dilatation. 3. Incidental finding of renal cortical cyst in the upper and lower pole.            ED Course / Medical Decision Making     Medications   0.9 % sodium chloride bolus (1,000 mLs Intravenous New Bag 8/9/20 1625)   ketorolac (TORADOL) injection 15 mg (15 mg Intravenous Given 8/9/20 1626)   ondansetron (ZOFRAN) injection 4 mg (4 mg Intravenous Given 8/9/20 1626)   iopamidol (ISOVUE-370) 76 % injection 75 mL (75 mLs Intravenous Given 8/9/20 1804)   iohexol (OMNIPAQUE 240) injection 50 mL (50 mLs Oral Given 8/9/20 1804)   metoclopramide (REGLAN) injection 10 mg (10 mg Intravenous Given 8/9/20 1849)   morphine (PF) injection 6 mg (6 mg Intravenous Given 8/9/20 1849)   diphenhydrAMINE (BENADRYL) injection 25 mg (25 mg Intravenous Given 8/9/20 1848)      Re-Evaluations:  8/9/20      Time: 9137    Patient is requesting something more for pain and nausea, although she does deny nausea at this time. Results discussed. She is agreeable with admission. Time: 1915  The plan is been discussed. Consultations:             Malinda Marr spoke with Dr. Florentino De Souza to coordinate admission. Procedures:   none    MDM: Patient presents to the emergency department for epigastric pain and nausea vomiting. CT abdomen pelvis suggests early versus partial small bowel obstruction. Patient's nausea and vomiting was controlled with antiemetics, and joint decision-making process with myself, attending physician, the patient, and general surgeon she was not given a NG tube. Her leukocytosis is associated with stress reaction from her small bowel obstruction and nausea/vomiting. She is afebrile and without anything else to suggest infection. She will be admitted for further evaluation treatment. Counseling:   I have spoken with the patient and discussed todays results, in addition to providing specific details for the plan of care and counseling regarding the diagnosis and prognosis and are agreeable with the plan. Assessment      1. SBO (small bowel obstruction) ([facility]      This patient's ED course included: a personal history and physicial examination, re-evaluation prior to disposition, multiple bedside re-evaluations and IV medications  This patient has remained hemodynamically stable, improved and been closely monitored during their ED course. Plan   Admit to med/surg floor. Patient condition is good.     New Medications     New Prescriptions    No medications on file     Electronically signed by Cleo Pinzon   DD: 8/9/20  **This report was transcribed using voice

## 2020-08-10 ENCOUNTER — APPOINTMENT (OUTPATIENT)
Dept: GENERAL RADIOLOGY | Age: 70
DRG: 336 | End: 2020-08-10
Payer: COMMERCIAL

## 2020-08-10 LAB
ANION GAP SERPL CALCULATED.3IONS-SCNC: 12 MMOL/L (ref 7–16)
BASOPHILS ABSOLUTE: 0.05 E9/L (ref 0–0.2)
BASOPHILS RELATIVE PERCENT: 0.3 % (ref 0–2)
BUN BLDV-MCNC: 15 MG/DL (ref 8–23)
CALCIUM SERPL-MCNC: 8.8 MG/DL (ref 8.6–10.2)
CHLORIDE BLD-SCNC: 94 MMOL/L (ref 98–107)
CO2: 28 MMOL/L (ref 22–29)
CREAT SERPL-MCNC: 0.7 MG/DL (ref 0.5–1)
EOSINOPHILS ABSOLUTE: 0.11 E9/L (ref 0.05–0.5)
EOSINOPHILS RELATIVE PERCENT: 0.6 % (ref 0–6)
GFR AFRICAN AMERICAN: >60
GFR NON-AFRICAN AMERICAN: >60 ML/MIN/1.73
GLUCOSE BLD-MCNC: 132 MG/DL (ref 74–99)
HCT VFR BLD CALC: 46.8 % (ref 34–48)
HEMOGLOBIN: 15.5 G/DL (ref 11.5–15.5)
IMMATURE GRANULOCYTES #: 0.07 E9/L
IMMATURE GRANULOCYTES %: 0.4 % (ref 0–5)
LYMPHOCYTES ABSOLUTE: 1.19 E9/L (ref 1.5–4)
LYMPHOCYTES RELATIVE PERCENT: 7 % (ref 20–42)
MCH RBC QN AUTO: 31.4 PG (ref 26–35)
MCHC RBC AUTO-ENTMCNC: 33.1 % (ref 32–34.5)
MCV RBC AUTO: 94.9 FL (ref 80–99.9)
MONOCYTES ABSOLUTE: 1.5 E9/L (ref 0.1–0.95)
MONOCYTES RELATIVE PERCENT: 8.8 % (ref 2–12)
NEUTROPHILS ABSOLUTE: 14.03 E9/L (ref 1.8–7.3)
NEUTROPHILS RELATIVE PERCENT: 82.9 % (ref 43–80)
PDW BLD-RTO: 13 FL (ref 11.5–15)
PLATELET # BLD: 208 E9/L (ref 130–450)
PMV BLD AUTO: 10.7 FL (ref 7–12)
POTASSIUM REFLEX MAGNESIUM: 3.6 MMOL/L (ref 3.5–5)
RBC # BLD: 4.93 E12/L (ref 3.5–5.5)
SODIUM BLD-SCNC: 134 MMOL/L (ref 132–146)
WBC # BLD: 17 E9/L (ref 4.5–11.5)

## 2020-08-10 PROCEDURE — 36415 COLL VENOUS BLD VENIPUNCTURE: CPT

## 2020-08-10 PROCEDURE — 6360000002 HC RX W HCPCS: Performed by: SURGERY

## 2020-08-10 PROCEDURE — 1200000000 HC SEMI PRIVATE

## 2020-08-10 PROCEDURE — C9113 INJ PANTOPRAZOLE SODIUM, VIA: HCPCS | Performed by: SURGERY

## 2020-08-10 PROCEDURE — 85025 COMPLETE CBC W/AUTO DIFF WBC: CPT

## 2020-08-10 PROCEDURE — 99223 1ST HOSP IP/OBS HIGH 75: CPT | Performed by: SURGERY

## 2020-08-10 PROCEDURE — 74018 RADEX ABDOMEN 1 VIEW: CPT

## 2020-08-10 PROCEDURE — 80048 BASIC METABOLIC PNL TOTAL CA: CPT

## 2020-08-10 PROCEDURE — 2580000003 HC RX 258: Performed by: SURGERY

## 2020-08-10 RX ADMIN — PANTOPRAZOLE SODIUM 40 MG: 40 INJECTION, POWDER, FOR SOLUTION INTRAVENOUS at 08:45

## 2020-08-10 RX ADMIN — ENOXAPARIN SODIUM 40 MG: 40 INJECTION SUBCUTANEOUS at 08:46

## 2020-08-10 RX ADMIN — ONDANSETRON 4 MG: 2 INJECTION INTRAMUSCULAR; INTRAVENOUS at 08:45

## 2020-08-10 RX ADMIN — SODIUM CHLORIDE, PRESERVATIVE FREE 10 ML: 5 INJECTION INTRAVENOUS at 20:09

## 2020-08-10 RX ADMIN — ONDANSETRON 4 MG: 2 INJECTION INTRAMUSCULAR; INTRAVENOUS at 14:57

## 2020-08-10 RX ADMIN — MORPHINE SULFATE 2 MG: 2 INJECTION, SOLUTION INTRAMUSCULAR; INTRAVENOUS at 16:05

## 2020-08-10 RX ADMIN — ONDANSETRON 4 MG: 2 INJECTION INTRAMUSCULAR; INTRAVENOUS at 20:08

## 2020-08-10 RX ADMIN — ONDANSETRON 4 MG: 2 INJECTION INTRAMUSCULAR; INTRAVENOUS at 04:09

## 2020-08-10 RX ADMIN — SODIUM CHLORIDE, PRESERVATIVE FREE 10 ML: 5 INJECTION INTRAVENOUS at 08:46

## 2020-08-10 ASSESSMENT — PAIN SCALES - GENERAL
PAINLEVEL_OUTOF10: 0
PAINLEVEL_OUTOF10: 5

## 2020-08-10 NOTE — CONSULTS
Department of Internal Medicine  Internal Medicine Consultation Note    Primary Care Physician: Emi Griffith DO   Admitting Physician:  Elham Noonan MD  Admission date and time: 8/9/2020  3:58 PM    Room:  [address] [place] [address]  Admitting diagnosis: SBO (small bowel obstruction) [facility]) [K56.609]    Patient Name: Carl Carrera  MRN: [id number]    Date of Service: 8/10/2020     Reason for consultation:  Abdominal pain    HISTORY OF PRESENT ILLNESS:    Letitia Story is a 82-year-old female who presented to the emergency department for the evaluation of intractable abdominal pain. She has an extensive past medical history that includes perforated diverticulitis requiring temporary colostomy placement. Work-up in the emergency department revealed small bowel obstruction. During my examination today, she continues to complain of abdominal pain and distention. She has not passed flatus nor has she had a bowel movement in greater than 1 day. The surgical team has admitted the patient and have asked us to provide consultation for medical management.       PAST MEDICAL Hx:  Past Medical History:   Diagnosis Date    CAD in native artery 7/9/0790    Diastolic heart failure ([address]     Diverticulitis 08/03/2018    HIGH CHOLESTEROL     History of atrial fibrillation 08/2018    with episode of diverticulosis    History of stress test 08/07/2018    Lexiscan stress test    Hypertension     Thyroid disease        PAST SURGICAL Hx:   Past Surgical History:   Procedure Laterality Date    COLONOSCOPY      COLONOSCOPY N/A 4/8/2019    COLONOSCOPY performed by Abdifatah Peters MD at [facility] CATH LAB PROCEDURE  08/09/2018    NERVE BLOCK  12 27 2011   [address] BLOCK  1/24/12    lumbar epidural    NERVE BLOCK  02/21/12    lumbar epidural with flouro    OR COLONOSCOPY FLX DX W/COLLJ SPEC WHEN PFRMD N/A 10/4/2018    COLONOSCOPY performed by Abdifatah Peters MD at [facility], PARTIAL, W/ANAST N/A 11/13/2018    LAPAROSCOPIC ROBOT XI ASSISTED SIGMOID COLON RESECTION WITH OSTOMY performed by Donavan Aguila MD at [address] N/A 4/9/2019    COLOSTOMY CLOSURE LAPAROSCOPIC ROBOTIC XI performed by Donavan Aguila MD at [address] [place] Hx:  Family History   Problem Relation Age of Onset    Cancer Other     Heart Disease Other     Cancer Mother         brain    Heart Attack Father         age 48     Heart Attack Sister         age 46        HOME MEDICATIONS:  Prior to Admission medications    Medication Sig Start Date End Date Taking? Authorizing Provider   amoxicillin-clavulanate (AUGMENTIN) 875-125 MG per tablet Take 1 tablet by mouth 2 times daily   Yes Historical Provider, MD   aspirin EC 81 MG EC tablet Take 1 tablet by mouth daily 8/9/18  Yes Sharda Guerrero MD   pantoprazole (PROTONIX) 40 MG tablet Take 1 tablet by mouth daily 8/9/18  Yes Sharda Guerrero MD   metoprolol tartrate (LOPRESSOR) 25 MG tablet Take 1 tablet by mouth every 8 hours 8/8/18  Yes Fabienne Llamas,    apixaban (ELIQUIS) 5 MG TABS tablet Take 1 tablet by mouth 2 times daily 8/8/18  Yes Fabienne Llamas,    levothyroxine (SYNTHROID) 100 MCG tablet Take 100 mcg by mouth Daily   Yes Historical Provider, MD   ZINC PO Take 1 tablet by mouth daily   Yes Historical Provider, MD   Omega-3 Fatty Acids (FISH OIL) 1000 MG CAPS Take 1,000 mg by mouth daily   Yes Historical Provider, MD   lisinopril (PRINIVIL;ZESTRIL) 10 MG tablet Take 10 mg by mouth daily. Yes Historical Provider, MD   rosuvastatin (CRESTOR) 10 MG tablet Take 10 mg by mouth daily. Yes Historical Provider, MD       ALLERGIES:  Patient has no known allergies.     SOCIAL Hx:  Social History     Socioeconomic History    Marital status:      Spouse name: Not on file    Number of children: Not on file    Years of education: Not on file    Highest education level: Not on file   Occupational History    Not on file Social Needs    Financial resource strain: Not on file    Food insecurity     Worry: Not on file     Inability: Not on file    Transportation needs     Medical: Not on file     Non-medical: Not on file   Tobacco Use    Smoking status: Current Every Day Smoker     Packs/day: 0.50     Types: Cigarettes    Smokeless tobacco: Never Used   Substance and Sexual Activity    Alcohol use: No     Comment: 5 cups of coffee decaf/regular    Drug use: No    Sexual activity: Not on file   Lifestyle    Physical activity     Days per week: Not on file     Minutes per session: Not on file    Stress: Not on file   Relationships    Social connections     Talks on phone: Not on file     Gets together: Not on file     Attends Sikh service: Not on file     Active member of club or organization: Not on file     Attends meetings of clubs or organizations: Not on file     Relationship status: Not on file    Intimate partner violence     Fear of current or ex partner: Not on file     Emotionally abused: Not on file     Physically abused: Not on file     Forced sexual activity: Not on file   Other Topics Concern    Not on file   Social History Narrative    Not on file       ROS:  General:   Denies chills, fatigue, fever, malaise, night sweats or weight loss    Psychological:   Denies anxiety, disorientation or hallucinations    ENT:    Denies epistaxis, headaches, vertigo or visual changes    Cardiovascular:   Denies any chest pain, irregular heartbeats, or palpitations. No paroxysmal nocturnal dyspnea. Respiratory:   Denies shortness of breath, coughing, sputum production, hemoptysis, or wheezing. No orthopnea. Gastrointestinal:   Ongoing abdominal pain. She has nausea without vomiting. She is not passing flatus nor is she having bowel movements. Genito-Urinary:    Denies any urgency, frequency, hematuria. Voiding without difficulty.     Musculoskeletal:   Denies joint pain, joint stiffness, joint swelling or HCT 46.8 08/10/2020     08/10/2020    MCV 94.9 08/10/2020    MCH 31.4 08/10/2020    MCHC 33.1 08/10/2020    RDW 13.0 08/10/2020    SEGSPCT 64 10/31/2013    METASPCT 0.9 11/13/2018    LYMPHOPCT 7.0 08/10/2020    MONOPCT 8.8 08/10/2020    BASOPCT 0.3 08/10/2020    MONOSABS 1.50 08/10/2020    LYMPHSABS 1.19 08/10/2020    EOSABS 0.11 08/10/2020    BASOSABS 0.05 08/10/2020     CMP:    Lab Results   Component Value Date     08/10/2020    K 3.6 08/10/2020    CL 94 08/10/2020    CO2 28 08/10/2020    BUN 15 08/10/2020    CREATININE 0.7 08/10/2020    GFRAA >60 08/10/2020    LABGLOM >60 08/10/2020    GLUCOSE 132 08/10/2020    GLUCOSE 94 12/13/2011    PROT 7.2 08/09/2020    LABALBU 4.0 08/09/2020    LABALBU 4.3 12/13/2011    CALCIUM 8.8 08/10/2020    BILITOT 0.6 08/09/2020    ALKPHOS 90 08/09/2020    AST 19 08/09/2020    ALT 11 08/09/2020       ASSESSMENT:  1. Small bowel obstruction in the setting of multiple abdominal surgeries recently stemming from perforated diverticulitis requiring temporary ostomy status post reversal  2. Essential hypertension  3. Hyperlipidemia  4. Hypothyroidism  5. Coronary artery disease  6. Chronic, compensated diastolic congestive heart failure  7. Mild aortic stenosis  8. Paroxysmal atrial fibrillation on Eliquis therapy    PLAN:  Repeat KUB is not indicating any contrast within the colon. Surgical team is following and considerations are for NG placement. Anticoagulation therapy has been placed on hold in the event surgical intervention would be necessary. If surgery were required, the patient would be cleared for surgical intervention. IV fluid resuscitation is being undertaken. Appropriate pain control will be undertaken. Nausea medications are being administered.     Kevon Vann  10:48 AM  8/10/2020    Electronically signed by Edson Ratliff DO on 8/10/20 at 10:48 AM EDT

## 2020-08-10 NOTE — CARE COORDINATION
8/10/2020 1030 CM note: NO COVID TESTING. Met with pt for transition of care needs. Pt resides alone,is [employer] and drives. PCP is Dr Ashley Llamas([place] [address]/[facility]) [facility]. [facility] when she had ostomy. Pt reports her eliquis $10/month copay card expires end of month. New eliquis $10.00/month copay card given. Pt voiced gratitude. Plan is home, family will provide ride.  Erin PERRY

## 2020-08-10 NOTE — H&P
General Surgery History and Physical  [facility]    Patient's Name/Date of Birth: Katie Gómez / 1950    Date: August 10, 2020     Surgeon: Megan Edmonds MD    PCP: Monique Hunter DO     Chief Complaint: SBO    HPI:   Katie Gómez is a 81 y.o. female who presents for evaluation of sbo and intractable abdominal pain. She has an extensive past medical history that includes perforated diverticulitis requiring temporary colostomy placement. Work-up in the emergency department revealed small bowel obstruction. She has not passed flatus nor has she had a bowel movement in greater than 1 day.      Patient Active Problem List   Diagnosis    Spinal stenosis    Degeneration of lumbosacral intervertebral disc    Lumbago    Colonic diverticular abscess    CAD in native artery    S/P colon resection    Diverticulitis of large intestine with perforation and abscess without bleeding    Pelvic abscess in female    Colostomy reversal    SBO (small bowel obstruction) (HCC)       Past Medical History:   Diagnosis Date    CAD in native artery 8/9/9358    Diastolic heart failure ([address]     Diverticulitis 08/03/2018    HIGH CHOLESTEROL     History of atrial fibrillation 08/2018    with episode of diverticulosis    History of stress test 08/07/2018    Lexiscan stress test    Hypertension     Thyroid disease        Past Surgical History:   Procedure Laterality Date    COLONOSCOPY      COLONOSCOPY N/A 4/8/2019    COLONOSCOPY performed by Byron Bob MD at [facility] CATH LAB PROCEDURE  08/09/2018    NERVE BLOCK  12 27 2011    NERVE BLOCK  1/24/12    lumbar epidural    NERVE BLOCK  02/21/12    lumbar epidural with flouro    ID COLONOSCOPY FLX DX W/COLLJ SPEC WHEN PFRMD N/A 10/4/2018    COLONOSCOPY performed by Byron Bob MD at [facility], PARTIAL, W/ANAST N/A 11/13/2018    LAPAROSCOPIC ROBOT XI ASSISTED SIGMOID COLON RESECTION WITH OSTOMY performed by Yue Garcia MD at [address] [place] [address] N/A 4/9/2019    COLOSTOMY CLOSURE LAPAROSCOPIC ROBOTIC XI performed by Yue Garcia MD at [address]       No Known Allergies    The patient has a family history that is negative for severe cardiovascular or respiratory issues, negative for reaction to anesthesia. Time spent reviewing past medical, surgical, social and family history, vitals, nursing assessment and images. No changes from above documented history.     Social History     Socioeconomic History    Marital status:      Spouse name: Not on file    Number of children: Not on file    Years of education: Not on file    Highest education level: Not on file   Occupational History    Not on file   Social Needs    Financial resource strain: Not on file    Food insecurity     Worry: Not on file     Inability: Not on file    Transportation needs     Medical: Not on file     Non-medical: Not on file   Tobacco Use    Smoking status: Current Every Day Smoker     Packs/day: 0.50     Types: Cigarettes    Smokeless tobacco: Never Used   Substance and Sexual Activity    Alcohol use: No     Comment: 5 cups of coffee decaf/regular    Drug use: No    Sexual activity: Not on file   Lifestyle    Physical activity     Days per week: Not on file     Minutes per session: Not on file    Stress: Not on file   Relationships    Social connections     Talks on phone: Not on file     Gets together: Not on file     Attends Worship service: Not on file     Active member of club or organization: Not on file     Attends meetings of clubs or organizations: Not on file     Relationship status: Not on file    Intimate partner violence     Fear of current or ex partner: Not on file     Emotionally abused: Not on file     Physically abused: Not on file     Forced sexual activity: Not on file   Other Topics Concern    Not on file   Social History Narrative    Not on file       I have reviewed relevant labs from this admission and interpretation is included in my assessment and plan    Review of Systems    A complete 10 system review was performed and are otherwise negative unless mentioned in the above HPI. Specific negatives are listed below but may not include all those reviewed. General ROS: negative obtundation, AMS  ENT ROS: negative rhinorrhea, epistaxis  Allergy and Immunology ROS: negative itchy/watery eyes or nasal congestion  Hematological and Lymphatic ROS: negative spontaneous bleeding or bruising  Endocrine ROS: negative  lethargy, mood swings, palpitations or polydipsia/polyuria  Respiratory ROS: negative sputum changes, stridor, tachypnea or wheezing  Cardiovascular ROS: negative for - loss of consciousness, murmur or orthopnea  Gastrointestinal ROS: negative for - hematochezia or hematemesis  Genito-Urinary ROS: negative for -  genital discharge or hematuria  Musculoskeletal ROS: negative for - focal weakness, gangrene  Psych/Neuro ROS: negative for - visual or auditory hallucinations, suicidal ideation    Physical exam:   /68   Pulse 73   Temp 98 °F (36.7 °C) (Oral)   Resp 16   Ht 5' 3\" (1.6 m)   Wt 128 lb (58.1 kg)   SpO2 97%   BMI 22.67 kg/m²   General appearance:  NAD, appears stated age  Head: NCAT, PERRLA, EOMI, red conjunctiva  Neck: supple, no masses, trachea midline  Lungs: Equal chest rise bilateral, no retractions, no wheezing  Heart: Reg rate  Abdomen: soft, minimal tender, moderately distended  Skin; warm and dry, no cyanosis  Gu: no cva tenderness  Extremities: atraumatic, no focal motor deficits, no open wounds  Psych: No tremor, visual hallucinations      Radiology: I reviewed relevant abdominal imaging from this admission and that available in the EMR including CT abd/pel from 8/9/20.  My assessment is SBO    Assessment:  Carl Carrera is a 81 y.o. female with SBO  Patient Active Problem List   Diagnosis    Spinal stenosis    Degeneration of lumbosacral intervertebral disc    Lumbago    Colonic diverticular abscess    CAD in native artery    S/P colon resection    Diverticulitis of large intestine with perforation and abscess without bleeding    Pelvic abscess in female    Colostomy reversal    SBO (small bowel obstruction) ([facility])     Plan:  - NPO  - NG tube  - Monitor bowel function  - Hydrate  - antiemetics and pain control      Taqueria Jacobsen MD  12:23 PM  8/10/2020

## 2020-08-11 ENCOUNTER — APPOINTMENT (OUTPATIENT)
Dept: CT IMAGING | Age: 70
DRG: 336 | End: 2020-08-11
Payer: COMMERCIAL

## 2020-08-11 ENCOUNTER — ANESTHESIA EVENT (OUTPATIENT)
Dept: OPERATING ROOM | Age: 70
DRG: 336 | End: 2020-08-11
Payer: COMMERCIAL

## 2020-08-11 LAB
ANION GAP SERPL CALCULATED.3IONS-SCNC: 12 MMOL/L (ref 7–16)
BASOPHILS ABSOLUTE: 0.03 E9/L (ref 0–0.2)
BASOPHILS RELATIVE PERCENT: 0.3 % (ref 0–2)
BUN BLDV-MCNC: 10 MG/DL (ref 8–23)
CALCIUM SERPL-MCNC: 8.4 MG/DL (ref 8.6–10.2)
CHLORIDE BLD-SCNC: 98 MMOL/L (ref 98–107)
CO2: 25 MMOL/L (ref 22–29)
CREAT SERPL-MCNC: 0.5 MG/DL (ref 0.5–1)
EOSINOPHILS ABSOLUTE: 0.06 E9/L (ref 0.05–0.5)
EOSINOPHILS RELATIVE PERCENT: 0.6 % (ref 0–6)
GFR AFRICAN AMERICAN: >60
GFR NON-AFRICAN AMERICAN: >60 ML/MIN/1.73
GLUCOSE BLD-MCNC: 134 MG/DL (ref 74–99)
HCT VFR BLD CALC: 44 % (ref 34–48)
HEMOGLOBIN: 14.6 G/DL (ref 11.5–15.5)
IMMATURE GRANULOCYTES #: 0.03 E9/L
IMMATURE GRANULOCYTES %: 0.3 % (ref 0–5)
LYMPHOCYTES ABSOLUTE: 0.68 E9/L (ref 1.5–4)
LYMPHOCYTES RELATIVE PERCENT: 6.8 % (ref 20–42)
MCH RBC QN AUTO: 31.3 PG (ref 26–35)
MCHC RBC AUTO-ENTMCNC: 33.2 % (ref 32–34.5)
MCV RBC AUTO: 94.4 FL (ref 80–99.9)
MONOCYTES ABSOLUTE: 1.35 E9/L (ref 0.1–0.95)
MONOCYTES RELATIVE PERCENT: 13.6 % (ref 2–12)
NEUTROPHILS ABSOLUTE: 7.81 E9/L (ref 1.8–7.3)
NEUTROPHILS RELATIVE PERCENT: 78.4 % (ref 43–80)
PDW BLD-RTO: 12.9 FL (ref 11.5–15)
PLATELET # BLD: 175 E9/L (ref 130–450)
PMV BLD AUTO: 10.9 FL (ref 7–12)
POTASSIUM REFLEX MAGNESIUM: 3.6 MMOL/L (ref 3.5–5)
RBC # BLD: 4.66 E12/L (ref 3.5–5.5)
SODIUM BLD-SCNC: 135 MMOL/L (ref 132–146)
WBC # BLD: 10 E9/L (ref 4.5–11.5)

## 2020-08-11 PROCEDURE — 74176 CT ABD & PELVIS W/O CONTRAST: CPT

## 2020-08-11 PROCEDURE — 99232 SBSQ HOSP IP/OBS MODERATE 35: CPT | Performed by: SURGERY

## 2020-08-11 PROCEDURE — 6360000002 HC RX W HCPCS: Performed by: SURGERY

## 2020-08-11 PROCEDURE — C9113 INJ PANTOPRAZOLE SODIUM, VIA: HCPCS | Performed by: SURGERY

## 2020-08-11 PROCEDURE — 36415 COLL VENOUS BLD VENIPUNCTURE: CPT

## 2020-08-11 PROCEDURE — 1200000000 HC SEMI PRIVATE

## 2020-08-11 PROCEDURE — 80048 BASIC METABOLIC PNL TOTAL CA: CPT

## 2020-08-11 PROCEDURE — 85025 COMPLETE CBC W/AUTO DIFF WBC: CPT

## 2020-08-11 PROCEDURE — 2580000003 HC RX 258: Performed by: SURGERY

## 2020-08-11 RX ORDER — PROCHLORPERAZINE EDISYLATE 5 MG/ML
10 INJECTION INTRAMUSCULAR; INTRAVENOUS EVERY 6 HOURS PRN
Status: DISCONTINUED | OUTPATIENT
Start: 2020-08-11 | End: 2020-08-15 | Stop reason: HOSPADM

## 2020-08-11 RX ADMIN — SODIUM CHLORIDE, PRESERVATIVE FREE 10 ML: 5 INJECTION INTRAVENOUS at 08:41

## 2020-08-11 RX ADMIN — DEXTROSE AND SODIUM CHLORIDE: 5; 450 INJECTION, SOLUTION INTRAVENOUS at 14:30

## 2020-08-11 RX ADMIN — PANTOPRAZOLE SODIUM 40 MG: 40 INJECTION, POWDER, FOR SOLUTION INTRAVENOUS at 08:41

## 2020-08-11 RX ADMIN — DEXTROSE AND SODIUM CHLORIDE: 5; 450 INJECTION, SOLUTION INTRAVENOUS at 23:55

## 2020-08-11 RX ADMIN — PROCHLORPERAZINE EDISYLATE 10 MG: 5 INJECTION INTRAMUSCULAR; INTRAVENOUS at 17:24

## 2020-08-11 RX ADMIN — ENOXAPARIN SODIUM 40 MG: 40 INJECTION SUBCUTANEOUS at 08:41

## 2020-08-11 RX ADMIN — ONDANSETRON 4 MG: 2 INJECTION INTRAMUSCULAR; INTRAVENOUS at 12:29

## 2020-08-11 ASSESSMENT — PAIN SCALES - GENERAL: PAINLEVEL_OUTOF10: 0

## 2020-08-11 NOTE — CARE COORDINATION
8/11/2020 1048 CM note: NO COVID TESTING. Pt remains with NG tube,await return of bowel function. Eliquis $10/month copay card expires end of month. New eliquis $10.00/month copay card given. Plan is home, family will provide ride.  Erin PERRY

## 2020-08-11 NOTE — PROGRESS NOTES
Internal Medicine Progress Note    [facility]    Shayy Garcia. Yue Ralph, JOVANI.AUBREE.FOSTEROAartiI. Tracy Cortes D.O., LUCIAOFRIEDA Gary D.O. Daya Ortiz, MSN, APRN, NP-C  Faith Rothor. Elieser Cade, MSN, APRN-CNP     Primary Care Physician: Estrellita Moody DO   Admitting Physician:  Theresa Villa MD  Admission date and time: 8/9/2020  3:58 PM    Room:  [id number]  Admitting diagnosis: SBO (small bowel obstruction) [facility]) [K56.609]    Patient Name: Guillermina Patel  MRN: [id number]    Date of Service: 8/11/2020     Subjective:  Angi Castro underwent NG placement yesterday with decompression. She is not yet passing flatus nor has she had bowel movements. She seems to have less abdominal discomfort today. We encouraged her to become more ambulatory as well. She has developed increased coughing and sputum production. She is in a significant abuser of tobacco.    Review of System:   Constitutional:   Denies fever or chills, weight loss or gain, fatigue or malaise. HEENT:   Denies ear pain, sore throat, sinus or eye problems. Admits to significant irritation associated with the NG tube. Cardiovascular:   Denies any chest pain, irregular heartbeats, or palpitations. Respiratory:   Denies shortness of breath, coughing, sputum production, hemoptysis, or wheezing. Gastrointestinal:   Admits to slight improvement in her abdominal discomfort. She has not yet passing flatus nor is she having bowel movements. Genitourinary:    Denies any urgency, frequency, hematuria. Voiding  without difficulty. Extremities:   Denies lower extremity swelling, edema or cyanosis. Neurology:    Denies any headache or focal neurological deficits, Denies generalized weakness or memory difficulty. Psch:   Denies being anxious or depressed. Musculoskeletal:    Denies  myalgias, joint complaints or back pain. Integumentary:   Denies any rashes, ulcers, or excoriations.   Denies bruising. Hematologic/Lymphatic:  Denies bruising or bleeding. Physical Exam:  No intake/output data recorded. Intake/Output Summary (Last 24 hours) at 8/11/2020 1008  Last data filed at 8/11/2020 0819  Gross per 24 hour   Intake 0 ml   Output 1050 ml   Net -1050 ml   I/O last 3 completed shifts: In: 0   Out: 1050 [Urine:750; Emesis/NG output:300]  Patient Vitals for the past 96 hrs (Last 3 readings):   Weight   08/09/20 2030 128 lb (58.1 kg)   08/09/20 1553 128 lb (58.1 kg)     Vital Signs:   Blood pressure (!) 148/80, pulse 90, temperature 98.1 °F (36.7 °C), temperature source Oral, resp. rate 18, height 5' 3\" (1.6 m), weight 128 lb (58.1 kg), SpO2 92 %, not currently breastfeeding. General appearance:  Alert, responsive, oriented to person, place, and time. Well preserved, alert, no distress. Head:  Normocephalic. No masses, lesions or tenderness. Eyes:  PERRLA. EOMI. Sclera clear. Buccal mucosa moist.  ENT:  Ears normal. Mucosa normal.  NG tube is in place. Neck:    Supple. Trachea midline. No thyromegaly. No JVD. No bruits. Heart:    Rhythm regular. Rate controlled. No murmurs. Lungs:    Symmetrical. Clear to auscultation bilaterally. No wheezes. No rhonchi. No rales. Abdomen:   Soft. Mildly tender to palpation diffusely. Bowel sounds remain hypoactive. Extremities:    Peripheral pulses present. No peripheral edema. No ulcers. No cyanosis. No clubbing. Neurologic:    Alert x 3. No focal deficit. Cranial nerves grossly intact. No focal weakness. Psych:   Behavior is normal. Mood appears normal. Speech is not rapid and/or pressured. Musculoskeletal:   Spine ROM normal. Muscular strength intact. Gait not assessed. Integumentary:  No rashes  Skin normal color and texture.   Genitalia/Breast:  Deferred    Medication:  Scheduled Meds:   sodium chloride flush  10 mL Intravenous 2 times per day    enoxaparin  40 mg Subcutaneous Daily    pantoprazole  40 mg Intravenous Daily    And    sodium chloride (PF)  10 mL Intravenous Daily     Continuous Infusions:   dextrose 5 % and 0.45 % NaCl 125 mL/hr at 08/09/20 2151       Objective Data:  CBC with Differential:    Lab Results   Component Value Date    WBC 10.0 08/11/2020    RBC 4.66 08/11/2020    HGB 14.6 08/11/2020    HCT 44.0 08/11/2020     08/11/2020    MCV 94.4 08/11/2020    MCH 31.3 08/11/2020    MCHC 33.2 08/11/2020    RDW 12.9 08/11/2020    SEGSPCT 64 10/31/2013    METASPCT 0.9 11/13/2018    LYMPHOPCT 6.8 08/11/2020    MONOPCT 13.6 08/11/2020    BASOPCT 0.3 08/11/2020    MONOSABS 1.35 08/11/2020    LYMPHSABS 0.68 08/11/2020    EOSABS 0.06 08/11/2020    BASOSABS 0.03 08/11/2020     BMP:    Lab Results   Component Value Date     08/11/2020    K 3.6 08/11/2020    CL 98 08/11/2020    CO2 25 08/11/2020    BUN 10 08/11/2020    LABALBU 4.0 08/09/2020    LABALBU 4.3 12/13/2011    CREATININE 0.5 08/11/2020    CALCIUM 8.4 08/11/2020    GFRAA >60 08/11/2020    LABGLOM >60 08/11/2020    GLUCOSE 134 08/11/2020    GLUCOSE 94 12/13/2011       Assessment:  1. Small bowel obstruction in the setting of multiple abdominal surgeries recently stemming from perforated diverticulitis requiring temporary ostomy status post reversal  2. Essential hypertension  3. Hyperlipidemia  4. Hypothyroidism  5. Coronary artery disease  6. Chronic, compensated diastolic congestive heart failure  7. Mild aortic stenosis  8. Paroxysmal atrial fibrillation on Eliquis therapy    Plan:   NG tube has been placed. We are awaiting return of bowel function and have encouraged the patient to become more ambulatory. Home medications have been placed on hold and will need to be resumed as soon as possible following return of bowel function. This would include anticoagulation therapy. Lab work and vital signs are otherwise stable. IV fluid resuscitation is being undertaken. I will discuss the case with the surgical team.  Continue current therapy.   See orders for further plan of care. More than 50% of my time was spent at the bedside counseling/coordinating care with the patient and/or family with face to face contact. This time was spent reviewing notes and laboratory data as well as instructing and counseling the patient. Time I spent with the family or surrogate(s) is included only if the patient was incapable of providing the necessary information or participating in medical decisions. I also discussed the differential diagnosis and all of the proposed management plans with the patient and individuals accompanying the patient. I am readily available for any further decision-making and intervention.        Adelaida Pratt DO, F.A.C.O.I.  8/11/2020  10:08 AM

## 2020-08-11 NOTE — PROGRESS NOTES
[facility] Quality Flow/Interdisciplinary Rounds Progress Note        Quality Flow Rounds held on August 11, 2020    Disciplines Attending:  Bedside Nurse, ,  and Nursing Unit Leadership    Ry Zurita was admitted on 8/9/2020  3:58 PM    Anticipated Discharge Date:  Expected Discharge Date: 08/12/20    Disposition:    Reid Score:  Reid Scale Score: 22    Readmission Risk              Risk of Unplanned Readmission:        11           Discussed patient goal for the day, patient clinical progression, and barriers to discharge.   The following Goal(s) of the Day/Commitment(s) have been identified:  Activity progression, L  DEIRDRE LISA, Cecilio HAM  August 11, 2020

## 2020-08-11 NOTE — PROGRESS NOTES
General Surgery Progress Note  [facility]    Patient's Name/Date of Birth: Katie Gómez / 1950    Date: August 11, 2020     Surgeon: Sawyer Monsivais MD    Chief Complaint: SBO    Patient Active Problem List   Diagnosis    Spinal stenosis    Degeneration of lumbosacral intervertebral disc    Lumbago    Colonic diverticular abscess    CAD in native artery    S/P colon resection    Diverticulitis of large intestine with perforation and abscess without bleeding    Pelvic abscess in female    Colostomy reversal    SBO (small bowel obstruction) (HCC)       Subjective: No flatus or BM. Had nausea yesterday.  Bilious output from NG     Objective:  BP (!) 148/80   Pulse 90   Temp 98.1 °F (36.7 °C) (Oral)   Resp 18   Ht 5' 3\" (1.6 m)   Wt 128 lb (58.1 kg)   SpO2 92%   BMI 22.67 kg/m²   Labs:  Recent Labs     08/09/20  1618 08/10/20  0509 08/11/20  0642   WBC 19.9* 17.0* 10.0   HGB 17.3* 15.5 14.6   HCT 51.1* 46.8 44.0     Lab Results   Component Value Date    CREATININE 0.5 08/11/2020    BUN 10 08/11/2020     08/11/2020    K 3.6 08/11/2020    CL 98 08/11/2020    CO2 25 08/11/2020     Recent Labs     08/09/20  1618   LIPASE 11*     CBC with Differential:    Lab Results   Component Value Date    WBC 10.0 08/11/2020    RBC 4.66 08/11/2020    HGB 14.6 08/11/2020    HCT 44.0 08/11/2020     08/11/2020    MCV 94.4 08/11/2020    MCH 31.3 08/11/2020    MCHC 33.2 08/11/2020    RDW 12.9 08/11/2020    SEGSPCT 64 10/31/2013    METASPCT 0.9 11/13/2018    LYMPHOPCT 6.8 08/11/2020    MONOPCT 13.6 08/11/2020    BASOPCT 0.3 08/11/2020    MONOSABS 1.35 08/11/2020    LYMPHSABS 0.68 08/11/2020    EOSABS 0.06 08/11/2020    BASOSABS 0.03 08/11/2020     BMP:    Lab Results   Component Value Date     08/11/2020    K 3.6 08/11/2020    CL 98 08/11/2020    CO2 25 08/11/2020    BUN 10 08/11/2020    LABALBU 4.0 08/09/2020    LABALBU 4.3 12/13/2011    CREATININE 0.5 08/11/2020    CALCIUM 8.4 08/11/2020 GFRAA >60 08/11/2020    LABGLOM >60 08/11/2020    GLUCOSE 134 08/11/2020    GLUCOSE 94 12/13/2011       General appearance:  NAD  Head: NCAT, PERRLA, EOMI, red conjunctiva  Neck: supple, no masses  Lungs: CTAB, equal chest rise bilateral  Heart: Reg rate  Abdomen: soft, mildly distended.  Non tender  Skin; no lesions  Gu: no cva tenderness  Extremities: extremities normal, atraumatic, no cyanosis or edema      Assessment/Plan:  Carl Carrera is a 81 y.o. female with SBO    Continue NPO/NG tube  Ambulate   Await bowel function  Repeat imaging later today vs tomorrow to evaluation progression  If no progression, will proceed with exploration tomorrow    Elham Noonan MD  8/11/2020  10:55 AM

## 2020-08-11 NOTE — ANESTHESIA PRE PROCEDURE
Department of Anesthesiology  Preprocedure Note       Name:  Tatiana Maza   Age:  81 y.o.  :  1950                                          MRN:  [id number]         Date:  2020      Surgeon: Yeny Rasmussen):  German Balderas MD    Procedure: Procedure(s):  DIAGNOSTIC LAPAROSCOPY, POSS BOWEL RESECTION    Medications prior to admission:   Prior to Admission medications    Medication Sig Start Date End Date Taking? Authorizing Provider   amoxicillin-clavulanate (AUGMENTIN) 875-125 MG per tablet Take 1 tablet by mouth 2 times daily   Yes Historical Provider, MD   aspirin EC 81 MG EC tablet Take 1 tablet by mouth daily 18  Yes Cristiano Enrique MD   pantoprazole (PROTONIX) 40 MG tablet Take 1 tablet by mouth daily 18  Yes Cristiano Enrique MD   metoprolol tartrate (LOPRESSOR) 25 MG tablet Take 1 tablet by mouth every 8 hours 18  Yes Harleen Combjerzy Llamas, DO   apixaban (ELIQUIS) 5 MG TABS tablet Take 1 tablet by mouth 2 times daily 18  Yes Harleen Llamas, DO   levothyroxine (SYNTHROID) 100 MCG tablet Take 100 mcg by mouth Daily   Yes Historical Provider, MD   ZINC PO Take 1 tablet by mouth daily   Yes Historical Provider, MD   Omega-3 Fatty Acids (FISH OIL) 1000 MG CAPS Take 1,000 mg by mouth daily   Yes Historical Provider, MD   lisinopril (PRINIVIL;ZESTRIL) 10 MG tablet Take 10 mg by mouth daily. Yes Historical Provider, MD   rosuvastatin (CRESTOR) 10 MG tablet Take 10 mg by mouth daily.      Yes Historical Provider, MD       Current medications:    Current Facility-Administered Medications   Medication Dose Route Frequency Provider Last Rate Last Dose    prochlorperazine (COMPAZINE) injection 10 mg  10 mg Intravenous Q6H PRN Martha Jimenez MD   10 mg at 20 1724    trimethobenzamide (TIGAN) injection 200 mg  200 mg Intramuscular Q6H PRN Martha Jimenez MD        acetaminophen (TYLENOL) tablet 650 mg  650 mg Oral Q4H PRN German Balderas MD        sodium chloride flush 0.9 % injection 10 mL 10 mL Intravenous 2 times per day Karma Decker MD   10 mL at 08/11/20 0841    sodium chloride flush 0.9 % injection 10 mL  10 mL Intravenous PRN Karma Decker MD        enoxaparin (LOVENOX) injection 40 mg  40 mg Subcutaneous Daily Karma Decker MD   40 mg at 08/11/20 0841    dextrose 5 % and 0.45 % sodium chloride infusion   Intravenous Continuous Karma Decker  mL/hr at 08/11/20 1430      oxyCODONE (ROXICODONE) immediate release tablet 5 mg  5 mg Oral Q4H PRN Karma Decker MD        Or   [employer] oxyCODONE (ROXICODONE) immediate release tablet 10 mg  10 mg Oral Q4H PRN Karma Decker MD        morphine (PF) injection 2 mg  2 mg Intravenous Q2H PRN Karma Decker MD   2 mg at 08/10/20 1605    Or    morphine injection 4 mg  4 mg Intravenous Q2H PRN Karma Decker MD        ondansetron (ZOFRAN) injection 4 mg  4 mg Intravenous Q6H PRN Karma Decker MD   4 mg at 08/11/20 1229    pantoprazole (PROTONIX) injection 40 mg  40 mg Intravenous Daily Karma Decker MD   40 mg at 08/11/20 0841    And    sodium chloride (PF) 0.9 % injection 10 mL  10 mL Intravenous Daily Karma Decker MD   10 mL at 08/11/20 0841       Allergies:  No Known Allergies    Problem List:    Patient Active Problem List   Diagnosis Code    Spinal stenosis M48.00    Degeneration of lumbosacral intervertebral disc M51.37    Lumbago M54.5    Colonic diverticular abscess K57.20    CAD in native artery I25.10    S/P colon resection Z90.49    Diverticulitis of large intestine with perforation and abscess without bleeding K57.20    Pelvic abscess in female N73.9    Colostomy reversal K57.92    SBO (small bowel obstruction) ([facility] K56.609       Past Medical History:        Diagnosis Date    CAD in native artery 7/0/6712    Diastolic heart failure ([facility]     Diverticulitis 08/03/2018    HIGH CHOLESTEROL     History of atrial fibrillation 08/2018    with episode of diverticulosis    History of stress test 08/07/2018    Lexiscan stress test    Hypertension     Thyroid disease        Past Surgical History:        Procedure Laterality Date    COLONOSCOPY      COLONOSCOPY N/A 4/8/2019    COLONOSCOPY performed by Jill Luna MD at [facility] CATH LAB PROCEDURE  08/09/2018    NERVE BLOCK  12 27 2011    NERVE BLOCK  1/24/12    lumbar epidural    NERVE BLOCK  02/21/12    lumbar epidural with flouro    NM COLONOSCOPY FLX DX W/COLLJ SPEC WHEN PFRMD N/A 10/4/2018    COLONOSCOPY performed by Jill Luna MD at [facility], PARTIAL, Lillian Apodaca N/A 11/13/2018    LAPAROSCOPIC ROBOT XI ASSISTED SIGMOID COLON RESECTION WITH OSTOMY performed by Jill Luna MD at [address] [place] [address] N/A 4/9/2019    COLOSTOMY CLOSURE LAPAROSCOPIC ROBOTIC XI performed by Jill Luna MD at [facility] History:    Social History     Tobacco Use    Smoking status: Current Every Day Smoker     Packs/day: 0.50     Types: Cigarettes    Smokeless tobacco: Never Used   Substance Use Topics    Alcohol use: No     Comment: 5 cups of coffee decaf/regular                                Ready to quit: Not Answered  Counseling given: Not Answered      Vital Signs (Current):   Vitals:    08/10/20 1945 08/11/20 0738 08/11/20 1225 08/11/20 1618   BP: 120/66 (!) 148/80 (!) 150/79 (!) 155/81   Pulse: 80 90 88 83   Resp: 16 18 16 18   Temp: 98.9 °F (37.2 °C) 98.1 °F (36.7 °C) 98.5 °F (36.9 °C) 98.2 °F (36.8 °C)   TempSrc: Oral Oral Oral Oral   SpO2: 94% 92% 95% 95%   Weight:       Height:                                                  BP Readings from Last 3 Encounters:   08/11/20 (!) 155/81   07/04/20 (!) 160/89   03/12/20 122/82       NPO Status:                                                                                 BMI:   Wt Readings from Last 3 Encounters:   08/09/20 128 lb (58.1 kg)   07/04/20 128 lb (58.1 kg)   03/12/20 128 lb (58.1 kg) thickening. Ejection fraction is visually estimated at 65%. No regional wall motion abnormalities seen. E/A flow reversal noted. Suggestive of diastolic dysfunction. Physiologic and/or trace mitral regurgitation is present. Mild aortic stenosis is present. Physiologic and/or trace tricuspid regurgitation. RVSP is normal.     Neuro/Psych:   Negative Neuro/Psych ROS              GI/Hepatic/Renal:        (-) liver disease and no renal disease      ROS comment: Diverticulitis. Bowel obstruction. Endo/Other:    (+) hypothyroidism::., .                 Abdominal:         (-) obese     Vascular: negative vascular ROS. Anesthesia Plan      general     ASA 4     (Patient is in afib with RVR of 160 to 170. Surgery was postpone.)  Induction: intravenous. BIS  MIPS: Postoperative opioids intended and Prophylactic antiemetics administered. Anesthetic plan and risks discussed with patient. Plan discussed with CRNA. Elmo Jackman MD   8/11/2020    DOS STAFF ADDENDUM:    Pt seen and examined, chart reviewed (including anesthesia, drug and allergy history). Anesthetic plan, risks, benefits, alternatives, and personnel involved discussed with patient. Patient verbalized an understanding and agrees to proceed. Plan discussed with care team members and agreed upon.     Ej Frankel MD  Staff Anesthesiologist  8:04 AM

## 2020-08-12 ENCOUNTER — ANESTHESIA EVENT (OUTPATIENT)
Dept: OPERATING ROOM | Age: 70
DRG: 336 | End: 2020-08-12
Payer: COMMERCIAL

## 2020-08-12 ENCOUNTER — ANESTHESIA (OUTPATIENT)
Dept: OPERATING ROOM | Age: 70
DRG: 336 | End: 2020-08-12
Payer: COMMERCIAL

## 2020-08-12 LAB
ANION GAP SERPL CALCULATED.3IONS-SCNC: 12 MMOL/L (ref 7–16)
BASOPHILS ABSOLUTE: 0 E9/L (ref 0–0.2)
BASOPHILS RELATIVE PERCENT: 0.5 % (ref 0–2)
BUN BLDV-MCNC: 10 MG/DL (ref 8–23)
CALCIUM SERPL-MCNC: 8.2 MG/DL (ref 8.6–10.2)
CHLORIDE BLD-SCNC: 96 MMOL/L (ref 98–107)
CO2: 26 MMOL/L (ref 22–29)
CREAT SERPL-MCNC: 0.5 MG/DL (ref 0.5–1)
EKG ATRIAL RATE: 234 BPM
EKG ATRIAL RATE: 83 BPM
EKG P AXIS: 74 DEGREES
EKG P-R INTERVAL: 140 MS
EKG Q-T INTERVAL: 294 MS
EKG Q-T INTERVAL: 414 MS
EKG QRS DURATION: 86 MS
EKG QRS DURATION: 86 MS
EKG QTC CALCULATION (BAZETT): 486 MS
EKG QTC CALCULATION (BAZETT): 490 MS
EKG R AXIS: 69 DEGREES
EKG R AXIS: 71 DEGREES
EKG T AXIS: -125 DEGREES
EKG T AXIS: 68 DEGREES
EKG VENTRICULAR RATE: 167 BPM
EKG VENTRICULAR RATE: 83 BPM
EOSINOPHILS ABSOLUTE: 0.07 E9/L (ref 0.05–0.5)
EOSINOPHILS RELATIVE PERCENT: 0.9 % (ref 0–6)
GFR AFRICAN AMERICAN: >60
GFR NON-AFRICAN AMERICAN: >60 ML/MIN/1.73
GLUCOSE BLD-MCNC: 122 MG/DL (ref 74–99)
HCT VFR BLD CALC: 43.2 % (ref 34–48)
HEMOGLOBIN: 14.5 G/DL (ref 11.5–15.5)
LYMPHOCYTES ABSOLUTE: 0.62 E9/L (ref 1.5–4)
LYMPHOCYTES RELATIVE PERCENT: 7.8 % (ref 20–42)
MAGNESIUM: 1.6 MG/DL (ref 1.6–2.6)
MAGNESIUM: 2.1 MG/DL (ref 1.6–2.6)
MCH RBC QN AUTO: 31.2 PG (ref 26–35)
MCHC RBC AUTO-ENTMCNC: 33.6 % (ref 32–34.5)
MCV RBC AUTO: 92.9 FL (ref 80–99.9)
MONOCYTES ABSOLUTE: 0.69 E9/L (ref 0.1–0.95)
MONOCYTES RELATIVE PERCENT: 8.7 % (ref 2–12)
NEUTROPHILS ABSOLUTE: 6.39 E9/L (ref 1.8–7.3)
NEUTROPHILS RELATIVE PERCENT: 82.6 % (ref 43–80)
PDW BLD-RTO: 12.4 FL (ref 11.5–15)
PLATELET # BLD: 173 E9/L (ref 130–450)
PMV BLD AUTO: 10.8 FL (ref 7–12)
POTASSIUM REFLEX MAGNESIUM: 3.3 MMOL/L (ref 3.5–5)
POTASSIUM SERPL-SCNC: 4.2 MMOL/L (ref 3.5–5)
RBC # BLD: 4.65 E12/L (ref 3.5–5.5)
SODIUM BLD-SCNC: 134 MMOL/L (ref 132–146)
TSH SERPL DL<=0.05 MIU/L-ACNC: 2.75 UIU/ML (ref 0.27–4.2)
WBC # BLD: 7.7 E9/L (ref 4.5–11.5)

## 2020-08-12 PROCEDURE — 2500000003 HC RX 250 WO HCPCS: Performed by: NURSE PRACTITIONER

## 2020-08-12 PROCEDURE — 85025 COMPLETE CBC W/AUTO DIFF WBC: CPT

## 2020-08-12 PROCEDURE — 84443 ASSAY THYROID STIM HORMONE: CPT

## 2020-08-12 PROCEDURE — 6360000002 HC RX W HCPCS: Performed by: INTERNAL MEDICINE

## 2020-08-12 PROCEDURE — 84132 ASSAY OF SERUM POTASSIUM: CPT

## 2020-08-12 PROCEDURE — 6360000002 HC RX W HCPCS: Performed by: SURGERY

## 2020-08-12 PROCEDURE — 99255 IP/OBS CONSLTJ NEW/EST HI 80: CPT | Performed by: INTERNAL MEDICINE

## 2020-08-12 PROCEDURE — 6360000002 HC RX W HCPCS: Performed by: PHYSICIAN ASSISTANT

## 2020-08-12 PROCEDURE — 80048 BASIC METABOLIC PNL TOTAL CA: CPT

## 2020-08-12 PROCEDURE — C9113 INJ PANTOPRAZOLE SODIUM, VIA: HCPCS | Performed by: SURGERY

## 2020-08-12 PROCEDURE — 36415 COLL VENOUS BLD VENIPUNCTURE: CPT

## 2020-08-12 PROCEDURE — 2580000003 HC RX 258: Performed by: NURSE PRACTITIONER

## 2020-08-12 PROCEDURE — 2060000000 HC ICU INTERMEDIATE R&B

## 2020-08-12 PROCEDURE — 99232 SBSQ HOSP IP/OBS MODERATE 35: CPT | Performed by: SURGERY

## 2020-08-12 PROCEDURE — APPSS60 APP SPLIT SHARED TIME 46-60 MINUTES: Performed by: PHYSICIAN ASSISTANT

## 2020-08-12 PROCEDURE — 83735 ASSAY OF MAGNESIUM: CPT

## 2020-08-12 PROCEDURE — 2580000003 HC RX 258: Performed by: SURGERY

## 2020-08-12 PROCEDURE — 93005 ELECTROCARDIOGRAM TRACING: CPT | Performed by: INTERNAL MEDICINE

## 2020-08-12 PROCEDURE — 93005 ELECTROCARDIOGRAM TRACING: CPT | Performed by: ANESTHESIOLOGY

## 2020-08-12 PROCEDURE — 2500000003 HC RX 250 WO HCPCS: Performed by: INTERNAL MEDICINE

## 2020-08-12 RX ORDER — MAGNESIUM SULFATE IN WATER 40 MG/ML
2 INJECTION, SOLUTION INTRAVENOUS ONCE
Status: COMPLETED | OUTPATIENT
Start: 2020-08-12 | End: 2020-08-12

## 2020-08-12 RX ORDER — CEFAZOLIN SODIUM 1 G/3ML
1 INJECTION, POWDER, FOR SOLUTION INTRAMUSCULAR; INTRAVENOUS
Status: ACTIVE | OUTPATIENT
Start: 2020-08-12 | End: 2020-08-12

## 2020-08-12 RX ORDER — POTASSIUM CHLORIDE 7.45 MG/ML
10 INJECTION INTRAVENOUS
Status: COMPLETED | OUTPATIENT
Start: 2020-08-12 | End: 2020-08-12

## 2020-08-12 RX ORDER — POTASSIUM CHLORIDE 7.45 MG/ML
40 INJECTION INTRAVENOUS ONCE
Status: DISCONTINUED | OUTPATIENT
Start: 2020-08-12 | End: 2020-08-12

## 2020-08-12 RX ORDER — DILTIAZEM HYDROCHLORIDE 5 MG/ML
10 INJECTION INTRAVENOUS ONCE
Status: COMPLETED | OUTPATIENT
Start: 2020-08-12 | End: 2020-08-12

## 2020-08-12 RX ORDER — METOPROLOL TARTRATE 5 MG/5ML
5 INJECTION INTRAVENOUS EVERY 6 HOURS
Status: DISCONTINUED | OUTPATIENT
Start: 2020-08-12 | End: 2020-08-14

## 2020-08-12 RX ORDER — POTASSIUM CHLORIDE AND SODIUM CHLORIDE 900; 300 MG/100ML; MG/100ML
INJECTION, SOLUTION INTRAVENOUS CONTINUOUS
Status: DISCONTINUED | OUTPATIENT
Start: 2020-08-12 | End: 2020-08-14

## 2020-08-12 RX ORDER — SODIUM CHLORIDE 9 MG/ML
10 INJECTION INTRAVENOUS ONCE
Status: DISCONTINUED | OUTPATIENT
Start: 2020-08-12 | End: 2020-08-14

## 2020-08-12 RX ADMIN — SODIUM CHLORIDE, PRESERVATIVE FREE 10 ML: 5 INJECTION INTRAVENOUS at 08:19

## 2020-08-12 RX ADMIN — POTASSIUM CHLORIDE 10 MEQ: 7.46 INJECTION, SOLUTION INTRAVENOUS at 12:01

## 2020-08-12 RX ADMIN — MAGNESIUM SULFATE HEPTAHYDRATE 2 G: 40 INJECTION, SOLUTION INTRAVENOUS at 13:03

## 2020-08-12 RX ADMIN — POTASSIUM CHLORIDE AND SODIUM CHLORIDE: 900; 300 INJECTION, SOLUTION INTRAVENOUS at 20:34

## 2020-08-12 RX ADMIN — DILTIAZEM HYDROCHLORIDE 5 MG/HR: 5 INJECTION, SOLUTION INTRAVENOUS at 09:25

## 2020-08-12 RX ADMIN — SODIUM CHLORIDE, PRESERVATIVE FREE 10 ML: 5 INJECTION INTRAVENOUS at 08:20

## 2020-08-12 RX ADMIN — POTASSIUM CHLORIDE 10 MEQ: 7.46 INJECTION, SOLUTION INTRAVENOUS at 09:51

## 2020-08-12 RX ADMIN — DILTIAZEM HYDROCHLORIDE 10 MG/HR: 5 INJECTION, SOLUTION INTRAVENOUS at 10:27

## 2020-08-12 RX ADMIN — DILTIAZEM HYDROCHLORIDE 10 MG: 5 INJECTION INTRAVENOUS at 08:33

## 2020-08-12 RX ADMIN — METOPROLOL TARTRATE 5 MG: 5 INJECTION, SOLUTION INTRAVENOUS at 13:03

## 2020-08-12 RX ADMIN — ENOXAPARIN SODIUM 90 MG: 100 INJECTION SUBCUTANEOUS at 10:48

## 2020-08-12 RX ADMIN — PANTOPRAZOLE SODIUM 40 MG: 40 INJECTION, POWDER, FOR SOLUTION INTRAVENOUS at 08:19

## 2020-08-12 RX ADMIN — METOPROLOL TARTRATE 5 MG: 5 INJECTION, SOLUTION INTRAVENOUS at 19:20

## 2020-08-12 RX ADMIN — ONDANSETRON 4 MG: 2 INJECTION INTRAMUSCULAR; INTRAVENOUS at 13:04

## 2020-08-12 RX ADMIN — PROCHLORPERAZINE EDISYLATE 10 MG: 5 INJECTION INTRAMUSCULAR; INTRAVENOUS at 18:12

## 2020-08-12 RX ADMIN — PROCHLORPERAZINE EDISYLATE 10 MG: 5 INJECTION INTRAMUSCULAR; INTRAVENOUS at 01:36

## 2020-08-12 RX ADMIN — DEXTROSE AND SODIUM CHLORIDE: 5; 450 INJECTION, SOLUTION INTRAVENOUS at 08:20

## 2020-08-12 RX ADMIN — POTASSIUM CHLORIDE AND SODIUM CHLORIDE: 900; 300 INJECTION, SOLUTION INTRAVENOUS at 09:50

## 2020-08-12 RX ADMIN — POTASSIUM CHLORIDE 10 MEQ: 7.46 INJECTION, SOLUTION INTRAVENOUS at 10:48

## 2020-08-12 ASSESSMENT — PAIN SCALES - GENERAL
PAINLEVEL_OUTOF10: 0

## 2020-08-12 NOTE — CONSULTS
Inpatient Cardiology Consultation      Reason for Consult: Atrial fibrillation with RVR    Consulting Physician: Dr. Ada Staton    Requesting Physician: Dr. Luis Jimenez    Date of Consultation: 8/12/2020    HISTORY OF PRESENT ILLNESS:   Patient is a 81year old WF who follows with Dr. Wiley Son outpatient. She is being seen in consultation this admission by Dr. Ada Staton for evaluation and recommendations regarding atrial fibrillation with RVR. Patient has a past medical history of paroxysmal atrial fibrillation on chronic Eliquis therapy, valvular heart disease, diverticulitis with perforation requiring temporary colostomy with eventual ostomy reversal, chronic diastolic heart failure, hypertension, hypothyroidism, gastroesophageal reflux disease, hyperlipidemia, tobacco abuse and coronary artery disease with recommendations for medical management ([address] [place] [address] details as noted below in past medical history section). Patient originally presented to [address] [place] [address] on August 9, 2020 due to persistent complaints of abdominal discomfort, nausea and vomiting. She also noted of constipation. She states her symptoms originally began Saturday evening, and did not resolve by Sunday afternoon so she decided to come to the ED for further evaluation and management. She was found to have small bowel obstruction on admission, and is currently being followed by general surgery. NG tube was placed initially, without complete resolution of obstruction, so surgical intervention was planned and scheduled for today. However prior to being taken to the OR, patient was noted to be atrial fibrillation with RVR on review of telemetry with heart rate reaching into the 170s. We were consulted for further evaluation and recommendations as a result.   Patient has not been able to tolerate oral intake since admission, and thus has not been receiving her beta-blocker or anticoagulation (this was also being held in preparation for possible surgical intervention). Patient states her only complaint at this time is fluttering sensation, or palpitations in her chest.  She states she still has intermittent episodes of nausea. She notes that her abdominal discomfort in both the right upper quadrant left upper quadrant has significantly improved since admission. She denies both prior to and during admission thus far any complaints of chest discomfort, shortness of breath at rest or on exertion, dizziness, near-syncope, syncope, paroxysmal nocturnal dyspnea, orthopnea or peripheral edema. She denies any recent subjective fever, chills or cough. She denies any recent sick contacts. She is a current everyday tobacco smoker, smokes one pack/day and has for 40+ years. Denies former current alcohol or illicit drug use. States her father has a history of coronary artery disease and passed away from an MI in his 76s. She also has a sister who passed away from an MI in her 46s. Denies any other pertinent cardiac family medical history. Regarding her history of atrial fibrillation, the patient states she has only had two prior episodes of atrial fibrillation to her knowledge. She states when she was first diagnosed with atrial fibrillation, she was admitted to the hospital with a non-cardiac issue, and it was incidentally discovered during admission. She had no cardiac symptoms at that time. She states she converted spontaneously to normal sinus rhythm regarding both prior episodes of atrial fibrillation. She denies any prior history of direct-current cardioversion or antiarrhythmic drug therapy. She admits to medication compliance at home. Labs and diagnostic testing as noted below. Please note: past medical records were reviewed per electronic medical record (EMR) - see detailed reports under Past Medical/ Surgical History. PAST MEDICAL HISTORY:    1. Paroxysmal atrial fibrillation. On chronic Eliquis therapy. 2. Coronary artery disease.   · Left heart catheterization Dr. Stock Show 08/2018: ANGIOGRAPHIC FINDINGS: The left main coronary artery arising from the left sinus of Valsalva. It is a large vessel that has no significant disease. It trifurcates into left anterior descending artery, left circumflex artery and ramus intermedius artery. The left anterior descending artery is a very tortuous vessel, actually all of her vessels are very tortuous and the overlapped significantly, the LADhas no significant disease. It gives off a two large diagonal branches. The LAD and its diagonal branches have no significant disease. The second diagonal branch may be has like 30% disease. The ramus intermedius artery also a tortuous vessel without any significantdisease. The left circumflex artery is a large vessel that gives off a large OM branch and that has like 30% proximal disease. There was grade 3 left-to-right collaterals. The right coronary artery arising from the right sinus of Valsalva. It has proximal total occlusion. Recommendations: Aggressive CAD risk factor modification. 3. Valvular heart disease. Chronic diastolic HF. · 2D TTE Dr. Stock Show 08/2018: Summary: No previous echo for comparison. Technically adequate study. Proximal septal thickening. Ejection fraction is visually estimated at 65%. No regional wall motion abnormalities seen. E/A flow reversal noted. Suggestive of diastolic dysfunction. Physiologic and/or trace mitral regurgitation is present. Mild aortic stenosis is present. Physiologic and/or trace tricuspid regurgitation. RVSP is normal.  4. Hypertension. 5. Hypothyroidism, on replacement therapy. 6. Gastroesophageal reflux disease. 7. Hyperlipidemia, on statin therapy. 8. Tobacco abuse.   9. History of diverticulitis with perforation requiring temporary colostomy with eventual reversal.      PAST SURGICAL HISTORY:    Past Surgical History:   Procedure Laterality Date    COLONOSCOPY      COLONOSCOPY N/A 4/8/2019    COLONOSCOPY performed by Dg Adamson MD at [facility] CATH LAB PROCEDURE  08/09/2018    NERVE BLOCK  12 27 2011    NERVE BLOCK  1/24/12    lumbar epidural    NERVE BLOCK  02/21/12    lumbar epidural with flouro    [facility] COLONOSCOPY FLX DX W/COLLJ SPEC WHEN PFRMD N/A 10/4/2018    COLONOSCOPY performed by Dg Adamson MD at [facility], PARTIAL, Jeffery Salmon N/A 11/13/2018    LAPAROSCOPIC ROBOT XI ASSISTED SIGMOID COLON RESECTION WITH OSTOMY performed by Dg Adamson MD at [address] [place] [address] N/A 4/9/2019    COLOSTOMY CLOSURE LAPAROSCOPIC ROBOTIC XI performed by Dg Adamson MD at [facility]:  Prior to Admission medications    Medication Sig Start Date End Date Taking? Authorizing Provider   amoxicillin-clavulanate (AUGMENTIN) 875-125 MG per tablet Take 1 tablet by mouth 2 times daily   Yes Historical Provider, MD   aspirin EC 81 MG EC tablet Take 1 tablet by mouth daily 8/9/18  Yes Nikia Mark MD   pantoprazole (PROTONIX) 40 MG tablet Take 1 tablet by mouth daily 8/9/18  Yes Nikia Mark MD   metoprolol tartrate (LOPRESSOR) 25 MG tablet Take 1 tablet by mouth every 8 hours 8/8/18  Yes Lionel Llamas, DO   apixaban (ELIQUIS) 5 MG TABS tablet Take 1 tablet by mouth 2 times daily 8/8/18  Yes Lionel Llamas, DO   levothyroxine (SYNTHROID) 100 MCG tablet Take 100 mcg by mouth Daily   Yes Historical Provider, MD   ZINC PO Take 1 tablet by mouth daily   Yes Historical Provider, MD   Omega-3 Fatty Acids (FISH OIL) 1000 MG CAPS Take 1,000 mg by mouth daily   Yes Historical Provider, MD   lisinopril (PRINIVIL;ZESTRIL) 10 MG tablet Take 10 mg by mouth daily. Yes Historical Provider, MD   rosuvastatin (CRESTOR) 10 MG tablet Take 10 mg by mouth daily.      Yes Historical Provider, MD       CURRENT MEDICATIONS:      Current Facility-Administered Medications:     ceFAZolin (ANCEF) injection 1 g, 1 g, Intravenous, On Call to OR,  or illicit drug use. FAMILY HISTORY:   States her father has a history of coronary artery disease and passed away from an MI in his 76s. She also has a sister who passed away from an MI in her 46s. Denies any other pertinent cardiac family medical history. REVIEW OF SYSTEMS:     · Constitutional: Denies fevers, chills, night sweats, and generalized fatigue. Denies significant weight loss or weight gain. · HEENT: Denies headaches, nose bleeds, rhinorrhea, sore throat. Denies blurred vision. Denies dysphagia, odynophagia. · Musculoskeletal: Denies falls, pain to BLE with ambulation. Denies muscle weakness. · Neurological: Denies dizziness and lightheadedness, numbness and tingling. Denies focal neurological deficits. · Cardiovascular: +palpitations. Denies chest pain, diaphoresis. Denies syncope. Denies PND, orthopnea, peripheral edema. · Respiratory: Denies shortness of breath at rest or with exertion. Denies cough, hemoptysis. · Gastrointestinal: +abdominal pain, +nausea, +vomiting. Denies diarrhea and constipation, black/bloody, and tarry stools. · Genitourinary: Denies dysuria and hematuria. · Hematologic: Denies excessive bruising or bleeding. · Endocrine: Denies excessive thirst. Denies intolerance to hot and cold. PHYSICAL EXAM:   /82   Pulse 72   Temp 98.3 °F (36.8 °C) (Oral)   Resp 16   Ht 5' 3\" (1.6 m)   Wt 128 lb (58.1 kg)   SpO2 95%   BMI 22.67 kg/m²   CONST:  Well developed, well nourished WF who appears stated age. Awake, alert, cooperative, no apparent distress. HEENT:   Head- Normocephalic, atraumatic. Eyes- Conjunctivae pink, anicteric. Throat- Oral mucosa pink and moist.  Neck-  No stridor, trachea midline, no jugular venous distention. CHEST: Chest symmetrical and non-tender to palpation. No accessory muscle use or intercostal retractions. RESPIRATORY: Lung sounds - clear throughout fields. No wheezing, rales or rhonchi. Diminished. CARDIOVASCULAR:     No carotid bruit. Heart Inspection- shows no noted pulsations. Heart Palpation- no heaves or thrills. Heart Ausculation- Irregularly irregular rhythm with fast rate, soft 1-3/2 systolic murmur RUSB. No s3, s4 or rub. PV: No lower extremity edema. No varicosities. Pedal pulses palpable, no clubbing or cyanosis. ABDOMEN: Soft. +NG tube in place. MS: Good muscle strength and tone. No atrophy or abnormal movements. SKIN: Warm and dry. No statis dermatitis or ulcers. NEURO / PSYCH: Oriented to person, place and time. Speech clear and appropriate. Follows all commands. Pleasant affect. DATA:    Telemetry: Atrial fibrillation with RVR, HR ranging 130s-170s at times    Diagnostic:  All diagnostic testing and lab work thus far this admission reviewed in detail. CT Abdomen/Pelvis 08/09/2020: Impression:  1. Multiple fluid-filled distended loops of small bowel with suspected   transition point in the pelvis.  Differential includes early small bowel   obstruction versus partially small-bowel obstruction. 2. Mild distention of the stomach.  There is no visible enteric catheter. 3. Small amount of free fluid in the pelvis. 4. Hepatic steatosis. KUB 08/10/2020: Impression:  Oral contrast material is noted throughout multiple dilated loops of small   bowel in the abdomen and pelvis.  No definite colonic contrast material.    Abdomen XRAY 08/10/2020: Impression:  NG tube tip in the proximal stomach. CT Abdomen/Pelvis 08/10/2020: Impression   Findings consistent with small-bowel obstruction.  A transition point is   suspected within the pelvis.  No obvious pneumatosis or free intraperitoneal   air.  The degree of small bowel distention is slightly improved compared with   08/09/2020.  An enteric catheter extends into the body of the stomach. Please note that one of the sideholes is within the distal esophagus.    New, small to moderate volume of abdominal/pelvic ascites is likely reactive. New trace right pleural effusion with right basilar atelectasis. Heterogeneously calcified 2.3 cm nodule of the right adrenal gland, stable in   size. Intake/Output Summary (Last 24 hours) at 8/12/2020 0830  Last data filed at 8/11/2020 2142  Gross per 24 hour   Intake 0 ml   Output 1150 ml   Net -1150 ml       Labs:   CBC:   Recent Labs     08/11/20  0642 08/12/20  0543   WBC 10.0 7.7   HGB 14.6 14.5   HCT 44.0 43.2    173     BMP:   Recent Labs     08/11/20  0642 08/12/20  0543    134   K 3.6 3.3*   CO2 25 26   BUN 10 10   CREATININE 0.5 0.5   LABGLOM >60 >60   CALCIUM 8.4* 8.2*     Mag:   Recent Labs     08/12/20  0543   MG 1.6     FASTING LIPID PANEL:  Lab Results   Component Value Date    CHOL 137 07/15/2019    HDL 45 06/26/2020    LDLCALC 72 06/26/2020    TRIG 86 07/15/2019     LIVER PROFILE:  Recent Labs     08/09/20  1618   AST 19   ALT 11   LABALBU 4.0           Assessment and plan to follow as per Dr. Ada Staton. Mathew Ahumada, [address] [place] [address], [place] [address] Cardiology    Electronically signed by Jose Angel Thomas PA-C on 8/12/2020 at 8:30 AM   ______________________________________________________________________  Cardiology attending attestation:  I have independently interviewed and examined the patient. I personally reviewed pertinent laboratory values and diagnostic testing (including, if applicable, chest xray, electrocardiogram, telemetry, echocardiogram, stress testing, and coronary angiography). I have reviewed the above documentation completed by Mathew Ahumada, PA-C including past medical, social, and family history and agree with the findings, assessment and plan except where noted. Plan formulated under my direct supervision. I participated in all aspects of the medical decision making.   Please see my additional contributions to the HPI, physical exam, and assessment / medical decision making:  _______________________________________________________________________    82-year-old female who follows with Dr. Sharonda Cool, known paroxysmal atrial fibrillation maintained on apixaban, who has had 2 episodes of A. fib in the past and it always spontaneously converted to sinus rhythm, never required cardioversion. Prior history of diverticulitis with perforation requiring a temporary colostomy that was reversed. She now presents on 8/9/2020 with abdominal pain found to have a small bowel obstruction. She has been n.p.o. since that time with an NG tube. She was scheduled for surgery today but developed A. fib with RVR this morning, with rates in the 160s. She has been given a diltiazem bolus and remains in A. fib with RVR. She does note a fluttering sensation in the chest, otherwise denies chest pain or shortness of breath. Physical Exam:  BP (P) 118/80   Pulse (P) 160   Temp 98.2 °F (36.8 °C)   Resp (P) 16   Ht 5' 3\" (1.6 m)   Wt 128 lb (58.1 kg)   SpO2 (P) 94%   BMI 22.67 kg/m²   General appearance: Awake, alert, no acute respiratory distress  Skin: Intact, no rash  ENMT: Moist mucous membranes. NG tube in place  Neck: Supple, no carotid bruits. Normal jugular venous pressure  Lungs: Clear to auscultation bilaterally. No wheezes, rales, or rhonchi  Cardiac: Irregular rhythm with a tachycardic rate, normal S1&S2, no murmurs apparent  Abdomen: Mildly distended, hypoactive bowel sounds,   Extremities: Moves all extremities x 4, no lower extremity edema  Neurologic: No focal motor deficits apparent, normal mood and affect    Telemetry: A. fib with RVR 140s to 160s  EKG: A. fib with  bpm.  Normal axis normal intervals. Diffuse ST depressions likely rate related. Impression:   1. Paroxysmal atrial fibrillation. Currently RVR. AC with apixaban, on hold since admission due to n.p.o./SBO  2. Small bowel obstruction, planned for surgical intervention  3.  Hypokalemia, hypomagnesemia  4. Coronary artery disease. Known  of the RCA with collaterals. 5. Mild aortic stenosis  6. Chronic HFpEF, appears compensated  7. Comorbid disease: Hypertension, hyperlipidemia, hypothyroidism, history of perforated diverticulitis with colostomy, ultimately reversed, GERD, tobacco abuse    Recommendations:  A. fib with RVR precipitated by small bowel obstruction.  IV diltiazem bolus and infusion for rate control   Give one dose of enoxaparin 1.5 mg/kg today   Aggressive electrolyte replacement   Will likely need another IV site so IV potassium chloride can be given without significant discomfort   Check thyroid function   Plan to control heart rates with IV diltiazem and IV metoprolol as needed, so she can undergo scheduled surgery tomorrow   Resume apixaban after surgery   If does not convert back to sinus rhythm spontaneously, outpatient cardioversion can be arranged after 3 weeks of therapeutic anticoagulation, versus JEANNA guided cardioversion while she remains inpatient if any difficulty controlling heart rates after surgery   Resume medical treatment of CAD with aspirin and statin when able to take p.o.  Aggressive risk factor modification including smoking cessation   Further care per primary/general surgery   Will follow    Thank you for the consultation. Please do not hesitate to call with questions.     Yael Diaz MD  [facility]) Cardiology

## 2020-08-12 NOTE — PROGRESS NOTES
myalgias, joint complaints or back pain. Integumentary:   Denies any rashes, ulcers, or excoriations. Denies bruising. Hematologic/Lymphatic:  Denies bruising or bleeding. Physical Exam:  No intake/output data recorded. Intake/Output Summary (Last 24 hours) at 8/12/2020 0910  Last data filed at 8/11/2020 2142  Gross per 24 hour   Intake 0 ml   Output 1150 ml   Net -1150 ml   I/O last 3 completed shifts: In: 0   Out: 1150 [Urine:300; Emesis/NG output:850]  Patient Vitals for the past 96 hrs (Last 3 readings):   Weight   08/09/20 2030 128 lb (58.1 kg)   08/09/20 1553 128 lb (58.1 kg)     Vital Signs:   Blood pressure 127/76, pulse 124, temperature 98.3 °F (36.8 °C), temperature source Oral, resp. rate 18, height 5' 3\" (1.6 m), weight 128 lb (58.1 kg), SpO2 95 %, not currently breastfeeding. General appearance:  Alert, responsive, oriented to person, place, and time. Well preserved, alert, no distress. Head:  Normocephalic. No masses, lesions or tenderness. Eyes:  PERRLA. EOMI. Sclera clear. Buccal mucosa moist.  ENT:  Ears normal. Mucosa normal.  NG tube is in place. Neck:    Supple. Trachea midline. No thyromegaly. No JVD. No bruits. Heart:    Atrial fibrillation with rapid ventricular response. Lungs:    Symmetrical. Clear to auscultation bilaterally. No wheezes. No rhonchi. No rales. Abdomen:   Soft. Mildly tender to palpation diffusely. Bowel sounds remain hypoactive. Extremities:    Peripheral pulses present. No peripheral edema. No ulcers. No cyanosis. No clubbing. Neurologic:    Alert x 3. No focal deficit. Cranial nerves grossly intact. No focal weakness. Psych:   Behavior is normal. Mood appears normal. Speech is not rapid and/or pressured. Musculoskeletal:   Spine ROM normal. Muscular strength intact. Gait not assessed. Integumentary:  No rashes  Skin normal color and texture.   Genitalia/Breast:  Deferred    Medication:  Scheduled Meds:   ceFAZolin  1 g Intravenous On Call to OR    sodium chloride (PF)  10 mL Intravenous Once    magnesium sulfate  2 g Intravenous Once    potassium chloride  40 mEq Intravenous Once    sodium chloride flush  10 mL Intravenous 2 times per day    enoxaparin  40 mg Subcutaneous Daily    pantoprazole  40 mg Intravenous Daily    And    sodium chloride (PF)  10 mL Intravenous Daily     Continuous Infusions:   dilTIAZem (CARDIZEM) 125 mg in dextrose 5% 125 mL infusion      dextrose 5 % and 0.45 % NaCl 125 mL/hr at 08/12/20 0820       Objective Data:  CBC with Differential:    Lab Results   Component Value Date    WBC 7.7 08/12/2020    RBC 4.65 08/12/2020    HGB 14.5 08/12/2020    HCT 43.2 08/12/2020     08/12/2020    MCV 92.9 08/12/2020    MCH 31.2 08/12/2020    MCHC 33.6 08/12/2020    RDW 12.4 08/12/2020    SEGSPCT 64 10/31/2013    METASPCT 0.9 11/13/2018    LYMPHOPCT 7.8 08/12/2020    MONOPCT 8.7 08/12/2020    BASOPCT 0.5 08/12/2020    MONOSABS 0.69 08/12/2020    LYMPHSABS 0.62 08/12/2020    EOSABS 0.07 08/12/2020    BASOSABS 0.00 08/12/2020     BMP:    Lab Results   Component Value Date     08/12/2020    K 3.3 08/12/2020    CL 96 08/12/2020    CO2 26 08/12/2020    BUN 10 08/12/2020    LABALBU 4.0 08/09/2020    LABALBU 4.3 12/13/2011    CREATININE 0.5 08/12/2020    CALCIUM 8.2 08/12/2020    GFRAA >60 08/12/2020    LABGLOM >60 08/12/2020    GLUCOSE 122 08/12/2020    GLUCOSE 94 12/13/2011       Assessment:  1. Small bowel obstruction in the setting of multiple abdominal surgeries recently stemming from perforated diverticulitis requiring temporary ostomy status post reversal  2. Atrial fibrillation with rapid ventricular response  3. Essential hypertension  4. Hyperlipidemia  5. Hypothyroidism  6. Coronary artery disease  7. Chronic, compensated diastolic congestive heart failure  8.  Mild aortic stenosis    Plan:   Unfortunately, as Deatra Harada has been off her cardiac medications for 2 days in the setting of small bowel obstruction, she has flipped into atrial fibrillation with rapid ventricular response. A Cardizem bolus will be administered and I have discussed the case with the cardiovascular team.  Cardiac clearance will be necessary prior to surgical intervention which was originally planned for today. The patient has been updated accordingly. Electrolyte derangements will be addressed. She remains positive despite multiple issues at play. More than 50% of my time was spent at the bedside counseling/coordinating care with the patient and/or family with face to face contact. This time was spent reviewing notes and laboratory data as well as instructing and counseling the patient. Time I spent with the family or surrogate(s) is included only if the patient was incapable of providing the necessary information or participating in medical decisions. I also discussed the differential diagnosis and all of the proposed management plans with the patient and individuals accompanying the patient. I am readily available for any further decision-making and intervention.        Fidel Anglin DO, ORLY.C.O.I.  8/12/2020  9:10 AM

## 2020-08-12 NOTE — PROGRESS NOTES
Patient states she will call her family to let them know of surgery being canceled and her transfer to the 6th floor.

## 2020-08-12 NOTE — H&P
General Surgery History and Physical  [facility]    Patient's Name/Date of Birth: Chelsey Cabrera / 1950    Date: August 12, 2020     Surgeon: Rosana Zelaya MD    PCP: Anil Mejia DO     Chief Complaint: SBO    HPI:   Chelsey Cabrera is a 81 y.o. female who presents for evaluation of sbo and intractable abdominal pain. She has an extensive past medical history that includes perforated diverticulitis requiring temporary colostomy placement. Work-up in the emergency department revealed small bowel obstruction. She has not passed flatus nor has she had a bowel movement in greater than 1 day.      Patient Active Problem List   Diagnosis    Spinal stenosis    Degeneration of lumbosacral intervertebral disc    Lumbago    Colonic diverticular abscess    CAD in native artery    S/P colon resection    Diverticulitis of large intestine with perforation and abscess without bleeding    Pelvic abscess in female    Colostomy reversal    SBO (small bowel obstruction) (HCC)       Past Medical History:   Diagnosis Date    CAD in native artery 1/6/3870    Diastolic heart failure ([address]     Diverticulitis 08/03/2018    HIGH CHOLESTEROL     History of atrial fibrillation 08/2018    with episode of diverticulosis    History of stress test 08/07/2018    Lexiscan stress test    Hypertension     Thyroid disease        Past Surgical History:   Procedure Laterality Date    COLONOSCOPY      COLONOSCOPY N/A 4/8/2019    COLONOSCOPY performed by Catalina Vicente MD at [address] [facility] CATH LAB PROCEDURE  08/09/2018    NERVE BLOCK  12 27 2011    NERVE BLOCK  1/24/12    lumbar epidural    NERVE BLOCK  02/21/12    lumbar epidural with flouro    [place] COLONOSCOPY FLX DX W/COLLJ SPEC WHEN PFRMD N/A 10/4/2018    COLONOSCOPY performed by Catalina Vicente MD at [facility], PARTIAL, W/ANAST N/A 11/13/2018    LAPAROSCOPIC ROBOT XI ASSISTED SIGMOID COLON file       I have reviewed relevant labs from this admission and interpretation is included in my assessment and plan    Review of Systems    A complete 10 system review was performed and are otherwise negative unless mentioned in the above HPI. Specific negatives are listed below but may not include all those reviewed. General ROS: negative obtundation, AMS  ENT ROS: negative rhinorrhea, epistaxis  Allergy and Immunology ROS: negative itchy/watery eyes or nasal congestion  Hematological and Lymphatic ROS: negative spontaneous bleeding or bruising  Endocrine ROS: negative  lethargy, mood swings, palpitations or polydipsia/polyuria  Respiratory ROS: negative sputum changes, stridor, tachypnea or wheezing  Cardiovascular ROS: negative for - loss of consciousness, murmur or orthopnea  Gastrointestinal ROS: negative for - hematochezia or hematemesis  Genito-Urinary ROS: negative for -  genital discharge or hematuria  Musculoskeletal ROS: negative for - focal weakness, gangrene  Psych/Neuro ROS: negative for - visual or auditory hallucinations, suicidal ideation    Physical exam:   /82   Pulse 72   Temp 98.4 °F (36.9 °C) (Oral)   Resp 16   Ht 5' 3\" (1.6 m)   Wt 128 lb (58.1 kg)   SpO2 94%   BMI 22.67 kg/m²   General appearance:  NAD, appears stated age  Head: NCAT, PERRLA, EOMI, red conjunctiva  Neck: supple, no masses, trachea midline  Lungs: Equal chest rise bilateral, no retractions, no wheezing  Heart: Reg rate  Abdomen: soft, minimal tender, moderately distended  Skin; warm and dry, no cyanosis  Gu: no cva tenderness  Extremities: atraumatic, no focal motor deficits, no open wounds  Psych: No tremor, visual hallucinations      Radiology: I reviewed relevant abdominal imaging from this admission and that available in the EMR including CT abd/pel from 8/9/20.  My assessment is SBO    Assessment:  Sandy Patel is a 81 y.o. female with SBO  Patient Active Problem List   Diagnosis    Spinal stenosis    Degeneration of lumbosacral intervertebral disc    Lumbago    Colonic diverticular abscess    CAD in native artery    S/P colon resection    Diverticulitis of large intestine with perforation and abscess without bleeding    Pelvic abscess in female    Colostomy reversal    SBO (small bowel obstruction) ([facility])     Plan:  - NPO  - NG tube  - Monitor bowel function  - Hydrate  - antiemetics and pain control      Meghana De Leon MD  7:27 AM  8/12/2020      Patient not progressing despite contrast study and NG decompression. Will proceed with diagnostic laparoscopy, BILL, possible bowel resection  -The procedure, risks, benefits and alternatives were discussed with patient. she   agrees to proceed.     Meghana De Leon MD

## 2020-08-12 NOTE — PROGRESS NOTES
Patient noted to be in Afib with RVR. Hold off on surgery today  Cardiology consult.    Heparin drip if anticoagulation is required  Plan for OR tomorrow once stable for persistent SBO    Molly Pruitt MD

## 2020-08-13 ENCOUNTER — ANESTHESIA (OUTPATIENT)
Dept: OPERATING ROOM | Age: 70
DRG: 336 | End: 2020-08-13
Payer: COMMERCIAL

## 2020-08-13 VITALS
RESPIRATION RATE: 14 BRPM | TEMPERATURE: 97.9 F | DIASTOLIC BLOOD PRESSURE: 77 MMHG | OXYGEN SATURATION: 100 % | SYSTOLIC BLOOD PRESSURE: 153 MMHG

## 2020-08-13 LAB
ANION GAP SERPL CALCULATED.3IONS-SCNC: 11 MMOL/L (ref 7–16)
BASOPHILS ABSOLUTE: 0.02 E9/L (ref 0–0.2)
BASOPHILS RELATIVE PERCENT: 0.2 % (ref 0–2)
BUN BLDV-MCNC: 13 MG/DL (ref 8–23)
CALCIUM SERPL-MCNC: 7.9 MG/DL (ref 8.6–10.2)
CHLORIDE BLD-SCNC: 101 MMOL/L (ref 98–107)
CO2: 23 MMOL/L (ref 22–29)
CREAT SERPL-MCNC: 0.5 MG/DL (ref 0.5–1)
EOSINOPHILS ABSOLUTE: 0.02 E9/L (ref 0.05–0.5)
EOSINOPHILS RELATIVE PERCENT: 0.2 % (ref 0–6)
GFR AFRICAN AMERICAN: >60
GFR NON-AFRICAN AMERICAN: >60 ML/MIN/1.73
GLUCOSE BLD-MCNC: 93 MG/DL (ref 74–99)
HCT VFR BLD CALC: 38.3 % (ref 34–48)
HEMOGLOBIN: 12.9 G/DL (ref 11.5–15.5)
IMMATURE GRANULOCYTES #: 0.04 E9/L
IMMATURE GRANULOCYTES %: 0.5 % (ref 0–5)
LYMPHOCYTES ABSOLUTE: 0.97 E9/L (ref 1.5–4)
LYMPHOCYTES RELATIVE PERCENT: 11.5 % (ref 20–42)
MAGNESIUM: 2.1 MG/DL (ref 1.6–2.6)
MCH RBC QN AUTO: 31.3 PG (ref 26–35)
MCHC RBC AUTO-ENTMCNC: 33.7 % (ref 32–34.5)
MCV RBC AUTO: 93 FL (ref 80–99.9)
MONOCYTES ABSOLUTE: 1.12 E9/L (ref 0.1–0.95)
MONOCYTES RELATIVE PERCENT: 13.3 % (ref 2–12)
NEUTROPHILS ABSOLUTE: 6.27 E9/L (ref 1.8–7.3)
NEUTROPHILS RELATIVE PERCENT: 74.3 % (ref 43–80)
PDW BLD-RTO: 12.5 FL (ref 11.5–15)
PHOSPHORUS: 2 MG/DL (ref 2.5–4.5)
PLATELET # BLD: 162 E9/L (ref 130–450)
PMV BLD AUTO: 11.1 FL (ref 7–12)
POTASSIUM REFLEX MAGNESIUM: 4.6 MMOL/L (ref 3.5–5)
RBC # BLD: 4.12 E12/L (ref 3.5–5.5)
SODIUM BLD-SCNC: 135 MMOL/L (ref 132–146)
WBC # BLD: 8.4 E9/L (ref 4.5–11.5)

## 2020-08-13 PROCEDURE — 44180 LAP ENTEROLYSIS: CPT | Performed by: SURGERY

## 2020-08-13 PROCEDURE — 6360000002 HC RX W HCPCS: Performed by: SURGERY

## 2020-08-13 PROCEDURE — 2060000000 HC ICU INTERMEDIATE R&B

## 2020-08-13 PROCEDURE — 6360000002 HC RX W HCPCS: Performed by: NURSE ANESTHETIST, CERTIFIED REGISTERED

## 2020-08-13 PROCEDURE — 2500000003 HC RX 250 WO HCPCS: Performed by: SURGERY

## 2020-08-13 PROCEDURE — 84100 ASSAY OF PHOSPHORUS: CPT

## 2020-08-13 PROCEDURE — C9113 INJ PANTOPRAZOLE SODIUM, VIA: HCPCS | Performed by: SURGERY

## 2020-08-13 PROCEDURE — 85025 COMPLETE CBC W/AUTO DIFF WBC: CPT

## 2020-08-13 PROCEDURE — 2580000003 HC RX 258: Performed by: NURSE ANESTHETIST, CERTIFIED REGISTERED

## 2020-08-13 PROCEDURE — 99233 SBSQ HOSP IP/OBS HIGH 50: CPT | Performed by: INTERNAL MEDICINE

## 2020-08-13 PROCEDURE — 36415 COLL VENOUS BLD VENIPUNCTURE: CPT

## 2020-08-13 PROCEDURE — 6360000002 HC RX W HCPCS: Performed by: INTERNAL MEDICINE

## 2020-08-13 PROCEDURE — 3600000005 HC SURGERY LEVEL 5 BASE: Performed by: SURGERY

## 2020-08-13 PROCEDURE — 0DNU4ZZ RELEASE OMENTUM, PERCUTANEOUS ENDOSCOPIC APPROACH: ICD-10-PCS | Performed by: SURGERY

## 2020-08-13 PROCEDURE — 7100000000 HC PACU RECOVERY - FIRST 15 MIN: Performed by: SURGERY

## 2020-08-13 PROCEDURE — 2500000003 HC RX 250 WO HCPCS: Performed by: INTERNAL MEDICINE

## 2020-08-13 PROCEDURE — 2709999900 HC NON-CHARGEABLE SUPPLY: Performed by: SURGERY

## 2020-08-13 PROCEDURE — 3600000015 HC SURGERY LEVEL 5 ADDTL 15MIN: Performed by: SURGERY

## 2020-08-13 PROCEDURE — 2500000003 HC RX 250 WO HCPCS: Performed by: NURSE ANESTHETIST, CERTIFIED REGISTERED

## 2020-08-13 PROCEDURE — 80048 BASIC METABOLIC PNL TOTAL CA: CPT

## 2020-08-13 PROCEDURE — 2720000010 HC SURG SUPPLY STERILE: Performed by: SURGERY

## 2020-08-13 PROCEDURE — 3700000000 HC ANESTHESIA ATTENDED CARE: Performed by: SURGERY

## 2020-08-13 PROCEDURE — 7100000001 HC PACU RECOVERY - ADDTL 15 MIN: Performed by: SURGERY

## 2020-08-13 PROCEDURE — 2580000003 HC RX 258: Performed by: SURGERY

## 2020-08-13 PROCEDURE — 83735 ASSAY OF MAGNESIUM: CPT

## 2020-08-13 PROCEDURE — 3700000001 HC ADD 15 MINUTES (ANESTHESIA): Performed by: SURGERY

## 2020-08-13 RX ORDER — SODIUM CHLORIDE, SODIUM LACTATE, POTASSIUM CHLORIDE, CALCIUM CHLORIDE 600; 310; 30; 20 MG/100ML; MG/100ML; MG/100ML; MG/100ML
INJECTION, SOLUTION INTRAVENOUS CONTINUOUS PRN
Status: DISCONTINUED | OUTPATIENT
Start: 2020-08-13 | End: 2020-08-13 | Stop reason: SDUPTHER

## 2020-08-13 RX ORDER — ROCURONIUM BROMIDE 10 MG/ML
INJECTION, SOLUTION INTRAVENOUS PRN
Status: DISCONTINUED | OUTPATIENT
Start: 2020-08-13 | End: 2020-08-13 | Stop reason: SDUPTHER

## 2020-08-13 RX ORDER — PROPOFOL 10 MG/ML
INJECTION, EMULSION INTRAVENOUS PRN
Status: DISCONTINUED | OUTPATIENT
Start: 2020-08-13 | End: 2020-08-13 | Stop reason: SDUPTHER

## 2020-08-13 RX ORDER — CEFAZOLIN SODIUM 1 G/3ML
INJECTION, POWDER, FOR SOLUTION INTRAMUSCULAR; INTRAVENOUS
Status: COMPLETED
Start: 2020-08-13 | End: 2020-08-13

## 2020-08-13 RX ORDER — LIDOCAINE HYDROCHLORIDE 20 MG/ML
INJECTION, SOLUTION INTRAVENOUS PRN
Status: DISCONTINUED | OUTPATIENT
Start: 2020-08-13 | End: 2020-08-13 | Stop reason: SDUPTHER

## 2020-08-13 RX ORDER — SUCCINYLCHOLINE/SOD CL,ISO/PF 200MG/10ML
SYRINGE (ML) INTRAVENOUS PRN
Status: DISCONTINUED | OUTPATIENT
Start: 2020-08-13 | End: 2020-08-13 | Stop reason: SDUPTHER

## 2020-08-13 RX ORDER — ROCURONIUM BROMIDE 10 MG/ML
INJECTION, SOLUTION INTRAVENOUS PRN
Status: DISCONTINUED | OUTPATIENT
Start: 2020-08-13 | End: 2020-08-13

## 2020-08-13 RX ORDER — SODIUM CHLORIDE 9 MG/ML
INJECTION INTRAVENOUS
Status: COMPLETED
Start: 2020-08-13 | End: 2020-08-14

## 2020-08-13 RX ORDER — MIDAZOLAM HYDROCHLORIDE 1 MG/ML
INJECTION INTRAMUSCULAR; INTRAVENOUS
Status: DISPENSED
Start: 2020-08-13 | End: 2020-08-13

## 2020-08-13 RX ORDER — FENTANYL CITRATE 50 UG/ML
INJECTION, SOLUTION INTRAMUSCULAR; INTRAVENOUS PRN
Status: DISCONTINUED | OUTPATIENT
Start: 2020-08-13 | End: 2020-08-13 | Stop reason: SDUPTHER

## 2020-08-13 RX ORDER — CEFAZOLIN SODIUM 1 G/3ML
1 INJECTION, POWDER, FOR SOLUTION INTRAMUSCULAR; INTRAVENOUS
Status: COMPLETED | OUTPATIENT
Start: 2020-08-13 | End: 2020-08-13

## 2020-08-13 RX ORDER — MEPERIDINE HYDROCHLORIDE 25 MG/ML
12.5 INJECTION INTRAMUSCULAR; INTRAVENOUS; SUBCUTANEOUS EVERY 5 MIN PRN
Status: DISCONTINUED | OUTPATIENT
Start: 2020-08-13 | End: 2020-08-13 | Stop reason: HOSPADM

## 2020-08-13 RX ORDER — SODIUM CHLORIDE 9 MG/ML
INJECTION, SOLUTION INTRAVENOUS CONTINUOUS PRN
Status: DISCONTINUED | OUTPATIENT
Start: 2020-08-13 | End: 2020-08-13 | Stop reason: SDUPTHER

## 2020-08-13 RX ORDER — ONDANSETRON 2 MG/ML
4 INJECTION INTRAMUSCULAR; INTRAVENOUS
Status: DISCONTINUED | OUTPATIENT
Start: 2020-08-13 | End: 2020-08-13 | Stop reason: HOSPADM

## 2020-08-13 RX ORDER — ONDANSETRON 2 MG/ML
INJECTION INTRAMUSCULAR; INTRAVENOUS PRN
Status: DISCONTINUED | OUTPATIENT
Start: 2020-08-13 | End: 2020-08-13 | Stop reason: SDUPTHER

## 2020-08-13 RX ORDER — DEXAMETHASONE SODIUM PHOSPHATE 10 MG/ML
INJECTION, SOLUTION INTRAMUSCULAR; INTRAVENOUS PRN
Status: DISCONTINUED | OUTPATIENT
Start: 2020-08-13 | End: 2020-08-13 | Stop reason: SDUPTHER

## 2020-08-13 RX ORDER — BUPIVACAINE HYDROCHLORIDE AND EPINEPHRINE 2.5; 5 MG/ML; UG/ML
INJECTION, SOLUTION EPIDURAL; INFILTRATION; INTRACAUDAL; PERINEURAL PRN
Status: DISCONTINUED | OUTPATIENT
Start: 2020-08-13 | End: 2020-08-13 | Stop reason: ALTCHOICE

## 2020-08-13 RX ORDER — MIDAZOLAM HYDROCHLORIDE 1 MG/ML
INJECTION INTRAMUSCULAR; INTRAVENOUS
Status: DISCONTINUED
Start: 2020-08-13 | End: 2020-08-14

## 2020-08-13 RX ORDER — METOPROLOL TARTRATE 5 MG/5ML
INJECTION INTRAVENOUS PRN
Status: DISCONTINUED | OUTPATIENT
Start: 2020-08-13 | End: 2020-08-13 | Stop reason: SDUPTHER

## 2020-08-13 RX ADMIN — FENTANYL CITRATE 150 MCG: 50 INJECTION, SOLUTION INTRAMUSCULAR; INTRAVENOUS at 14:57

## 2020-08-13 RX ADMIN — PROCHLORPERAZINE EDISYLATE 10 MG: 5 INJECTION INTRAMUSCULAR; INTRAVENOUS at 05:00

## 2020-08-13 RX ADMIN — METOPROLOL TARTRATE 5 MG: 5 INJECTION, SOLUTION INTRAVENOUS at 08:28

## 2020-08-13 RX ADMIN — Medication 100 MG: at 14:57

## 2020-08-13 RX ADMIN — PROPOFOL 150 MG: 10 INJECTION, EMULSION INTRAVENOUS at 14:57

## 2020-08-13 RX ADMIN — SODIUM CHLORIDE, POTASSIUM CHLORIDE, SODIUM LACTATE AND CALCIUM CHLORIDE: 600; 310; 30; 20 INJECTION, SOLUTION INTRAVENOUS at 15:14

## 2020-08-13 RX ADMIN — CEFAZOLIN 1 G: 1 INJECTION, POWDER, FOR SOLUTION INTRAMUSCULAR; INTRAVENOUS at 14:57

## 2020-08-13 RX ADMIN — SODIUM CHLORIDE: 9 INJECTION, SOLUTION INTRAVENOUS at 14:53

## 2020-08-13 RX ADMIN — ONDANSETRON 4 MG: 2 INJECTION INTRAMUSCULAR; INTRAVENOUS at 15:32

## 2020-08-13 RX ADMIN — METOPROLOL TARTRATE 5 MG: 5 INJECTION, SOLUTION INTRAVENOUS at 01:44

## 2020-08-13 RX ADMIN — METOPROLOL TARTRATE 5 MG: 5 INJECTION, SOLUTION INTRAVENOUS at 20:29

## 2020-08-13 RX ADMIN — METOPROLOL TARTRATE 5 MG: 5 INJECTION, SOLUTION INTRAVENOUS at 15:03

## 2020-08-13 RX ADMIN — LIDOCAINE HYDROCHLORIDE 60 MG: 20 INJECTION, SOLUTION INTRAVENOUS at 14:57

## 2020-08-13 RX ADMIN — POTASSIUM CHLORIDE AND SODIUM CHLORIDE: 900; 300 INJECTION, SOLUTION INTRAVENOUS at 05:01

## 2020-08-13 RX ADMIN — ONDANSETRON 4 MG: 2 INJECTION INTRAMUSCULAR; INTRAVENOUS at 08:28

## 2020-08-13 RX ADMIN — ONDANSETRON 4 MG: 2 INJECTION INTRAMUSCULAR; INTRAVENOUS at 01:58

## 2020-08-13 RX ADMIN — ROCURONIUM BROMIDE 50 MG: 10 SOLUTION INTRAVENOUS at 15:05

## 2020-08-13 RX ADMIN — SODIUM CHLORIDE, PRESERVATIVE FREE 10 ML: 5 INJECTION INTRAVENOUS at 20:29

## 2020-08-13 RX ADMIN — FENTANYL CITRATE 50 MCG: 50 INJECTION, SOLUTION INTRAMUSCULAR; INTRAVENOUS at 15:34

## 2020-08-13 RX ADMIN — PANTOPRAZOLE SODIUM 40 MG: 40 INJECTION, POWDER, FOR SOLUTION INTRAVENOUS at 08:28

## 2020-08-13 RX ADMIN — DEXAMETHASONE SODIUM PHOSPHATE 10 MG: 10 INJECTION, SOLUTION INTRAMUSCULAR; INTRAVENOUS at 14:57

## 2020-08-13 RX ADMIN — SUGAMMADEX 300 MG: 100 INJECTION, SOLUTION INTRAVENOUS at 15:36

## 2020-08-13 ASSESSMENT — PULMONARY FUNCTION TESTS
PIF_VALUE: 27
PIF_VALUE: 17
PIF_VALUE: 23
PIF_VALUE: 1
PIF_VALUE: 16
PIF_VALUE: 19
PIF_VALUE: 29
PIF_VALUE: 22
PIF_VALUE: 18
PIF_VALUE: 28
PIF_VALUE: 20
PIF_VALUE: 0
PIF_VALUE: 22
PIF_VALUE: 27
PIF_VALUE: 27
PIF_VALUE: 3
PIF_VALUE: 14
PIF_VALUE: 16
PIF_VALUE: 17
PIF_VALUE: 0
PIF_VALUE: 23
PIF_VALUE: 2
PIF_VALUE: 24
PIF_VALUE: 16
PIF_VALUE: 17
PIF_VALUE: 18
PIF_VALUE: 19
PIF_VALUE: 27
PIF_VALUE: 13
PIF_VALUE: 2
PIF_VALUE: 27
PIF_VALUE: 13
PIF_VALUE: 19
PIF_VALUE: 28
PIF_VALUE: 16
PIF_VALUE: 17
PIF_VALUE: 17
PIF_VALUE: 23
PIF_VALUE: 1
PIF_VALUE: 27
PIF_VALUE: 4
PIF_VALUE: 18
PIF_VALUE: 27

## 2020-08-13 ASSESSMENT — PAIN SCALES - WONG BAKER: WONGBAKER_NUMERICALRESPONSE: 0

## 2020-08-13 ASSESSMENT — PAIN SCALES - GENERAL
PAINLEVEL_OUTOF10: 0

## 2020-08-13 NOTE — PROGRESS NOTES
Persistent obstruction.    In sinus rhythm  For OR today - dx lap, possible bowel resection    Gali Carr MD

## 2020-08-13 NOTE — PLAN OF CARE
Problem: Bowel/Gastric:  Goal: Ability to achieve a regular elimination pattern will improve  Description: Ability to achieve a regular elimination pattern will improve  8/13/2020 1411 by Alberto Reynolds RN  Outcome: Met This Shift  8/13/2020 0226 by Hong Olivia RN  Outcome: Met This Shift     Problem:  Bowel/Gastric:  Goal: Control of bowel function will improve  Description: Control of bowel function will improve  8/13/2020 0226 by Hong Olivia RN  Outcome: Met This Shift     Problem: Nutritional:  Goal: Maintenance of adequate nutrition will improve  Description: Maintenance of adequate nutrition will improve  8/13/2020 1411 by Alberto Reynolds RN  Outcome: Met This Shift  8/13/2020 0226 by Hong Olivia RN  Outcome: Met This Shift

## 2020-08-13 NOTE — ANESTHESIA POSTPROCEDURE EVALUATION
Department of Anesthesiology  Postprocedure Note    Patient: Tatiana Maza  MRN: [id number]  YOB: 1950  Date of evaluation: 8/13/2020  Time:  4:21 PM     Procedure Summary     Date:  08/12/20 Room / Location:  [address] [place] [address] 06 / [address] [facility], [place]    Anesthesia Start:   Anesthesia Stop:      Procedure:  DIAGNOSTIC LAPAROSCOPY, POSS BOWEL RESECTION (N/A ) Diagnosis:  (SMALL BOWEL OBSTRUCTION)    Surgeon:  German Balderas MD Responsible Provider:  Anne Krishnan MD    Anesthesia Type:  general ASA Status:  4          Anesthesia Type: No value filed. Brett Phase I: Brett Score: 10    Brett Phase II:      Last vitals: Reviewed and per EMR flowsheets.        Anesthesia Post Evaluation    Patient location during evaluation: PACU  Patient participation: complete - patient participated  Level of consciousness: awake and alert  Airway patency: patent  Nausea & Vomiting: no vomiting and no nausea  Complications: no  Cardiovascular status: hemodynamically stable  Respiratory status: acceptable  Hydration status: stable

## 2020-08-13 NOTE — PROGRESS NOTES
INPATIENT CARDIOLOGY FOLLOW-UP    Name: Dwayne Levy    Age: 81 y.o. Date of Admission: 8/9/2020  3:58 PM    Date of Service: 8/13/2020    Primary Cardiologist: Dr David Marinelli    Chief Complaint: Follow-up for Afib RVR    Interim History:  Patient converted to sinus rhythm yesterday shortly after starting diltiazem infusion, and has been in sinus rhythm/sinus bradycardia since then. She is scheduled for surgery this morning. She feels better, denies chest pain, shortness of breath, palpitations. Abdominal pain is improved. Still some nausea.     Review of Systems:   Negative except as described above    Problem List:  Patient Active Problem List   Diagnosis    Spinal stenosis    Degeneration of lumbosacral intervertebral disc    Lumbago    Colonic diverticular abscess    CAD in native artery    S/P colon resection    Diverticulitis of large intestine with perforation and abscess without bleeding    Pelvic abscess in female    Colostomy reversal    SBO (small bowel obstruction) (HCC)       Current Medications:    Current Facility-Administered Medications:     sodium chloride (PF) 0.9 % injection 10 mL, 10 mL, Intravenous, Once, Stephany Navarrete MD    0.9% NaCl with KCl 40 mEq infusion, , Intravenous, Continuous, Avni Ortiz DO, Last Rate: 100 mL/hr at 08/13/20 0501    metoprolol (LOPRESSOR) injection 5 mg, 5 mg, Intravenous, Q6H, Daniel Green MD, 5 mg at 08/13/20 3188    prochlorperazine (COMPAZINE) injection 10 mg, 10 mg, Intravenous, Q6H PRN, Corine Dowling MD, 10 mg at 08/13/20 0500    trimethobenzamide (TIGAN) injection 200 mg, 200 mg, Intramuscular, Q6H PRN, Corine Dowling MD    acetaminophen (TYLENOL) tablet 650 mg, 650 mg, Oral, Q4H PRN, Stephany Navarrete MD    sodium chloride flush 0.9 % injection 10 mL, 10 mL, Intravenous, 2 times per day, Stephany Navarrete MD, 10 mL at 08/12/20 0819    sodium chloride flush 0.9 % injection 10 mL, 10 mL, Intravenous, PRN, Stephany Navarrete, MD    oxyCODONE (ROXICODONE) immediate release tablet 5 mg, 5 mg, Oral, Q4H PRN **OR** oxyCODONE (ROXICODONE) immediate release tablet 10 mg, 10 mg, Oral, Q4H PRN, Cherylene Collum, MD    morphine (PF) injection 2 mg, 2 mg, Intravenous, Q2H PRN, 2 mg at 08/10/20 1605 **OR** morphine injection 4 mg, 4 mg, Intravenous, Q2H PRN, Cherylene Collum, MD    ondansetron (ZOFRAN) injection 4 mg, 4 mg, Intravenous, Q6H PRN, Cherylene Collum, MD, 4 mg at 08/13/20 0828    pantoprazole (PROTONIX) injection 40 mg, 40 mg, Intravenous, Daily, 40 mg at 08/13/20 0828 **AND** sodium chloride (PF) 0.9 % injection 10 mL, 10 mL, Intravenous, Daily, Cherylene Collum, MD, 10 mL at 08/12/20 0820    Physical Exam:  /66   Pulse 60   Temp 98.1 °F (36.7 °C) (Oral)   Resp 16   Ht 5' 3\" (1.6 m)   Wt 128 lb (58.1 kg)   SpO2 92%   BMI 22.67 kg/m²   Wt Readings from Last 3 Encounters:   08/09/20 128 lb (58.1 kg)   07/04/20 128 lb (58.1 kg)   03/12/20 128 lb (58.1 kg)     Appearance: Awake, alert, no acute respiratory distress  Skin: Intact, no rash  Head: Normocephalic, atraumatic  Eyes: EOMI, no conjunctival erythema  ENMT: No pharyngeal erythema, MMM, no rhinorrhea. NG tube in place   neck: Supple, no elevated JVP, no carotid bruits  Lungs: Clear to auscultation bilaterally. No wheezes, rales, or rhonchi.   Cardiac: PMI nondisplaced, Regular rhythm with a normal rate, S1 & S2 normal, no murmurs  Abdomen: Soft, nontender, +bowel sounds  Extremities: Moves all extremities x 4, no lower extremity edema  Neurologic: No focal motor deficits apparent, normal mood and affect  Peripheral Pulses: Intact posterior tibial pulses bilaterally    Intake/Output:    Intake/Output Summary (Last 24 hours) at 8/13/2020 0859  Last data filed at 8/13/2020 0816  Gross per 24 hour   Intake 1202 ml   Output 1000 ml   Net 202 ml     I/O this shift:  In: -   Out: 1000 [Emesis/NG output:1000]    Laboratory Tests:  Recent Labs     08/11/20  7565 recognition software. Every effort was made to ensure accuracy; however, inadvertent computerized transcription errors may be present.

## 2020-08-13 NOTE — PLAN OF CARE
Problem:  Bowel/Gastric:  Goal: Ability to achieve a regular elimination pattern will improve  Outcome: Met This Shift  Goal: Control of bowel function will improve  Outcome: Met This Shift

## 2020-08-13 NOTE — CARE COORDINATION
CM Note: 8/13/2020 at 2:56pm: NO COVID TESTING. Pt currently in surgery. CM / SW to follow up with pt for transition of care post op.  Brenda Roberson RN

## 2020-08-13 NOTE — PROGRESS NOTES
Suction was suspended 30 minutes ago. Still no drainage in canister or tubing that would fluccuate the drainage. Ng in left naris is taped at the 10cm laith. No c/o nausea or discomfort at this time.

## 2020-08-13 NOTE — ANESTHESIA PRE PROCEDURE
Intravenous Q6H Christian Nichols MD   5 mg at 08/13/20 4728    prochlorperazine (COMPAZINE) injection 10 mg  10 mg Intravenous Q6H PRN Cheryle Penner, MD   10 mg at 08/13/20 0500    trimethobenzamide (TIGAN) injection 200 mg  200 mg Intramuscular Q6H PRN Cheryle Penner, MD        acetaminophen (TYLENOL) tablet 650 mg  650 mg Oral Q4H PRN Meghana De Leon MD        sodium chloride flush 0.9 % injection 10 mL  10 mL Intravenous 2 times per day Meghana De Leon MD   10 mL at 08/12/20 0819    sodium chloride flush 0.9 % injection 10 mL  10 mL Intravenous PRN Meghana De Leon MD        oxyCODONE (ROXICODONE) immediate release tablet 5 mg  5 mg Oral Q4H PRN Meghana De Leon MD        Or   Dennis oxyCODONE (ROXICODONE) immediate release tablet 10 mg  10 mg Oral Q4H PRN Meghana De Leon MD        morphine (PF) injection 2 mg  2 mg Intravenous Q2H PRN Meghana De Leon MD   2 mg at 08/10/20 1605    Or    morphine injection 4 mg  4 mg Intravenous Q2H PRN Meghana De Leon MD        ondansetron (ZOFRAN) injection 4 mg  4 mg Intravenous Q6H PRN Meghana De Leon MD   4 mg at 08/13/20 1664    pantoprazole (PROTONIX) injection 40 mg  40 mg Intravenous Daily Meghana De Leon MD   40 mg at 08/13/20 3406    And    sodium chloride (PF) 0.9 % injection 10 mL  10 mL Intravenous Daily Meghana De Leon MD   10 mL at 08/12/20 0820       Allergies:  No Known Allergies    Problem List:    Patient Active Problem List   Diagnosis Code    Spinal stenosis M48.00    Degeneration of lumbosacral intervertebral disc M51.37    Lumbago M54.5    Colonic diverticular abscess K57.20    CAD in native artery I25.10    S/P colon resection Z90.49    Diverticulitis of large intestine with perforation and abscess without bleeding K57.20    Pelvic abscess in female N73.9    Colostomy reversal K57.92    SBO (small bowel obstruction) ([address] K56.609       Past Medical History:        Diagnosis Date    CAD in native artery 8/9/2018    Di[facility]     Diverticulitis 08/03/2018    HIGH CHOLESTEROL     History of atrial fibrillation 08/2018    with episode of diverticulosis    History of stress test 08/07/2018    Lexiscan stress test    Hypertension     Thyroid disease        Past Surgical History:        Procedure Laterality Date    COLONOSCOPY      COLONOSCOPY N/A 4/8/2019    COLONOSCOPY performed by Caleb Emmanuel MD at [address] [facility] CATH LAB PROCEDURE  08/09/2018    NERVE BLOCK  12 27 2011    NERVE BLOCK  1/24/12    lumbar epidural    NERVE BLOCK  02/21/12    lumbar epidural with flouro    [place] COLONOSCOPY FLX DX W/COLLJ SPEC WHEN PFRMD N/A 10/4/2018    COLONOSCOPY performed by Caleb Emmanuel MD at [facility], PARTIAL, Laura Aguirre N/A 11/13/2018    LAPAROSCOPIC ROBOT XI ASSISTED SIGMOID COLON RESECTION WITH OSTOMY performed by Caleb Emmanuel MD at [address] [place] [address] N/A 4/9/2019    COLOSTOMY CLOSURE LAPAROSCOPIC ROBOTIC XI performed by Caleb Emmanuel MD at [address] History:    Social History     Tobacco Use    Smoking status: Current Every Day Smoker     Packs/day: 0.50     Types: Cigarettes    Smokeless tobacco: Never Used   Substance Use Topics    Alcohol use: No     Comment: 5 cups of coffee decaf/regular                                Ready to quit: Not Answered  Counseling given: Not Answered      Vital Signs (Current): There were no vitals filed for this visit.                                            BP Readings from Last 3 Encounters:   08/13/20 132/66   07/04/20 (!) 160/89   03/12/20 122/82       NPO Status:                                                                                 BMI:   Wt Readings from Last 3 Encounters:   08/09/20 128 lb (58.1 kg)   07/04/20 128 lb (58.1 kg)   03/12/20 128 lb (58.1 kg)     There is no height or weight on file to calculate BMI.    CBC:   Lab Results   Component Value Date WBC 8.4 08/13/2020    RBC 4.12 08/13/2020    HGB 12.9 08/13/2020    HCT 38.3 08/13/2020    MCV 93.0 08/13/2020    RDW 12.5 08/13/2020     08/13/2020       CMP:   Lab Results   Component Value Date     08/13/2020    K 4.6 08/13/2020     08/13/2020    CO2 23 08/13/2020    BUN 13 08/13/2020    CREATININE 0.5 08/13/2020    GFRAA >60 08/13/2020    LABGLOM >60 08/13/2020    GLUCOSE 93 08/13/2020    GLUCOSE 94 12/13/2011    PROT 7.2 08/09/2020    CALCIUM 7.9 08/13/2020    BILITOT 0.6 08/09/2020    ALKPHOS 90 08/09/2020    AST 19 08/09/2020    ALT 11 08/09/2020       POC Tests: No results for input(s): POCGLU, POCNA, POCK, POCCL, POCBUN, POCHEMO, POCHCT in the last 72 hours. Coags:   Lab Results   Component Value Date    PROTIME 12.6 08/03/2018    INR 1.1 08/03/2018       HCG (If Applicable): No results found for: PREGTESTUR, PREGSERUM, HCG, HCGQUANT     ABGs: No results found for: PHART, PO2ART, GIX0WWE, ECS1LZL, BEART, G9UUXYON     Type & Screen (If Applicable):  No results found for: LABABO, LABRH    Drug/Infectious Status (If Applicable):  No results found for: HIV, HEPCAB    COVID-19 Screening (If Applicable): No results found for: COVID19      Anesthesia Evaluation  Patient summary reviewed  Airway: Mallampati: II  TM distance: >3 FB   Neck ROM: full  Mouth opening: > = 3 FB Dental:    (+) edentulous      Pulmonary:Negative Pulmonary ROS breath sounds clear to auscultation                             Cardiovascular:  Exercise tolerance: good (>4 METS),   (+) hypertension: moderate, CAD: obstructive, dysrhythmias: atrial fibrillation, CHF: diastolic, hyperlipidemia      ECG reviewed  Rhythm: regular  Rate: normal  Echocardiogram reviewed    Cleared by cardiology     Beta Blocker:  Dose within 24 Hrs      ROS comment: EKG: Normal Sinus Rhythm 66. ECHO:  No previous echo for comparison. Technically adequate study. Proximal septal thickening.    Ejection fraction is visually estimated at 65%.   No regional wall motion abnormalities seen. E/A flow reversal noted. Suggestive of diastolic dysfunction. Physiologic and/or trace mitral regurgitation is present. Mild aortic stenosis is present. Physiologic and/or trace tricuspid regurgitation. RVSP is normal.     Neuro/Psych:   Negative Neuro/Psych ROS              GI/Hepatic/Renal:        (-) liver disease and no renal disease      ROS comment: Diverticulitis. Bowel obstruction. Endo/Other:    (+) hypothyroidism::., .                 Abdominal:         (-) obese     Vascular: negative vascular ROS. Anesthesia Plan      general     ASA 3     (Patient is sinus rhythm.  )  Induction: intravenous. BIS and arterial line  MIPS: Postoperative opioids intended and Prophylactic antiemetics administered. Anesthetic plan and risks discussed with patient. Plan discussed with CRNA.                   King Kidd MD   8/13/2020      King Kidd MD  Staff Anesthesiologist  9:37 AM

## 2020-08-13 NOTE — PROGRESS NOTES
Internal Medicine Progress Note    [facility]    Ludwin Espinoza. Karen Hurd, LUCIAOFRIEDA Johns D.O., HECTOR Olmos, MSN, APRN, NP-C  Lisa Anderson. Hayley Perez, MSN, APRN-CNP     Primary Care Physician: Steph Rdz DO   Admitting Physician:  Estee Colunga MD  Admission date and time: 8/9/2020  3:58 PM    Room:  OR POOL/NONE  Admitting diagnosis: SBO (small bowel obstruction) [facility]) [K56.609]    Patient Name: Aminah Doherty  MRN: [id number]    Date of Service: 8/13/2020     Subjective:  Ridge Stevenson was seen s/p surgical procedure. Pain is currently well controlled and she is resting comfortably. No nausea, chest pain or shortness of breath. Admits to mild irritation from the NGT-left nostril. No family present. Review of System:   Constitutional:   Denies fever or chills, weight loss or gain, fatigue or malaise. Tired at present due to anesthesia   HEENT:   Denies ear pain, sore throat, sinus or eye problems. Admits to irritation associated with the NG tube. Cardiovascular:   Currently denies palpitations. No chest pain  Respiratory:   Denies shortness of breath, coughing, sputum production, hemoptysis, or wheezing. Gastrointestinal:   Mild surgical abdominal pain at present -very well controlled. Genitourinary:    Denies any urgency, frequency, hematuria. Voiding  without difficulty. Extremities:   Denies lower extremity swelling, edema or cyanosis. Neurology:    Denies any headache or focal neurological deficits, Denies generalized weakness or memory difficulty. Psch:   Denies being anxious or depressed. Musculoskeletal:    Denies  myalgias, joint complaints or back pain. Integumentary:   Denies any rashes, ulcers, or excoriations. Denies bruising. Surgical changes of the abdomen. Hematologic/Lymphatic:  Denies bruising or bleeding.     Physical Exam:  I/O this shift:  In: 1300 [I.V.:1300]  Out: 325 [Emesis/NG Q6H    sodium chloride flush  10 mL Intravenous 2 times per day    pantoprazole  40 mg Intravenous Daily    And    sodium chloride (PF)  10 mL Intravenous Daily     Continuous Infusions:   0.9% NaCl with KCl 40 mEq Stopped (08/13/20 1011)       Objective Data:  CBC with Differential:    Lab Results   Component Value Date    WBC 8.4 08/13/2020    RBC 4.12 08/13/2020    HGB 12.9 08/13/2020    HCT 38.3 08/13/2020     08/13/2020    MCV 93.0 08/13/2020    MCH 31.3 08/13/2020    MCHC 33.7 08/13/2020    RDW 12.5 08/13/2020    SEGSPCT 64 10/31/2013    METASPCT 0.9 11/13/2018    LYMPHOPCT 11.5 08/13/2020    MONOPCT 13.3 08/13/2020    BASOPCT 0.2 08/13/2020    MONOSABS 1.12 08/13/2020    LYMPHSABS 0.97 08/13/2020    EOSABS 0.02 08/13/2020    BASOSABS 0.02 08/13/2020     BMP:    Lab Results   Component Value Date     08/13/2020    K 4.6 08/13/2020     08/13/2020    CO2 23 08/13/2020    BUN 13 08/13/2020    LABALBU 4.0 08/09/2020    LABALBU 4.3 12/13/2011    CREATININE 0.5 08/13/2020    CALCIUM 7.9 08/13/2020    GFRAA >60 08/13/2020    LABGLOM >60 08/13/2020    GLUCOSE 93 08/13/2020    GLUCOSE 94 12/13/2011       Assessment:  1. Small bowel obstruction in the setting of multiple abdominal surgeries recently stemming from perforated diverticulitis requiring temporary ostomy status post reversal. Patient underwent laparoscopic surgical intervention 8/13/2020 performed by Dr. Fidel Kuhn. 2. Atrial fibrillation with rapid ventricular response  3. Essential hypertension  4. Hyperlipidemia  5. Hypothyroidism  6. Coronary artery disease  7. Chronic, compensated diastolic congestive heart failure  8. Mild aortic stenosis    Plan:   The patient was seen s/p abdominal surgery. Her pain is currently well controlled. We will monitor pain and treat accordingly. Diet as per general surgery. Continue IV fluids for gentle hydration.  Secondary to cardiac risk we will obtain one set of cardiac enzymes and an EKG in the morning. Activity as tolerated. Cardiac wise she did chemically cardiovert. Currently NSR. Monitor VS closely.  for discharge planning. More than 50% of my time was spent at the bedside counseling/coordinating care with the patient and/or family with face to face contact. This time was spent reviewing notes and laboratory data as well as instructing and counseling the patient. Time I spent with the family or surrogate(s) is included only if the patient was incapable of providing the necessary information or participating in medical decisions. I also discussed the differential diagnosis and all of the proposed management plans with the patient and individuals accompanying the patient. I am readily available for any further decision-making and intervention. The patient was seen, examined and then discussed with Dr. Justa Wilcox. Dia Jackson, WAQAR - CNP,   8/13/2020  4:11 PM         I saw and evaluated the patient. I agree with the findings and the plan of care as documented in Dia Jackson NP-C's  note.     Anastacio Palacios D.O., FACOI  4:30 PM  8/13/2020

## 2020-08-14 LAB
ANION GAP SERPL CALCULATED.3IONS-SCNC: 15 MMOL/L (ref 7–16)
BASOPHILS ABSOLUTE: 0.03 E9/L (ref 0–0.2)
BASOPHILS RELATIVE PERCENT: 0.3 % (ref 0–2)
BUN BLDV-MCNC: 17 MG/DL (ref 8–23)
CALCIUM SERPL-MCNC: 7.9 MG/DL (ref 8.6–10.2)
CHLORIDE BLD-SCNC: 103 MMOL/L (ref 98–107)
CK MB: 5.3 NG/ML (ref 0–4.3)
CO2: 20 MMOL/L (ref 22–29)
CREAT SERPL-MCNC: 0.6 MG/DL (ref 0.5–1)
EOSINOPHILS ABSOLUTE: 0 E9/L (ref 0.05–0.5)
EOSINOPHILS RELATIVE PERCENT: 0 % (ref 0–6)
GFR AFRICAN AMERICAN: >60
GFR NON-AFRICAN AMERICAN: >60 ML/MIN/1.73
GLUCOSE BLD-MCNC: 73 MG/DL (ref 74–99)
HCT VFR BLD CALC: 39.2 % (ref 34–48)
HEMOGLOBIN: 12.9 G/DL (ref 11.5–15.5)
IMMATURE GRANULOCYTES #: 0.08 E9/L
IMMATURE GRANULOCYTES %: 0.7 % (ref 0–5)
LYMPHOCYTES ABSOLUTE: 1.01 E9/L (ref 1.5–4)
LYMPHOCYTES RELATIVE PERCENT: 9.2 % (ref 20–42)
MCH RBC QN AUTO: 31.5 PG (ref 26–35)
MCHC RBC AUTO-ENTMCNC: 32.9 % (ref 32–34.5)
MCV RBC AUTO: 95.6 FL (ref 80–99.9)
MONOCYTES ABSOLUTE: 0.89 E9/L (ref 0.1–0.95)
MONOCYTES RELATIVE PERCENT: 8.1 % (ref 2–12)
NEUTROPHILS ABSOLUTE: 9.02 E9/L (ref 1.8–7.3)
NEUTROPHILS RELATIVE PERCENT: 81.7 % (ref 43–80)
PDW BLD-RTO: 12.6 FL (ref 11.5–15)
PLATELET # BLD: 187 E9/L (ref 130–450)
PMV BLD AUTO: 10.8 FL (ref 7–12)
POTASSIUM REFLEX MAGNESIUM: 4.4 MMOL/L (ref 3.5–5)
RBC # BLD: 4.1 E12/L (ref 3.5–5.5)
SODIUM BLD-SCNC: 138 MMOL/L (ref 132–146)
TOTAL CK: 60 U/L (ref 20–180)
TROPONIN: <0.01 NG/ML (ref 0–0.03)
WBC # BLD: 11 E9/L (ref 4.5–11.5)

## 2020-08-14 PROCEDURE — 6360000002 HC RX W HCPCS: Performed by: INTERNAL MEDICINE

## 2020-08-14 PROCEDURE — 80048 BASIC METABOLIC PNL TOTAL CA: CPT

## 2020-08-14 PROCEDURE — 84484 ASSAY OF TROPONIN QUANT: CPT

## 2020-08-14 PROCEDURE — 2500000003 HC RX 250 WO HCPCS: Performed by: INTERNAL MEDICINE

## 2020-08-14 PROCEDURE — C9113 INJ PANTOPRAZOLE SODIUM, VIA: HCPCS | Performed by: SURGERY

## 2020-08-14 PROCEDURE — 2580000003 HC RX 258: Performed by: SURGERY

## 2020-08-14 PROCEDURE — 82553 CREATINE MB FRACTION: CPT

## 2020-08-14 PROCEDURE — 2580000003 HC RX 258

## 2020-08-14 PROCEDURE — 99024 POSTOP FOLLOW-UP VISIT: CPT | Performed by: SURGERY

## 2020-08-14 PROCEDURE — 2060000000 HC ICU INTERMEDIATE R&B

## 2020-08-14 PROCEDURE — 82550 ASSAY OF CK (CPK): CPT

## 2020-08-14 PROCEDURE — 6360000002 HC RX W HCPCS: Performed by: SURGERY

## 2020-08-14 PROCEDURE — 99233 SBSQ HOSP IP/OBS HIGH 50: CPT | Performed by: INTERNAL MEDICINE

## 2020-08-14 PROCEDURE — 6370000000 HC RX 637 (ALT 250 FOR IP): Performed by: INTERNAL MEDICINE

## 2020-08-14 PROCEDURE — 36415 COLL VENOUS BLD VENIPUNCTURE: CPT

## 2020-08-14 PROCEDURE — 85025 COMPLETE CBC W/AUTO DIFF WBC: CPT

## 2020-08-14 RX ORDER — LEVOTHYROXINE SODIUM 0.1 MG/1
100 TABLET ORAL DAILY
Status: DISCONTINUED | OUTPATIENT
Start: 2020-08-14 | End: 2020-08-15 | Stop reason: HOSPADM

## 2020-08-14 RX ORDER — ROSUVASTATIN CALCIUM 10 MG/1
10 TABLET, COATED ORAL DAILY
Status: DISCONTINUED | OUTPATIENT
Start: 2020-08-14 | End: 2020-08-15 | Stop reason: HOSPADM

## 2020-08-14 RX ORDER — IBUPROFEN 800 MG/1
800 TABLET ORAL EVERY 6 HOURS PRN
Qty: 20 TABLET | Refills: 0 | Status: ON HOLD | OUTPATIENT
Start: 2020-08-14 | End: 2021-05-05 | Stop reason: ALTCHOICE

## 2020-08-14 RX ORDER — LISINOPRIL 10 MG/1
10 TABLET ORAL DAILY
Status: DISCONTINUED | OUTPATIENT
Start: 2020-08-14 | End: 2020-08-15 | Stop reason: HOSPADM

## 2020-08-14 RX ADMIN — METOPROLOL TARTRATE 25 MG: 25 TABLET, FILM COATED ORAL at 20:29

## 2020-08-14 RX ADMIN — METOPROLOL TARTRATE 5 MG: 5 INJECTION, SOLUTION INTRAVENOUS at 02:58

## 2020-08-14 RX ADMIN — SODIUM CHLORIDE, PRESERVATIVE FREE 10 ML: 5 INJECTION INTRAVENOUS at 08:59

## 2020-08-14 RX ADMIN — METOPROLOL TARTRATE 5 MG: 5 INJECTION, SOLUTION INTRAVENOUS at 15:34

## 2020-08-14 RX ADMIN — METOPROLOL TARTRATE 5 MG: 5 INJECTION, SOLUTION INTRAVENOUS at 08:58

## 2020-08-14 RX ADMIN — POTASSIUM CHLORIDE AND SODIUM CHLORIDE: 900; 300 INJECTION, SOLUTION INTRAVENOUS at 02:52

## 2020-08-14 RX ADMIN — LEVOTHYROXINE SODIUM 100 MCG: 0.1 TABLET ORAL at 20:31

## 2020-08-14 RX ADMIN — ROSUVASTATIN CALCIUM 10 MG: 10 TABLET, COATED ORAL at 20:30

## 2020-08-14 RX ADMIN — APIXABAN 5 MG: 5 TABLET, FILM COATED ORAL at 20:29

## 2020-08-14 RX ADMIN — LISINOPRIL 10 MG: 10 TABLET ORAL at 20:30

## 2020-08-14 RX ADMIN — PANTOPRAZOLE SODIUM 40 MG: 40 INJECTION, POWDER, FOR SOLUTION INTRAVENOUS at 08:58

## 2020-08-14 ASSESSMENT — PAIN SCALES - WONG BAKER
WONGBAKER_NUMERICALRESPONSE: 0
WONGBAKER_NUMERICALRESPONSE: 2
WONGBAKER_NUMERICALRESPONSE: 0

## 2020-08-14 ASSESSMENT — PAIN SCALES - GENERAL
PAINLEVEL_OUTOF10: 0
PAINLEVEL_OUTOF10: 2
PAINLEVEL_OUTOF10: 0

## 2020-08-14 ASSESSMENT — PAIN DESCRIPTION - LOCATION: LOCATION: ABDOMEN

## 2020-08-14 ASSESSMENT — PAIN DESCRIPTION - ORIENTATION: ORIENTATION: LOWER

## 2020-08-14 ASSESSMENT — PAIN DESCRIPTION - PAIN TYPE: TYPE: SURGICAL PAIN

## 2020-08-14 ASSESSMENT — PAIN DESCRIPTION - DESCRIPTORS: DESCRIPTORS: TENDER

## 2020-08-14 NOTE — PROGRESS NOTES
Surgery Progress Note            Chief complaint:   Patient Active Problem List   Diagnosis    Spinal stenosis    Degeneration of lumbosacral intervertebral disc    Lumbago    Colonic diverticular abscess    CAD in native artery    S/P colon resection    Diverticulitis of large intestine with perforation and abscess without bleeding    Pelvic abscess in female    Colostomy reversal    SBO (small bowel obstruction) ([address]       S: doing very well    O:   Vitals:    08/14/20 1215   BP: (!) 151/72   Pulse: 69   Resp: 18   Temp: 96.8 °F (36 °C)   SpO2: 95%       Intake/Output Summary (Last 24 hours) at 8/14/2020 1429  Last data filed at 8/14/2020 1419  Gross per 24 hour   Intake 2660 ml   Output 625 ml   Net 2035 ml           Labs:  Recent Labs     08/12/20  0543 08/13/20  0718 08/14/20  0740   WBC 7.7 8.4 11.0   HGB 14.5 12.9 12.9   HCT 43.2 38.3 39.2     Lab Results   Component Value Date    CREATININE 0.6 08/14/2020    BUN 17 08/14/2020     08/14/2020    K 4.4 08/14/2020     08/14/2020    CO2 20 (L) 08/14/2020     No results for input(s): LIPASE, AMYLASE in the last 72 hours.     Physical exam:   BP (!) 151/72   Pulse 69   Temp 96.8 °F (36 °C) (Axillary)   Resp 18   Ht 5' 3\" (1.6 m)   Wt 128 lb (58.1 kg)   SpO2 95%   BMI 22.67 kg/m²   General appearance: NAD  Head: NCAT  Neck: supple, no masses  Lungs: equal chest rise bilateral  Heart: S1S2 present  Abdomen: soft, nontender, nondistended,  Incisions clean and intact  Skin; no lesions  Gu: no cva tenderness  Extremities: extremities normal, atraumatic, no cyanosis or edema    A:  POD # 1 lap lysis of adhesions and release of sbo    P: general diet and home in am if tolerates    Chad Alvarez MD  8/14/2020

## 2020-08-14 NOTE — PROGRESS NOTES
Comprehensive Nutrition Assessment    Type and Reason for Visit:  Initial, RD Nutrition Re-Screen/LOS, NPO/Clear Liquid    Nutrition Recommendations/Plan: Will start Ensure Enlive BID to supplement intake. Nutrition Assessment:  Pt is nutritionally compromised 2/2 NPO status x 4 days, however improving w/ diet advancement this AM. Pt admitted with SBO, s/p diagnositic lap w/ lysis of adhesions and release of SBO. PMH of CHF. Will start ONS and monitor. Malnutrition Assessment:  Malnutrition Status: At risk for malnutrition (Comment)    Context:  Acute Illness     Findings of the 6 clinical characteristics of malnutrition:  Energy Intake:  7 - 50% or less of estimated energy requirements for 5 or more days  Weight Loss:  Unable to assess     Body Fat Loss:  No significant body fat loss     Muscle Mass Loss:  No significant muscle mass loss    Fluid Accumulation:  No significant fluid accumulation     Strength:  Not Performed    Estimated Daily Nutrient Needs:  Energy (kcal):  7325-2637; Weight Used for Energy Requirements:  Current     Protein (g):  75-90(1.2-1.4 gm/kg); Weight Used for Protein Requirements:  Current        Fluid (ml/day):  6285-4032(1 ml/kcal); Weight Used for Fluid Requirements:         Nutrition Related Findings:  abd soft tender, hypoactive BS, I/O WNL, no edema      Wounds:  Surgical Wound       Current Nutrition Therapies:    DIET GENERAL;     Anthropometric Measures:  · Height: 5' 3\" (160 cm)  · Current Body Weight: 137 lb 12.8 oz (62.5 kg)   · Admission Body Weight: 137 lb 12.8 oz (62.5 kg)(8/14 bed scale)    · Usual Body Weight: (128 lb stated wt hx, no actual EMR wt hx)     · Ideal Body Weight: 115 lbs; % Ideal Body Weight 119.8 %   · BMI: 24.4  · Adjusted Body Weight:  ; No Adjustment   · BMI Categories: Normal Weight (BMI 22.0 to 24.9) age over 72       Nutrition Diagnosis:   · Inadequate oral intake related to altered GI structure as evidenced by NPO or clear liquid status due to medical condition, wounds      Nutrition Interventions:   Food and/or Nutrient Delivery:  Continue Current Diet, Start Oral Nutrition Supplement(Will start Ensure Enlive BID to supplement intake)  Nutrition Education/Counseling:  Education not indicated, Counseling not indicated   Coordination of Nutrition Care:  Continued Inpatient Monitoring    Goals:  Pt is to consume >50% of most meals/ONS       Nutrition Monitoring and Evaluation:    Food/Nutrient Intake Outcomes:  Food and Nutrient Intake, Supplement Intake  Physical Signs/Symptoms Outcomes:  Biochemical Data, Fluid Status or Edema, Nutrition Focused Physical Findings, Skin, Weight     Discharge Planning:    Continue current diet     Electronically signed by Kelsie Hobson RD, LD on 8/14/20 at 2:55 PM EDT    Contact: [phone]

## 2020-08-14 NOTE — PROGRESS NOTES
Internal Medicine Progress Note    [facility]    Kedar Martinez. Arnel Coreas., LUCIAOAartiI. Althea Mustafa D.O., LUCIAOFRIEDA Galo D.O. Abbe Márquez, MSN, APRN, NP-C  Javier Grief. Tawanna Briggs, MSN, APRN-CNP     Primary Care Physician: Susanne Alvarez DO   Admitting Physician:  Jeanette Murrell MD  Admission date and time: 8/9/2020  3:58 PM    Room:  [address] [place] [address]  Admitting diagnosis: SBO (small bowel obstruction) [facility]) [K56.609]    Patient Name: Gertrudis Ortiz  MRN: [id number]    Date of Service: 8/14/2020     Subjective:  Bakari Nichols was seen postop day 1 status post diagnostic laparoscopy with lysis of adhesions and release of small bowel obstruction. Patient states she feels much better today. She does admit to irritation from the NG tube and is questioning when it can be removed. Patient is also very thirsty and inquiring when her n.p.o. will be discontinued. Admits to mild surgical pain of the abdomen. Denies any chest pain or shortness of breath. States he has been up ambulating. Denies flatus or bowel movement. No family present. Review of System:   Constitutional:   Denies fever or chills, weight loss or gain, fatigue or malaise. HEENT:   Denies ear pain, sore throat, sinus or eye problems. Admits to irritation associated with the NG tube. Cardiovascular:   Currently denies palpitations. No chest pain  Respiratory:   Denies shortness of breath, coughing, sputum production, hemoptysis, or wheezing. Gastrointestinal:   Mild surgical abdominal pain at present - well controlled. Genitourinary:    Denies any urgency, frequency, hematuria. Voiding  without difficulty. Extremities:   Denies lower extremity swelling, edema or cyanosis. Neurology:    Denies any headache or focal neurological deficits, Denies generalized weakness or memory difficulty. Psch:   Denies being anxious or depressed.   Musculoskeletal:    Denies  myalgias, joint complaints or back pain. Integumentary:   Denies any rashes, ulcers, or excoriations. Denies bruising. Surgical changes of the abdomen. Hematologic/Lymphatic:  Denies bruising or bleeding. Physical Exam:  I/O this shift: In: 0   Out: 300 [Emesis/NG output:300]    Intake/Output Summary (Last 24 hours) at 8/14/2020 1300  Last data filed at 8/14/2020 1224  Gross per 24 hour   Intake 2100 ml   Output 625 ml   Net 1475 ml   I/O last 3 completed shifts: In: 2100 [I.V.:2100]  Out: 1325 [Emesis/NG output:1300; Blood:25]  No data found. Vital Signs:   Blood pressure (!) 151/72, pulse 69, temperature 96.8 °F (36 °C), temperature source Axillary, resp. rate 18, height 5' 3\" (1.6 m), weight 128 lb (58.1 kg), SpO2 95 %, not currently breastfeeding. General appearance:  Alert, responsive, oriented to person, place, and time. Well preserved, alert, no distress. Head:  Normocephalic. No masses, lesions or tenderness. Eyes:  PERRLA. EOMI. Sclera clear. Impaired vision. ENT:  Ears normal. Mucosa normal.  NG tube is in place-left nostril. Missing teeth. Neck:    Supple. Trachea midline. No thyromegaly. No JVD. No bruits. Heart:    NSR. Regular rate and rhythm. Lungs:    Symmetrical. Clear to auscultation bilaterally. No wheezes. No rhonchi. No rales. Abdomen:   Soft. Mildly tender to palpation diffusely. Bowel sounds present. Surgical changes noted. Extremities:    Peripheral pulses present. No peripheral edema. No ulcers. No cyanosis. No clubbing. Neurologic:    Alert x 3. No focal deficit. Cranial nerves grossly intact. No focal weakness. Psych:   Behavior is normal. Mood appears normal. Speech is not rapid and/or pressured. Musculoskeletal:   Spine ROM normal. Muscular strength intact. Gait not assessed. Integumentary:  No rashes  Skin normal color and texture. Surgical changes of the abdomen.    Genitalia/Breast:  Deferred    Medication:  Scheduled Meds:   sodium chloride (PF)  10 mL Intravenous Once We will continue to monitor and treat accordingly. Patient has been getting up and ambulating in the moore. Encourage her to increase her activity. Currently no flatus or bowel movement. Await return of bowel function. Continue IV fluids for gentle hydration exercise tolerance of diet before discontinuing. Activity as tolerated. Patient remains in sinus rhythm. No further episodes of atrial fibrillation. As per cardiology apixaban may be resumed when okay with surgical team.  Currently on IV metoprolol as she has been n.p.o. Will adjust cardiac medications. Monitor VS closely.  for discharge planning. More than 50% of my time was spent at the bedside counseling/coordinating care with the patient and/or family with face to face contact. This time was spent reviewing notes and laboratory data as well as instructing and counseling the patient. Time I spent with the family or surrogate(s) is included only if the patient was incapable of providing the necessary information or participating in medical decisions. I also discussed the differential diagnosis and all of the proposed management plans with the patient and individuals accompanying the patient. I am readily available for any further decision-making and intervention.        Honorio Ludwig DO,   8/14/2020  1:00 PM

## 2020-08-14 NOTE — OP NOTE
[address]                                OPERATIVE REPORT    PATIENT NAME: Yoni Webster                      :        1950  MED REC NO:   [id number]                            ROOM:       0722  ACCOUNT NO:   [de-identified]                           ADMIT DATE: 2020  PROVIDER:     Leatha Porter MD    DATE OF PROCEDURE:  2020    PREOPERATIVE DIAGNOSIS:  Small bowel obstruction. POSTOPERATIVE DIAGNOSIS:  Small bowel obstruction. PROCEDURE PERFORMED:  Diagnostic laparoscopy with lysis of adhesions and  release of small bowel obstruction. SURGEON:  Leatha Porter MD    ASSISTANT:  Xavier Helms MD.    ANESTHESIA:  General.    COMPLICATIONS:  None. FLUIDS:  Crystalloid. BLOOD LOSS:  Minimal.    DISPOSITION:  Admitted to floor for routine postoperative care. SPECIMEN:  None. INDICATION:  This is a 82-year-old female with the aforementioned  diagnosis. I explained the risks, benefits, potential outcomes, and  alternative treatment to the aforementioned procedure and she agreed to  proceed, understanding those risks and potential outcomes. OPERATIVE PROCEDURE:  The patient was brought into the operative suite,  was placed under general anesthesia, bilateral PCDs placed, was given  preoperative antibiotics within 30 minutes of incision, was then prepped  and draped in normal sterile condition. Once this was done, local  anesthetic was infiltrated in the supraumbilical medial region. A 5-mm  incision was made. A Veress needle was passed in the peritoneum. CO2  was used to insufflate the abdomen to pressure of 15 mmHg. At this  time, the Veress needle was removed and a 5-mm trocar was put in its  place. Two additional 5s were placed in the left upper quadrant, one in  the right lower quadrant. We were able to run the small bowel from  start to finish.   There was a band across the omentum that had  obstructed it with transition point near the pelvis. This was cut with  a LigaSure, freeing up this. We then ran the small bowel from start to  finish. There was some that had herniated underneath the previous  colonic anastomosis, this was reduced. The bowel was then ran from  ligament to Treitz to fold of Noysea Rummer and there were no other  abnormalities appreciated. Once this was done, pneumoperitoneum and all  trocars were evacuated and the trocar sites were closed with 4-0  Monocryl and surgical glue.         Samantha Gonzales MD    D: 08/13/2020 15:35:02       T: 08/13/2020 20:29:15     [email or web address]  Job#: [id number]     Doc#: [id number]    CC:

## 2020-08-14 NOTE — PROGRESS NOTES
INPATIENT CARDIOLOGY FOLLOW-UP    Name: Felicita Payjerzy    Age: 81 y.o. Date of Admission: 8/9/2020  3:58 PM    Date of Service: 8/14/2020    Primary Cardiologist: Dr Papo Chris    Chief Complaint: Follow-up for Afib RVR    Interim History:  Underwent uneventful laparoscopy with lysis of adhesions and release of SBO yesterday with Dr. Carlos Hall. Remains in sinus rhythm. Feeling better today. She is walking around the unit. Denies chest pain shortness of breath or palpitations. No flatus yet.     Review of Systems:   Negative except as described above    Problem List:  Patient Active Problem List   Diagnosis    Spinal stenosis    Degeneration of lumbosacral intervertebral disc    Lumbago    Colonic diverticular abscess    CAD in native artery    S/P colon resection    Diverticulitis of large intestine with perforation and abscess without bleeding    Pelvic abscess in female    Colostomy reversal    SBO (small bowel obstruction) (HCC)       Current Medications:    Current Facility-Administered Medications:     sodium chloride (PF) 0.9 % injection 10 mL, 10 mL, Intravenous, Once, Ruddy España MD    0.9% NaCl with KCl 40 mEq infusion, , Intravenous, Continuous, Sunita Carter DO, Last Rate: 100 mL/hr at 08/14/20 0252    metoprolol (LOPRESSOR) injection 5 mg, 5 mg, Intravenous, Q6H, Ethan Granados MD, 5 mg at 08/14/20 0858    prochlorperazine (COMPAZINE) injection 10 mg, 10 mg, Intravenous, Q6H PRN, Raquel David MD, 10 mg at 08/13/20 0500    trimethobenzamide (TIGAN) injection 200 mg, 200 mg, Intramuscular, Q6H PRN, Raquel David MD    acetaminophen (TYLENOL) tablet 650 mg, 650 mg, Oral, Q4H PRN, Ruddy España MD    sodium chloride flush 0.9 % injection 10 mL, 10 mL, Intravenous, 2 times per day, Ruddy España MD, 10 mL at 08/14/20 0859    sodium chloride flush 0.9 % injection 10 mL, 10 mL, Intravenous, PRN, Ruddy España MD    oxyCODONE (ROXICODONE) immediate release tablet 5 mg, 5 mg, Oral, Q4H PRN **OR** oxyCODONE (ROXICODONE) immediate release tablet 10 mg, 10 mg, Oral, Q4H PRN, Aroldo Miller MD    morphine (PF) injection 2 mg, 2 mg, Intravenous, Q2H PRN, 2 mg at 08/10/20 1605 **OR** morphine injection 4 mg, 4 mg, Intravenous, Q2H PRN, Aroldo Miller MD    ondansetron (ZOFRAN) injection 4 mg, 4 mg, Intravenous, Q6H PRN, Aroldo Miller MD, 4 mg at 08/13/20 0828    pantoprazole (PROTONIX) injection 40 mg, 40 mg, Intravenous, Daily, 40 mg at 08/14/20 0858 **AND** sodium chloride (PF) 0.9 % injection 10 mL, 10 mL, Intravenous, Daily, Aroldo Miller MD, 10 mL at 08/14/20 0859    Physical Exam:  BP (!) 155/72   Pulse 69   Temp 98.2 °F (36.8 °C) (Temporal)   Resp 18   Ht 5' 3\" (1.6 m)   Wt 128 lb (58.1 kg)   SpO2 94%   BMI 22.67 kg/m²   Wt Readings from Last 3 Encounters:   08/09/20 128 lb (58.1 kg)   07/04/20 128 lb (58.1 kg)   03/12/20 128 lb (58.1 kg)     Appearance: Awake, alert, no acute respiratory distress  Skin: Intact, no rash  Head: Normocephalic, atraumatic  Eyes: EOMI, no conjunctival erythema  ENMT: No pharyngeal erythema, MMM, no rhinorrhea. NG tube in place   neck: Supple, no elevated JVP, no carotid bruits  Lungs: Clear to auscultation bilaterally. No wheezes, rales, or rhonchi. Cardiac: PMI nondisplaced, Regular rhythm with a normal rate, S1 & S2 normal, no murmurs  Abdomen: Soft, nontender, +bowel sounds  Extremities: Moves all extremities x 4, no lower extremity edema  Neurologic: No focal motor deficits apparent, normal mood and affect  Peripheral Pulses: Intact posterior tibial pulses bilaterally    Intake/Output:    Intake/Output Summary (Last 24 hours) at 8/14/2020 1048  Last data filed at 8/14/2020 0824  Gross per 24 hour   Intake 2100 ml   Output 525 ml   Net 1575 ml     I/O this shift:   In: 0   Out: 200 [Emesis/NG output:200]    Laboratory Tests:  Recent Labs     08/12/20  0543 08/12/20  1838 08/13/20  0718 08/14/20  0740    60s    Chest X-ray:     CT abdomen 8/11/2020  Impression:          Findings consistent with small-bowel obstruction.  A transition point is   suspected within the pelvis.  No obvious pneumatosis or free intraperitoneal   air.  The degree of small bowel distention is slightly improved compared with   08/09/2020.  An enteric catheter extends into the body of the stomach. Please note that one of the sideholes is within the distal esophagus. New, small to moderate volume of abdominal/pelvic ascites is likely reactive. New trace right pleural effusion with right basilar atelectasis. Heterogeneously calcified 2.3 cm nodule of the right adrenal gland, stable in   size. Echocardiogram:   ? 2D TTE Dr. Montrell Tobin 08/2018: Summary: No previous echo for comparison. Technically adequate study. Proximal septal thickening. Ejection fraction is visually estimated at 65%. No regional wall motion abnormalities seen. E/A flow reversal noted. Suggestive of diastolic dysfunction. Physiologic and/or trace mitral regurgitation is present. Mild aortic stenosis is present. Physiologic and/or trace tricuspid regurgitation. RVSP is normal    Stress test:      Cardiac catheterization:   ? Left heart catheterization Dr. Montrell Tobin 08/2018: ANGIOGRAPHIC FINDINGS: The left main coronary artery arising from the left sinus of Valsalva. It is a large vessel that has no significant disease. It trifurcates into left anterior descending artery, left circumflex artery and ramus intermedius artery. The left anterior descending artery is a very tortuous vessel, actually all of her vessels are very tortuous and the overlapped significantly, the LADhas no significant disease. It gives off a two large diagonal branches. The LAD and its diagonal branches have no significant disease. The second diagonal branch may be has like 30% disease.  The ramus intermedius artery also a tortuous vessel without any significantdisease.  The left circumflex artery is a large vessel that gives off a large OM branch and that has like 30% proximal disease. There was grade 3 left-to-right collaterals. The right coronary artery arising from the right sinus of Valsalva. It has proximal total occlusion. Recommendations: Aggressive CAD risk factor modification.    ----------------------------------------------------------------------------------------------------------------------------------------------------------------  IMPRESSION:  1. Paroxysmal atrial fibrillation in setting of SBO. Spontaneously converted to sinus on diltiazem infusion. AC with apixaban, on hold since admission due to n.p.o.  2. Small bowel obstruction, s/p diagnostic laparoscopy with lysis of adhesions and release of SBO 8/13/2020  3. Hypokalemia, hypomagnesemia, resolved  4. Hypophosphatemia  5. Coronary artery disease. Known  of the RCA with collaterals. Asymptomatic. No EKG changes. 6. Mild aortic stenosis  7. Chronic HFpEF, appears compensated  8. Comorbid disease: Hypertension, hyperlipidemia, hypothyroidism, history of perforated diverticulitis with colostomy, ultimately reversed, GERD, tobacco abuse    RECOMMENDATIONS:  Maintaining sinus rhythm and doing well after uneventful surgery.  Continue scheduled IV metoprolol 5 mg every 6 hours until able to take p.o.  Aggressive electrolyte replacement   Resume apixaban when okay from surgical standpoint   Thus far no evidence of postoperative A. Fib   She is asymptomatic, no need to check troponins   Continue medical treatment of CAD with aspirin/statin when able to take p.o.  Aggressive risk factor modification including smoking cessation   Further care per primary/general surgery   Will see as needed, please call with questions    Tereza Pisano MD  [facility]) Cardiology    NOTE: This report was transcribed using voice recognition software.  Every effort was made to ensure accuracy; however, inadvertent computerized transcription errors may be present.

## 2020-08-14 NOTE — PLAN OF CARE
Problem: Nutritional:  Goal: Maintenance of adequate nutrition will improve  Outcome: Met This Shift     Problem: Falls - Risk of:  Goal: Will remain free from falls  Outcome: Met This Shift     Problem: Falls - Risk of:  Goal: Absence of physical injury  Outcome: Met This Shift

## 2020-08-14 NOTE — PROGRESS NOTES
General Surgery Progress Note  [facility]    Patient's Name/Date of Birth: Shiraz Atwood / 1950    Date: August 14, 2020     Surgeon: Hazel Espitia MD    Chief Complaint: sbo    Patient Active Problem List   Diagnosis    Spinal stenosis    Degeneration of lumbosacral intervertebral disc    Lumbago    Colonic diverticular abscess    CAD in native artery    S/P colon resection    Diverticulitis of large intestine with perforation and abscess without bleeding    Pelvic abscess in female    Colostomy reversal    SBO (small bowel obstruction) (HCC)       Subjective: doing well. Tolerating full liquids. No flatus/bm    Objective:  BP (!) 151/72   Pulse 69   Temp 96.8 °F (36 °C) (Axillary)   Resp 18   Ht 5' 3\" (1.6 m)   Wt 128 lb (58.1 kg)   SpO2 95%   BMI 22.67 kg/m²   Labs:  Recent Labs     08/12/20  0543 08/13/20  0718 08/14/20  0740   WBC 7.7 8.4 11.0   HGB 14.5 12.9 12.9   HCT 43.2 38.3 39.2     Lab Results   Component Value Date    CREATININE 0.6 08/14/2020    BUN 17 08/14/2020     08/14/2020    K 4.4 08/14/2020     08/14/2020    CO2 20 (L) 08/14/2020     No results for input(s): LIPASE, AMYLASE in the last 72 hours.   CBC:   Lab Results   Component Value Date    WBC 11.0 08/14/2020    RBC 4.10 08/14/2020    HGB 12.9 08/14/2020    HCT 39.2 08/14/2020    MCV 95.6 08/14/2020    MCH 31.5 08/14/2020    MCHC 32.9 08/14/2020    RDW 12.6 08/14/2020     08/14/2020    MPV 10.8 08/14/2020     CMP:    Lab Results   Component Value Date     08/14/2020    K 4.4 08/14/2020     08/14/2020    CO2 20 08/14/2020    BUN 17 08/14/2020    CREATININE 0.6 08/14/2020    GFRAA >60 08/14/2020    LABGLOM >60 08/14/2020    GLUCOSE 73 08/14/2020    GLUCOSE 94 12/13/2011    PROT 7.2 08/09/2020    LABALBU 4.0 08/09/2020    LABALBU 4.3 12/13/2011    CALCIUM 7.9 08/14/2020    BILITOT 0.6 08/09/2020    ALKPHOS 90 08/09/2020    AST 19 08/09/2020    ALT 11 08/09/2020       General appearance:  NAD  Head: NCAT, PERRLA, EOMI, red conjunctiva  Neck: supple, no masses  Lungs: CTAB, equal chest rise bilateral  Heart: Reg rate  Abdomen: soft, nondistended, tender appropriately, incision C/D/I  Skin; no lesions  Gu: no cva tenderness  Extremities: extremities normal, atraumatic, no cyanosis or edema      Assessment/Plan:  Ashly Mccallum is a 81 y.o. female POD 1 lap BILL for sbo, doing well    Adv diet  Await bowel function  Pain control  D/c home once GI function returns    Karolina Tobin MD  8/14/2020  1:45 PM

## 2020-08-14 NOTE — PLAN OF CARE
Problem: Inadequate protein-energy intake  (NI-5.3)  Goal: Food and/or Nutrient Delivery  Description: Individualized approach for food/nutrient provision.   Outcome: Met This Shift

## 2020-08-14 NOTE — CARE COORDINATION
SOCIAL WORK / DISCHARGE PLANNING:  COVID testing not done. Sw met with pt at bedside, POD #1, pt ambulating ad yisel in hallway. Resides alone, independent. Denies any dc needs at this time. Son will provide home going transport.                      Electronically signed by ARTI Acevedo on 8/14/2020 at 2:03 PM

## 2020-08-15 VITALS
WEIGHT: 137.7 LBS | HEIGHT: 63 IN | SYSTOLIC BLOOD PRESSURE: 142 MMHG | HEART RATE: 57 BPM | TEMPERATURE: 97.1 F | RESPIRATION RATE: 18 BRPM | DIASTOLIC BLOOD PRESSURE: 74 MMHG | BODY MASS INDEX: 24.4 KG/M2 | OXYGEN SATURATION: 95 %

## 2020-08-15 LAB
ANION GAP SERPL CALCULATED.3IONS-SCNC: 9 MMOL/L (ref 7–16)
BASOPHILS ABSOLUTE: 0.11 E9/L (ref 0–0.2)
BASOPHILS RELATIVE PERCENT: 1 % (ref 0–2)
BUN BLDV-MCNC: 12 MG/DL (ref 8–23)
CALCIUM SERPL-MCNC: 8.1 MG/DL (ref 8.6–10.2)
CHLORIDE BLD-SCNC: 100 MMOL/L (ref 98–107)
CO2: 27 MMOL/L (ref 22–29)
CREAT SERPL-MCNC: 0.6 MG/DL (ref 0.5–1)
EOSINOPHILS ABSOLUTE: 0.16 E9/L (ref 0.05–0.5)
EOSINOPHILS RELATIVE PERCENT: 1.5 % (ref 0–6)
GFR AFRICAN AMERICAN: >60
GFR NON-AFRICAN AMERICAN: >60 ML/MIN/1.73
GLUCOSE BLD-MCNC: 83 MG/DL (ref 74–99)
HCT VFR BLD CALC: 41.3 % (ref 34–48)
HEMOGLOBIN: 13.5 G/DL (ref 11.5–15.5)
IMMATURE GRANULOCYTES #: 0.3 E9/L
IMMATURE GRANULOCYTES %: 2.8 % (ref 0–5)
LYMPHOCYTES ABSOLUTE: 2.1 E9/L (ref 1.5–4)
LYMPHOCYTES RELATIVE PERCENT: 19.7 % (ref 20–42)
MCH RBC QN AUTO: 31.3 PG (ref 26–35)
MCHC RBC AUTO-ENTMCNC: 32.7 % (ref 32–34.5)
MCV RBC AUTO: 95.6 FL (ref 80–99.9)
MONOCYTES ABSOLUTE: 1.13 E9/L (ref 0.1–0.95)
MONOCYTES RELATIVE PERCENT: 10.6 % (ref 2–12)
NEUTROPHILS ABSOLUTE: 6.85 E9/L (ref 1.8–7.3)
NEUTROPHILS RELATIVE PERCENT: 64.4 % (ref 43–80)
PDW BLD-RTO: 12.6 FL (ref 11.5–15)
PLATELET # BLD: 226 E9/L (ref 130–450)
PMV BLD AUTO: 10.3 FL (ref 7–12)
POTASSIUM REFLEX MAGNESIUM: 3.7 MMOL/L (ref 3.5–5)
RBC # BLD: 4.32 E12/L (ref 3.5–5.5)
SODIUM BLD-SCNC: 136 MMOL/L (ref 132–146)
WBC # BLD: 10.7 E9/L (ref 4.5–11.5)

## 2020-08-15 PROCEDURE — 36415 COLL VENOUS BLD VENIPUNCTURE: CPT

## 2020-08-15 PROCEDURE — 6370000000 HC RX 637 (ALT 250 FOR IP): Performed by: INTERNAL MEDICINE

## 2020-08-15 PROCEDURE — 85025 COMPLETE CBC W/AUTO DIFF WBC: CPT

## 2020-08-15 PROCEDURE — 80048 BASIC METABOLIC PNL TOTAL CA: CPT

## 2020-08-15 RX ADMIN — ROSUVASTATIN CALCIUM 10 MG: 10 TABLET, COATED ORAL at 08:57

## 2020-08-15 RX ADMIN — APIXABAN 5 MG: 5 TABLET, FILM COATED ORAL at 08:58

## 2020-08-15 RX ADMIN — METOPROLOL TARTRATE 25 MG: 25 TABLET, FILM COATED ORAL at 03:53

## 2020-08-15 RX ADMIN — LISINOPRIL 10 MG: 10 TABLET ORAL at 08:58

## 2020-08-15 ASSESSMENT — PAIN SCALES - GENERAL: PAINLEVEL_OUTOF10: 0

## 2020-08-15 NOTE — DISCHARGE SUMMARY
Internal Medicine Progress Note     [facility]     Hailey Díaz. Orlando Andrews., JOVANI.A.FOSTERO.I. Melisa Brantley D.O., MAGGIE Brownlee D.O. Karuna Rizo, MSN, APRN, NP-C  Clint Aldrich. Nadiya Soriano, MSN, APRN-CNP       Internal Medicine  Discharge Summary    NAME: Swati Jeff  :  1950  MRN:  [id number]  PCP:Dustin Garcia DO  ADMITTED: 2020      DISCHARGED: 8/15/20    ADMITTING PHYSICIAN: Marly att. providers found    CONSULTANT(S):   IP CONSULT TO GENERAL SURGERY  IP CONSULT TO HOSPITALIST  IP CONSULT TO CARDIOLOGY     ADMITTING DIAGNOSIS:   SBO (small bowel obstruction) ([facility] [address] [K56.609]     DISCHARGE DIAGNOSES:   1. Small bowel obstruction in the setting of multiple abdominal surgeries recently stemming from perforated diverticulitis requiring temporary ostomy status post reversal. Patient underwent laparoscopic surgical intervention 2020 performed by Dr. Christopher Borden. 2. Atrial fibrillation with rapid ventricular response-spontaneously converted to sinus rhythm. 3. Essential hypertension  4. Hyperlipidemia  5. Hypothyroidism  6. Coronary artery disease  7. Chronic, compensated diastolic congestive heart failure  8. Mild aortic stenosis    BRIEF HISTORY OF PRESENT ILLNESS:   Jessa Valencia is a 80-year-old female who presented to the emergency department for the evaluation of intractable abdominal pain. She has an extensive past medical history that includes perforated diverticulitis requiring temporary colostomy placement. Work-up in the emergency department revealed small bowel obstruction. During my examination today, she continues to complain of abdominal pain and distention. She has not passed flatus nor has she had a bowel movement in greater than 1 day. The surgical team has admitted the patient and have asked us to provide consultation for medical management.     LABS[de-identified]  Lab Results   Component Value Date    WBC 10.7 08/15/2020    HGB 13.5 08/15/2020    HCT 41.3 08/15/2020     08/15/2020     08/15/2020    K 3.7 08/15/2020     08/15/2020    CREATININE 0.6 08/15/2020    BUN 12 08/15/2020    CO2 27 08/15/2020    GLUCOSE 83 08/15/2020    ALT 11 08/09/2020    AST 19 08/09/2020    INR 1.1 08/03/2018     Lab Results   Component Value Date    INR 1.1 08/03/2018    PROTIME 12.6 (H) 08/03/2018      Lab Results   Component Value Date    TSH 2.750 08/12/2020     Lab Results   Component Value Date    TRIG 86 07/15/2019    TRIG 85 07/29/2016    TRIG 106 08/14/2015     Lab Results   Component Value Date    HDL 45 06/26/2020    HDL 49 07/15/2019    HDL 30 07/27/2018     Lab Results   Component Value Date    LDLCALC 72 06/26/2020    LDLCALC 71 07/15/2019    LDLCALC 48 07/27/2018     No results found for: LABA1C    IMAGING:  Ct Abdomen Pelvis Wo Contrast Additional Contrast? None    Result Date: 8/11/2020  EXAMINATION: CT OF THE ABDOMEN AND PELVIS WITHOUT CONTRAST 8/11/2020 3:35 pm TECHNIQUE: CT of the abdomen and pelvis was performed without the administration of intravenous contrast. Multiplanar reformatted images are provided for review. Dose modulation, iterative reconstruction, and/or weight based adjustment of the mA/kV was utilized to reduce the radiation dose to as low as reasonably achievable. COMPARISON: August 9, 2020 HISTORY: ORDERING SYSTEM PROVIDED HISTORY: sbo TECHNOLOGIST PROVIDED HISTORY: Reason for exam:->sbo Additional Contrast?->None FINDINGS: Lower Chest: There is a trace right pleural effusion. Mild dependent atelectasis is present at both lung bases. A 3 mm calcified granuloma in the right lower lobe is again noted. Heart size is within normal limits. Trace pericardial fluid is present. Coronary artery calcifications are present, potentially a marker for coronary artery disease. Organs: The gallbladder is distended without obvious cholelithiasis or gallbladder wall thickening. The unenhanced liver is normal in appearance. Trace perihepatic ascites is new from August 9. The spleen is normal in size. No obvious abnormality involving the left adrenal gland. There is a stable 2.3 cm peripherally calcified right adrenal nodule. A 2.6 cm simple appearing cyst arises from the upper pole of the right kidney. An additional simple appearing cyst measuring 3.6 cm arises from the lower pole of the right kidney. Several smaller cortically based hypodense lesions in the left kidney likely also reflective of cysts. No obvious abnormality involving the unenhanced pancreas. Divya Wyatt GI/Bowel: An enteric catheter extends below the diaphragm, its tip terminates in the body of the stomach. Please note that there is a side hole within the distal esophagus. There is persistent dilation of the stomach and multiple loops of small bowel throughout the abdomen and pelvis. A transition point is suspected within the pelvic midline (see key image). Small bowel loops distal to this transition site are decompressed. Postsurgical changes related to left hemicolectomy are again noted. An anastomosis of the colon in the deep pelvis appears to be patent. Portions the colon are decompressed, limiting assessment for mucosal based abnormalities. There is diverticulosis without evidence of diverticulitis. The appendix is normal. Pelvis: The urinary bladder is distended without obvious abnormality. The uterus is grossly unremarkable. The ovaries are not well visualized. A small to moderate amount of free fluid extends into the pelvis, likely reactive. No convincing evidence of pelvic lymphadenopathy. Peritoneum/Retroperitoneum: The abdominal aorta is normal in caliber but heavily calcified. Scattered visible but nonenlarged periaortic/retroperitoneal lymph nodes are present. Bones/Soft Tissues: Bones are diffusely osteopenic. No acute osseous abnormality or evidence of an aggressive osseous lesion.   Mild-to-moderate degenerative changes involve the lower lumbar spine. Findings consistent with small-bowel obstruction. A transition point is suspected within the pelvis. No obvious pneumatosis or free intraperitoneal air. The degree of small bowel distention is slightly improved compared with 08/09/2020. An enteric catheter extends into the body of the stomach. Please note that one of the sideholes is within the distal esophagus. New, small to moderate volume of abdominal/pelvic ascites is likely reactive. New trace right pleural effusion with right basilar atelectasis. Heterogeneously calcified 2.3 cm nodule of the right adrenal gland, stable in size. Xr Abdomen (kub) (single Ap View)    Result Date: 8/10/2020  EXAMINATION: ONE SUPINE XRAY VIEW(S) OF THE ABDOMEN 8/10/2020 9:10 am COMPARISON: CT abdomen pelvis from August 9, 2020 HISTORY: ORDERING SYSTEM PROVIDED HISTORY: progression of contrast TECHNOLOGIST PROVIDED HISTORY: Reason for exam:->progression of contrast FINDINGS: Dilute oral contrast material is seen throughout multiple dilated loops of small bowel. No definite colonic or all contrast is appreciated. Is no convincing evidence of pneumatosis or free intraperitoneal air on the supine view. Contrast material is noted within the distended urinary bladder. Both renal shadows are obscured by overlying stool and bowel gas. A moderate amount of stool is present in the ascending colon. Bones are osteopenic. A partially calcified right adrenal nodule is present. Oral contrast material is noted throughout multiple dilated loops of small bowel in the abdomen and pelvis. No definite colonic contrast material.     Ct Abdomen Pelvis W Iv Contrast Additional Contrast? Oral    Result Date: 8/10/2020  EXAMINATION: CT OF THE ABDOMEN AND PELVIS WITH CONTRAST 8/9/2020 5:56 pm TECHNIQUE: CT of the abdomen and pelvis was performed with the administration of intravenous contrast. Multiplanar reformatted images are provided for review.  Dose modulation, August 3, 2018 HISTORY: ORDERING SYSTEM PROVIDED HISTORY: RUQ pain, r/o cholecystitis TECHNOLOGIST PROVIDED HISTORY: Reason for exam:->RUQ pain, r/o cholecystitis Acute right upper quadrant abdominal pain. Initial encounter. FINDINGS: LIVER:  The liver demonstrates normal echogenicity without evidence of intrahepatic biliary ductal dilatation. BILIARY SYSTEM:  Gallbladder is unremarkable without evidence of pericholecystic fluid, wall thickening or stones. Negative sonographic Real's sign. Common bile duct is within normal limits measuring 0.4 cm. RIGHT KIDNEY: 10.0 x 4.6 x 4.4 cm. 2.2 x 2.2 x 1.9 cm upper pole cortical cyst.  Approximately 4.6 x 3.2 x 4.0 cm inferior pole mixed echogenicity mass with posterior acoustic enhancement, suspected to be a complicated cyst, particularly when correlated to prior CT of the abdomen and pelvis dated August 3, 2018. PANCREAS:  Visualized portions of the pancreas are unremarkable. OTHER: No evidence of right upper quadrant ascites. 1. No acute sonographic finding to account for patient's right upper quadrant abdominal pain. Specifically, there is no evidence of cholelithiasis or cholecystitis. 2. No evidence of intrahepatic ductal dilatation. 3. Incidental finding of renal cortical cyst in the upper and lower pole. Xr Abdomen For Ng/og/ne Tube Placement    Result Date: 8/10/2020  EXAMINATION: ONE SUPINE XRAY VIEW(S) OF THE ABDOMEN 8/10/2020 4:02 pm COMPARISON: 10/10/2020 HISTORY: ORDERING SYSTEM PROVIDED HISTORY: Ng placement TECHNOLOGIST PROVIDED HISTORY: Reason for exam:->Ng placement Portable? ->Yes FINDINGS: An AP, supine view of the lower chest and upper abdomen was performed. There is an NG tube with the tip in the proximal stomach. Multiple distended small bowel loops are again seen, suspicious for obstruction. Lung bases are clear. NG tube tip in the proximal stomach.        HOSPITAL COURSE:   Mylene improved dramatically throughout the course of hospitalization. She was initially admitted with symptoms of small bowel obstruction in the setting of multiple prior abdominal surgeries. During the course of her initial hospitalization she was n.p.o. with an NG tube in place. She developed atrial fibrillation with rapid ventricular response. With the administration of IV medications the patient's rate and rhythm were controlled adequately. Cardiology provided consultation for assistance in medication management as well. She tolerated laparoscopic surgical intervention by Dr. Svetlana Irvin and improved well. She had recovery of bowel function. She tolerated a diet. Anticoagulation was resumed and blood counts remained stable. Overall she improved to the point where she can be discharged home with close outpatient follow-up with primary care, cardiology and PCP. BRIEF PHYSICAL EXAMINATION AND LABORATORIES ON DAY OF DISCHARGE:  VITALS:  BP (!) 142/74   Pulse 57   Temp 97.1 °F (36.2 °C) (Temporal)   Resp 18   Ht 5' 3\" (1.6 m)   Wt 137 lb 11.2 oz (62.5 kg)   SpO2 95%   BMI 24.39 kg/m²     HEENT:  PERRLA. EOMI. Sclera clear. Buccal mucosa moist.    Neck:  Supple. Trachea midline. No thyromegaly. No JVD. No bruits. Heart:  Rhythm regular, rate controlled. Systolic murmur. Lungs:  Symmetrical. Clear to auscultation bilaterally. No wheezes. No rhonchi. No rales. Abdomen: Remarkable postoperative appearance of the abdomen with expected degree of tenderness to palpation. Soft and nondistended. Extremities:  Peripheral pulses present. No peripheral edema. No ulcers. Neurologic:  Alert x 3. No focal deficit. Cranial nerves grossly intact. Skin:  No petechia. No hemorrhage. No wounds. DISPOSITION:  The patient's condition is good. At this time the patient is without objective evidence of an acute process requiring continuing hospitalization or inpatient management. They are stable for discharge with outpatient follow-up.     I have spoken with the patient and discussed the results of the current hospitalization, in addition to providing specific details for the plan of care and counseling regarding the diagnosis and prognosis. The plan has been discussed in detail and they are aware of the specific conditions for emergent return, as well as the importance of follow-up. Their questions are answered at this time and they are agreeable with the plan for discharge to home    DISCHARGE MEDICATIONS:    Asael Read   Home Medication Instructions LACEY:[id number]    Printed on:08/15/20 1139   Medication Information                      amoxicillin-clavulanate (AUGMENTIN) 875-125 MG per tablet  Take 1 tablet by mouth 2 times daily             apixaban (ELIQUIS) 5 MG TABS tablet  Take 1 tablet by mouth 2 times daily             aspirin EC 81 MG EC tablet  Take 1 tablet by mouth daily             ibuprofen (ADVIL;MOTRIN) 800 MG tablet  Take 1 tablet by mouth every 6 hours as needed for Pain             levothyroxine (SYNTHROID) 100 MCG tablet  Take 100 mcg by mouth Daily             lisinopril (PRINIVIL;ZESTRIL) 10 MG tablet  Take 10 mg by mouth daily. metoprolol tartrate (LOPRESSOR) 25 MG tablet  Take 1 tablet by mouth every 8 hours             Omega-3 Fatty Acids (FISH OIL) 1000 MG CAPS  Take 1,000 mg by mouth daily             pantoprazole (PROTONIX) 40 MG tablet  Take 1 tablet by mouth daily             rosuvastatin (CRESTOR) 10 MG tablet  Take 10 mg by mouth daily. ZINC PO  Take 1 tablet by mouth daily                 FOLLOW UP/INSTRUCTIONS:  · This patient is instructed to follow-up with her primary care physician. · Patient is instructed to follow-up with the consults listed above as directed by them. · she is instructed to resume home medications and take new medications as indicated in the list above. · If the patient has a recurrence of symptoms, she is instructed to go to the ED.     Preparing for this patient's discharge, including paperwork, orders, instructions, and meeting with patient did require > 40 minutes.     WAQAR Giordano - CNP     8/15/2020  11:31 AM

## 2020-08-17 NOTE — CARE COORDINATION
8-17-Cm note: dc follow up call, all is well with her discharge, happy with her care here.  Electronically signed by Russ Farley RN on 8/17/2020 at 1:31 PM

## 2020-08-20 ENCOUNTER — OFFICE VISIT (OUTPATIENT)
Dept: SURGERY | Age: 70
End: 2020-08-20

## 2020-08-20 VITALS
TEMPERATURE: 97.8 F | BODY MASS INDEX: 24.27 KG/M2 | WEIGHT: 137 LBS | DIASTOLIC BLOOD PRESSURE: 86 MMHG | SYSTOLIC BLOOD PRESSURE: 128 MMHG | HEIGHT: 63 IN | HEART RATE: 61 BPM

## 2020-08-20 LAB
EKG ATRIAL RATE: 67 BPM
EKG P AXIS: 81 DEGREES
EKG P-R INTERVAL: 132 MS
EKG Q-T INTERVAL: 454 MS
EKG QRS DURATION: 86 MS
EKG QTC CALCULATION (BAZETT): 479 MS
EKG R AXIS: 63 DEGREES
EKG T AXIS: 44 DEGREES
EKG VENTRICULAR RATE: 67 BPM

## 2020-08-20 PROCEDURE — 99024 POSTOP FOLLOW-UP VISIT: CPT | Performed by: SURGERY

## 2020-08-20 RX ORDER — METHYLDOPA/HYDROCHLOROTHIAZIDE 250MG-25MG
125 TABLET ORAL DAILY
COMMUNITY

## 2020-08-20 RX ORDER — DOCUSATE SODIUM 100 MG/1
100 CAPSULE, LIQUID FILLED ORAL NIGHTLY
COMMUNITY

## 2020-08-28 ENCOUNTER — HOSPITAL ENCOUNTER (OUTPATIENT)
Dept: MAMMOGRAPHY | Age: 70
Discharge: HOME | End: 2020-08-30
Payer: COMMERCIAL

## 2020-09-04 ENCOUNTER — HOSPITAL ENCOUNTER (OUTPATIENT)
Dept: MAMMOGRAPHY | Age: 70
Discharge: HOME | End: 2020-09-06
Payer: COMMERCIAL

## 2020-09-04 PROCEDURE — 77080 DXA BONE DENSITY AXIAL: CPT

## 2020-12-05 ENCOUNTER — APPOINTMENT (OUTPATIENT)
Dept: GENERAL RADIOLOGY | Age: 70
End: 2020-12-05
Payer: COMMERCIAL

## 2020-12-05 ENCOUNTER — HOSPITAL ENCOUNTER (EMERGENCY)
Age: 70
Discharge: HOME | End: 2020-12-06
Attending: EMERGENCY MEDICINE
Payer: COMMERCIAL

## 2020-12-05 LAB
ANION GAP SERPL CALCULATED.3IONS-SCNC: 8 MMOL/L (ref 7–16)
BASOPHILS ABSOLUTE: 0.09 E9/L (ref 0–0.2)
BASOPHILS RELATIVE PERCENT: 0.8 % (ref 0–2)
BUN BLDV-MCNC: 8 MG/DL (ref 8–23)
CALCIUM SERPL-MCNC: 9.2 MG/DL (ref 8.6–10.2)
CHLORIDE BLD-SCNC: 107 MMOL/L (ref 98–107)
CO2: 26 MMOL/L (ref 22–29)
CREAT SERPL-MCNC: 0.6 MG/DL (ref 0.5–1)
EOSINOPHILS ABSOLUTE: 0.19 E9/L (ref 0.05–0.5)
EOSINOPHILS RELATIVE PERCENT: 1.7 % (ref 0–6)
GFR AFRICAN AMERICAN: >60
GFR NON-AFRICAN AMERICAN: >60 ML/MIN/1.73
GLUCOSE BLD-MCNC: 106 MG/DL (ref 74–99)
HCT VFR BLD CALC: 48.6 % (ref 34–48)
HEMOGLOBIN: 15.9 G/DL (ref 11.5–15.5)
IMMATURE GRANULOCYTES #: 0.04 E9/L
IMMATURE GRANULOCYTES %: 0.4 % (ref 0–5)
LYMPHOCYTES ABSOLUTE: 2.85 E9/L (ref 1.5–4)
LYMPHOCYTES RELATIVE PERCENT: 25.5 % (ref 20–42)
MAGNESIUM: 2.3 MG/DL (ref 1.6–2.6)
MCH RBC QN AUTO: 32.1 PG (ref 26–35)
MCHC RBC AUTO-ENTMCNC: 32.7 % (ref 32–34.5)
MCV RBC AUTO: 98.2 FL (ref 80–99.9)
MONOCYTES ABSOLUTE: 1.18 E9/L (ref 0.1–0.95)
MONOCYTES RELATIVE PERCENT: 10.6 % (ref 2–12)
NEUTROPHILS ABSOLUTE: 6.83 E9/L (ref 1.8–7.3)
NEUTROPHILS RELATIVE PERCENT: 61 % (ref 43–80)
PDW BLD-RTO: 12.8 FL (ref 11.5–15)
PLATELET # BLD: 220 E9/L (ref 130–450)
PMV BLD AUTO: 10.1 FL (ref 7–12)
POTASSIUM SERPL-SCNC: 3.4 MMOL/L (ref 3.5–5)
PRO-BNP: 508 PG/ML (ref 0–125)
RBC # BLD: 4.95 E12/L (ref 3.5–5.5)
REASON FOR REJECTION: NORMAL
REJECTED TEST: NORMAL
SODIUM BLD-SCNC: 141 MMOL/L (ref 132–146)
TROPONIN: <0.01 NG/ML (ref 0–0.03)
WBC # BLD: 11.2 E9/L (ref 4.5–11.5)

## 2020-12-05 PROCEDURE — 93005 ELECTROCARDIOGRAM TRACING: CPT | Performed by: EMERGENCY MEDICINE

## 2020-12-05 PROCEDURE — 96375 TX/PRO/DX INJ NEW DRUG ADDON: CPT

## 2020-12-05 PROCEDURE — 99285 EMERGENCY DEPT VISIT HI MDM: CPT

## 2020-12-05 PROCEDURE — 83880 ASSAY OF NATRIURETIC PEPTIDE: CPT

## 2020-12-05 PROCEDURE — 36415 COLL VENOUS BLD VENIPUNCTURE: CPT

## 2020-12-05 PROCEDURE — 6360000002 HC RX W HCPCS: Performed by: EMERGENCY MEDICINE

## 2020-12-05 PROCEDURE — 80048 BASIC METABOLIC PNL TOTAL CA: CPT

## 2020-12-05 PROCEDURE — 71045 X-RAY EXAM CHEST 1 VIEW: CPT

## 2020-12-05 PROCEDURE — 85025 COMPLETE CBC W/AUTO DIFF WBC: CPT

## 2020-12-05 PROCEDURE — 84484 ASSAY OF TROPONIN QUANT: CPT

## 2020-12-05 PROCEDURE — 96374 THER/PROPH/DIAG INJ IV PUSH: CPT

## 2020-12-05 PROCEDURE — 2500000003 HC RX 250 WO HCPCS: Performed by: EMERGENCY MEDICINE

## 2020-12-05 PROCEDURE — 83735 ASSAY OF MAGNESIUM: CPT

## 2020-12-05 RX ORDER — METOPROLOL TARTRATE 5 MG/5ML
5 INJECTION INTRAVENOUS ONCE
Status: DISCONTINUED | OUTPATIENT
Start: 2020-12-05 | End: 2020-12-05

## 2020-12-05 RX ORDER — METOPROLOL TARTRATE 5 MG/5ML
2.5 INJECTION INTRAVENOUS ONCE
Status: COMPLETED | OUTPATIENT
Start: 2020-12-05 | End: 2020-12-05

## 2020-12-05 RX ORDER — MIDAZOLAM HYDROCHLORIDE 1 MG/ML
1 INJECTION INTRAMUSCULAR; INTRAVENOUS ONCE
Status: COMPLETED | OUTPATIENT
Start: 2020-12-05 | End: 2020-12-05

## 2020-12-05 RX ORDER — SODIUM CHLORIDE 0.9 % (FLUSH) 0.9 %
SYRINGE (ML) INJECTION
Status: DISCONTINUED
Start: 2020-12-05 | End: 2020-12-06 | Stop reason: HOSPADM

## 2020-12-05 RX ORDER — FENTANYL CITRATE 50 UG/ML
50 INJECTION, SOLUTION INTRAMUSCULAR; INTRAVENOUS ONCE
Status: COMPLETED | OUTPATIENT
Start: 2020-12-05 | End: 2020-12-05

## 2020-12-05 RX ADMIN — FENTANYL CITRATE 50 MCG: 50 INJECTION, SOLUTION INTRAMUSCULAR; INTRAVENOUS at 23:01

## 2020-12-05 RX ADMIN — MIDAZOLAM 1 MG: 1 INJECTION INTRAMUSCULAR; INTRAVENOUS at 23:01

## 2020-12-05 RX ADMIN — METOROPROLOL TARTRATE 2.5 MG: 5 INJECTION, SOLUTION INTRAVENOUS at 22:17

## 2020-12-05 ASSESSMENT — ENCOUNTER SYMPTOMS
CHEST TIGHTNESS: 0
SHORTNESS OF BREATH: 0
NAUSEA: 0
VOMITING: 0
ABDOMINAL PAIN: 0

## 2020-12-05 ASSESSMENT — PAIN SCALES - GENERAL: PAINLEVEL_OUTOF10: 0

## 2020-12-06 VITALS
DIASTOLIC BLOOD PRESSURE: 74 MMHG | SYSTOLIC BLOOD PRESSURE: 108 MMHG | OXYGEN SATURATION: 99 % | TEMPERATURE: 97 F | RESPIRATION RATE: 20 BRPM | HEART RATE: 64 BPM

## 2020-12-06 LAB
EKG ATRIAL RATE: 133 BPM
EKG Q-T INTERVAL: 314 MS
EKG QRS DURATION: 86 MS
EKG QTC CALCULATION (BAZETT): 465 MS
EKG R AXIS: 72 DEGREES
EKG T AXIS: 56 DEGREES
EKG VENTRICULAR RATE: 132 BPM

## 2020-12-06 PROCEDURE — 93010 ELECTROCARDIOGRAM REPORT: CPT | Performed by: INTERNAL MEDICINE

## 2020-12-06 NOTE — ED PROVIDER NOTES
72-year-old female presenting with palpitations that began earlier. She missed her metoprolol dose yesterday. She has been in atrial fibrillation previously, it is not been permanent for her. She is not short of breath, has no chest pain, is not lightheaded or dizzy. This is began suddenly and has been persistent throughout the day. No dyspnea with exertion or or trouble breathing at this time. Timing is sudden, quality is irregular palpitations, moderate severity, duration of 1 day, associated with history of A. fib. She has been taking her Eliquis like she supposed to and did resume taking her metoprolol as well. Family History   Problem Relation Age of Onset    Cancer Other     Heart Disease Other     Cancer Mother         brain    Heart Attack Father         age 48     Heart Attack Sister         age 46      Past Surgical History:   Procedure Laterality Date    COLONOSCOPY      COLONOSCOPY N/A 4/8/2019    COLONOSCOPY performed by Channing Johns MD at [facility] CATH LAB PROCEDURE  08/09/2018    NERVE BLOCK  12 27 2011    NERVE BLOCK  1/24/12    lumbar epidural    NERVE BLOCK  02/21/12    lumbar epidural with flouro    OK COLONOSCOPY FLX DX W/COLLJ SPEC WHEN PFRMD N/A 10/4/2018    COLONOSCOPY performed by Channing Johns MD at [facility], PARTIAL, Lalitha Chong N/A 11/13/2018    LAPAROSCOPIC ROBOT XI ASSISTED SIGMOID COLON RESECTION WITH OSTOMY performed by Channing Johns MD at [address] [place] [address] N/A 4/9/2019    COLOSTOMY CLOSURE LAPAROSCOPIC ROBOTIC XI performed by Channing Johns MD at [address] [place] [address] N/A 8/13/2020    DIAGNOSTIC LAPAROSCOPIY POSS BOWEL RESECTION performed by Juve Cadena MD at [address] [place] [address]   Constitutional: Negative for chills and fever. Respiratory: Negative for chest tightness and shortness of breath.     Cardiovascular: Positive for palpitations. Negative for chest pain. Gastrointestinal: Negative for abdominal pain, nausea and vomiting. All other systems reviewed and are negative. Physical Exam  Constitutional:       General: She is not in acute distress. Appearance: She is well-developed. HENT:      Head: Normocephalic and atraumatic. Eyes:      Conjunctiva/sclera: Conjunctivae normal.      Pupils: Pupils are equal, round, and reactive to light. Neck:      Musculoskeletal: Normal range of motion. Thyroid: No thyromegaly. Cardiovascular:      Rate and Rhythm: Tachycardia present. Rhythm irregular. Pulmonary:      Effort: Pulmonary effort is normal. No respiratory distress. Breath sounds: Normal breath sounds. Abdominal:      General: There is no distension. Palpations: Abdomen is soft. Tenderness: There is no abdominal tenderness. There is no guarding or rebound. Musculoskeletal: Normal range of motion. General: No tenderness. Skin:     General: Skin is warm and dry. Findings: No erythema. Neurological:      Mental Status: She is alert and oriented to person, place, and time. Cranial Nerves: No cranial nerve deficit. Coordination: Coordination normal.          Procedures     MDM     ED Course as of Dec 06 2347   Sat Dec 05, 2020   2245 Patient's blood pressure is soft 102/84, she continues to be in atrial fibrillation with rapid response. She has been taking her Eliquis appropriately. Blood work does not show any other acute abnormalities. We talked about the possible options regarding the intrafibrillation. She is not normally in A. fib like this. We discussed cardioversion which she is agreeable to as her goal would be to keep her out of the hospital considering her global coronavirus pandemic is occurring right now.    [SO]   3556 After informed consent was obtained, patient was given procedural sedation and cardioverted.   She successfully converted into normal sinus rhythm. She had a heart rate in the 70s during reexamination. Remains awake, alert, oriented x4. [SO]   2318 EKG: Interpreted by meSinus rhythm, rate 72, normal axis, no ST elevations or depressions, no T wave abnormalities, Q waves in the anteroseptal leads.    [SO]      ED Course User Index  [SO] Paty Spine, DO          EKG: Atrial fibrillation with rapid ventricular response, rate 132, normal axis, nonspecific ST changes, no T wave abnormalities. Conscious Sedation Procedure Note    Dr. Xu Esteban was present during the sedation and procedure as well. Indication: cardioversion    Consent: I have discussed with the patient and/or the patient representative the indication, alternatives, and the possible risks and/or complications of the planned procedure and the anesthesia methods. The patient and/or patient representative appear to understand and agree to proceed. Physician Involvement: The attending physician was present and supervising this procedure. Pre-Sedation Documentation and Exam:  Time: 2115  I have personally completed a history, physical exam & review of systems for this patient (see notes). Vital signs have been reviewed (see flow sheet for vitals). Airway Assessment: normal, dentition not prohibitive, normal neck range of motion    Prior History of Anesthesia Complications: none    ASA Classification: Class 2 - A normal healthy patient with mild systemic disease    Sedation/ Anesthesia Plan: intravenous sedation    Medications Used: midazolam (Versed) intravenously and fentanyl intravenously    Monitoring and Safety: The patient was placed on a cardiac monitor and vital signs, pulse oximetry and level of consciousness were continuously evaluated throughout the procedure. The patient was closely monitored until recovery from the medications was complete and the patient had returned to baseline status. Respiratory therapy was on standby at all times during the procedure.     (The following sections must be completed)  Post-Sedation Vital Signs: Vital signs were reviewed and were stable after the procedure (see flow sheet for vitals)            Post-Sedation Exam:   Time: 7894. Lungs: clear and Cardiovascular: regular rate and rhythm           Complications: none          Cardioversion Procedure Note      Dr. Tarik Templeton was also present during the cardioversion. Indication: atrial fibrillation    Consent: The patient was counseled regarding the procedure, its indications, risks, potential complications and alternatives, and any questions were answered. Consent was obtained to proceed. Pre-Medication: midazolam (Versed) intravenously and fentanyl intravenously    Procedure: The patient was placed in the supine position and the chest area was exposed. The cardioversion pads were applied in the standard manner and configuration. Attempt #1: The defibrillator was set on the synchronous mode and charged to 360 joules. A charge was then delivered which resulted in conversion to normal sinus rhythm. Attempt #2: Not necessary    Attempt #3: Not necessary    The patient tolerated the procedure well. Complications: None      ED Course as of Dec 06 2347   Sat Dec 05, 2020   2245 Patient's blood pressure is soft 102/84, she continues to be in atrial fibrillation with rapid response. She has been taking her Eliquis appropriately. Blood work does not show any other acute abnormalities. We talked about the possible options regarding the intrafibrillation. She is not normally in A. fib like this. We discussed cardioversion which she is agreeable to as her goal would be to keep her out of the hospital considering her global coronavirus pandemic is occurring right now.    [SO]   2239 After informed consent was obtained, patient was given procedural sedation and cardioverted. She successfully converted into normal sinus rhythm. She had a heart rate in the 70s during reexamination.   Remains awake, alert, oriented x4. [SO]   2958 EKG: Interpreted by meSinus rhythm, rate 72, normal axis, no ST elevations or depressions, no T wave abnormalities, Q waves in the anteroseptal leads.    [SO]      ED Course User Index  [SO] Paty Mims, DO       --------------------------------------------- PAST HISTORY ---------------------------------------------  Past Medical History:  has a past medical history of CAD in native artery, Diastolic heart failure ([address] Diverticulitis, HIGH CHOLESTEROL, History of atrial fibrillation, History of stress test, Hypertension, and Thyroid disease. Past Surgical History:  has a past surgical history that includes Nerve Block (12 27 2011); Nerve Block (1/24/12); Nerve Block (02/21/12); pr colonoscopy flx dx w/collj spec when pfrmd (N/A, 10/4/2018); pr lap,surg,colectomy, partial, w/anast (N/A, 11/13/2018); Colonoscopy; Colonoscopy (N/A, 4/8/2019); Small intestine surgery (N/A, 4/9/2019); Diagnostic Cardiac Cath Lab Procedure (08/09/2018); and Small intestine surgery (N/A, 8/13/2020). Social History:  reports that she has been smoking cigarettes. She has been smoking about 0.50 packs per day. She has never used smokeless tobacco. She reports that she does not drink alcohol or use drugs. Family History: family history includes Cancer in her mother and another family member; Heart Attack in her father and sister; Heart Disease in an other family member. The patients home medications have been reviewed. Allergies: Patient has no known allergies.     -------------------------------------------------- RESULTS -------------------------------------------------  Labs:  Results for orders placed or performed during the hospital encounter of 12/05/20   CBC auto differential   Result Value Ref Range    WBC 11.2 4.5 - 11.5 E9/L    RBC 4.95 3.50 - 5.50 E12/L    Hemoglobin 15.9 (H) 11.5 - 15.5 g/dL    Hematocrit 48.6 (H) 34.0 - 48.0 %    MCV 98.2 80.0 - 99.9 fL    MCH 32.1 26.0 - 35.0 pg    MCHC 32.7 32.0 - 34.5 %    RDW 12.8 11.5 - 15.0 fL    Platelets 681 939 - 383 E9/L    MPV 10.1 7.0 - 12.0 fL    Neutrophils % 61.0 43.0 - 80.0 %    Immature Granulocytes % 0.4 0.0 - 5.0 %    Lymphocytes % 25.5 20.0 - 42.0 %    Monocytes % 10.6 2.0 - 12.0 %    Eosinophils % 1.7 0.0 - 6.0 %    Basophils % 0.8 0.0 - 2.0 %    Neutrophils Absolute 6.83 1.80 - 7.30 E9/L    Immature Granulocytes # 0.04 E9/L    Lymphocytes Absolute 2.85 1.50 - 4.00 E9/L    Monocytes Absolute 1.18 (H) 0.10 - 0.95 E9/L    Eosinophils Absolute 0.19 0.05 - 0.50 E9/L    Basophils Absolute 0.09 0.00 - 0.20 E9/L   Brain Natriuretic Peptide   Result Value Ref Range    Pro- (H) 0 - 125 pg/mL   Magnesium   Result Value Ref Range    Magnesium 2.3 1.6 - 2.6 mg/dL   Basic metabolic panel   Result Value Ref Range    Sodium 141 132 - 146 mmol/L    Potassium 3.4 (L) 3.5 - 5.0 mmol/L    Chloride 107 98 - 107 mmol/L    CO2 26 22 - 29 mmol/L    Anion Gap 8 7 - 16 mmol/L    Glucose 106 (H) 74 - 99 mg/dL    BUN 8 8 - 23 mg/dL    CREATININE 0.6 0.5 - 1.0 mg/dL    GFR Non-African American >60 >=60 mL/min/1.73    GFR African American >60     Calcium 9.2 8.6 - 10.2 mg/dL   Troponin   Result Value Ref Range    Troponin <0.01 0.00 - 0.03 ng/mL   SPECIMEN REJECTION   Result Value Ref Range    Rejected Test BMP,TROP     Reason for Rejection see below    EKG 12 Lead   Result Value Ref Range    Ventricular Rate 132 BPM    Atrial Rate 133 BPM    QRS Duration 86 ms    Q-T Interval 314 ms    QTc Calculation (Bazett) 465 ms    R Axis 72 degrees    T Axis 56 degrees       Radiology:  XR CHEST PORTABLE   Final Result   No acute cardiopulmonary abnormality.          ------------------------- NURSING NOTES AND VITALS REVIEWED ---------------------------  Date / Time Roomed:  12/5/2020  9:00 PM  ED Bed Assignment:  03/03    The nursing notes within the ED encounter and vital signs as below have been reviewed.    /74   Pulse 64   Temp 97 °F (36.1 °C) (Temporal)   Resp 20   SpO2 99%   Oxygen Saturation Interpretation: Normal      ------------------------------------------ PROGRESS NOTES ------------------------------------------  I have spoken with the patient and discussed todays results, in addition to providing specific details for the plan of care and counseling regarding the diagnosis and prognosis. Their questions are answered at this time and they are agreeable with the plan. I discussed at length with them reasons for immediate return here for re evaluation. They will followup with primary care by calling their office tomorrow. --------------------------------- ADDITIONAL PROVIDER NOTES ---------------------------------  At this time the patient is without objective evidence of an acute process requiring hospitalization or inpatient management. They have remained hemodynamically stable throughout their entire ED visit and are stable for discharge with outpatient follow-up. The plan has been discussed in detail and they are aware of the specific conditions for emergent return, as well as the importance of follow-up. Discharge Medication List as of 12/6/2020 12:21 AM          Diagnosis:  1. Atrial fibrillation with rapid ventricular response (HCC)    2. Paroxysmal atrial fibrillation (HCC)        Disposition:  Patient's disposition: Discharge to home  Patient's condition is stable.              Paty Mims DO  12/06/20 2348

## 2020-12-06 NOTE — ED NOTES
Pt A&Ox 4, no complaints at this time.  At bedside performing EKG     Lubna Leach, [facility]  12/05/20 5099

## 2020-12-06 NOTE — ED NOTES
EKG performed, NSR, given to Dr Emma Thomas, [place]  12/05/20 [address] [facility]  12/05/20 1940

## 2020-12-09 NOTE — PROGRESS NOTES
[facility] Progress Note  Dr. Ted Lucio      Referring Physician: Nabeel Neri DO  CHIEF COMPLAINT:   Chief Complaint   Patient presents with    Coronary Artery Disease     9 month check . Patient denies any cp sob or dizziness. patient had AF 2 weeks ago she forgot to take her medication       HISTORY OF PRESENT ILLNESS:   Patient is 78year old female with CAD, paroxysmal atrial fibrillation, mild aortic valve stenosis, chronic diastolic congestive heart failure, hypertension, hypothyroidism, hyperlipidemia, is here for follow-up appointment  Patient denies any chest pain, no shortness of breath, no lightheadedness, no dizziness, no palpitations, no pedal edema, no PND, no orthopnea, no syncope, no presyncopal episodes.       Past Medical History:   Diagnosis Date    CAD in native artery 6/7/4033    Diastolic heart failure ([address]     Diverticulitis 08/03/2018    HIGH CHOLESTEROL     History of atrial fibrillation 08/2018    with episode of diverticulosis    History of stress test 08/07/2018    Lexiscan stress test    Hypertension     Thyroid disease          Past Surgical History:   Procedure Laterality Date    COLONOSCOPY      COLONOSCOPY N/A 4/8/2019    COLONOSCOPY performed by Radha Domingo MD at [facility] CATH LAB PROCEDURE  08/09/2018    NERVE BLOCK  12 27 2011    NERVE BLOCK  1/24/12    lumbar epidural    NERVE BLOCK  02/21/12    lumbar epidural with flouro    MA COLONOSCOPY FLX DX W/COLLJ SPEC WHEN PFRMD N/A 10/4/2018    COLONOSCOPY performed by Radha Domingo MD at [facility], PARTIAL, Bhargav Champion N/A 11/13/2018    LAPAROSCOPIC ROBOT XI ASSISTED SIGMOID COLON RESECTION WITH OSTOMY performed by Radha Domingo MD at [address] [place] [address] N/A 4/9/2019    COLOSTOMY CLOSURE LAPAROSCOPIC ROBOTIC XI performed by Radha Domingo MD at [address] [place] [address] N/A 8/13/2020    DIAGNOSTIC LAPAROSCOPIY POSS BOWEL RESECTION performed by Ulysses Silva, MD at [address]         Current Outpatient Medications   Medication Sig Dispense Refill    docusate sodium (COLACE) 100 MG capsule Take 100 mg by mouth daily      Echinacea 125 MG CAPS Take 125 mg by mouth daily      ibuprofen (ADVIL;MOTRIN) 800 MG tablet Take 1 tablet by mouth every 6 hours as needed for Pain 20 tablet 0    aspirin EC 81 MG EC tablet Take 1 tablet by mouth daily 30 tablet 3    pantoprazole (PROTONIX) 40 MG tablet Take 1 tablet by mouth daily 30 tablet 3    metoprolol tartrate (LOPRESSOR) 25 MG tablet Take 1 tablet by mouth every 8 hours 90 tablet 0    apixaban (ELIQUIS) 5 MG TABS tablet Take 1 tablet by mouth 2 times daily 60 tablet 0    levothyroxine (SYNTHROID) 100 MCG tablet Take 100 mcg by mouth Daily      ZINC PO Take 1 tablet by mouth daily      Omega-3 Fatty Acids (FISH OIL) 1000 MG CAPS Take 1,000 mg by mouth daily      lisinopril (PRINIVIL;ZESTRIL) 10 MG tablet Take 10 mg by mouth daily.  rosuvastatin (CRESTOR) 10 MG tablet Take 10 mg by mouth daily. No current facility-administered medications for this visit.           Allergies as of 12/15/2020    (No Known Allergies)       Social History     Socioeconomic History    Marital status:      Spouse name: Not on file    Number of children: Not on file    Years of education: Not on file    Highest education level: Not on file   Occupational History    Not on file   Social Needs    Financial resource strain: Not on file    Food insecurity     Worry: Not on file     Inability: Not on file    Transportation needs     Medical: Not on file     Non-medical: Not on file   Tobacco Use    Smoking status: Current Every Day Smoker     Packs/day: 0.50     Types: Cigarettes    Smokeless tobacco: Never Used   Substance and Sexual Activity    Alcohol use: No     Comment: 5 cups of coffee decaf/regular    Drug use: No    Sexual activity: Not on file Lifestyle    Physical activity     Days per week: Not on file     Minutes per session: Not on file    Stress: Not on file   Relationships    Social connections     Talks on phone: Not on file     Gets together: Not on file     Attends Confucianism service: Not on file     Active member of club or organization: Not on file     Attends meetings of clubs or organizations: Not on file     Relationship status: Not on file    Intimate partner violence     Fear of current or ex partner: Not on file     Emotionally abused: Not on file     Physically abused: Not on file     Forced sexual activity: Not on file   Other Topics Concern    Not on file   Social History Narrative    Not on file       Family History   Problem Relation Age of Onset    Cancer Other     Heart Disease Other     Cancer Mother         brain    Heart Attack Father         age 48     Heart Attack Sister         age 46        REVIEW OF SYSTEMS:     CONSTITUTIONAL:  negative for  fevers, chills, sweats and fatigue  HEENT:  negative for  tinnitus, earaches, nasal congestion and epistaxis  RESPIRATORY:  negative for  dry cough, cough with sputum, dyspnea, wheezing and hemoptysis  GASTROINTESTINAL:  negative for nausea, vomiting, diarrhea, constipation, pruritus and jaundice  HEMATOLOGIC/LYMPHATIC:  negative for easy bruising, bleeding, lymphadenopathy and petechiae  ENDOCRINE:  negative for heat intolerance, cold intolerance, tremor, hair loss and diabetic symptoms including neither polyuria nor polydipsia nor blurred vision  MUSCULOSKELETAL:  negative for  myalgias, arthralgias, joint swelling, stiff joints and decreased range of motion  NEUROLOGICAL:  negative for memory problems, speech problems, visual disturbance, dysphagia, weakness and numbness      PHYSICAL EXAM:   CONSTITUTIONAL:  awake, alert, cooperative, no apparent distress, and appears stated age  HEENT:  Moist and pink mucous membranes, normocephalic, without obvious abnormality, atraumatic  NECK:  Supple, symmetrical, trachea midline, no adenopathy, thyroid symmetric, not enlarged and no tenderness, skin normal  LUNGS:  No increased work of breathing, good air exchange, clear to auscultation bilaterally, no crackles or wheezing  CARDIOVASCULAR:  Normal apical impulse, regular rate and rhythm, normal S1 and S2, no S3 or S4, 2 to 3/6 systolic ejection murmur at the right upper central border, no pedal edema, no carotid bruit, no JVD, no pulsating masses. ABDOMEN:  soft, nontender, no hepatomegaly, no splenomegaly, bowel sounds positive. MUSCULOSKELETAL:  No clubbing, no cyanosis, no pedal edema,there is no redness, warmth, or swelling of the joints, full range of motion noted. NEUROLOGIC:  Alert, awake, oriented  3.    /68 (Site: Right Upper Arm, Position: Sitting, Cuff Size: Large Adult)   Pulse 56   Ht 5' 3\" (1.6 m)   Wt 129 lb (58.5 kg)   BMI 22.85 kg/m²     DATA:   I personally reviewed the visit EKG with the following interpretation: Sinus bradycardia    EKG - 12/5/20 Atrial fibrillation with rapid ventricular response  Marked ST abnormality, possible inferior subendocardial injury  Abnormal ECG    ECHO: 8/6/18 Summary   No previous echo for comparison. Technically adequate study. Proximal septal thickening. Ejection fraction is visually estimated at 65%. No regional wall motion abnormalities seen. E/A flow reversal noted. Suggestive of diastolic dysfunction. Physiologic and/or trace mitral regurgitation is present. Mild aortic stenosis is present. Physiologic and/or trace tricuspid regurgitation. RVSP is normal    Stress Test: 8/7/18   Findings:    Both the stress and resting myocardial images are normal.       The gated left ventricular ejection fraction is 92 %.             Impression   1. Normal study       Angiography: 8/9/18 IMPRESSION:  1. 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   2. Significantly tortuous and overlapping left coronary arteries. 3.  Mild disease involving a second diagonal and first OM branch. Cardiology Labs: BMP:    Lab Results   Component Value Date     12/05/2020    K 3.4 12/05/2020    K 3.7 08/15/2020     12/05/2020    CO2 26 12/05/2020    BUN 8 12/05/2020    CREATININE 0.6 12/05/2020     CMP:    Lab Results   Component Value Date     12/05/2020    K 3.4 12/05/2020    K 3.7 08/15/2020     12/05/2020    CO2 26 12/05/2020    BUN 8 12/05/2020    CREATININE 0.6 12/05/2020    PROT 7.2 08/09/2020     CBC:    Lab Results   Component Value Date    WBC 11.2 12/05/2020    RBC 4.95 12/05/2020    HGB 15.9 12/05/2020    HCT 48.6 12/05/2020    MCV 98.2 12/05/2020    RDW 12.8 12/05/2020     12/05/2020     PT/INR:  No results found for: PTINR  PT/INR Warfarin:  No components found for: PTPATWAR, PTINRWAR  PTT:  No results found for: APTT  PTT Heparin:  No components found for: APTTHEP  Magnesium:    Lab Results   Component Value Date    MG 2.3 12/05/2020     TSH:    Lab Results   Component Value Date    TSH 2.750 08/12/2020     TROPONIN:  No components found for: TROP  BNP:  No results found for: BNP  FASTING LIPID PANEL:    Lab Results   Component Value Date    CHOL 137 07/15/2019    HDL 45 06/26/2020    TRIG 86 07/15/2019     No orders to display     I have personally reviewed the laboratory, cardiac diagnostic and radiographic testing as outlined above:      IMPRESSION:  1: Atherosclerotic heart disease of native coronary artery without angina pectoris:    Patient had cardiac catheterization on(8/9/2018) which revealed:  #Chronic total occlusion of the right coronary artery with a grade 3 left to right collaterals. #Significantly tortuous and overlapping left coronary arteries. #Mild disease involving a second diagonal and first OM branch.     Will continue medical therapy              2: Paroxysmal atrial fibrillation: in sinus rhythm, will continue anticoagulation with Eliquis               3: Nonrheumatic aortic (valve) stenosis:    Mild             4:  Long term (current) use of anticoagulants             5: Essential (primary) hypertension:  Controlled                6: Tobacco use :  Patient was counseled regarding smoking cessation              RECOMMENDATIONS:   1. Continue current treatment  2. Preventive Cardiology: low salt, low cholesterol diet, daily exercise, cholesterol goal of total cholesterol less than 200, LDL less than 70, smoking cessation were all advised. 3.increased risk of bleeding, symptoms and signs of bleeding discussed with patient, patient was advised to seek medical attention at the earliest symptoms or signs of bleeding. 4.  Follow-up with Dr. Vikram Hicks as scheduled  5. Follow-up with Dr. Verna Ingram in 9 months, sooner if symptomatic for any reason    I have reviewed my findings and recommendations with patient    Electronically signed by Lucas Grewal MD on 12/15/2020 at 7:23 PM  NOTE: This report was transcribed using voice recognition software.  Every effort was made to ensure accuracy; however, inadvertent computerized transcription errors may be present

## 2020-12-15 ENCOUNTER — OFFICE VISIT (OUTPATIENT)
Dept: CARDIOLOGY CLINIC | Age: 70
End: 2020-12-15
Payer: COMMERCIAL

## 2020-12-15 VITALS
HEART RATE: 56 BPM | WEIGHT: 129 LBS | BODY MASS INDEX: 22.86 KG/M2 | HEIGHT: 63 IN | SYSTOLIC BLOOD PRESSURE: 120 MMHG | DIASTOLIC BLOOD PRESSURE: 68 MMHG

## 2020-12-15 PROCEDURE — 99214 OFFICE O/P EST MOD 30 MIN: CPT | Performed by: INTERNAL MEDICINE

## 2020-12-15 PROCEDURE — 93000 ELECTROCARDIOGRAM COMPLETE: CPT | Performed by: INTERNAL MEDICINE

## 2020-12-17 ENCOUNTER — HOSPITAL ENCOUNTER (EMERGENCY)
Age: 70
Discharge: HOME | End: 2020-12-17
Attending: EMERGENCY MEDICINE
Payer: COMMERCIAL

## 2020-12-17 ENCOUNTER — APPOINTMENT (OUTPATIENT)
Dept: GENERAL RADIOLOGY | Age: 70
End: 2020-12-17
Payer: COMMERCIAL

## 2020-12-17 VITALS
RESPIRATION RATE: 20 BRPM | DIASTOLIC BLOOD PRESSURE: 88 MMHG | SYSTOLIC BLOOD PRESSURE: 131 MMHG | HEIGHT: 63 IN | TEMPERATURE: 97.8 F | OXYGEN SATURATION: 96 % | HEART RATE: 80 BPM | BODY MASS INDEX: 22.68 KG/M2 | WEIGHT: 128 LBS

## 2020-12-17 LAB
ANION GAP SERPL CALCULATED.3IONS-SCNC: 12 MMOL/L (ref 7–16)
BUN BLDV-MCNC: 7 MG/DL (ref 8–23)
CALCIUM SERPL-MCNC: 9 MG/DL (ref 8.6–10.2)
CHLORIDE BLD-SCNC: 104 MMOL/L (ref 98–107)
CO2: 26 MMOL/L (ref 22–29)
CREAT SERPL-MCNC: 0.6 MG/DL (ref 0.5–1)
EKG ATRIAL RATE: 147 BPM
EKG Q-T INTERVAL: 320 MS
EKG QRS DURATION: 84 MS
EKG QTC CALCULATION (BAZETT): 483 MS
EKG R AXIS: 72 DEGREES
EKG T AXIS: 41 DEGREES
EKG VENTRICULAR RATE: 137 BPM
GFR AFRICAN AMERICAN: >60
GFR NON-AFRICAN AMERICAN: >60 ML/MIN/1.73
GLUCOSE BLD-MCNC: 110 MG/DL (ref 74–99)
HCT VFR BLD CALC: 48.9 % (ref 34–48)
HEMOGLOBIN: 16 G/DL (ref 11.5–15.5)
MCH RBC QN AUTO: 31.7 PG (ref 26–35)
MCHC RBC AUTO-ENTMCNC: 32.7 % (ref 32–34.5)
MCV RBC AUTO: 97 FL (ref 80–99.9)
PDW BLD-RTO: 12.7 FL (ref 11.5–15)
PLATELET # BLD: 214 E9/L (ref 130–450)
PMV BLD AUTO: 10.9 FL (ref 7–12)
POTASSIUM SERPL-SCNC: 3.6 MMOL/L (ref 3.5–5)
RBC # BLD: 5.04 E12/L (ref 3.5–5.5)
SODIUM BLD-SCNC: 142 MMOL/L (ref 132–146)
TROPONIN: <0.01 NG/ML (ref 0–0.03)
WBC # BLD: 10.5 E9/L (ref 4.5–11.5)

## 2020-12-17 PROCEDURE — 93005 ELECTROCARDIOGRAM TRACING: CPT | Performed by: EMERGENCY MEDICINE

## 2020-12-17 PROCEDURE — 71045 X-RAY EXAM CHEST 1 VIEW: CPT

## 2020-12-17 PROCEDURE — 80048 BASIC METABOLIC PNL TOTAL CA: CPT

## 2020-12-17 PROCEDURE — 85027 COMPLETE CBC AUTOMATED: CPT

## 2020-12-17 PROCEDURE — 93010 ELECTROCARDIOGRAM REPORT: CPT | Performed by: INTERNAL MEDICINE

## 2020-12-17 PROCEDURE — 2500000003 HC RX 250 WO HCPCS: Performed by: EMERGENCY MEDICINE

## 2020-12-17 PROCEDURE — 96376 TX/PRO/DX INJ SAME DRUG ADON: CPT

## 2020-12-17 PROCEDURE — 96365 THER/PROPH/DIAG IV INF INIT: CPT

## 2020-12-17 PROCEDURE — 84484 ASSAY OF TROPONIN QUANT: CPT

## 2020-12-17 PROCEDURE — 96361 HYDRATE IV INFUSION ADD-ON: CPT

## 2020-12-17 PROCEDURE — 2580000003 HC RX 258: Performed by: EMERGENCY MEDICINE

## 2020-12-17 PROCEDURE — 96366 THER/PROPH/DIAG IV INF ADDON: CPT

## 2020-12-17 PROCEDURE — 99284 EMERGENCY DEPT VISIT MOD MDM: CPT

## 2020-12-17 RX ORDER — DILTIAZEM HYDROCHLORIDE 5 MG/ML
10 INJECTION INTRAVENOUS ONCE
Status: COMPLETED | OUTPATIENT
Start: 2020-12-17 | End: 2020-12-17

## 2020-12-17 RX ORDER — 0.9 % SODIUM CHLORIDE 0.9 %
1000 INTRAVENOUS SOLUTION INTRAVENOUS ONCE
Status: COMPLETED | OUTPATIENT
Start: 2020-12-17 | End: 2020-12-17

## 2020-12-17 RX ADMIN — DILTIAZEM HYDROCHLORIDE 5 MG/HR: 5 INJECTION, SOLUTION INTRAVENOUS at 02:49

## 2020-12-17 RX ADMIN — DILTIAZEM HYDROCHLORIDE 10 MG: 5 INJECTION INTRAVENOUS at 01:52

## 2020-12-17 RX ADMIN — SODIUM CHLORIDE 1000 ML: 9 INJECTION, SOLUTION INTRAVENOUS at 01:52

## 2020-12-17 ASSESSMENT — ENCOUNTER SYMPTOMS
SHORTNESS OF BREATH: 0
EYE PAIN: 0
NAUSEA: 0
SINUS PRESSURE: 0
DIARRHEA: 0
BACK PAIN: 0
COUGH: 0
EYE DISCHARGE: 0
WHEEZING: 0
EYE REDNESS: 0
ABDOMINAL DISTENTION: 0
SORE THROAT: 0
VOMITING: 0

## 2020-12-17 NOTE — ED PROVIDER NOTES
Patient is a 77 y/o female who presents to the ED with palpitations. Patient states that she woke up tonight with her heart \"jiggling and racing. \" She does have a history of atrial fibrillation and was cardioverted 12 days ago. She is on Eliquis. Patient denies any chest pain, shortness of breath or dizziness. Review of Systems   Constitutional: Negative for chills and fever. HENT: Negative for ear pain, sinus pressure and sore throat. Eyes: Negative for pain, discharge and redness. Respiratory: Negative for cough, shortness of breath and wheezing. Cardiovascular: Positive for palpitations. Negative for chest pain. Gastrointestinal: Negative for abdominal distention, diarrhea, nausea and vomiting. Genitourinary: Negative for dysuria and frequency. Musculoskeletal: Negative for arthralgias and back pain. Skin: Negative for rash and wound. Neurological: Negative for weakness and headaches. Hematological: Negative for adenopathy. All other systems reviewed and are negative. Physical Exam  Vitals signs and nursing note reviewed. Constitutional:       General: She is not in acute distress. HENT:      Head: Normocephalic and atraumatic. Right Ear: External ear normal.      Left Ear: External ear normal.      Nose: Nose normal.      Mouth/Throat:      Mouth: Mucous membranes are moist.   Eyes:      Conjunctiva/sclera: Conjunctivae normal.      Pupils: Pupils are equal, round, and reactive to light. Neck:      Musculoskeletal: Normal range of motion and neck supple. Cardiovascular:      Rate and Rhythm: Tachycardia present. Rhythm irregularly irregular. Heart sounds: No murmur. Pulmonary:      Effort: Pulmonary effort is normal. No respiratory distress. Breath sounds: Normal breath sounds. No stridor. No wheezing, rhonchi or rales. Abdominal:      General: Bowel sounds are normal. There is no distension. Palpations: Abdomen is soft.       Tenderness: There is no abdominal tenderness. There is no guarding. Musculoskeletal: Normal range of motion. General: No swelling. Skin:     General: Skin is warm and dry. Findings: No rash. Neurological:      Mental Status: She is alert and oriented to person, place, and time. Procedures     MDM           EKG:  Atrial fibrillation with ventricular rate of 137. NY interval, QRS duration and QT interval within normal range. Normal axis. Nonspecific ST segment changes. No previous EKG. EKG:  Sinus bradycardi with ventricular rate of 55. NY interval, QRS duration and QT interval within normal range. Normal axis. No ST segment abnormalities to suggest acute ischemia.      0650. Patient spontaneously converted to sinus rhythm. Feels much better. --------------------------------------------- PAST HISTORY ---------------------------------------------  Past Medical History:  has a past medical history of CAD in native artery, Diastolic heart failure ([facility] [address] Diverticulitis, HIGH CHOLESTEROL, History of atrial fibrillation, History of stress test, Hypertension, and Thyroid disease. Past Surgical History:  has a past surgical history that includes Nerve Block (12 27 2011); Nerve Block (1/24/12); Nerve Block (02/21/12); pr colonoscopy flx dx w/collj spec when pfrmd (N/A, 10/4/2018); pr lap,surg,colectomy, partial, w/anast (N/A, 11/13/2018); Colonoscopy; Colonoscopy (N/A, 4/8/2019); Small intestine surgery (N/A, 4/9/2019); Diagnostic Cardiac Cath Lab Procedure (08/09/2018); and Small intestine surgery (N/A, 8/13/2020). Social History:  reports that she has been smoking cigarettes. She has been smoking about 0.50 packs per day. She has never used smokeless tobacco. She reports that she does not drink alcohol or use drugs. Family History: family history includes Cancer in her mother and another family member; Heart Attack in her father and sister; Heart Disease in an other family member. answered at this time and they are agreeable with the plan. I discussed at length with them reasons for immediate return here for re evaluation. They will followup with primary care by calling their office tomorrow. --------------------------------- ADDITIONAL PROVIDER NOTES ---------------------------------  At this time the patient is without objective evidence of an acute process requiring hospitalization or inpatient management. They have remained hemodynamically stable throughout their entire ED visit and are stable for discharge with outpatient follow-up. The plan has been discussed in detail and they are aware of the specific conditions for emergent return, as well as the importance of follow-up. New Prescriptions    No medications on file       Diagnosis:  1. Atrial fibrillation with rapid ventricular response (HCC)        Disposition:  Patient's disposition: Discharge to home  Patient's condition is stable.            Yamileth Bob DO  12/17/20 0352

## 2020-12-17 NOTE — CONSULTS
MD   Omega-3 Fatty Acids (FISH OIL) 1000 MG CAPS Take 1,000 mg by mouth daily    Historical Provider, MD   lisinopril (PRINIVIL;ZESTRIL) 10 MG tablet Take 10 mg by mouth daily. Historical Provider, MD   rosuvastatin (CRESTOR) 10 MG tablet Take 10 mg by mouth daily. Historical Provider, MD       Current Medications:    Current Facility-Administered Medications: dilTIAZem 125 mg in dextrose 5 % 125 mL infusion, 5 mg/hr, Intravenous, Continuous    Allergies:  Patient has no known allergies. Social History:        Family History:   Family History   Problem Relation Age of Onset    Cancer Other     Heart Disease Other     Cancer Mother         brain    Heart Attack Father         age 48     Heart Attack Sister         age 46        REVIEW OF SYSTEMS:     · Constitutional: Denies fatigue, fevers, chills or night sweats  · Eyes: Denies visual changes or drainage  · ENT: Denies headaches or hearing loss. No mouth sores or sore throat. No epistaxis   · Cardiovascular: Denies chest pain, pressure or palpitations. No lower extremity swelling. · Respiratory: Denies VAIL, cough, orthopnea or PND. No hemoptysis   · Gastrointestinal: Denies hematemesis or anorexia. No hematochezia or melena    · Genitourinary: Denies urgency, dysuria or hematuria. · Musculoskeletal: Denies gait disturbance, weakness or joint complaints  · Integumentary: Denies rash, hives or pruritis   · Neurological: Denies dizziness, headaches or seizures. No numbness or tingling  · Psychiatric: Denies anxiety or depression. · Endocrine: Denies temperature intolerance. No recent weight change. .  · Hematologic/Lymphatic: Denies abnormal bruising or bleeding. No swollen lymph nodes    PHYSICAL EXAM:   /88   Pulse 80   Temp 97.8 °F (36.6 °C) (Oral)   Resp 20   Ht 5' 3\" (1.6 m)   Wt 128 lb (58.1 kg)   SpO2 96%   BMI 22.67 kg/m²   CONST:  Well developed, well nourished who appears of stated age. Awake, alert and cooperative.  No apparent distress. HEENT:   Head- Normocephalic, atraumatic   Eyes- Conjunctivae pink, anicteric  Throat- Oral mucosa pink and moist  Neck-  No stridor, trachea midline, no jugular venous distention. No carotid bruit. CHEST: Chest symmetrical and non-tender to palpation. No accessory muscle use or intercostal retractions  RESPIRATORY: Lung sounds - clear throughout fields   CARDIOVASCULAR:     Heart Inspection- shows no noted pulsations  Heart Palpation- no heaves or thrills; PMI is non-displaced   Heart Ausculation- Regular rate and rhythm, no murmur. No s3, s4 or rub   PV: No lower extremity edema. No varicosities. Pedal pulses palpable, no clubbing or cyanosis   ABDOMEN: Soft, non-tender to light palpation. Bowel sounds present. No palpable masses no organomegaly; no abdominal bruit  MS: Good muscle strength and tone. No atrophy or abnormal movements. : Deferred  SKIN: Warm and dry no statis dermatitis or ulcers   NEURO / PSYCH: Oriented to person, place and time. Speech clear and appropriate. Follows all commands. Pleasant affect     DATA:    ECG / Tele strips: please see HPI   Diagnostic:    No intake or output data in the 24 hours ending 12/17/20 0845    Labs:   CBC:   Recent Labs     12/17/20 0150   WBC 10.5   HGB 16.0*   HCT 48.9*        BMP:   Recent Labs     12/17/20 0150      K 3.6   CO2 26   BUN 7*   CREATININE 0.6   LABGLOM >60   CALCIUM 9.0     Mag: No results for input(s): MG in the last 72 hours. Phos: No results for input(s): PHOS in the last 72 hours. TSH: No results for input(s): TSH in the last 72 hours. HgA1c: No results found for: LABA1C  No results found for: EAG  proBNP: No results for input(s): PROBNP in the last 72 hours. PT/INR: No results for input(s): PROTIME, INR in the last 72 hours. APTT:No results for input(s): APTT in the last 72 hours.   CARDIAC ENZYMES:  Recent Labs     12/17/20 0150   TROPONINI <0.01     FASTING LIPID PANEL:  Lab Results   Component Value Date    CHOL 137 07/15/2019    HDL 45 06/26/2020    LDLCALC 72 06/26/2020    TRIG 86 07/15/2019     LIVER PROFILE:No results for input(s): AST, ALT, LABALBU in the last 72 hours. Electronically signed by WAQAR Matias CNP on 12/17/2020 at 8:45 AM       Late attestaion:    As above  Impression/Plan:     A fib with RVR - Rates controlled and converted to Sinus. Continue Metoprolol and Eliquis for [address]    Essential HTN - Controlled        F/u by Dr Grimaldo Range 2-4 weeks after discharge.     Electronically signed by Gisel Prado MD on 5/20/2021 at 10:06 AM

## 2021-04-15 ENCOUNTER — APPOINTMENT (OUTPATIENT)
Dept: GENERAL RADIOLOGY | Age: 71
End: 2021-04-15
Payer: COMMERCIAL

## 2021-04-15 ENCOUNTER — HOSPITAL ENCOUNTER (EMERGENCY)
Age: 71
Discharge: HOME | End: 2021-04-16
Attending: EMERGENCY MEDICINE
Payer: COMMERCIAL

## 2021-04-15 DIAGNOSIS — I48.91 ATRIAL FIBRILLATION WITH RAPID VENTRICULAR RESPONSE (HCC): Primary | ICD-10-CM

## 2021-04-15 LAB
HCT VFR BLD CALC: 45.3 % (ref 34–48)
HEMOGLOBIN: 15.2 G/DL (ref 11.5–15.5)
MCH RBC QN AUTO: 31.5 PG (ref 26–35)
MCHC RBC AUTO-ENTMCNC: 33.6 % (ref 32–34.5)
MCV RBC AUTO: 93.8 FL (ref 80–99.9)
PDW BLD-RTO: 12.5 FL (ref 11.5–15)
PLATELET # BLD: 198 E9/L (ref 130–450)
PMV BLD AUTO: 10.1 FL (ref 7–12)
RBC # BLD: 4.83 E12/L (ref 3.5–5.5)
WBC # BLD: 10.5 E9/L (ref 4.5–11.5)

## 2021-04-15 PROCEDURE — 85027 COMPLETE CBC AUTOMATED: CPT

## 2021-04-15 PROCEDURE — 71046 X-RAY EXAM CHEST 2 VIEWS: CPT

## 2021-04-15 PROCEDURE — 84484 ASSAY OF TROPONIN QUANT: CPT

## 2021-04-15 PROCEDURE — 83880 ASSAY OF NATRIURETIC PEPTIDE: CPT

## 2021-04-15 PROCEDURE — 80048 BASIC METABOLIC PNL TOTAL CA: CPT

## 2021-04-15 PROCEDURE — 99284 EMERGENCY DEPT VISIT MOD MDM: CPT

## 2021-04-15 PROCEDURE — 96374 THER/PROPH/DIAG INJ IV PUSH: CPT

## 2021-04-15 PROCEDURE — 84443 ASSAY THYROID STIM HORMONE: CPT

## 2021-04-15 PROCEDURE — 93005 ELECTROCARDIOGRAM TRACING: CPT | Performed by: EMERGENCY MEDICINE

## 2021-04-15 ASSESSMENT — ENCOUNTER SYMPTOMS
NAUSEA: 0
WHEEZING: 0
DIARRHEA: 0
VOMITING: 0
ABDOMINAL DISTENTION: 0
SINUS PRESSURE: 0
EYE DISCHARGE: 0
BACK PAIN: 0
SHORTNESS OF BREATH: 0
EYE PAIN: 0
SORE THROAT: 0
COUGH: 0
EYE REDNESS: 0

## 2021-04-16 ENCOUNTER — TELEPHONE (OUTPATIENT)
Dept: CARDIOLOGY CLINIC | Age: 71
End: 2021-04-16

## 2021-04-16 VITALS
HEART RATE: 105 BPM | TEMPERATURE: 96.6 F | OXYGEN SATURATION: 95 % | SYSTOLIC BLOOD PRESSURE: 112 MMHG | DIASTOLIC BLOOD PRESSURE: 76 MMHG | RESPIRATION RATE: 22 BRPM

## 2021-04-16 DIAGNOSIS — I48.91 ATRIAL FIBRILLATION, UNSPECIFIED TYPE (HCC): Primary | ICD-10-CM

## 2021-04-16 LAB
ANION GAP SERPL CALCULATED.3IONS-SCNC: 8 MMOL/L (ref 7–16)
BUN BLDV-MCNC: 9 MG/DL (ref 8–23)
CALCIUM SERPL-MCNC: 9.3 MG/DL (ref 8.6–10.2)
CHLORIDE BLD-SCNC: 104 MMOL/L (ref 98–107)
CO2: 29 MMOL/L (ref 22–29)
CREAT SERPL-MCNC: 0.8 MG/DL (ref 0.5–1)
EKG ATRIAL RATE: 107 BPM
EKG Q-T INTERVAL: 334 MS
EKG QRS DURATION: 90 MS
EKG QTC CALCULATION (BAZETT): 449 MS
EKG R AXIS: 75 DEGREES
EKG T AXIS: 65 DEGREES
EKG VENTRICULAR RATE: 109 BPM
GFR AFRICAN AMERICAN: >60
GFR NON-AFRICAN AMERICAN: >60 ML/MIN/1.73
GLUCOSE BLD-MCNC: 121 MG/DL (ref 74–99)
POTASSIUM SERPL-SCNC: 3.8 MMOL/L (ref 3.5–5)
PRO-BNP: 767 PG/ML (ref 0–125)
SODIUM BLD-SCNC: 141 MMOL/L (ref 132–146)
TROPONIN: <0.01 NG/ML (ref 0–0.03)
TSH SERPL DL<=0.05 MIU/L-ACNC: 6.71 UIU/ML (ref 0.27–4.2)

## 2021-04-16 PROCEDURE — 2500000003 HC RX 250 WO HCPCS: Performed by: EMERGENCY MEDICINE

## 2021-04-16 RX ORDER — METOPROLOL TARTRATE 5 MG/5ML
5 INJECTION INTRAVENOUS ONCE
Status: COMPLETED | OUTPATIENT
Start: 2021-04-16 | End: 2021-04-16

## 2021-04-16 RX ORDER — METOPROLOL TARTRATE 50 MG/1
50 TABLET, FILM COATED ORAL 2 TIMES DAILY
Status: ON HOLD | COMMUNITY
End: 2021-05-06 | Stop reason: HOSPADM

## 2021-04-16 RX ORDER — METOPROLOL TARTRATE 50 MG/1
50 TABLET, FILM COATED ORAL 2 TIMES DAILY
COMMUNITY
End: 2021-04-16 | Stop reason: SDUPTHER

## 2021-04-16 RX ORDER — METOPROLOL TARTRATE 50 MG/1
50 TABLET, FILM COATED ORAL 2 TIMES DAILY
Qty: 180 TABLET | Refills: 1 | Status: ON HOLD
Start: 2021-04-16 | End: 2021-05-06 | Stop reason: HOSPADM

## 2021-04-16 RX ADMIN — METOPROLOL TARTRATE 5 MG: 1 INJECTION, SOLUTION INTRAVENOUS at 00:54

## 2021-04-16 NOTE — TELEPHONE ENCOUNTER
Dr. Brenna Pool,    Patient called stating that she was in ER last night with a fib. She was advised to call as this is the third time in 3 months she has felt it. Did you want to adjust any meds? She is taking Eliquis 5 mg BID,Lopressor 25 mg q8H,  ASA 81 mg daily, Lisinopril 10 mg daily, Crestor 10 mg daily, Synthroid,Protonix, Fish Oil, Zinc, Echinacea, Ibuprofen    When did you want to see her? Thank you.   Ty Walter

## 2021-04-16 NOTE — TELEPHONE ENCOUNTER
Spoke with patient. EP referral placed. Requests Rx to [facility] due to increase in Metoprolol.

## 2021-04-16 NOTE — ED PROVIDER NOTES
Patient is a 77 y/o female who presents to the ED with palpitations. Patient states she had onset of palpitations approximately one hour prior to arrival. She states that it is her a fib. She states it feels like her heart is dancing. She denies any chest pain. She denies any shortness of breath. She denies missing any medication. She is on Eliquis. Currently, she feels much better. Review of Systems   Constitutional: Negative for chills and fever. HENT: Negative for ear pain, sinus pressure and sore throat. Eyes: Negative for pain, discharge and redness. Respiratory: Negative for cough, shortness of breath and wheezing. Cardiovascular: Positive for palpitations. Negative for chest pain. Gastrointestinal: Negative for abdominal distention, diarrhea, nausea and vomiting. Genitourinary: Negative for dysuria and frequency. Musculoskeletal: Negative for arthralgias and back pain. Skin: Negative for rash and wound. Neurological: Negative for weakness and headaches. Hematological: Negative for adenopathy. All other systems reviewed and are negative. Physical Exam  Vitals signs and nursing note reviewed. Constitutional:       General: She is not in acute distress. HENT:      Head: Normocephalic and atraumatic. Right Ear: External ear normal.      Left Ear: External ear normal.      Nose: Nose normal.      Mouth/Throat:      Mouth: Mucous membranes are moist.   Eyes:      Conjunctiva/sclera: Conjunctivae normal.      Pupils: Pupils are equal, round, and reactive to light. Neck:      Musculoskeletal: Normal range of motion and neck supple. Cardiovascular:      Rate and Rhythm: Normal rate. Rhythm irregularly irregular. Heart sounds: No murmur. Pulmonary:      Effort: Pulmonary effort is normal. No respiratory distress. Breath sounds: Normal breath sounds. No stridor. No wheezing, rhonchi or rales.    Abdominal:      General: Bowel sounds are normal. There is no in an other family member. The patients home medications have been reviewed. Allergies: Patient has no known allergies. -------------------------------------------------- RESULTS -------------------------------------------------  Labs:  Results for orders placed or performed during the hospital encounter of 89/11/80   Basic metabolic panel   Result Value Ref Range    Sodium 141 132 - 146 mmol/L    Potassium 3.8 3.5 - 5.0 mmol/L    Chloride 104 98 - 107 mmol/L    CO2 29 22 - 29 mmol/L    Anion Gap 8 7 - 16 mmol/L    Glucose 121 (H) 74 - 99 mg/dL    BUN 9 8 - 23 mg/dL    CREATININE 0.8 0.5 - 1.0 mg/dL    GFR Non-African American >60 >=60 mL/min/1.73    GFR African American >60     Calcium 9.3 8.6 - 10.2 mg/dL   CBC   Result Value Ref Range    WBC 10.5 4.5 - 11.5 E9/L    RBC 4.83 3.50 - 5.50 E12/L    Hemoglobin 15.2 11.5 - 15.5 g/dL    Hematocrit 45.3 34.0 - 48.0 %    MCV 93.8 80.0 - 99.9 fL    MCH 31.5 26.0 - 35.0 pg    MCHC 33.6 32.0 - 34.5 %    RDW 12.5 11.5 - 15.0 fL    Platelets 340 703 - 419 E9/L    MPV 10.1 7.0 - 12.0 fL   Troponin   Result Value Ref Range    Troponin <0.01 0.00 - 0.03 ng/mL   Brain Natriuretic Peptide   Result Value Ref Range    Pro- (H) 0 - 125 pg/mL   TSH without Reflex   Result Value Ref Range    TSH 6.710 (H) 0.270 - 4.200 uIU/mL       Radiology:  XR CHEST (2 VW)   Final Result   No acute process or significant change. Short-term follow-up if symptoms   persist.             ------------------------- NURSING NOTES AND VITALS REVIEWED ---------------------------  Date / Time Roomed:  4/15/2021 10:11 PM  ED Bed Assignment:  13/13    The nursing notes within the ED encounter and vital signs as below have been reviewed.    /84   Pulse 105   Temp 96.6 °F (35.9 °C) (Temporal)   Resp 22   SpO2 95%   Oxygen Saturation Interpretation: Normal      ------------------------------------------ PROGRESS NOTES ------------------------------------------  I have spoken with the patient and discussed todays results, in addition to providing specific details for the plan of care and counseling regarding the diagnosis and prognosis. Their questions are answered at this time and they are agreeable with the plan. I discussed at length with them reasons for immediate return here for re evaluation. They will followup with primary care by calling their office tomorrow. --------------------------------- ADDITIONAL PROVIDER NOTES ---------------------------------  At this time the patient is without objective evidence of an acute process requiring hospitalization or inpatient management. They have remained hemodynamically stable throughout their entire ED visit and are stable for discharge with outpatient follow-up. The plan has been discussed in detail and they are aware of the specific conditions for emergent return, as well as the importance of follow-up. New Prescriptions    No medications on file       Diagnosis:  1. Atrial fibrillation with rapid ventricular response (HCC)        Disposition:  Patient's disposition: Discharge to home  Patient's condition is stable.              [address] [facility],   04/16/21 [id number]

## 2021-05-04 ENCOUNTER — HOSPITAL ENCOUNTER (OUTPATIENT)
Age: 71
Setting detail: OBSERVATION
Discharge: HOME | End: 2021-05-06
Attending: EMERGENCY MEDICINE | Admitting: INTERNAL MEDICINE
Payer: COMMERCIAL

## 2021-05-04 ENCOUNTER — APPOINTMENT (OUTPATIENT)
Dept: GENERAL RADIOLOGY | Age: 71
End: 2021-05-04
Payer: COMMERCIAL

## 2021-05-04 DIAGNOSIS — R07.9 CHEST PAIN, UNSPECIFIED TYPE: Primary | ICD-10-CM

## 2021-05-04 DIAGNOSIS — R00.1 BRADYCARDIA: ICD-10-CM

## 2021-05-04 LAB
ALBUMIN SERPL-MCNC: 4.2 G/DL (ref 3.5–5.2)
ALP BLD-CCNC: 68 U/L (ref 35–104)
ALT SERPL-CCNC: 13 U/L (ref 0–32)
ANION GAP SERPL CALCULATED.3IONS-SCNC: 10 MMOL/L (ref 7–16)
AST SERPL-CCNC: 23 U/L (ref 0–31)
BASOPHILS ABSOLUTE: 0.08 E9/L (ref 0–0.2)
BASOPHILS RELATIVE PERCENT: 0.6 % (ref 0–2)
BILIRUB SERPL-MCNC: 0.4 MG/DL (ref 0–1.2)
BUN BLDV-MCNC: 7 MG/DL (ref 6–23)
CALCIUM SERPL-MCNC: 9.5 MG/DL (ref 8.6–10.2)
CHLORIDE BLD-SCNC: 103 MMOL/L (ref 98–107)
CO2: 26 MMOL/L (ref 22–29)
CREAT SERPL-MCNC: 0.8 MG/DL (ref 0.5–1)
EOSINOPHILS ABSOLUTE: 0.19 E9/L (ref 0.05–0.5)
EOSINOPHILS RELATIVE PERCENT: 1.5 % (ref 0–6)
GFR AFRICAN AMERICAN: >60
GFR NON-AFRICAN AMERICAN: >60 ML/MIN/1.73
GLUCOSE BLD-MCNC: 102 MG/DL (ref 74–99)
HCT VFR BLD CALC: 46 % (ref 34–48)
HEMOGLOBIN: 15.4 G/DL (ref 11.5–15.5)
IMMATURE GRANULOCYTES #: 0.06 E9/L
IMMATURE GRANULOCYTES %: 0.5 % (ref 0–5)
LYMPHOCYTES ABSOLUTE: 2.72 E9/L (ref 1.5–4)
LYMPHOCYTES RELATIVE PERCENT: 21.6 % (ref 20–42)
MAGNESIUM: 2 MG/DL (ref 1.6–2.6)
MCH RBC QN AUTO: 32.2 PG (ref 26–35)
MCHC RBC AUTO-ENTMCNC: 33.5 % (ref 32–34.5)
MCV RBC AUTO: 96.2 FL (ref 80–99.9)
MONOCYTES ABSOLUTE: 1.14 E9/L (ref 0.1–0.95)
MONOCYTES RELATIVE PERCENT: 9.1 % (ref 2–12)
NEUTROPHILS ABSOLUTE: 8.4 E9/L (ref 1.8–7.3)
NEUTROPHILS RELATIVE PERCENT: 66.7 % (ref 43–80)
PDW BLD-RTO: 12.6 FL (ref 11.5–15)
PLATELET # BLD: 223 E9/L (ref 130–450)
PMV BLD AUTO: 10.8 FL (ref 7–12)
POTASSIUM SERPL-SCNC: 4 MMOL/L (ref 3.5–5)
PRO-BNP: 560 PG/ML (ref 0–125)
RBC # BLD: 4.78 E12/L (ref 3.5–5.5)
SODIUM BLD-SCNC: 139 MMOL/L (ref 132–146)
TOTAL PROTEIN: 7.2 G/DL (ref 6.4–8.3)
TROPONIN: <0.01 NG/ML (ref 0–0.03)
WBC # BLD: 12.6 E9/L (ref 4.5–11.5)

## 2021-05-04 PROCEDURE — 83880 ASSAY OF NATRIURETIC PEPTIDE: CPT

## 2021-05-04 PROCEDURE — 84484 ASSAY OF TROPONIN QUANT: CPT

## 2021-05-04 PROCEDURE — 80053 COMPREHEN METABOLIC PANEL: CPT

## 2021-05-04 PROCEDURE — 71045 X-RAY EXAM CHEST 1 VIEW: CPT

## 2021-05-04 PROCEDURE — 93005 ELECTROCARDIOGRAM TRACING: CPT | Performed by: EMERGENCY MEDICINE

## 2021-05-04 PROCEDURE — G0378 HOSPITAL OBSERVATION PER HR: HCPCS

## 2021-05-04 PROCEDURE — 85025 COMPLETE CBC W/AUTO DIFF WBC: CPT

## 2021-05-04 PROCEDURE — 83735 ASSAY OF MAGNESIUM: CPT

## 2021-05-04 PROCEDURE — 99282 EMERGENCY DEPT VISIT SF MDM: CPT

## 2021-05-04 RX ORDER — DOCUSATE SODIUM 100 MG/1
100 CAPSULE, LIQUID FILLED ORAL DAILY
Status: DISCONTINUED | OUTPATIENT
Start: 2021-05-05 | End: 2021-05-06 | Stop reason: HOSPADM

## 2021-05-04 RX ORDER — DEXTROSE MONOHYDRATE 50 MG/ML
100 INJECTION, SOLUTION INTRAVENOUS PRN
Status: DISCONTINUED | OUTPATIENT
Start: 2021-05-04 | End: 2021-05-06 | Stop reason: HOSPADM

## 2021-05-04 RX ORDER — ACETAMINOPHEN 325 MG/1
650 TABLET ORAL EVERY 6 HOURS PRN
Status: DISCONTINUED | OUTPATIENT
Start: 2021-05-04 | End: 2021-05-06 | Stop reason: HOSPADM

## 2021-05-04 RX ORDER — POLYETHYLENE GLYCOL 3350 17 G/17G
17 POWDER, FOR SOLUTION ORAL DAILY PRN
Status: DISCONTINUED | OUTPATIENT
Start: 2021-05-04 | End: 2021-05-06 | Stop reason: HOSPADM

## 2021-05-04 RX ORDER — LEVOTHYROXINE SODIUM 0.1 MG/1
100 TABLET ORAL DAILY
Status: DISCONTINUED | OUTPATIENT
Start: 2021-05-05 | End: 2021-05-06 | Stop reason: HOSPADM

## 2021-05-04 RX ORDER — ACETAMINOPHEN 650 MG/1
650 SUPPOSITORY RECTAL EVERY 6 HOURS PRN
Status: DISCONTINUED | OUTPATIENT
Start: 2021-05-04 | End: 2021-05-06 | Stop reason: HOSPADM

## 2021-05-04 RX ORDER — SODIUM CHLORIDE 9 MG/ML
25 INJECTION, SOLUTION INTRAVENOUS PRN
Status: DISCONTINUED | OUTPATIENT
Start: 2021-05-04 | End: 2021-05-06 | Stop reason: HOSPADM

## 2021-05-04 RX ORDER — DEXTROSE MONOHYDRATE 25 G/50ML
12.5 INJECTION, SOLUTION INTRAVENOUS PRN
Status: DISCONTINUED | OUTPATIENT
Start: 2021-05-04 | End: 2021-05-06 | Stop reason: HOSPADM

## 2021-05-04 RX ORDER — ASPIRIN 81 MG/1
81 TABLET ORAL DAILY
Status: DISCONTINUED | OUTPATIENT
Start: 2021-05-05 | End: 2021-05-06 | Stop reason: HOSPADM

## 2021-05-04 RX ORDER — SODIUM CHLORIDE 0.9 % (FLUSH) 0.9 %
5-40 SYRINGE (ML) INJECTION EVERY 12 HOURS SCHEDULED
Status: DISCONTINUED | OUTPATIENT
Start: 2021-05-04 | End: 2021-05-06 | Stop reason: HOSPADM

## 2021-05-04 RX ORDER — SODIUM CHLORIDE 0.9 % (FLUSH) 0.9 %
5-40 SYRINGE (ML) INJECTION PRN
Status: DISCONTINUED | OUTPATIENT
Start: 2021-05-04 | End: 2021-05-06 | Stop reason: HOSPADM

## 2021-05-04 RX ORDER — NICOTINE POLACRILEX 4 MG
15 LOZENGE BUCCAL PRN
Status: DISCONTINUED | OUTPATIENT
Start: 2021-05-04 | End: 2021-05-06 | Stop reason: HOSPADM

## 2021-05-04 RX ORDER — PANTOPRAZOLE SODIUM 40 MG/1
40 TABLET, DELAYED RELEASE ORAL DAILY
Status: DISCONTINUED | OUTPATIENT
Start: 2021-05-05 | End: 2021-05-06 | Stop reason: HOSPADM

## 2021-05-04 ASSESSMENT — ENCOUNTER SYMPTOMS
NAUSEA: 1
ABDOMINAL PAIN: 0
BACK PAIN: 0
VOMITING: 0
COUGH: 0
BLOOD IN STOOL: 0
RHINORRHEA: 0
SHORTNESS OF BREATH: 0
COLOR CHANGE: 0

## 2021-05-05 ENCOUNTER — APPOINTMENT (OUTPATIENT)
Dept: NUCLEAR MEDICINE | Age: 71
End: 2021-05-05
Payer: COMMERCIAL

## 2021-05-05 LAB
ALBUMIN SERPL-MCNC: 3.8 G/DL (ref 3.5–5.2)
ALP BLD-CCNC: 59 U/L (ref 35–104)
ALT SERPL-CCNC: 13 U/L (ref 0–32)
ANION GAP SERPL CALCULATED.3IONS-SCNC: 7 MMOL/L (ref 7–16)
AST SERPL-CCNC: 18 U/L (ref 0–31)
BACTERIA: ABNORMAL /HPF
BASOPHILS ABSOLUTE: 0.07 E9/L (ref 0–0.2)
BASOPHILS RELATIVE PERCENT: 0.8 % (ref 0–2)
BILIRUB SERPL-MCNC: 0.4 MG/DL (ref 0–1.2)
BILIRUBIN URINE: NEGATIVE
BLOOD, URINE: ABNORMAL
BUN BLDV-MCNC: 7 MG/DL (ref 6–23)
CALCIUM SERPL-MCNC: 9.2 MG/DL (ref 8.6–10.2)
CHLORIDE BLD-SCNC: 105 MMOL/L (ref 98–107)
CLARITY: CLEAR
CO2: 28 MMOL/L (ref 22–29)
COLOR: YELLOW
CREAT SERPL-MCNC: 0.8 MG/DL (ref 0.5–1)
EKG ATRIAL RATE: 74 BPM
EKG P AXIS: 85 DEGREES
EKG P-R INTERVAL: 144 MS
EKG Q-T INTERVAL: 404 MS
EKG QRS DURATION: 88 MS
EKG QTC CALCULATION (BAZETT): 448 MS
EKG R AXIS: 71 DEGREES
EKG T AXIS: 66 DEGREES
EKG VENTRICULAR RATE: 74 BPM
EOSINOPHILS ABSOLUTE: 0.15 E9/L (ref 0.05–0.5)
EOSINOPHILS RELATIVE PERCENT: 1.7 % (ref 0–6)
EPITHELIAL CELLS, UA: ABNORMAL /HPF
GFR AFRICAN AMERICAN: >60
GFR NON-AFRICAN AMERICAN: >60 ML/MIN/1.73
GLUCOSE BLD-MCNC: 89 MG/DL (ref 74–99)
GLUCOSE URINE: NEGATIVE MG/DL
HCT VFR BLD CALC: 43.7 % (ref 34–48)
HEMOGLOBIN: 14.3 G/DL (ref 11.5–15.5)
IMMATURE GRANULOCYTES #: 0.04 E9/L
IMMATURE GRANULOCYTES %: 0.4 % (ref 0–5)
KETONES, URINE: NEGATIVE MG/DL
LEUKOCYTE ESTERASE, URINE: NEGATIVE
LV EF: 81 %
LVEF MODALITY: NORMAL
LYMPHOCYTES ABSOLUTE: 2.36 E9/L (ref 1.5–4)
LYMPHOCYTES RELATIVE PERCENT: 26.2 % (ref 20–42)
MAGNESIUM: 1.9 MG/DL (ref 1.6–2.6)
MCH RBC QN AUTO: 31.8 PG (ref 26–35)
MCHC RBC AUTO-ENTMCNC: 32.7 % (ref 32–34.5)
MCV RBC AUTO: 97.1 FL (ref 80–99.9)
MONOCYTES ABSOLUTE: 0.85 E9/L (ref 0.1–0.95)
MONOCYTES RELATIVE PERCENT: 9.4 % (ref 2–12)
NEUTROPHILS ABSOLUTE: 5.55 E9/L (ref 1.8–7.3)
NEUTROPHILS RELATIVE PERCENT: 61.5 % (ref 43–80)
NITRITE, URINE: NEGATIVE
PDW BLD-RTO: 12.8 FL (ref 11.5–15)
PH UA: 7.5 (ref 5–9)
PHOSPHORUS: 4.1 MG/DL (ref 2.5–4.5)
PLATELET # BLD: 181 E9/L (ref 130–450)
PMV BLD AUTO: 10.5 FL (ref 7–12)
POTASSIUM SERPL-SCNC: 4.1 MMOL/L (ref 3.5–5)
PROTEIN UA: NEGATIVE MG/DL
RBC # BLD: 4.5 E12/L (ref 3.5–5.5)
RBC UA: ABNORMAL /HPF (ref 0–2)
SODIUM BLD-SCNC: 140 MMOL/L (ref 132–146)
SPECIFIC GRAVITY UA: 1.02 (ref 1–1.03)
TOTAL PROTEIN: 6.2 G/DL (ref 6.4–8.3)
TROPONIN: <0.01 NG/ML (ref 0–0.03)
TROPONIN: <0.01 NG/ML (ref 0–0.03)
UROBILINOGEN, URINE: 0.2 E.U./DL
WBC # BLD: 9 E9/L (ref 4.5–11.5)
WBC UA: ABNORMAL /HPF (ref 0–5)

## 2021-05-05 PROCEDURE — 83735 ASSAY OF MAGNESIUM: CPT

## 2021-05-05 PROCEDURE — 2580000003 HC RX 258: Performed by: INTERNAL MEDICINE

## 2021-05-05 PROCEDURE — 81001 URINALYSIS AUTO W/SCOPE: CPT

## 2021-05-05 PROCEDURE — G0378 HOSPITAL OBSERVATION PER HR: HCPCS

## 2021-05-05 PROCEDURE — 78452 HT MUSCLE IMAGE SPECT MULT: CPT | Performed by: INTERNAL MEDICINE

## 2021-05-05 PROCEDURE — 99214 OFFICE O/P EST MOD 30 MIN: CPT | Performed by: INTERNAL MEDICINE

## 2021-05-05 PROCEDURE — 6370000000 HC RX 637 (ALT 250 FOR IP): Performed by: NURSE PRACTITIONER

## 2021-05-05 PROCEDURE — 84100 ASSAY OF PHOSPHORUS: CPT

## 2021-05-05 PROCEDURE — 78452 HT MUSCLE IMAGE SPECT MULT: CPT

## 2021-05-05 PROCEDURE — 6370000000 HC RX 637 (ALT 250 FOR IP): Performed by: INTERNAL MEDICINE

## 2021-05-05 PROCEDURE — 84484 ASSAY OF TROPONIN QUANT: CPT

## 2021-05-05 PROCEDURE — A9500 TC99M SESTAMIBI: HCPCS | Performed by: RADIOLOGY

## 2021-05-05 PROCEDURE — 93017 CV STRESS TEST TRACING ONLY: CPT

## 2021-05-05 PROCEDURE — 87040 BLOOD CULTURE FOR BACTERIA: CPT

## 2021-05-05 PROCEDURE — 85025 COMPLETE CBC W/AUTO DIFF WBC: CPT

## 2021-05-05 PROCEDURE — 93016 CV STRESS TEST SUPVJ ONLY: CPT | Performed by: INTERNAL MEDICINE

## 2021-05-05 PROCEDURE — 6360000002 HC RX W HCPCS: Performed by: INTERNAL MEDICINE

## 2021-05-05 PROCEDURE — 93018 CV STRESS TEST I&R ONLY: CPT | Performed by: INTERNAL MEDICINE

## 2021-05-05 PROCEDURE — 3430000000 HC RX DIAGNOSTIC RADIOPHARMACEUTICAL: Performed by: RADIOLOGY

## 2021-05-05 PROCEDURE — 93010 ELECTROCARDIOGRAM REPORT: CPT | Performed by: INTERNAL MEDICINE

## 2021-05-05 PROCEDURE — 80053 COMPREHEN METABOLIC PANEL: CPT

## 2021-05-05 RX ORDER — ATORVASTATIN CALCIUM 20 MG/1
40 TABLET, FILM COATED ORAL NIGHTLY
Status: DISCONTINUED | OUTPATIENT
Start: 2021-05-05 | End: 2021-05-06 | Stop reason: HOSPADM

## 2021-05-05 RX ORDER — METOPROLOL SUCCINATE 25 MG/1
25 TABLET, EXTENDED RELEASE ORAL 2 TIMES DAILY
Status: DISCONTINUED | OUTPATIENT
Start: 2021-05-05 | End: 2021-05-06

## 2021-05-05 RX ORDER — LISINOPRIL 10 MG/1
10 TABLET ORAL DAILY
Status: DISCONTINUED | OUTPATIENT
Start: 2021-05-05 | End: 2021-05-06 | Stop reason: HOSPADM

## 2021-05-05 RX ADMIN — APIXABAN 5 MG: 5 TABLET, FILM COATED ORAL at 01:08

## 2021-05-05 RX ADMIN — Medication 30 MILLICURIE: at 14:33

## 2021-05-05 RX ADMIN — Medication 10 ML: at 21:13

## 2021-05-05 RX ADMIN — APIXABAN 5 MG: 5 TABLET, FILM COATED ORAL at 10:15

## 2021-05-05 RX ADMIN — Medication 10 MILLICURIE: at 13:01

## 2021-05-05 RX ADMIN — LEVOTHYROXINE SODIUM 100 MCG: 0.1 TABLET ORAL at 08:09

## 2021-05-05 RX ADMIN — LISINOPRIL 10 MG: 10 TABLET ORAL at 15:47

## 2021-05-05 RX ADMIN — ASPIRIN 81 MG: 81 TABLET, COATED ORAL at 10:15

## 2021-05-05 RX ADMIN — ATORVASTATIN CALCIUM 40 MG: 20 TABLET, FILM COATED ORAL at 21:12

## 2021-05-05 RX ADMIN — Medication 10 ML: at 00:22

## 2021-05-05 RX ADMIN — REGADENOSON 0.4 MG: 0.08 INJECTION, SOLUTION INTRAVENOUS at 14:28

## 2021-05-05 RX ADMIN — APIXABAN 5 MG: 5 TABLET, FILM COATED ORAL at 21:12

## 2021-05-05 RX ADMIN — Medication 10 ML: at 10:16

## 2021-05-05 ASSESSMENT — PAIN SCALES - GENERAL: PAINLEVEL_OUTOF10: 0

## 2021-05-05 NOTE — H&P
Department of Internal Medicine  History and Physical    PCP: Dr. Miya Dean  Admitting Physician: Dr. Jana Mckinley  Consultants: Dr. Dillon Gloss:  Malaise/chest discomfort/tremors    HISTORY OF PRESENT ILLNESS:    Patient is a 65-year-old female who presented to the ED due to driving chest discomfort, palpitations, sensation of shakiness, malaise. Patient states that about 1.5 weeks ago she had her metoprolol dose increased due to rapid ventricular response. She went from 25 3 times a day to 50 twice a day. She states initially she did feel lightheaded however this subsided. As the week progressed she noticed that she would feel palpitations that were different from the ones after she felt before in the past.  She also was experiencing generalized shakiness. She also complained of a jerky type chest discomfort. This would come and go. Would last maybe a few seconds. However they became more frequent. She notified her cardiologist and a stress test was ordered. However she presented today as she did not feel well and continued to have symptoms.       PAST MEDICAL Hx:  Past Medical History:   Diagnosis Date    CAD in native artery 2/5/1888    Diastolic heart failure ([facility] [address]     Diverticulitis 08/03/2018    HIGH CHOLESTEROL     History of atrial fibrillation 08/2018    with episode of diverticulosis    History of stress test 08/07/2018    Lexiscan stress test    Hypertension     Thyroid disease        PAST SURGICAL Hx:   Past Surgical History:   Procedure Laterality Date    COLONOSCOPY      COLONOSCOPY N/A 4/8/2019    COLONOSCOPY performed by Eden Cowan MD at [address] [place] [address] CATH LAB PROCEDURE  08/09/2018    NERVE BLOCK  12 27 2011   [address] BLOCK  1/24/12    lumbar epidural    NERVE BLOCK  02/21/12    lumbar epidural with flouro    [place] COLONOSCOPY FLX DX W/COLLJ SPEC WHEN PFRMD N/A 10/4/2018    COLONOSCOPY performed by Eden Cowan MD at [place] [address]  [place] LAP,SURG,COLECTOMY, PARTIAL, W/ANAST N/A 11/13/2018    LAPAROSCOPIC ROBOT XI ASSISTED SIGMOID COLON RESECTION WITH OSTOMY performed by Amaryllis Boeck, MD at [address] N/A 4/9/2019    COLOSTOMY CLOSURE LAPAROSCOPIC ROBOTIC XI performed by Amaryllis Boeck, MD at [address] [employer] N/A 8/13/2020    DIAGNOSTIC LAPAROSCOPIY POSS BOWEL RESECTION performed by Mecca Sparks MD at [address] Hx:  Family History   Problem Relation Age of Onset    Cancer Other     Heart Disease Other     Cancer Mother         brain    Breast Cancer Mother     Heart Attack Father         age 48     Heart Attack Sister         age 46    Deidre Manual Colon Cancer Sister        HOME MEDICATIONS:  Prior to Admission medications    Medication Sig Start Date End Date Taking?  Authorizing Provider   metoprolol tartrate (LOPRESSOR) 50 MG tablet Take 50 mg by mouth 2 times daily    Historical Provider, MD   metoprolol tartrate (LOPRESSOR) 50 MG tablet Take 1 tablet by mouth 2 times daily 4/16/21   Mabel Johnson MD   docusate sodium (COLACE) 100 MG capsule Take 100 mg by mouth daily    Historical Provider, MD   Echinacea 125 MG CAPS Take 125 mg by mouth daily    Historical Provider, MD   ibuprofen (ADVIL;MOTRIN) 800 MG tablet Take 1 tablet by mouth every 6 hours as needed for Pain 8/14/20   Mecca Sparks MD   aspirin EC 81 MG EC tablet Take 1 tablet by mouth daily 8/9/18   Mabel Johnson MD   pantoprazole (PROTONIX) 40 MG tablet Take 1 tablet by mouth daily 8/9/18   Mabel Johnson MD   apixaban (ELIQUIS) 5 MG TABS tablet Take 1 tablet by mouth 2 times daily 8/8/18   Judy Llamas DO   levothyroxine (SYNTHROID) 100 MCG tablet Take 100 mcg by mouth Daily    Historical Provider, MD   ZINC PO Take 1 tablet by mouth daily    Historical Provider, MD   Omega-3 Fatty Acids (FISH OIL) 1000 MG CAPS Take 1,000 mg by mouth daily    Historical Provider, MD   lisinopril (PRINIVIL;ZESTRIL) 10 MG tablet Take 10 mg by mouth daily. Historical Provider, MD   rosuvastatin (CRESTOR) 10 MG tablet Take 10 mg by mouth daily. Historical Provider, MD       ALLERGIES:  Patient has no known allergies.     SOCIAL Hx:  Social History     Socioeconomic History    Marital status:      Spouse name: Not on file    Number of children: Not on file    Years of education: Not on file    Highest education level: Not on file   Occupational History    Not on file   Social Needs    Financial resource strain: Not on file    Food insecurity     Worry: Not on file     Inability: Not on file    Transportation needs     Medical: Not on file     Non-medical: Not on file   Tobacco Use    Smoking status: Current Every Day Smoker     Packs/day: 0.50     Types: Cigarettes    Smokeless tobacco: Never Used   Substance and Sexual Activity    Alcohol use: No     Comment: 5 cups of coffee decaf/regular    Drug use: No    Sexual activity: Not on file   Lifestyle    Physical activity     Days per week: Not on file     Minutes per session: Not on file    Stress: Not on file   Relationships    Social connections     Talks on phone: Not on file     Gets together: Not on file     Attends Advent service: Not on file     Active member of club or organization: Not on file     Attends meetings of clubs or organizations: Not on file     Relationship status: Not on file    Intimate partner violence     Fear of current or ex partner: Not on file     Emotionally abused: Not on file     Physically abused: Not on file     Forced sexual activity: Not on file   Other Topics Concern    Not on file   Social History Narrative    Not on file       ROS: Positive in bold  General:   Denies chills, fatigue, fever, malaise, night sweats or weight loss    Psychological:   Denies anxiety, disorientation or hallucinations    ENT:    Denies epistaxis, headaches, vertigo or visual changes    Cardiovascular:   Denies any chest pain, irregular heartbeats, or palpitations. No paroxysmal nocturnal dyspnea. Respiratory:   Denies shortness of breath, coughing, sputum production, hemoptysis, or wheezing. No orthopnea. Gastrointestinal:   Denies nausea, vomiting, diarrhea, or constipation. Denies any abdominal pain. Denies change in bowel habits or stools. Genito-Urinary:    Denies any urgency, frequency, hematuria. Voiding without difficulty. Musculoskeletal:   Denies joint pain, joint stiffness, joint swelling or muscle pain    Neurology:    Denies any headache or focal neurological deficits. No weakness or paresthesia. Derm:    Denies any rashes, ulcers, or excoriations. Denies bruising. Extremities:   Denies any lower extremity swelling or edema. PHYSICAL EXAM: Abnormal findings noted  VITALS:  Vitals:    05/04/21 2310   BP: (!) 157/92   Pulse: 50   Resp: 22   Temp: 97.8 °F (36.6 °C)   SpO2: 95%         CONSTITUTIONAL:    Awake, alert, cooperative, no apparent distress, and appears stated age    EYES:     EOMI, sclera clear, conjunctiva normal    ENT:    Normocephalic, atraumatic, sinuses nontender on palpation. External ears without lesions. NECK:    Supple, symmetrical, trachea midline, no adenopathy, thyroid symmetric, not enlarged and no tenderness, skin normal, no bruits, no JVD    HEMATOLOGIC/LYMPHATICS:    No cervical lymphadenopathy and no supraclavicular lymphadenopathy    LUNGS:    Symmetric. No increased work of breathing, good air exchange, clear to auscultation bilaterally, no wheezes, rhonchi, or rales,     CARDIOVASCULAR:    Normal apical impulse, regular rate and rhythm, normal S1 and S2, no S3 or S4, and no murmur noted    ABDOMEN:    No scars, normal bowel sounds, soft, non-distended, non-tender, no masses palpated, no hepatosplenomegaly, no rebound or guarding elicited on palpation     MUSCULOSKELETAL:    There is no redness, warmth, or swelling of the joints.       NEUROLOGIC:    Awake, alert, oriented

## 2021-05-05 NOTE — CONSULTS
mg  10 mg Oral Daily Delta Carpenter, APRN - CNP        technetium sestamibi (CARDIOLITE) injection 30 millicurie  30 millicurie Intravenous ONCE PRN Bessie Gross MD        aspirin EC tablet 81 mg  81 mg Oral Daily Marzetta Mellissa, DO   81 mg at 05/05/21 1015    apixaban (ELIQUIS) tablet 5 mg  5 mg Oral BID Marzetta Mellissa, DO   5 mg at 05/05/21 1015    docusate sodium (COLACE) capsule 100 mg  100 mg Oral Daily Marzetta Mellissa, DO        levothyroxine (SYNTHROID) tablet 100 mcg  100 mcg Oral Daily Marzetta Mellissa, DO   100 mcg at 05/05/21 0809    pantoprazole (PROTONIX) tablet 40 mg  40 mg Oral Daily Marzetta Mellissa, DO        glucose (GLUTOSE) 40 % oral gel 15 g  15 g Oral PRN Marzetta Mellissa, DO        dextrose 50 % IV solution  12.5 g Intravenous PRN Marzetta Mellissa, DO        glucagon (rDNA) injection 1 mg  1 mg Intramuscular PRN Marzetta Mellissa, DO        dextrose 5 % solution  100 mL/hr Intravenous PRN Marzetta Mellissa, DO        sodium chloride flush 0.9 % injection 5-40 mL  5-40 mL Intravenous 2 times per day Desireetta Mellissa, DO   10 mL at 05/05/21 1016    sodium chloride flush 0.9 % injection 5-40 mL  5-40 mL Intravenous PRN Marzetta Mellissa, DO        0.9 % sodium chloride infusion  25 mL Intravenous PRN Marzetta Mellissa, DO        polyethylene glycol (GLYCOLAX) packet 17 g  17 g Oral Daily PRN Marzetta Mellissa, DO        acetaminophen (TYLENOL) tablet 650 mg  650 mg Oral Q6H PRN Marzetta Mellissa, DO        Or    acetaminophen (TYLENOL) suppository 650 mg  650 mg Rectal Q6H PRN Marzetta Mellissa, DO           Social History     Socioeconomic History    Marital status:      Spouse name: Not on file    Number of children: Not on file    Years of education: Not on file    Highest education level: Not on file   Occupational History    Not on file   Social Needs    Financial resource strain: Not on file    Food insecurity     Worry: Not on file     Inability: Not on file   [facility] Transportation needs     Medical: Not on file     Non-medical: Not on file   Tobacco Use    Smoking status: Current Every Day Smoker     Packs/day: 0.50     Types: Cigarettes    Smokeless tobacco: Never Used   Substance and Sexual Activity    Alcohol use: No     Comment: 5 cups of coffee decaf/regular    Drug use: No    Sexual activity: Not on file   Lifestyle    Physical activity     Days per week: Not on file     Minutes per session: Not on file    Stress: Not on file   Relationships    Social connections     Talks on phone: Not on file     Gets together: Not on file     Attends Anabaptist service: Not on file     Active member of club or organization: Not on file     Attends meetings of clubs or organizations: Not on file     Relationship status: Not on file    Intimate partner violence     Fear of current or ex partner: Not on file     Emotionally abused: Not on file     Physically abused: Not on file     Forced sexual activity: Not on file   Other Topics Concern    Not on file   Social History Narrative    Not on file       Family History   Problem Relation Age of Onset    Cancer Other     Heart Disease Other     Cancer Mother         brain    Breast Cancer Mother     Heart Attack Father         age 48     Heart Attack Sister         age 46     Colon Cancer Sister        Review of Systems:   Heart: as above   Lungs: as above   Eyes: denies changes in vision or discharge. Ears: denies changes in hearing or pain. Nose: denies epistaxis or masses   Throat: denies sore throat or trouble swallowing. Neuro: denies numbness, tingling, tremors. Skin: denies rashes or itching. : denies hematuria, dysuria   GI: denies vomiting, diarrhea   Psych: denies mood changed, anxiety, depression. all others negative.     Physical Exam   BP (!) 145/110   Pulse 60   Temp 98.1 °F (36.7 °C) (Oral)   Resp 18   Ht 5' 3\" (1.6 m)   Wt 130 lb (59 kg)   SpO2 93%   BMI 23.03 kg/m²   Constitutional: Oriented to person, place, and time. Well-developed and well-nourished. No distress. Head: Normocephalic and atraumatic. Eyes: EOM are normal. Pupils are equal, round, and reactive to light. Neck: Normal range of motion. Neck supple. No hepatojugular reflux and no JVD present. Carotid bruit is not present. No tracheal deviation present. No thyromegaly present. Cardiovascular: Normal rate, regular rhythm, normal heart sounds and intact distal pulses. Exam reveals no gallop and no friction rub. No murmur heard. Pulmonary/Chest: Effort normal and breath sounds normal. No respiratory distress. No wheezes. No rales. No tenderness. Abdominal: Soft. Bowel sounds are normal. No distension and no mass. No tenderness. No rebound and no guarding. Musculoskeletal: Normal range of motion. No edema and no tenderness. Lymphadenopathy:   No cervical adenopathy. No groin adenopathy. Neurological: Alert and oriented to person, place, and time. Skin: Skin is warm and dry. No rash noted. Not diaphoretic. No erythema. Psychiatric: Normal mood and affect.  Behavior is normal.     CBC:   Lab Results   Component Value Date    WBC 9.0 05/05/2021    RBC 4.50 05/05/2021    HGB 14.3 05/05/2021    HCT 43.7 05/05/2021    MCV 97.1 05/05/2021    MCH 31.8 05/05/2021    MCHC 32.7 05/05/2021    RDW 12.8 05/05/2021     05/05/2021    MPV 10.5 05/05/2021     BMP:   Lab Results   Component Value Date     05/05/2021    K 4.1 05/05/2021    K 3.7 08/15/2020     05/05/2021    CO2 28 05/05/2021    BUN 7 05/05/2021    LABALBU 3.8 05/05/2021    LABALBU 4.3 12/13/2011    CREATININE 0.8 05/05/2021    CALCIUM 9.2 05/05/2021    GFRAA >60 05/05/2021    LABGLOM >60 05/05/2021     Magnesium:    Lab Results   Component Value Date    MG 1.9 05/05/2021     Cardiac Enzymes:   Lab Results   Component Value Date    CKTOTAL 60 08/14/2020    CKTOTAL 44 08/06/2018    CKTOTAL 52 08/05/2018    CKMB 5.3 (H) 08/14/2020    CKMB 4.4 (H) 08/06/2018    CKMB 4.4 (H) 08/05/2018    TROPONINI <0.01 05/05/2021    TROPONINI <0.01 05/04/2021    TROPONINI <0.01 05/04/2021      PT/INR:    Lab Results   Component Value Date    PROTIME 12.6 08/03/2018    INR 1.1 08/03/2018     TSH:    Lab Results   Component Value Date    TSH 6.710 04/15/2021     Rhythm Strip: normal sinus rhythm. EKG:  normal sinus rhythm, nonspecific ST and T waves changes. Echo 8/6/2018: LVEF 65% Mild AS    CATH FINDINGS 8/9/2018:  ANGIOGRAPHIC FINDINGS:  The left main coronary artery arising from the left sinus of Valsalva. It is a large vessel that has no significant disease. It trifurcates into left anterior descending artery, left circumflex artery and ramus intermedius artery.     The left anterior descending artery is a very tortuous vessel, actually all of her vessels are very tortuous and the overlapped significantly, the LAD has no significant disease. It gives off a two large diagonal branches. The LAD and its diagonal branches have no significant disease. The second diagonal branch may be has like 30% disease.     The ramus intermedius artery also a tortuous vessel without any significant disease. The left circumflex artery is a large vessel that gives off a large OM branch and that has like 30% proximal disease.      The right coronary artery arising from the right sinus of Valsalva. It has proximal total occlusion. There was grade 3 left-to-right collaterals. IMPRESSION:  1. 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 2.  Significantly tortuous and overlapping left coronary arteries. 3.  Mild disease involving a second diagonal and first OM branch. RECOMMENDATIONS:  1. Aggressive coronary artery disease risk-factor modification. 2.  Smoking cessation. 3.  The patient will be observed for 2 hours, discharged home if she meets discharge criteria.     ASSESSMENT AND PLAN:  Patient Active Problem List   Diagnosis  Spinal stenosis    Degeneration of lumbosacral intervertebral disc    Lumbago    Colonic diverticular abscess    CAD in native artery    S/P colon resection    Diverticulitis of large intestine with perforation and abscess without bleeding    Pelvic abscess in female    Colostomy reversal    SBO (small bowel obstruction) (HCC)    Bradycardia    Chest pain     1. Chest pain/CAD: EKG and CE okay. Stress ordered. 2. PAF/Palpitations/Bradycardia:    In sinus. Monitor. Restart metoprolol at 25 mg BID. Radha Anders D.O.   Cardiologist  Cardiology, [facility]

## 2021-05-05 NOTE — ED NOTES
Patient in gown and on cardiac monitor. Provided patient with a warm blanket for comfort. No complaints at this time.       Xiomara Patton RN  05/04/21 [id number]

## 2021-05-05 NOTE — PROGRESS NOTES
Internal Medicine Progress Note    [facility]    Sebastián Elliott. Samantha Grier., LUCIAOAartiI. Zehra Omer D.O., LUCIAOAartiIAarti Reynolds D.O. Conrado Ormond, MSN, APRN, NP-C  Swati Johnson. Светлана Washington, MSN, APRN-CNP     Primary Care Physician: Diane Yarbrough DO   Admitting Physician:  Esteban Frank DO  Admission date and time: 5/4/2021  8:57 PM    Room:  20/20  Admitting diagnosis: Bradycardia [R00.1]    Patient Name: Gonzalo Mac  MRN: [id number]    Date of Service: 5/5/2021     Subjective:  Hollie Rodriguez is a 78 y.o. female who was seen and examined today,5/5/2021, at the bedside. She has been boarded in the emergency department since her admission last evening. Beta-blocker therapy clarified. She was previously on 25 mg of metoprolol 3 times daily but this was recently increased 2 weeks ago to 50 mg twice daily by her cardiologist.  This was increased due to rate control issues with her underlying atrial fibrillation in which she was tachycardic and required ER visits x3 in the recent past.  She states that the sensation she had presently was not that of atrial fibrillation or tachycardia, but rather the sensation of skipped beats causing fatigue and lightheadedness. She has been maintained n.p.o. for stress testing and I have discussed this with the stress lab to ensure this was to be performed today by cardiology. They will update the patient. She is comfortable being maintained off of her beta-blocker therapy until cardiology consultation is obtained. No family present during my examination. Review of System:   Constitutional:   Denies fever or chills, weight loss or gain, fatigue or malaise. HEENT:   Denies ear pain, sore throat, sinus or eye problems. Cardiovascular:   Symptoms as described above. No present chest pain somewhat irregular rhythm. Admits to intermittent palpitations related to skipped beats.   Respiratory:   Denies shortness of breath, coughing, sputum production, hemoptysis, or wheezing. Gastrointestinal:   Denies nausea, vomiting, diarrhea, or constipation. Denies any abdominal pain. Genitourinary:    Denies any urgency, frequency, hematuria. Voiding  without difficulty. Extremities:   Denies lower extremity swelling, edema or cyanosis. Neurology:    Denies any headache or focal neurological deficits, Denies generalized weakness or memory difficulty. Psch:   Denies being anxious or depressed. Musculoskeletal:    Denies  myalgias, joint complaints or back pain. Integumentary:   Denies any rashes, ulcers, or excoriations. Denies bruising. Hematologic/Lymphatic:  Denies bruising or bleeding. Physical Exam:  No intake/output data recorded. Intake/Output Summary (Last 24 hours) at 5/5/2021 1134  Last data filed at 5/5/2021 0022  Gross per 24 hour   Intake 10 ml   Output --   Net 10 ml   I/O last 3 completed shifts: In: 10 [I.V.:10]  Out: -   Patient Vitals for the past 96 hrs (Last 3 readings):   Weight   05/04/21 2101 130 lb (59 kg)     Vital Signs:   Blood pressure (!) 168/92, pulse 55, temperature 97.6 °F (36.4 °C), temperature source Oral, resp. rate 20, weight 130 lb (59 kg), SpO2 96 %, not currently breastfeeding. General appearance:  Alert, responsive, oriented to person, place, and time. Well preserved, alert, no distress. Head:  Normocephalic. No masses, lesions or tenderness. Eyes:  PERRLA. EOMI. Sclera clear. Buccal mucosa moist.  ENT:  Ears normal. Mucosa normal.  Neck:    Supple. Trachea midline. No thyromegaly. No JVD. No bruits. Heart:    Somewhat irregular rhythm with bradycardic rate, S1 and S2. Heart rate noted on bedside telemetry ranging between 55 and 68 bpm.  Appears to be atrial fibrillation with frequent PVCs, sometimes occurring in couplets or triplets. Appear monomorphic  Lungs:    Symmetrical. Clear to auscultation bilaterally. Diminished bibasilar exchange. No wheezes. No rhonchi. No rales. Abdomen:   Soft. Non-tender. Non-distended. Bowel sounds positive. No organomegaly or masses. No pain on palpation. Extremities:    Peripheral pulses present. No peripheral edema. No ulcers. No cyanosis. No clubbing. Neurologic:    Alert x 3. No focal deficit. Cranial nerves grossly intact. No focal weakness. Psych:   Behavior is normal. Mood appears normal. Speech is not rapid and/or pressured. Musculoskeletal:   Spine ROM normal. Muscular strength intact. Gait not assessed. Integumentary:  No rashes  Skin normal color and texture. Genitalia/Breast:  Deferred    Medication:  Scheduled Meds:   aspirin EC  81 mg Oral Daily    apixaban  5 mg Oral BID    docusate sodium  100 mg Oral Daily    levothyroxine  100 mcg Oral Daily    pantoprazole  40 mg Oral Daily    sodium chloride flush  5-40 mL Intravenous 2 times per day     Continuous Infusions:   dextrose      sodium chloride         Objective Data:  CBC with Differential:    Lab Results   Component Value Date    WBC 9.0 05/05/2021    RBC 4.50 05/05/2021    HGB 14.3 05/05/2021    HCT 43.7 05/05/2021     05/05/2021    MCV 97.1 05/05/2021    MCH 31.8 05/05/2021    MCHC 32.7 05/05/2021    RDW 12.8 05/05/2021    SEGSPCT 64 10/31/2013    METASPCT 0.9 11/13/2018    LYMPHOPCT 26.2 05/05/2021    MONOPCT 9.4 05/05/2021    BASOPCT 0.8 05/05/2021    MONOSABS 0.85 05/05/2021    LYMPHSABS 2.36 05/05/2021    EOSABS 0.15 05/05/2021    BASOSABS 0.07 05/05/2021     BMP:    Lab Results   Component Value Date     05/05/2021    K 4.1 05/05/2021    K 3.7 08/15/2020     05/05/2021    CO2 28 05/05/2021    BUN 7 05/05/2021    LABALBU 3.8 05/05/2021    LABALBU 4.3 12/13/2011    CREATININE 0.8 05/05/2021    CALCIUM 9.2 05/05/2021    GFRAA >60 05/05/2021    LABGLOM >60 05/05/2021    GLUCOSE 89 05/05/2021    GLUCOSE 94 12/13/2011       Assessment:  1. Atypical chest pain with known CAD  2. Symptomatic sinus bradycardia with PVC  3. Leukocytosis  4.  Coronary artery disease

## 2021-05-05 NOTE — PROCEDURES
Lexiscan Stress EKG Report:    Dx CP    Baseline EKG: normal sinus rhythm, nonspecific ST and T waves changes. Stress EKG: No ST-T changes. Arrhythmias: None. Symptoms: None. Summary:  Unremarkable lexiscan stress EKG. See separate report for stress perfusion results. Ellis Lopez D.O.   Cardiologist  Cardiology, [address] [facility]

## 2021-05-05 NOTE — PLAN OF CARE
Problem: Skin Integrity:  Goal: Skin integrity will stabilize  Description: Skin integrity will stabilize  Outcome: Met This Shift

## 2021-05-06 VITALS
HEIGHT: 63 IN | TEMPERATURE: 97.5 F | HEART RATE: 72 BPM | BODY MASS INDEX: 23.04 KG/M2 | WEIGHT: 130 LBS | SYSTOLIC BLOOD PRESSURE: 121 MMHG | RESPIRATION RATE: 16 BRPM | DIASTOLIC BLOOD PRESSURE: 81 MMHG | OXYGEN SATURATION: 98 %

## 2021-05-06 LAB
ALBUMIN SERPL-MCNC: 3.7 G/DL (ref 3.5–5.2)
ALP BLD-CCNC: 57 U/L (ref 35–104)
ALT SERPL-CCNC: 11 U/L (ref 0–32)
ANION GAP SERPL CALCULATED.3IONS-SCNC: 8 MMOL/L (ref 7–16)
AST SERPL-CCNC: 18 U/L (ref 0–31)
BASOPHILS ABSOLUTE: 0.07 E9/L (ref 0–0.2)
BASOPHILS RELATIVE PERCENT: 0.8 % (ref 0–2)
BILIRUB SERPL-MCNC: 0.5 MG/DL (ref 0–1.2)
BUN BLDV-MCNC: 9 MG/DL (ref 6–23)
CALCIUM SERPL-MCNC: 8.8 MG/DL (ref 8.6–10.2)
CHLORIDE BLD-SCNC: 103 MMOL/L (ref 98–107)
CO2: 27 MMOL/L (ref 22–29)
CREAT SERPL-MCNC: 0.6 MG/DL (ref 0.5–1)
EOSINOPHILS ABSOLUTE: 0.14 E9/L (ref 0.05–0.5)
EOSINOPHILS RELATIVE PERCENT: 1.6 % (ref 0–6)
GFR AFRICAN AMERICAN: >60
GFR NON-AFRICAN AMERICAN: >60 ML/MIN/1.73
GLUCOSE BLD-MCNC: 84 MG/DL (ref 74–99)
HCT VFR BLD CALC: 43.8 % (ref 34–48)
HEMOGLOBIN: 14.4 G/DL (ref 11.5–15.5)
IMMATURE GRANULOCYTES #: 0.04 E9/L
IMMATURE GRANULOCYTES %: 0.5 % (ref 0–5)
LYMPHOCYTES ABSOLUTE: 1.76 E9/L (ref 1.5–4)
LYMPHOCYTES RELATIVE PERCENT: 19.9 % (ref 20–42)
MCH RBC QN AUTO: 31.6 PG (ref 26–35)
MCHC RBC AUTO-ENTMCNC: 32.9 % (ref 32–34.5)
MCV RBC AUTO: 96.3 FL (ref 80–99.9)
MONOCYTES ABSOLUTE: 0.76 E9/L (ref 0.1–0.95)
MONOCYTES RELATIVE PERCENT: 8.6 % (ref 2–12)
NEUTROPHILS ABSOLUTE: 6.08 E9/L (ref 1.8–7.3)
NEUTROPHILS RELATIVE PERCENT: 68.6 % (ref 43–80)
PDW BLD-RTO: 12.8 FL (ref 11.5–15)
PLATELET # BLD: 197 E9/L (ref 130–450)
PMV BLD AUTO: 11.1 FL (ref 7–12)
POTASSIUM SERPL-SCNC: 3.7 MMOL/L (ref 3.5–5)
RBC # BLD: 4.55 E12/L (ref 3.5–5.5)
SODIUM BLD-SCNC: 138 MMOL/L (ref 132–146)
TOTAL PROTEIN: 6.2 G/DL (ref 6.4–8.3)
WBC # BLD: 8.9 E9/L (ref 4.5–11.5)

## 2021-05-06 PROCEDURE — 6370000000 HC RX 637 (ALT 250 FOR IP): Performed by: INTERNAL MEDICINE

## 2021-05-06 PROCEDURE — 2580000003 HC RX 258: Performed by: INTERNAL MEDICINE

## 2021-05-06 PROCEDURE — 6370000000 HC RX 637 (ALT 250 FOR IP): Performed by: NURSE PRACTITIONER

## 2021-05-06 PROCEDURE — 80053 COMPREHEN METABOLIC PANEL: CPT

## 2021-05-06 PROCEDURE — 99215 OFFICE O/P EST HI 40 MIN: CPT | Performed by: INTERNAL MEDICINE

## 2021-05-06 PROCEDURE — G0378 HOSPITAL OBSERVATION PER HR: HCPCS

## 2021-05-06 PROCEDURE — 85025 COMPLETE CBC W/AUTO DIFF WBC: CPT

## 2021-05-06 PROCEDURE — 36415 COLL VENOUS BLD VENIPUNCTURE: CPT

## 2021-05-06 RX ORDER — METOPROLOL SUCCINATE 25 MG/1
25 TABLET, EXTENDED RELEASE ORAL DAILY
Qty: 30 TABLET | Refills: 1 | Status: ON HOLD | OUTPATIENT
Start: 2021-05-07 | End: 2021-06-10 | Stop reason: HOSPADM

## 2021-05-06 RX ORDER — METOPROLOL SUCCINATE 25 MG/1
25 TABLET, EXTENDED RELEASE ORAL DAILY
Status: DISCONTINUED | OUTPATIENT
Start: 2021-05-07 | End: 2021-05-06 | Stop reason: HOSPADM

## 2021-05-06 RX ADMIN — METOPROLOL SUCCINATE 25 MG: 25 TABLET, EXTENDED RELEASE ORAL at 08:18

## 2021-05-06 RX ADMIN — PANTOPRAZOLE SODIUM 40 MG: 40 TABLET, DELAYED RELEASE ORAL at 08:19

## 2021-05-06 RX ADMIN — APIXABAN 5 MG: 5 TABLET, FILM COATED ORAL at 08:18

## 2021-05-06 RX ADMIN — DOCUSATE SODIUM 100 MG: 100 CAPSULE, LIQUID FILLED ORAL at 08:19

## 2021-05-06 RX ADMIN — ASPIRIN 81 MG: 81 TABLET, COATED ORAL at 08:17

## 2021-05-06 RX ADMIN — LEVOTHYROXINE SODIUM 100 MCG: 0.1 TABLET ORAL at 05:50

## 2021-05-06 RX ADMIN — Medication 10 ML: at 08:20

## 2021-05-06 RX ADMIN — LISINOPRIL 10 MG: 10 TABLET ORAL at 08:18

## 2021-05-06 NOTE — CARE COORDINATION
5-6-Cm note: i met with pt today, she is anxious to be discharged, pt is ambulating well, denies any needs for homegoing, CM/SS will follow.  Electronically signed by Chato Zaragoza RN on 5/6/2021 at 11:32 AM

## 2021-05-06 NOTE — PROGRESS NOTES
CHIEF COMPLAINT: Chest pain/PAF/Palpitations/Bradycardia    HISTORY OF PRESENT ILLNESS: No CP or SOB.     Past Medical History:   Diagnosis Date    CAD in native artery 42/32/0240    Diastolic heart failure ([address]     Diverticulitis 08/03/2018    H/O cardiovascular stress test 05/05/2021    Lexiscan    HIGH CHOLESTEROL     History of atrial fibrillation 08/2018    with episode of diverticulosis    History of stress test 08/07/2018    Lexiscan stress test    Hypertension     Thyroid disease        Patient Active Problem List   Diagnosis    Spinal stenosis    Degeneration of lumbosacral intervertebral disc    Lumbago    Colonic diverticular abscess    CAD in native artery    S/P colon resection    Diverticulitis of large intestine with perforation and abscess without bleeding    Pelvic abscess in female    Colostomy reversal    SBO (small bowel obstruction) (HCC)    Bradycardia    Chest pain       No Known Allergies    Current Facility-Administered Medications   Medication Dose Route Frequency Provider Last Rate Last Admin    atorvastatin (LIPITOR) tablet 40 mg  40 mg Oral Nightly [place] Grief, APRN - CNP   40 mg at 05/05/21 2112    lisinopril (PRINIVIL;ZESTRIL) tablet 10 mg  10 mg Oral Daily Yuliya Grief, APRN - CNP   10 mg at 05/06/21 0818    metoprolol succinate (TOPROL XL) extended release tablet 25 mg  25 mg Oral BID Andriy Vance, DO   25 mg at 05/06/21 0818    aspirin EC tablet 81 mg  81 mg Oral Daily Ismail U Earl, DO   81 mg at 05/06/21 0817    apixaban (ELIQUIS) tablet 5 mg  5 mg Oral BID Hector Hawk, DO   5 mg at 05/06/21 0818    docusate sodium (COLACE) capsule 100 mg  100 mg Oral Daily Hector Hawk, DO   100 mg at 05/06/21 0819    levothyroxine (SYNTHROID) tablet 100 mcg  100 mcg Oral Daily Hector Hawk, DO   100 mcg at 05/06/21 0550    pantoprazole (PROTONIX) tablet 40 mg  40 mg Oral Daily Ismail U Earl, DO   40 mg at 05/06/21 0819    glucose (GLUTOSE) 40 % oral gel 15 g  15 g Oral PRN [place] Pac, DO        dextrose 50 % IV solution  12.5 g Intravenous PRN [place] Pac, DO        glucagon (rDNA) injection 1 mg  1 mg Intramuscular PRN [place] Pac, DO        dextrose 5 % solution  100 mL/hr Intravenous PRN [place] Pac, DO        sodium chloride flush 0.9 % injection 5-40 mL  5-40 mL Intravenous 2 times per day [place] Pac, DO   10 mL at 05/06/21 0820    sodium chloride flush 0.9 % injection 5-40 mL  5-40 mL Intravenous PRN [place] Pac, DO        0.9 % sodium chloride infusion  25 mL Intravenous PRN [place] Pac, DO        polyethylene glycol (GLYCOLAX) packet 17 g  17 g Oral Daily PRN [place] Pac, DO        acetaminophen (TYLENOL) tablet 650 mg  650 mg Oral Q6H PRN [place] Pac, DO        Or    acetaminophen (TYLENOL) suppository 650 mg  650 mg Rectal Q6H PRN [place], DO           Social History     Socioeconomic History    Marital status:      Spouse name: Not on file    Number of children: Not on file    Years of education: Not on file    Highest education level: Not on file   Occupational History    Not on file   Social Needs    Financial resource strain: Not on file    Food insecurity     Worry: Not on file     Inability: Not on file    Transportation needs     Medical: Not on file     Non-medical: Not on file   Tobacco Use    Smoking status: Current Every Day Smoker     Packs/day: 0.50     Types: Cigarettes    Smokeless tobacco: Never Used   Substance and Sexual Activity    Alcohol use: No     Comment: 5 cups of coffee decaf/regular    Drug use: No    Sexual activity: Not on file   Lifestyle    Physical activity     Days per week: Not on file     Minutes per session: Not on file    Stress: Not on file   Relationships    Social connections     Talks on phone: Not on file     Gets together: Not on file     Attends Zoroastrian service: Not on file     Active member of club or organization: Not on file     Attends meetings of clubs or organizations: Not on file     Relationship status: Not on file    Intimate partner violence     Fear of current or ex partner: Not on file     Emotionally abused: Not on file     Physically abused: Not on file     Forced sexual activity: Not on file   Other Topics Concern    Not on file   Social History Narrative    Not on file       Family History   Problem Relation Age of Onset    Cancer Other     Heart Disease Other     Cancer Mother         brain    Breast Cancer Mother     Heart Attack Father         age 48     Heart Attack Sister         age 46     Colon Cancer Sister        Review of Systems:   Heart: as above   Lungs: as above   Eyes: denies changes in vision or discharge. Ears: denies changes in hearing or pain. Nose: denies epistaxis or masses   Throat: denies sore throat or trouble swallowing. Neuro: denies numbness, tingling, tremors. Skin: denies rashes or itching. : denies hematuria, dysuria   GI: denies vomiting, diarrhea   Psych: denies mood changed, anxiety, depression. all others negative. Physical Exam   /81   Pulse 72   Temp 97.5 °F (36.4 °C) (Oral)   Resp 16   Ht 5' 3\" (1.6 m)   Wt 130 lb (59 kg)   SpO2 98%   BMI 23.03 kg/m²   Constitutional: Oriented to person, place, and time. Well-developed and well-nourished. No distress. Head: Normocephalic and atraumatic. Eyes: EOM are normal. Pupils are equal, round, and reactive to light. Neck: Normal range of motion. Neck supple. No hepatojugular reflux and no JVD present. Carotid bruit is not present. No tracheal deviation present. No thyromegaly present. Cardiovascular: Normal rate, regular rhythm, normal heart sounds and intact distal pulses. Exam reveals no gallop and no friction rub. No murmur heard. Pulmonary/Chest: Effort normal and breath sounds normal. No respiratory distress. No wheezes. No rales. No tenderness. Abdominal: Soft.  Bowel sounds are normal. No distension and no mass. No tenderness. No rebound and no guarding. Musculoskeletal: Normal range of motion. No edema and no tenderness. Lymphadenopathy:   No cervical adenopathy. No groin adenopathy. Neurological: Alert and oriented to person, place, and time. Skin: Skin is warm and dry. No rash noted. Not diaphoretic. No erythema. Psychiatric: Normal mood and affect. Behavior is normal.     CBC:   Lab Results   Component Value Date    WBC 8.9 05/06/2021    RBC 4.55 05/06/2021    HGB 14.4 05/06/2021    HCT 43.8 05/06/2021    MCV 96.3 05/06/2021    MCH 31.6 05/06/2021    MCHC 32.9 05/06/2021    RDW 12.8 05/06/2021     05/06/2021    MPV 11.1 05/06/2021     BMP:   Lab Results   Component Value Date     05/06/2021    K 3.7 05/06/2021    K 3.7 08/15/2020     05/06/2021    CO2 27 05/06/2021    BUN 9 05/06/2021    LABALBU 3.7 05/06/2021    LABALBU 4.3 12/13/2011    CREATININE 0.6 05/06/2021    CALCIUM 8.8 05/06/2021    GFRAA >60 05/06/2021    LABGLOM >60 05/06/2021     Magnesium:    Lab Results   Component Value Date    MG 1.9 05/05/2021     Cardiac Enzymes:   Lab Results   Component Value Date    CKTOTAL 60 08/14/2020    CKTOTAL 44 08/06/2018    CKTOTAL 52 08/05/2018    CKMB 5.3 (H) 08/14/2020    CKMB 4.4 (H) 08/06/2018    CKMB 4.4 (H) 08/05/2018    TROPONINI <0.01 05/05/2021    TROPONINI <0.01 05/04/2021    TROPONINI <0.01 05/04/2021      PT/INR:    Lab Results   Component Value Date    PROTIME 12.6 08/03/2018    INR 1.1 08/03/2018     TSH:    Lab Results   Component Value Date    TSH 6.710 04/15/2021     Rhythm Strip: normal sinus rhythm. EKG:  normal sinus rhythm, nonspecific ST and T waves changes. Echo 8/6/2018: LVEF 65% Mild AS    CATH FINDINGS 8/9/2018:  ANGIOGRAPHIC FINDINGS:  The left main coronary artery arising from the left sinus of Valsalva. It is a large vessel that has no significant disease.  It trifurcates into left anterior descending artery, left circumflex artery and ramus intermedius artery.     The left anterior descending artery is a very tortuous vessel, actually all of her vessels are very tortuous and the overlapped significantly, the LAD has no significant disease. It gives off a two large diagonal branches. The LAD and its diagonal branches have no significant disease. The second diagonal branch may be has like 30% disease.     The ramus intermedius artery also a tortuous vessel without any significant disease. The left circumflex artery is a large vessel that gives off a large OM branch and that has like 30% proximal disease.      The right coronary artery arising from the right sinus of Valsalva. It has proximal total occlusion. There was grade 3 left-to-right collaterals. IMPRESSION:  1. 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 2.  Significantly tortuous and overlapping left coronary arteries. 3.  Mild disease involving a second diagonal and first OM branch. RECOMMENDATIONS:  1. Aggressive coronary artery disease risk-factor modification. 2.  Smoking cessation. Stress Summary 5/5/2021:  1. Moderate-sized reversible inferior perfusion defect (mild defect). 2. Gated SPECT left ventricular ejection fraction was calculated to be   81% with normal myocardial wall motion. ASSESSMENT AND PLAN:  Patient Active Problem List   Diagnosis    Spinal stenosis    Degeneration of lumbosacral intervertebral disc    Lumbago    Colonic diverticular abscess    CAD in native artery    S/P colon resection    Diverticulitis of large intestine with perforation and abscess without bleeding    Pelvic abscess in female    Colostomy reversal    SBO (small bowel obstruction) (HCC)    Bradycardia    Chest pain     1. Chest pain/CAD: EKG and CE okay. Stress with inferior mild defect. This is in area of known chronic total occlusion of RCA and expected. Medical management.      2. PAF/Palpitations/Bradycardia:    In sinus. Monitor. Reduce Toprol to 25 mg daily due to bradycardia. Move to discharge. Mil Olivares D.O.   Cardiologist  Cardiology, [address]

## 2021-05-07 NOTE — DISCHARGE SUMMARY
Dictate [id number]
b.i.d., aspirin 81 mg daily, calcium 1200 a day,  Colace 100 mg daily, echinacea daily, fish oil, Synthroid 100 mcg daily,  lisinopril 10 mg daily, Protonix 40 mg daily, Crestor 10 mg daily, and  zinc 50 daily. The patient will follow up with her primary care doctor  on an outpatient basis, Dr. Gerda Cabrera. The patient was also seen by  [address] [place] [address] Cardiology. The patient should refrain from cigarette smoking. Further adjustment made in her medication. The patient was given full  instructions to return if any problems arise. More than 40 minutes were  spent on the day of discharge with more than 50% of the time spent  face-to-face with the patient discussing the plan of care and treatment  at this time.         [address] [place] [address],     D: 05/06/2021 15:13:00       T: 05/07/2021 4:09:48     [email or web address]  Job#: [id number]     Doc#: [id number]    CC:

## 2021-05-09 NOTE — PROGRESS NOTES
[address] and Vascular [facility] Electrophysiology  Consultation Report  PATIENT: Farhan Vicente  MEDICAL RECORD NUMBER: <[id number]>  DATE OF SERVICE:  5/10/2021  ATTENDING ELECTROPHYSIOLOGIST: Merline Konig, MD  REFERRING PHYSICIAN: Rosaura Manriquez MD and Roseanne Duarte DO  CHIEF COMPLAINT: Paroxysmal atrial fibrillation and bradycardia    HPI: This is a 78 y.o. female with a history of   Patient Active Problem List   Diagnosis    Spinal stenosis    Degeneration of lumbosacral intervertebral disc    Lumbago    Colonic diverticular abscess    CAD in native artery    S/P colon resection    Diverticulitis of large intestine with perforation and abscess without bleeding    Pelvic abscess in female    Colostomy reversal    SBO (small bowel obstruction) ([facility] [address]    Bradycardia    Chest pain   who presents to cardiac electrophysiology clinic for consultation of paroxysmal atrial fibrillation and sinus bradycardia. The patient has history of paroxysmal atrial fibrillation, coronary artery disease with chronic total occlusion of the right coronary artery with collaterals, HFpEF, mild aortic stenosis, hypertension, hyperlipidemia and hypothyroidism. The patient was noted to be in AF with RVR on 8/12/20 when she was admitted for SBO. Rhythm converted spontaneously after IV Diltiazem. She presented to the ED on 12/5/20 with AF with RVR and underwent DC-cardioversion. She presented on 4/15/21 with recurrent AF with RVR and she was discharged and Metoprolol dose was adjusted. She presented on 5/4/21 with chest pain and was noted to be in sinus bradycardia at 40's bpm. Her Toprol XL dose was decreased. She underwent nuclear stress test on 5/5/21 which showed moderate-sized reversible inferior perfusion defect (mild defect) and was recommended medical therapy per Cardiology.  The patient reports since being discharged from the hospital she states feeling overall well on the Toprol XL Medication Sig Dispense Refill    metoprolol succinate (TOPROL XL) 25 MG extended release tablet Take 1 tablet by mouth daily 30 tablet 1    Calcium Carbonate-Vit D-Min (CALCIUM 1200 PO) Take 1 capsule by mouth      docusate sodium (COLACE) 100 MG capsule Take 100 mg by mouth nightly       Echinacea 125 MG CAPS Take 125 mg by mouth daily      aspirin EC 81 MG EC tablet Take 1 tablet by mouth daily 30 tablet 3    pantoprazole (PROTONIX) 40 MG tablet Take 1 tablet by mouth daily 30 tablet 3    apixaban (ELIQUIS) 5 MG TABS tablet Take 1 tablet by mouth 2 times daily 60 tablet 0    ZINC PO Take 1 tablet by mouth daily      Omega-3 Fatty Acids (FISH OIL) 1000 MG CAPS Take 1,000 mg by mouth 2 times daily       lisinopril (PRINIVIL;ZESTRIL) 10 MG tablet Take 10 mg by mouth daily.  rosuvastatin (CRESTOR) 10 MG tablet Take 10 mg by mouth daily.  SYNTHROID 125 MCG tablet Take 125 mcg by mouth Daily Take 1 tablet daily       No current facility-administered medications for this visit. No Known Allergies    ROS:   Constitutional: Negative for fever, activity change and appetite change. HENT: Negative for epistaxis. Eyes: Negative for diploplia, blurred vision. Respiratory: Negative for cough, chest tightness, shortness of breath and wheezing. Cardiovascular: pertinent positives in HPI  Gastrointestinal: Negative for abdominal pain and blood in stool. All other review of systems are negative     PHYSICAL EXAM:   Vitals:    05/10/21 1354   BP: 130/84   Pulse: 71   Resp: 18   Weight: 130 lb (59 kg)   Height: 5' 3\" (1.6 m)      Constitutional: Well-developed, no acute distress  Eyes: conjunctivae normal, no xanthelasma   Ears, Nose, Throat: oral mucosa moist, no cyanosis   CV: no JVD. Regular rate and rhythm. Normal S1S2 and no S3. No murmurs, rubs, or gallops.  PMI is nondisplaced  Lungs: clear to auscultation bilaterally, normal respiratory effort without used of accessory stenosis is present. Pulmonic Valve   The pulmonic valve was not well visualized. No evidence of any pulmonic regurgitation. Pericardial Effusion   No evidence of pericardial effusion. Aorta   Aortic root within normal limits. Conclusions      Summary   No previous echo for comparison. Technically adequate study. Proximal septal thickening. Ejection fraction is visually estimated at 65%. No regional wall motion abnormalities seen. E/A flow reversal noted. Suggestive of diastolic dysfunction. Physiologic and/or trace mitral regurgitation is present. Mild aortic stenosis is present. Physiologic and/or trace tricuspid regurgitation. RVSP is normal.      Signature      ----------------------------------------------------------------   Electronically signed by Angelito Castillo MD(Interpreting   physician) on 08/06/2018 07:46 PM   ----------------------------------------------------------------    Cardiac Catheterization 08/9/18:  PROCEDURES:  1. Coronary angiography. 2.  Right radial approach. 3.  Conscious sedation using Versed and fentanyl.     INDICATION:  #4 with score of seven.     HISTORY:  The patient is a 72-year-old lady who presented to the hospital  with shortness of breath. She was found to have stress test that was read  as normal, upon personal review of the stress test showed significant  inferior and lateral wall reversible defect, the patient was brought to the  cath lab to evaluate the anatomy of her coronary arteries with the  intention to intervene as warranted.     DESCRIPTION OF PROCEDURE:  After the appropriate informed consent, the  right wrist area was prepped and draped in the usual sterile fashion. A  time-out was called. The right wrist area was locally anesthetized with 80  ml of 2% lidocaine. A 21-gauge needle was used to access the right radial  artery. A 6-Kiswahili introducer sheath was used to cannulate the right  radial artery.   A 5-Kiswahili JL-3.5 and 5-Ku[place]i JR-4 catheter were used for  coronary angiography in multiple projections.     ANGIOGRAPHIC FINDINGS:  The left main coronary artery arising from the left  sinus of Valsalva. It is a large vessel that has no significant disease. It trifurcates into left anterior descending artery, left circumflex artery  and ramus intermedius artery.     The left anterior descending artery is a very tortuous vessel, actually all  of her vessels are very tortuous and the overlapped significantly, the LAD  has no significant disease. It gives off a two large diagonal branches. The LAD and its diagonal branches have no significant disease. The second  diagonal branch may be has like 30% disease.     The ramus intermedius artery also a tortuous vessel without any significant  disease. The left circumflex artery is a large vessel that gives off a  large OM branch and that has like 30% proximal disease.     There was grade 3 left-to-right collaterals.     The right coronary artery arising from the right sinus of Valsalva. It has  proximal total occlusion.     At the end of the procedure, the catheter and the wire were pulled out from  the body, Vasc band was applied to the right wrist area with effective  hemostasis.     MEDICATIONS USED DURING THE PROCEDURE:  Intraarterial verapamil to prevent  vasospasm, intra-arterial heparin for anticoagulation.     We used Versed and fentanyl for conscious sedation. First dose was given  at [address], procedure ended at 1815, there was 20 minutes of direct  face-to-face supervision during conscious sedation administration.     Total fluoroscopy time was 1.7 minutes.     Total contrast used was 50 mL.     IMPRESSION:  1. 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 2.  Significantly tortuous and overlapping left coronary arteries. 3.  Mild disease involving a second diagonal and first OM branch.     RECOMMENDATIONS:  1. Aggressive coronary artery disease risk-factor modification. 2.  Smoking cessation. 3.  The patient will be observed for 2 hours, discharged home if she meets  discharge criteria.           Moncho Gurrola MD    Stress Test: 5/5/21   Findings      Left Ventricle   Left ventricle size is normal.   Proximal septal thickening. Ejection fraction is visually estimated at 65%. No regional wall motion abnormalities seen. E/A flow reversal noted. Suggestive of diastolic dysfunction. No evidence of left ventricular mass or thrombus noted. Right Ventricle   Normal right ventricular size and function. Left Atrium   Normal sized left atrium. Interatrial septum appears intact. Right Atrium   Normal right atrium size. Mitral Valve   Structurally normal mitral valve. Physiologic and/or trace mitral regurgitation is present. No evidence of mitral valve stenosis. Tricuspid Valve   The tricuspid valve appears structurally normal.   Physiologic and/or trace tricuspid regurgitation. RVSP is normal.      Aortic Valve   The aortic valve appears mildly sclerotic. Aortic valve opens well. No evidence of aortic valve regurgitation. Mild aortic stenosis is present. Pulmonic Valve   The pulmonic valve was not well visualized. No evidence of any pulmonic regurgitation. Pericardial Effusion   No evidence of pericardial effusion. Aorta   Aortic root within normal limits. Conclusions      Summary   No previous echo for comparison. Technically adequate study. Proximal septal thickening. Ejection fraction is visually estimated at 65%. No regional wall motion abnormalities seen. E/A flow reversal noted. Suggestive of diastolic dysfunction. Physiologic and/or trace mitral regurgitation is present. Mild aortic stenosis is present. Physiologic and/or trace tricuspid regurgitation.    RVSP is normal.      Signature ----------------------------------------------------------------   Electronically signed by Berenice Andrews MD(Interpreting   physician) on 08/06/2018 07:46 PM   ----------------------------------------------------------------    I have independently reviewed all of the ECGs and rhythm strips per above     Assessment/Plan: This is a 78 y.o. female with a history of   Patient Active Problem List   Diagnosis    Spinal stenosis    Degeneration of lumbosacral intervertebral disc    Lumbago    Colonic diverticular abscess    CAD in native artery    S/P colon resection    Diverticulitis of large intestine with perforation and abscess without bleeding    Pelvic abscess in female    Colostomy reversal    SBO (small bowel obstruction) (HCC)    Bradycardia    Chest pain    who presents with PAF and bradycardia. 1. Paroxysmal atrial fibrillation  - Diagnosed 8/2018.   - [id number] = 5 (age, gender, CAD, CHF, HTN)  - [place] [facility] [place] includes Eliquis. - Current medical therapy includes Toprol XL.  - Thus far has had 1 cardioversions and the last one was 12/5/20.  - Previous attempts at rhythm control have been DC-cardioversion  - Had an extensive discussion regarding options between rate and rhythm control and the patient has chosen rate control for now.   - For rhythm control in future we plan to utilize Tikosyn versus AF ablation if AF continues to recur.  - Had an extensive discussion regarding all secondary causes of AF which include but are not limited to the following and then need for lifestyle modifications and stringent control of contributing medical conditions which include            - Weight Loss: aggressive weight loss and regular moderate cardiopulmonary exercise to maintain BMI <27 with a loss of at least 10% of their current weight which is safely and adequately maintained            - Exercise: 30min 3-4x/week of at least moderate cardiopulmonary exercise up to 250 min/wk            - Blood pressure: target of <130/80mmHg            - Dyslipidemia: add a statin if LDL >100mg/dL            - Diabetes Mellitus: add Metformin if HbA1c >6.5%            - Obstructive sleep apnea: evaluate for and or treat with CPAP if AHI >30/hour            - Abstinence from alcohol and tobacco products  - Discussed the potential improvement in all atrial fibrillation therapies if diet/exercise and weight loss are achieved per above. 2. Sinus bradycardia  - Probable underlying sinus node dysfunction which worsening by Beta-blocker. - Presents today in NSR on low dose Toprol XL.  - Not a good candidate for AAD therapy except Tikosyn. 3. Coronary artery disease   - Chronic total occlusion of the right coronary artery with collaterals per [address] [facility] 8/9/18.  - On ASA, Toprol XL and Crestor. 4. Chronic HFpEF  - LV EF 65% on TTE 8/6/18.  - Euvolemic and compensated. - On Toprol XL and Zestril. 5. Valvular heart disease  - Mild aortic stenosis on TTE 8/6/18. .    6. Hypertension  - Well controlled. - O Toprol XL and Zestril. 7. Hyperlipidemia   - On Crestor. 8. Hypothyroidism   - On Toprol XL and Zestril. Recommendations:  1. Fourteen day cardiac monitor to further identify AF burden. 2. Echocardiogram to update LV function and wall thickness. 3. Check cost of Tikosyn. Consider Tikosyn therapy if patient agrees  4. Patient to consider AF ablation if fails Tikosyn therapy. 5. Follow up in 3 months or sooner PRN. Encouraged the patient to call the office for any questions or concerns. I have spent a total of 60 minutes with the patient and the family reviewing the above stated recommendations.   And a total of >50% of that time involved face-to-face time providing counseling and or coordination of care with the other providers, preparation for the clinic visit, reviewing records/tests, counseling/education of the patient, ordering medications/tests/procedures, coordinating care, and documenting clinical information in the EHR. Thank you for allowing me to participate in your patient's care. Please call me if there are any questions or concerns.       Asia De Leon MD  Cardiac Electrophysiology  [facility]) Physicians  [facility] [place]: [facility]  2:18 PM  5/10/2021

## 2021-05-10 ENCOUNTER — OFFICE VISIT (OUTPATIENT)
Dept: NON INVASIVE DIAGNOSTICS | Age: 71
End: 2021-05-10
Payer: COMMERCIAL

## 2021-05-10 VITALS
HEIGHT: 63 IN | DIASTOLIC BLOOD PRESSURE: 84 MMHG | RESPIRATION RATE: 18 BRPM | BODY MASS INDEX: 23.04 KG/M2 | SYSTOLIC BLOOD PRESSURE: 130 MMHG | WEIGHT: 130 LBS | HEART RATE: 71 BPM

## 2021-05-10 DIAGNOSIS — I48.91 ATRIAL FIBRILLATION, UNSPECIFIED TYPE (HCC): ICD-10-CM

## 2021-05-10 DIAGNOSIS — R00.1 BRADYCARDIA: Primary | ICD-10-CM

## 2021-05-10 DIAGNOSIS — R94.31 EKG ABNORMALITIES: ICD-10-CM

## 2021-05-10 LAB
BLOOD CULTURE, ROUTINE: NORMAL
CULTURE, BLOOD 2: NORMAL

## 2021-05-10 PROCEDURE — 93000 ELECTROCARDIOGRAM COMPLETE: CPT | Performed by: INTERNAL MEDICINE

## 2021-05-10 PROCEDURE — 99205 OFFICE O/P NEW HI 60 MIN: CPT | Performed by: INTERNAL MEDICINE

## 2021-05-10 RX ORDER — LEVOTHYROXINE SODIUM 125 MCG
125 TABLET ORAL DAILY
COMMUNITY
Start: 2021-04-25

## 2021-05-10 NOTE — PROGRESS NOTES
Patient was in today had 14 day Zio XT placed per Dr Jaylen Tilley. Patient given instructions and questions answered, patient verbalized understanding.     Serial #: [id number]    Ashley Salter MA

## 2021-05-11 ENCOUNTER — TELEPHONE (OUTPATIENT)
Dept: NON INVASIVE DIAGNOSTICS | Age: 71
End: 2021-05-11

## 2021-05-11 ENCOUNTER — TELEPHONE (OUTPATIENT)
Dept: CARDIOLOGY | Age: 71
End: 2021-05-11

## 2021-05-11 NOTE — TELEPHONE ENCOUNTER
Request from [facility] to do echo ASAP, however, patient wants testing done at [place] and is scheduled later in May. Reviewed Covid-19 checklist with the patient.     Electronically signed by Quincy Muñoz on 5/11/2021 at 12:21 PM

## 2021-05-11 NOTE — TELEPHONE ENCOUNTER
Sotalol - $38.35 (30 day supply)  Dofetilide - $130 (30 day supply)    Awaiting for echo to be scheduled. I faxed the order downstairs and sent the  a message to schedule echo ASAP.

## 2021-05-21 ENCOUNTER — HOSPITAL ENCOUNTER (OUTPATIENT)
Age: 71
Discharge: HOME | End: 2021-05-21
Payer: COMMERCIAL

## 2021-05-21 LAB
ALBUMIN SERPL-MCNC: 4.3 G/DL (ref 3.5–5.2)
ALP BLD-CCNC: 62 U/L (ref 35–104)
ALT SERPL-CCNC: 13 U/L (ref 0–32)
ANION GAP SERPL CALCULATED.3IONS-SCNC: 7 MMOL/L (ref 7–16)
AST SERPL-CCNC: 20 U/L (ref 0–31)
BASOPHILS ABSOLUTE: 0.09 E9/L (ref 0–0.2)
BASOPHILS RELATIVE PERCENT: 1 % (ref 0–2)
BILIRUB SERPL-MCNC: 0.5 MG/DL (ref 0–1.2)
BUN BLDV-MCNC: 10 MG/DL (ref 6–23)
CALCIUM SERPL-MCNC: 10.5 MG/DL (ref 8.6–10.2)
CHLORIDE BLD-SCNC: 101 MMOL/L (ref 98–107)
CHOLESTEROL, FASTING: 146 MG/DL (ref 0–199)
CO2: 31 MMOL/L (ref 22–29)
CREAT SERPL-MCNC: 0.7 MG/DL (ref 0.5–1)
EOSINOPHILS ABSOLUTE: 0.13 E9/L (ref 0.05–0.5)
EOSINOPHILS RELATIVE PERCENT: 1.5 % (ref 0–6)
GFR AFRICAN AMERICAN: >60
GFR NON-AFRICAN AMERICAN: >60 ML/MIN/1.73
GLUCOSE FASTING: 104 MG/DL (ref 74–99)
HCT VFR BLD CALC: 45.9 % (ref 34–48)
HDLC SERPL-MCNC: 51 MG/DL
HEMOGLOBIN: 14.9 G/DL (ref 11.5–15.5)
IMMATURE GRANULOCYTES #: 0.03 E9/L
IMMATURE GRANULOCYTES %: 0.3 % (ref 0–5)
LDL CHOLESTEROL CALCULATED: 75 MG/DL (ref 0–99)
LYMPHOCYTES ABSOLUTE: 1.61 E9/L (ref 1.5–4)
LYMPHOCYTES RELATIVE PERCENT: 18.3 % (ref 20–42)
MCH RBC QN AUTO: 31.7 PG (ref 26–35)
MCHC RBC AUTO-ENTMCNC: 32.5 % (ref 32–34.5)
MCV RBC AUTO: 97.7 FL (ref 80–99.9)
MONOCYTES ABSOLUTE: 0.78 E9/L (ref 0.1–0.95)
MONOCYTES RELATIVE PERCENT: 8.9 % (ref 2–12)
NEUTROPHILS ABSOLUTE: 6.16 E9/L (ref 1.8–7.3)
NEUTROPHILS RELATIVE PERCENT: 70 % (ref 43–80)
PDW BLD-RTO: 12.7 FL (ref 11.5–15)
PLATELET # BLD: 228 E9/L (ref 130–450)
PMV BLD AUTO: 9.9 FL (ref 7–12)
POTASSIUM SERPL-SCNC: 5.3 MMOL/L (ref 3.5–5)
RBC # BLD: 4.7 E12/L (ref 3.5–5.5)
SODIUM BLD-SCNC: 139 MMOL/L (ref 132–146)
T4 TOTAL: 11.5 MCG/DL (ref 4.5–11.7)
TOTAL PROTEIN: 7 G/DL (ref 6.4–8.3)
TRIGLYCERIDE, FASTING: 98 MG/DL (ref 0–149)
TSH SERPL DL<=0.05 MIU/L-ACNC: 1.81 UIU/ML (ref 0.27–4.2)
VLDLC SERPL CALC-MCNC: 20 MG/DL
WBC # BLD: 8.8 E9/L (ref 4.5–11.5)

## 2021-05-21 PROCEDURE — 84443 ASSAY THYROID STIM HORMONE: CPT

## 2021-05-21 PROCEDURE — 80061 LIPID PANEL: CPT

## 2021-05-21 PROCEDURE — 36415 COLL VENOUS BLD VENIPUNCTURE: CPT

## 2021-05-21 PROCEDURE — 84436 ASSAY OF TOTAL THYROXINE: CPT

## 2021-05-21 PROCEDURE — 85025 COMPLETE CBC W/AUTO DIFF WBC: CPT

## 2021-05-21 PROCEDURE — 80053 COMPREHEN METABOLIC PANEL: CPT

## 2021-06-09 ENCOUNTER — APPOINTMENT (OUTPATIENT)
Dept: GENERAL RADIOLOGY | Age: 71
DRG: 309 | End: 2021-06-09
Payer: COMMERCIAL

## 2021-06-09 ENCOUNTER — HOSPITAL ENCOUNTER (INPATIENT)
Age: 71
LOS: 1 days | Discharge: HOME | DRG: 309 | End: 2021-06-10
Attending: EMERGENCY MEDICINE | Admitting: INTERNAL MEDICINE
Payer: COMMERCIAL

## 2021-06-09 DIAGNOSIS — I48.91 ATRIAL FIBRILLATION WITH RAPID VENTRICULAR RESPONSE (HCC): Primary | ICD-10-CM

## 2021-06-09 LAB
ANION GAP SERPL CALCULATED.3IONS-SCNC: 12 MMOL/L (ref 7–16)
BUN BLDV-MCNC: 8 MG/DL (ref 6–23)
CALCIUM SERPL-MCNC: 9.7 MG/DL (ref 8.6–10.2)
CHLORIDE BLD-SCNC: 103 MMOL/L (ref 98–107)
CO2: 25 MMOL/L (ref 22–29)
CREAT SERPL-MCNC: 0.7 MG/DL (ref 0.5–1)
EKG ATRIAL RATE: 107 BPM
EKG Q-T INTERVAL: 320 MS
EKG QRS DURATION: 82 MS
EKG QTC CALCULATION (BAZETT): 490 MS
EKG R AXIS: 76 DEGREES
EKG T AXIS: 118 DEGREES
EKG VENTRICULAR RATE: 141 BPM
GFR AFRICAN AMERICAN: >60
GFR NON-AFRICAN AMERICAN: >60 ML/MIN/1.73
GLUCOSE BLD-MCNC: 103 MG/DL (ref 74–99)
HCT VFR BLD CALC: 45.1 % (ref 34–48)
HEMOGLOBIN: 15.2 G/DL (ref 11.5–15.5)
LV EF: 65 %
LVEF MODALITY: NORMAL
MCH RBC QN AUTO: 32.2 PG (ref 26–35)
MCHC RBC AUTO-ENTMCNC: 33.7 % (ref 32–34.5)
MCV RBC AUTO: 95.6 FL (ref 80–99.9)
PDW BLD-RTO: 12.6 FL (ref 11.5–15)
PLATELET # BLD: 228 E9/L (ref 130–450)
PMV BLD AUTO: 10.6 FL (ref 7–12)
POTASSIUM SERPL-SCNC: 3.9 MMOL/L (ref 3.5–5)
PRO-BNP: 291 PG/ML (ref 0–125)
RBC # BLD: 4.72 E12/L (ref 3.5–5.5)
SODIUM BLD-SCNC: 140 MMOL/L (ref 132–146)
T4 FREE: 1.57 NG/DL (ref 0.93–1.7)
TROPONIN, HIGH SENSITIVITY: 23 NG/L (ref 0–9)
TROPONIN, HIGH SENSITIVITY: 24 NG/L (ref 0–9)
TROPONIN, HIGH SENSITIVITY: 9 NG/L (ref 0–9)
TSH SERPL DL<=0.05 MIU/L-ACNC: 5.94 UIU/ML (ref 0.27–4.2)
WBC # BLD: 11.8 E9/L (ref 4.5–11.5)

## 2021-06-09 PROCEDURE — 84439 ASSAY OF FREE THYROXINE: CPT

## 2021-06-09 PROCEDURE — 85027 COMPLETE CBC AUTOMATED: CPT

## 2021-06-09 PROCEDURE — 84484 ASSAY OF TROPONIN QUANT: CPT

## 2021-06-09 PROCEDURE — G0378 HOSPITAL OBSERVATION PER HR: HCPCS

## 2021-06-09 PROCEDURE — 2500000003 HC RX 250 WO HCPCS: Performed by: EMERGENCY MEDICINE

## 2021-06-09 PROCEDURE — 80048 BASIC METABOLIC PNL TOTAL CA: CPT

## 2021-06-09 PROCEDURE — 6370000000 HC RX 637 (ALT 250 FOR IP): Performed by: NURSE PRACTITIONER

## 2021-06-09 PROCEDURE — 2060000000 HC ICU INTERMEDIATE R&B

## 2021-06-09 PROCEDURE — 84443 ASSAY THYROID STIM HORMONE: CPT

## 2021-06-09 PROCEDURE — 2580000003 HC RX 258: Performed by: NURSE PRACTITIONER

## 2021-06-09 PROCEDURE — 83880 ASSAY OF NATRIURETIC PEPTIDE: CPT

## 2021-06-09 PROCEDURE — 93010 ELECTROCARDIOGRAM REPORT: CPT | Performed by: INTERNAL MEDICINE

## 2021-06-09 PROCEDURE — 36415 COLL VENOUS BLD VENIPUNCTURE: CPT

## 2021-06-09 PROCEDURE — 96376 TX/PRO/DX INJ SAME DRUG ADON: CPT

## 2021-06-09 PROCEDURE — 99284 EMERGENCY DEPT VISIT MOD MDM: CPT

## 2021-06-09 PROCEDURE — 93306 TTE W/DOPPLER COMPLETE: CPT

## 2021-06-09 PROCEDURE — 71045 X-RAY EXAM CHEST 1 VIEW: CPT

## 2021-06-09 PROCEDURE — 99254 IP/OBS CNSLTJ NEW/EST MOD 60: CPT | Performed by: INTERNAL MEDICINE

## 2021-06-09 PROCEDURE — 96366 THER/PROPH/DIAG IV INF ADDON: CPT

## 2021-06-09 PROCEDURE — 2580000003 HC RX 258: Performed by: EMERGENCY MEDICINE

## 2021-06-09 PROCEDURE — 96365 THER/PROPH/DIAG IV INF INIT: CPT

## 2021-06-09 PROCEDURE — 93005 ELECTROCARDIOGRAM TRACING: CPT | Performed by: EMERGENCY MEDICINE

## 2021-06-09 RX ORDER — POLYETHYLENE GLYCOL 3350 17 G/17G
17 POWDER, FOR SOLUTION ORAL DAILY PRN
Status: DISCONTINUED | OUTPATIENT
Start: 2021-06-09 | End: 2021-06-10 | Stop reason: HOSPADM

## 2021-06-09 RX ORDER — ACETAMINOPHEN 650 MG/1
650 SUPPOSITORY RECTAL EVERY 6 HOURS PRN
Status: DISCONTINUED | OUTPATIENT
Start: 2021-06-09 | End: 2021-06-10 | Stop reason: HOSPADM

## 2021-06-09 RX ORDER — ONDANSETRON 2 MG/ML
4 INJECTION INTRAMUSCULAR; INTRAVENOUS EVERY 6 HOURS PRN
Status: DISCONTINUED | OUTPATIENT
Start: 2021-06-09 | End: 2021-06-10 | Stop reason: HOSPADM

## 2021-06-09 RX ORDER — ONDANSETRON 4 MG/1
4 TABLET, ORALLY DISINTEGRATING ORAL EVERY 8 HOURS PRN
Status: DISCONTINUED | OUTPATIENT
Start: 2021-06-09 | End: 2021-06-10 | Stop reason: HOSPADM

## 2021-06-09 RX ORDER — PANTOPRAZOLE SODIUM 40 MG/1
40 TABLET, DELAYED RELEASE ORAL DAILY
Status: DISCONTINUED | OUTPATIENT
Start: 2021-06-09 | End: 2021-06-10 | Stop reason: HOSPADM

## 2021-06-09 RX ORDER — SODIUM CHLORIDE 9 MG/ML
25 INJECTION, SOLUTION INTRAVENOUS PRN
Status: DISCONTINUED | OUTPATIENT
Start: 2021-06-09 | End: 2021-06-10 | Stop reason: HOSPADM

## 2021-06-09 RX ORDER — ROSUVASTATIN CALCIUM 10 MG/1
10 TABLET, COATED ORAL DAILY
Status: DISCONTINUED | OUTPATIENT
Start: 2021-06-09 | End: 2021-06-10 | Stop reason: HOSPADM

## 2021-06-09 RX ORDER — ACETAMINOPHEN 325 MG/1
650 TABLET ORAL EVERY 6 HOURS PRN
Status: DISCONTINUED | OUTPATIENT
Start: 2021-06-09 | End: 2021-06-10 | Stop reason: HOSPADM

## 2021-06-09 RX ORDER — DOCUSATE SODIUM 100 MG/1
100 CAPSULE, LIQUID FILLED ORAL 2 TIMES DAILY
Status: DISCONTINUED | OUTPATIENT
Start: 2021-06-09 | End: 2021-06-10 | Stop reason: HOSPADM

## 2021-06-09 RX ORDER — MAGNESIUM SULFATE 1 G/100ML
1000 INJECTION INTRAVENOUS PRN
Status: DISCONTINUED | OUTPATIENT
Start: 2021-06-09 | End: 2021-06-10 | Stop reason: HOSPADM

## 2021-06-09 RX ORDER — SODIUM CHLORIDE 0.9 % (FLUSH) 0.9 %
5-40 SYRINGE (ML) INJECTION PRN
Status: DISCONTINUED | OUTPATIENT
Start: 2021-06-09 | End: 2021-06-10 | Stop reason: HOSPADM

## 2021-06-09 RX ORDER — LISINOPRIL 10 MG/1
10 TABLET ORAL DAILY
Status: DISCONTINUED | OUTPATIENT
Start: 2021-06-09 | End: 2021-06-10 | Stop reason: HOSPADM

## 2021-06-09 RX ORDER — ASPIRIN 81 MG/1
81 TABLET ORAL DAILY
Status: DISCONTINUED | OUTPATIENT
Start: 2021-06-09 | End: 2021-06-10 | Stop reason: HOSPADM

## 2021-06-09 RX ORDER — POTASSIUM CHLORIDE 20 MEQ/1
40 TABLET, EXTENDED RELEASE ORAL PRN
Status: DISCONTINUED | OUTPATIENT
Start: 2021-06-09 | End: 2021-06-10 | Stop reason: HOSPADM

## 2021-06-09 RX ORDER — LEVOTHYROXINE SODIUM 0.12 MG/1
125 TABLET ORAL DAILY
Status: DISCONTINUED | OUTPATIENT
Start: 2021-06-09 | End: 2021-06-10 | Stop reason: HOSPADM

## 2021-06-09 RX ORDER — DILTIAZEM HYDROCHLORIDE 5 MG/ML
10 INJECTION INTRAVENOUS ONCE
Status: COMPLETED | OUTPATIENT
Start: 2021-06-09 | End: 2021-06-09

## 2021-06-09 RX ORDER — SODIUM CHLORIDE 0.9 % (FLUSH) 0.9 %
5-40 SYRINGE (ML) INJECTION EVERY 12 HOURS SCHEDULED
Status: DISCONTINUED | OUTPATIENT
Start: 2021-06-09 | End: 2021-06-10 | Stop reason: HOSPADM

## 2021-06-09 RX ORDER — METOPROLOL SUCCINATE 25 MG/1
50 TABLET, EXTENDED RELEASE ORAL DAILY
Status: DISCONTINUED | OUTPATIENT
Start: 2021-06-09 | End: 2021-06-10 | Stop reason: HOSPADM

## 2021-06-09 RX ORDER — POTASSIUM CHLORIDE 7.45 MG/ML
10 INJECTION INTRAVENOUS PRN
Status: DISCONTINUED | OUTPATIENT
Start: 2021-06-09 | End: 2021-06-10 | Stop reason: HOSPADM

## 2021-06-09 RX ADMIN — APIXABAN 5 MG: 5 TABLET, FILM COATED ORAL at 21:57

## 2021-06-09 RX ADMIN — DILTIAZEM HYDROCHLORIDE 5 MG/HR: 5 INJECTION INTRAVENOUS at 00:57

## 2021-06-09 RX ADMIN — APIXABAN 5 MG: 5 TABLET, FILM COATED ORAL at 09:04

## 2021-06-09 RX ADMIN — LISINOPRIL 10 MG: 10 TABLET ORAL at 09:04

## 2021-06-09 RX ADMIN — DILTIAZEM HYDROCHLORIDE 10 MG: 5 INJECTION INTRAVENOUS at 00:48

## 2021-06-09 RX ADMIN — DOCUSATE SODIUM 100 MG: 100 CAPSULE, LIQUID FILLED ORAL at 21:57

## 2021-06-09 RX ADMIN — ROSUVASTATIN CALCIUM 10 MG: 10 TABLET, COATED ORAL at 09:04

## 2021-06-09 RX ADMIN — ASPIRIN 81 MG: 81 TABLET, COATED ORAL at 09:04

## 2021-06-09 RX ADMIN — PANTOPRAZOLE SODIUM 40 MG: 40 TABLET, DELAYED RELEASE ORAL at 09:05

## 2021-06-09 RX ADMIN — Medication 10 ML: at 09:07

## 2021-06-09 RX ADMIN — Medication 10 ML: at 21:58

## 2021-06-09 RX ADMIN — LEVOTHYROXINE SODIUM 125 MCG: 125 TABLET ORAL at 09:05

## 2021-06-09 ASSESSMENT — ENCOUNTER SYMPTOMS
DIARRHEA: 0
SINUS PRESSURE: 0
EYE PAIN: 0
EYE DISCHARGE: 0
COUGH: 0
VOMITING: 0
EYE REDNESS: 0
SORE THROAT: 0
SHORTNESS OF BREATH: 0
NAUSEA: 0
WHEEZING: 0
ABDOMINAL DISTENTION: 0
BACK PAIN: 0

## 2021-06-09 ASSESSMENT — PAIN DESCRIPTION - LOCATION: LOCATION: CHEST;HEAD;ARM

## 2021-06-09 ASSESSMENT — PAIN DESCRIPTION - FREQUENCY: FREQUENCY: CONTINUOUS

## 2021-06-09 ASSESSMENT — PAIN DESCRIPTION - ORIENTATION: ORIENTATION: LEFT

## 2021-06-09 ASSESSMENT — PAIN SCALES - GENERAL
PAINLEVEL_OUTOF10: 0
PAINLEVEL_OUTOF10: 5

## 2021-06-09 NOTE — H&P
Department of Internal Medicine  History and Physical Examination     Primary Care Physician: Dolly Pisano DO   Admitting Physician:  Emily Vora DO  Admission date and time: 6/9/2021 12:18 AM    Room:  01/01  Admitting diagnosis: Atrial fibrillation with rapid ventricular response [facility]) [I48.91]    Patient Name: Thanh Penny  MRN: [id number]    Date of Service: 6/9/2021     Chief Complaint:  Palpitations    HISTORY OF PRESENT ILLNESS:    Reggie Gotti is a 57-year-old female patient who presented to the emergency department with palpitations and the sensation that she was in atrial fibrillation with RVR. She states that the symptoms onset while she was at rest.  Although being out in the heat yesterday for a baseball game, she did not exert herself substantially. She has been compliant with medications. She does follow with cardiology and electrophysiology as an outpatient. She states that during the last hospitalization her metoprolol was changed to Toprol-XL due to bradycardia. She states that she was slated to have an echocardiogram within the next week as an outpatient. The last hospitalization was reviewed with the patient including the abnormal stress test that was felt to reflect a known RCA occlusion. She is having no chest pains. She is not lightheaded or dizzy. There is no more fatigue than usual.  No fevers, chills, sick contacts or exposures. No headache or acute neurological symptoms. No cough, wheezing or sputum. She continues to smoke cigarettes. No abdominal, urinary or genital complaints.     PAST MEDICAL Hx:  Past Medical History:   Diagnosis Date    CAD in native artery 78/34/7633    Diastolic heart failure ([address]     Diverticulitis 08/03/2018    H/O cardiovascular stress test 05/05/2021    Lexiscan    HIGH CHOLESTEROL     History of atrial fibrillation 08/2018    with episode of diverticulosis    History of stress test 08/07/2018    Lexiscan stress test    Hypertension     Thyroid disease        PAST SURGICAL Hx:   Past Surgical History:   Procedure Laterality Date    COLONOSCOPY      COLONOSCOPY N/A 4/8/2019    COLONOSCOPY performed by Janessa Owen MD at [place] [address] CATH LAB PROCEDURE  08/09/2018    NERVE BLOCK  12 27 2011    NERVE BLOCK  1/24/12    lumbar epidural    NERVE BLOCK  02/21/12    lumbar epidural with flouro    [place] COLONOSCOPY FLX DX W/COLLJ SPEC WHEN PFRMD N/A 10/4/2018    COLONOSCOPY performed by Janessa Owen MD at [facility], PARTIAL, [place] Sandifer N/A 11/13/2018    LAPAROSCOPIC ROBOT XI ASSISTED SIGMOID COLON RESECTION WITH OSTOMY performed by Janessa Owen MD at [address] [place] [address] N/A 4/9/2019    COLOSTOMY CLOSURE LAPAROSCOPIC ROBOTIC XI performed by Janessa Owen MD at [address] [place] [address] N/A 8/13/2020    DIAGNOSTIC LAPAROSCOPIY POSS BOWEL RESECTION performed by Tyra Fuentes MD at [address] Hx:  Family History   Problem Relation Age of Onset    Cancer Other     Heart Disease Other     Cancer Mother         brain    Breast Cancer Mother     Heart Attack Father         age 48     Heart Attack Sister         age 46    [facility] Colon Cancer Sister        HOME MEDICATIONS:  Prior to Admission medications    Medication Sig Start Date End Date Taking?  Authorizing Provider   SYNTHROID 125 MCG tablet Take 125 mcg by mouth Daily Take 1 tablet daily 4/25/21   Historical Provider, MD   metoprolol succinate (TOPROL XL) 25 MG extended release tablet Take 1 tablet by mouth daily 5/7/21   Neeraj Llamas,    Calcium Carbonate-Vit D-Min (CALCIUM 1200 PO) Take 1 capsule by mouth    Historical Provider, MD   docusate sodium (COLACE) 100 MG capsule Take 100 mg by mouth nightly     Historical Provider, MD   Echinacea 125 MG CAPS Take 125 mg by mouth daily    Historical Provider, MD   aspirin EC 81 MG EC tablet Take 1 tablet by mouth daily 8/9/18   Devin Chavis MD pantoprazole (PROTONIX) 40 MG tablet Take 1 tablet by mouth daily 8/9/18   Shira Poe MD   apixaban (ELIQUIS) 5 MG TABS tablet Take 1 tablet by mouth 2 times daily 8/8/18   Mark Llamas DO   ZINC PO Take 1 tablet by mouth daily    Historical Provider, MD   Omega-3 Fatty Acids (FISH OIL) 1000 MG CAPS Take 1,000 mg by mouth 2 times daily     Historical Provider, MD   lisinopril (PRINIVIL;ZESTRIL) 10 MG tablet Take 10 mg by mouth daily. Historical Provider, MD   rosuvastatin (CRESTOR) 10 MG tablet Take 10 mg by mouth daily. Historical Provider, MD       ALLERGIES:  Patient has no known allergies. SOCIAL Hx:  Social History     Socioeconomic History    Marital status:      Spouse name: Not on file    Number of children: Not on file    Years of education: Not on file    Highest education level: Not on file   Occupational History    Not on file   Tobacco Use    Smoking status: Current Every Day Smoker     Packs/day: 0.50     Types: Cigarettes    Smokeless tobacco: Never Used   Vaping Use    Vaping Use: Never used   Substance and Sexual Activity    Alcohol use: No     Comment: 5 cups of coffee decaf/regular    Drug use: No    Sexual activity: Not on file   Other Topics Concern    Not on file   Social History Narrative    Not on file     Social Determinants of Health     Financial Resource Strain:     Difficulty of Paying Living Expenses:    Food Insecurity:     Worried About Running Out of Food in the Last Year:     Ran Out of Food in the Last Year:    Transportation Needs:     Lack of Transportation (Medical):      Lack of Transportation (Non-Medical):    Physical Activity:     Days of Exercise per Week:     Minutes of Exercise per Session:    Stress:     Feeling of Stress :    Social Connections:     Frequency of Communication with Friends and Family:     Frequency of Social Gatherings with Friends and Family:     Attends Buddhism Services:     Active Member of [employer] or Organizations:     Attends Club or Organization Meetings:     Marital Status:    Intimate Partner Violence:     Fear of Current or Ex-Partner:     Emotionally Abused:     Physically Abused:     Sexually Abused:      ROS: 12 point review of symptoms was conducted, pertinent positives and negative were reviewed, aside from that all 12 systems were reviewed and negative. PHYSICAL EXAM:  VITALS:  Vitals:    06/09/21 0642   BP: (!) 136/92   Pulse: 88   Resp: 20   Temp: 97.7 °F (36.5 °C)   SpO2: 98%         CONSTITUTIONAL:    Awake, alert, cooperative, comfortable without distress    EYES:    PERRL, EOMI, sclera clear without icterus, conjunctiva normal    ENT:    Normocephalic, atraumatic, sinuses nontender on palpation. External ears without lesions. Oral pharynx with moist mucus membranes. NECK:    Supple, symmetrical, trachea midline, no adenopathy, thyroid symmetric, not enlarged and no tenderness, skin normal, no bruits, no JVD    HEMATOLOGIC/LYMPHATICS:    No cervical lymphadenopathy and no supraclavicular lymphadenopathy    LUNGS:    Symmetric. No increased work of breathing, mildly diminished air exchange, clear to auscultation bilaterally, no wheezes, rhonchi, or rales,     CARDIOVASCULAR:    Normal apical impulse, somewhat irregular rhythm with controlled ventricular rate, P1-N3, systolic murmur    ABDOMEN:    Normal contour, normal bowel sounds, soft, non-distended, non-tender, no masses palpated, no hepatosplenomegaly, no rebound or guarding elicited on palpation     MUSCULOSKELETAL:    There is no redness, warmth, or swelling of the joints. Tone is normal.    NEUROLOGIC:    Awake, alert, oriented to name, place and time. Cranial nerves II-XII are grossly intact. Motor is 5 out of 5 bilaterally. SKIN:    No bruising or bleeding. No redness, warmth, or swelling    EXTREMITIES:    Peripheral pulses present. No edema, cyanosis, or swelling.     LABORATORY DATA:  CBC with Differential:

## 2021-06-09 NOTE — CONSULTS
[facility]) Physicians        CARDIOLOGY CONSULT                           PATIENT SEEN IN CONSULT FOR: A fib with RVR    Hospital Day: 1     Toan Ramirez is a 78year old patient known to Dr. Stephani Cabrera:  Bryna Knight is a 45-year-old female with history of PAF since 2018, mild CAD by cath in 2018, HTN presented to the emergency department with palpitations. In the ER, she was in atrial fibrillation with RVR, hence, Cardiology consulted. She states that her symptoms started at rest. No associated dizzy or syncope. She states that during the last hospitalization her metoprolol was changed to Toprol-XL due to bradycardia. She states that she was scheduled to have an echocardiogram as an outpatient. The last hospitalization was reviewed with the patient including the abnormal stress test that was felt to reflect a known RCA occlusion. She is having no chest pains. She is not lightheaded or dizzy. There is no more fatigue than usual.  She denies CP or PND. No fever or chills. She has been compliant with medications, juliann. Her anticoagulation. . No headache or acute neurological symptoms. No cough, wheezing or sputum. She continues to smoke cigarettes. No abdominal, urinary or genital complaints. She saw [facility] Dr Mary Sin 5/10/21 recommended Echo, then AAD with Tikosyn and if she fails, then PVI.       ROS: Review of rest of 10 systems negative except as mentioned above       PAST MEDICAL Hx:  Past Medical History        Past Medical History:   Diagnosis Date    CAD in native artery 32/25/4098    Diastolic heart failure (HCC)      Diverticulitis 08/03/2018    H/O cardiovascular stress test 05/05/2021     Lexiscan    HIGH CHOLESTEROL      History of atrial fibrillation 08/2018     with episode of diverticulosis    History of stress test 08/07/2018     Lexiscan stress test    Hypertension      Thyroid disease              PAST SURGICAL Hx:   Past Surgical History         Past Surgical History:   Procedure Laterality Date    COLONOSCOPY        COLONOSCOPY N/A 4/8/2019     COLONOSCOPY performed by Jaye Sanchez MD at [address] CATH LAB PROCEDURE   08/09/2018    NERVE BLOCK   12 27 2011    NERVE BLOCK   1/24/12     lumbar epidural    NERVE BLOCK   02/21/12     lumbar epidural with flouro    ME COLONOSCOPY FLX DX W/COLLJ SPEC WHEN PFRMD N/A 10/4/2018     COLONOSCOPY performed by Jaye Sanchez MD at [facility] March N/A 11/13/2018     LAPAROSCOPIC ROBOT XI ASSISTED SIGMOID COLON RESECTION WITH OSTOMY performed by Jaye Sanchez MD at [address] [facility] N/A 4/9/2019     COLOSTOMY CLOSURE LAPAROSCOPIC ROBOTIC XI performed by Jaye Sanchez MD at [address] [facility] N/A 8/13/2020     DIAGNOSTIC LAPAROSCOPIY POSS BOWEL RESECTION performed by Charlette Rodriguez MD at [address] [facility] Hx:  Family History         Family History   Problem Relation Age of Onset    Cancer Other      Heart Disease Other      Cancer Mother           brain    Breast Cancer Mother      Heart Attack Father           age 48     Heart Attack Sister           age 46     Colon Cancer Sister              HOME MEDICATIONS:  Home Medications           Prior to Admission medications    Medication Sig Start Date End Date Taking?  Authorizing Provider   SYNTHROID 125 MCG tablet Take 125 mcg by mouth Daily Take 1 tablet daily 4/25/21     Historical Provider, MD   metoprolol succinate (TOPROL XL) 25 MG extended release tablet Take 1 tablet by mouth daily 5/7/21     Mark Llamas,    Calcium Carbonate-Vit D-Min (CALCIUM 1200 PO) Take 1 capsule by mouth       Historical Provider, MD   docusate sodium (COLACE) 100 MG capsule Take 100 mg by mouth nightly        Historical Provider, MD   Echinacea 125 MG CAPS Take 125 mg by mouth daily       Historical Provider, MD   aspirin EC 81 MG EC tablet Take 1 tablet by mouth daily 8/9/18     Berenice Andrews MD   pantoprazole (PROTONIX) 40 MG tablet Take 1 tablet by mouth daily 8/9/18     Berenice Andrews MD   apixaban (ELIQUIS) 5 MG TABS tablet Take 1 tablet by mouth 2 times daily 8/8/18     Mark Llamas,    ZINC PO Take 1 tablet by mouth daily       Historical Provider, MD   Omega-3 Fatty Acids (FISH OIL) 1000 MG CAPS Take 1,000 mg by mouth 2 times daily        Historical Provider, MD   lisinopril (PRINIVIL;ZESTRIL) 10 MG tablet Take 10 mg by mouth daily.         Historical Provider, MD   rosuvastatin (CRESTOR) 10 MG tablet Take 10 mg by mouth daily.         Historical Provider, MD            ALLERGIES:  Patient has no known allergies.     SOCIAL Hx:  Social History            Diagnostics:       Telemetry: Reviewed    12 lead EKG: Reviewed    CxR :     Impression   No pneumonia, pneumothorax or CHF.       Pulmonary hyperinflation, similar to prior studies. Stress test May 5, 2021:  Impression   1. Moderate-sized reversible inferior perfusion defect (mild defect). 2. Gated SPECT left ventricular ejection fraction was calculated to be   81% with normal myocardial wall motion. 2D Echo 8/3/2018: No previous echo for comparison. Technically adequate study. Proximal septal thickening. Ejection fraction is visually estimated at 65%. No regional wall motion abnormalities seen. E/A flow reversal noted. Suggestive of diastolic dysfunction. Physiologic and/or trace mitral regurgitation is present. Mild aortic stenosis is present. Physiologic and/or trace tricuspid regurgitation. RVSP is normal.    [facility] 8/9/2018  1.  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 2.  Significantly tortuous and overlapping left coronary arteries. 3.  Mild disease - 30% stenosis, involving a second diagonal and first OM branch.     No intake or output data in the 24 hours ending 06/09/21 1230    Labs: CBC:   Recent Labs     21   WBC 11.8*   HGB 15.2   HCT 45.1        BMP:   Recent Labs     21      K 3.9   CO2 25   BUN 8   CREATININE 0.7   LABGLOM >60   CALCIUM 9.7     Mag: No results for input(s): MG in the last 72 hours. Phos: No results for input(s): PHOS in the last 72 hours. TSH:   Recent Labs     21   TSH 5.940*     HgA1c:     BNP: No results for input(s): BNP in the last 72 hours. PT/INR: No results for input(s): PROTIME, INR in the last 72 hours. APTT:No results for input(s): APTT in the last 72 hours. CARDIAC ENZYMES:No results for input(s): CKTOTAL, CKMB, CKMBINDEX, TROPONINI in the last 72 hours. FASTING LIPID PANEL:  Lab Results   Component Value Date    CHOL 137 07/15/2019    HDL 51 2021    LDLCALC 75 2021    TRIG 86 07/15/2019     LIVER PROFILE:No results for input(s): AST, ALT, LABALBU in the last 72 hours. Current Inpatient Medications:   levothyroxine  125 mcg Oral Daily    rosuvastatin  10 mg Oral Daily    pantoprazole  40 mg Oral Daily    metoprolol succinate  50 mg Oral Daily    lisinopril  10 mg Oral Daily    docusate sodium  100 mg Oral BID    aspirin EC  81 mg Oral Daily    apixaban  5 mg Oral BID    sodium chloride flush  5-40 mL Intravenous 2 times per day       IV Infusions (if any):   dilTIAZem 7.5 mg/hr (21 1144)    sodium chloride           PHYSICAL EXAM:     CONSTITUTIONAL:   /78   Pulse 81   Temp 97.8 °F (36.6 °C) (Oral)   Resp 20   Ht 5' 3\" (1.6 m)   Wt 130 lb (59 kg)   SpO2 97%   BMI 23.03 kg/m²   Pulse  Av.2  Min: 75  Max: 351  Systolic (33HYR), QYZ:437 , Min:97 , YFS:437    Diastolic (76RIQ), FKF:17, Min:57, Max:122    In general, this is a well developed, well nourished who appears stated age. awake, alert, no apparent distress  HEENT: eyes -conjunctivae pink,  Throat - Oral mucosa pink and moist.   Neck-  no stridor, no noted enlargement of the thyroid, no carotid bruit. no jugular venous distention   RESPIRATORY: Chest symmetrical and non-tender to palpation. No accessory muscle use. Lung auscultation -  few rhonchi  CARDIOVASCULAR:     Heart Inspection shows no noted pulsations  Heart Palpation - no palpable thrills    Heart Ausculation - Irregularly irregular rate and rhythm, 2/6 systolic murmur, No s3 or rub. No lower extremity edema, no varicosities. Distal pulses palpable, no clubbing or cyanosis   ABDOMEN: Soft, nontender,  Bowel sounds present. no abdominal pulsations  MS: good muscle strength and tone. : Deferred  Rectal Exam: Deferred  SKIN: warm and dry   NEURO / PSYCH: oriented to person, place        ASSESSMENT/PLAN:    Atrial fibrillation with rapid ventricular response ([address] - Dx since 2018, High [id number] sore 5, rate control with Metoprolol, Wean iv Cardizem. Continue [address] with Eliquis. Seen by Dr Sherri Higgins May 10, 2021(Recommended Echo, AAD with Tikosyn). QTc prolonged and she wants to go home soon,  hence, wait on initiatingTikosyn at this time. If she remain in A Fib, recommend DC Cardioversion, she is agreeable. She is on uninterrupted [address] with Eliquis last 4 weeks. 2D Echo pending. Mild CAD - Cath 2018; Continue BB, Statin, Nonischemic stress 5/5/21    Essential HTN - Controlled, Monitor BP    Mild AS - Echo pending    Tobacco use  - Counseled to quit smoking            Above recommendations discussed with her.         Electronically signed by Alvaro Lyon MD on 6/9/2021 at 12:30 PM  [facility]) Cardiology

## 2021-06-09 NOTE — ED NOTES
Floor unable to take report at this time, per [facility]..      Hector Verdugo RN  06/09/21 6311

## 2021-06-09 NOTE — ACP (ADVANCE CARE PLANNING)
Advance Care Planning     Advance Care Planning Activator (Inpatient)  Conversation Note      Date of ACP Conversation: 6/9/2021     Conversation Conducted with: Patient with Decision Making Capacity    ACP Activator: Oren Loni, [address]:     Current Designated Health Care Decision Maker:     Primary Decision Maker: Yaquelin Tang - [phone]  Click here to complete [employer] including section of the Healthcare Decision Maker Relationship (ie \"Primary\")  Today we documented Decision Maker(s) consistent with Legal Next of Kin hierarchy. Care Preferences    Ventilation: \"If you were in your present state of health and suddenly became very ill and were unable to breathe on your own, what would your preference be about the use of a ventilator (breathing machine) if it were available to you? \"      Would the patient desire the use of ventilator (breathing machine)?: no    \"If your health worsens and it becomes clear that your chance of recovery is unlikely, what would your preference be about the use of a ventilator (breathing machine) if it were available to you? \"     Would the patient desire the use of ventilator (breathing machine)?: No      Resuscitation  \"CPR works best to restart the heart when there is a sudden event, like a heart attack, in someone who is otherwise healthy. Unfortunately, CPR does not typically restart the heart for people who have serious health conditions or who are very sick. \"    \"In the event your heart stopped as a result of an underlying serious health condition, would you want attempts to be made to restart your heart (answer \"yes\" for attempt to resuscitate) or would you prefer a natural death (answer \"no\" for do not attempt to resuscitate)? \" Pt undecided.        [] Yes   [x] No   Educated Patient / Darrel Ramos regarding differences between Advance Directives and portable DNR orders.     Length of ACP Conversation in minutes: 15     Conversation Outcomes:  [x] ACP discussion completed  [] Existing advance directive reviewed with patient; no changes to patient's previously recorded wishes  [] New Advance Directive completed  [] Portable Do Not Rescitate prepared for Provider review and signature  [] POLST/POST/MOLST/MOST prepared for Provider review and signature      Follow-up plan:    [x] Schedule follow-up conversation to continue planning  [] Referred individual to Provider for additional questions/concerns   [] Advised patient/agent/surrogate to review completed ACP document and update if needed with changes in condition, patient preferences or care setting    [x] This note routed to one or more involved healthcare providers. SW did complete LW w/pt. She is unsure about her Resuscitation status. If she required to be shocked to place her heart back into rhythm, she wants this and has had this in past. However, if her heart stopped, she is unsure if she wants to be resuscitated. NEDA informed her she is full code status @ this time and NEDA will note to doctor she does not want intubation and to discuss resuscitation options- possibly DNR/CCA- limited code.

## 2021-06-09 NOTE — CARE COORDINATION
SS Note: No Covid test. Pt here for A-Fib w/RVR and pt on Cardizem drip -goal to be weaned today. She is very anxious regarding d/c home. Pt has recent hospitalization 5/4 -5/6 for Cardiac dysrhythmia. SW met w/pt in ED 01 and explained role. Pt lives alone in 2 story home- has 4 step entry into house. PTA, pt is independent in ADL's, drives, works @ [employer] and has insurance. PCP is Dr. Roshan Jessica and Pharmacy is  "[facility]. However, most of her meds are mail order via [facility] Pt has No DME. Past hx of [facility] w[facility] but no FRANCISCO hx. No hx of 02. SW completed ACP w/pt and a LW. Pt plans on returning home. No needs identified.   Electronically signed by ARTI Sethi on 6/9/2021 at 10:55 AM

## 2021-06-09 NOTE — ED PROVIDER NOTES
Patient is a 79 y/o female who presents to the ED with atrial fibrillation. Patient states that she knows when her atrial fibrillation is out of control. She states that she was lying in bed tonight when it started. She states that her left arm felt numb and she became shaky. She denies any other numbness. She states that the numbness in her left arm has resolved. She denies any chest pain or shortness of breath. Review of Systems   Constitutional: Negative for chills and fever. HENT: Negative for ear pain, sinus pressure and sore throat. Eyes: Negative for pain, discharge and redness. Respiratory: Negative for cough, shortness of breath and wheezing. Cardiovascular: Positive for palpitations. Negative for chest pain. Gastrointestinal: Negative for abdominal distention, diarrhea, nausea and vomiting. Genitourinary: Negative for dysuria and frequency. Musculoskeletal: Negative for arthralgias and back pain. Skin: Negative for rash and wound. Neurological: Positive for tremors and numbness. Negative for weakness and headaches. Hematological: Negative for adenopathy. All other systems reviewed and are negative. Physical Exam  Vitals and nursing note reviewed. Constitutional:       General: She is not in acute distress. HENT:      Head: Normocephalic and atraumatic. Right Ear: External ear normal.      Left Ear: External ear normal.      Nose: Nose normal.      Mouth/Throat:      Mouth: Mucous membranes are moist.   Eyes:      Conjunctiva/sclera: Conjunctivae normal.      Pupils: Pupils are equal, round, and reactive to light. Cardiovascular:      Rate and Rhythm: Tachycardia present. Rhythm irregularly irregular. Heart sounds: No murmur heard. Pulmonary:      Effort: Pulmonary effort is normal. No respiratory distress. Breath sounds: Normal breath sounds. No stridor. No wheezing, rhonchi or rales.    Abdominal:      General: Bowel sounds are normal. There is no distension. Palpations: Abdomen is soft. Tenderness: There is no abdominal tenderness. There is no guarding. Musculoskeletal:         General: Normal range of motion. Cervical back: Normal range of motion and neck supple. Skin:     General: Skin is warm and dry. Findings: No rash. Neurological:      Mental Status: She is alert and oriented to person, place, and time. Procedures     [facility]                --------------------------------------------- PAST HISTORY ---------------------------------------------  Past Medical History:  has a past medical history of CAD in native artery, Diastolic heart failure ([facility] [address] Diverticulitis, H/O cardiovascular stress test, HIGH CHOLESTEROL, History of atrial fibrillation, History of stress test, Hypertension, and Thyroid disease. Past Surgical History:  has a past surgical history that includes Nerve Block (12 27 2011); Nerve Block (1/24/12); Nerve Block (02/21/12); pr colonoscopy flx dx w/collj spec when pfrmd (N/A, 10/4/2018); pr lap,surg,colectomy, partial, w/anast (N/A, 11/13/2018); Colonoscopy; Colonoscopy (N/A, 4/8/2019); Small intestine surgery (N/A, 4/9/2019); Diagnostic Cardiac Cath Lab Procedure (08/09/2018); and Small intestine surgery (N/A, 8/13/2020). Social History:  reports that she has been smoking cigarettes. She has been smoking about 0.50 packs per day. She has never used smokeless tobacco. She reports that she does not drink alcohol and does not use drugs. Family History: family history includes Breast Cancer in her mother; Cancer in her mother and another family member; Juanis Sincere in her sister; Heart Attack in her father and sister; Heart Disease in an other family member. The patients home medications have been reviewed. Allergies: Patient has no known allergies.     -------------------------------------------------- RESULTS -------------------------------------------------    LABS:  Results for orders placed or performed during the hospital encounter of 34/44/73   Basic metabolic panel   Result Value Ref Range    Sodium 140 132 - 146 mmol/L    Potassium 3.9 3.5 - 5.0 mmol/L    Chloride 103 98 - 107 mmol/L    CO2 25 22 - 29 mmol/L    Anion Gap 12 7 - 16 mmol/L    Glucose 103 (H) 74 - 99 mg/dL    BUN 8 6 - 23 mg/dL    CREATININE 0.7 0.5 - 1.0 mg/dL    GFR Non-African American >60 >=60 mL/min/1.73    GFR African American >60     Calcium 9.7 8.6 - 10.2 mg/dL   CBC   Result Value Ref Range    WBC 11.8 (H) 4.5 - 11.5 E9/L    RBC 4.72 3.50 - 5.50 E12/L    Hemoglobin 15.2 11.5 - 15.5 g/dL    Hematocrit 45.1 34.0 - 48.0 %    MCV 95.6 80.0 - 99.9 fL    MCH 32.2 26.0 - 35.0 pg    MCHC 33.7 32.0 - 34.5 %    RDW 12.6 11.5 - 15.0 fL    Platelets 700 519 - 042 E9/L    MPV 10.6 7.0 - 12.0 fL   Troponin   Result Value Ref Range    Troponin, High Sensitivity 9 0 - 9 ng/L   Brain Natriuretic Peptide   Result Value Ref Range    Pro- (H) 0 - 125 pg/mL   TSH without Reflex   Result Value Ref Range    TSH 5.940 (H) 0.270 - 4.200 uIU/mL   EKG 12 Lead   Result Value Ref Range    Ventricular Rate 141 BPM    Atrial Rate 107 BPM    QRS Duration 82 ms    Q-T Interval 320 ms    QTc Calculation (Bazett) 490 ms    R Axis 76 degrees    T Axis 118 degrees       RADIOLOGY:  XR CHEST PORTABLE   Final Result   No pneumonia, pneumothorax or CHF. Pulmonary hyperinflation, similar to prior studies. EKG:  This EKG is signed and interpreted by me. Rate: 141  Rhythm: Atrial fibrillation and Rapid ventricular response  Interpretation: non-specific EKG and atrial fibrillation (chronic)  Comparison: no previous EKG available      ------------------------- NURSING NOTES AND VITALS REVIEWED ---------------------------  Date / Time Roomed:  6/9/2021 12:18 AM  ED Bed Assignment:  01/01    The nursing notes within the ED encounter and vital signs as below have been reviewed.      Patient Vitals for the past 24 hrs:   BP Temp Temp src Pulse Resp SpO2 Height Weight   06/09/21 0332 127/83 -- -- 76 -- -- -- --   06/09/21 0300 123/83 97.9 °F (36.6 °C) -- 80 16 99 % -- --   06/09/21 0230 123/77 -- -- 75 -- -- -- --   06/09/21 0159 (!) 138/92 -- -- 96 -- -- -- --   06/09/21 0150 (!) 122/57 -- -- 113 -- -- -- --   06/09/21 0147 (!) 151/95 -- -- 100 22 -- -- --   06/09/21 0123 (!) 122/93 -- -- 104 -- 96 % -- --   06/09/21 0045 (!) 153/122 -- -- 115 20 97 % -- --   06/09/21 0037 -- -- -- -- -- -- 5' 3\" (1.6 m) 130 lb (59 kg)   06/09/21 0009 (!) 158/107 97.6 °F (36.4 °C) Oral 130 -- 100 % 5' 3\" (1.6 m) 130 lb (59 kg)       Oxygen Saturation Interpretation: Normal    ------------------------------------------ PROGRESS NOTES ------------------------------------------  Re-evaluation(s):  Time: 0500. Patients symptoms are improving  Repeat physical examination is improved    Counseling:  I have spoken with the patient and discussed todays results, in addition to providing specific details for the plan of care and counseling regarding the diagnosis and prognosis. Their questions are answered at this time and they are agreeable with the plan of admission.    --------------------------------- ADDITIONAL PROVIDER NOTES ---------------------------------  Consultations:  Time: 4021. Spoke with Dr. Orozco. Discussed case. They will admit the patient. This patient's ED course included: a personal history and physicial examination, re-evaluation prior to disposition, multiple bedside re-evaluations, IV medications, cardiac monitoring and continuous pulse oximetry    This patient has remained hemodynamically stable during their ED course. Diagnosis:  1. Atrial fibrillation with rapid ventricular response (HCC)        Disposition:  Patient's disposition: Admit to [address]  Patient's condition is stable.            Ange Fernandez DO  06/09/21 0928

## 2021-06-10 ENCOUNTER — ANESTHESIA (OUTPATIENT)
Dept: POSTOP/PACU | Age: 71
End: 2021-06-10

## 2021-06-10 ENCOUNTER — TELEPHONE (OUTPATIENT)
Dept: ADMINISTRATIVE | Age: 71
End: 2021-06-10

## 2021-06-10 ENCOUNTER — ANESTHESIA EVENT (OUTPATIENT)
Dept: POSTOP/PACU | Age: 71
End: 2021-06-10

## 2021-06-10 VITALS
DIASTOLIC BLOOD PRESSURE: 98 MMHG | BODY MASS INDEX: 23.04 KG/M2 | TEMPERATURE: 97.7 F | RESPIRATION RATE: 18 BRPM | WEIGHT: 130 LBS | HEIGHT: 63 IN | OXYGEN SATURATION: 100 % | SYSTOLIC BLOOD PRESSURE: 129 MMHG | HEART RATE: 65 BPM

## 2021-06-10 LAB
ANION GAP SERPL CALCULATED.3IONS-SCNC: 7 MMOL/L (ref 7–16)
BASOPHILS ABSOLUTE: 0.07 E9/L (ref 0–0.2)
BASOPHILS RELATIVE PERCENT: 0.8 % (ref 0–2)
BUN BLDV-MCNC: 12 MG/DL (ref 6–23)
CALCIUM SERPL-MCNC: 8.8 MG/DL (ref 8.6–10.2)
CHLORIDE BLD-SCNC: 105 MMOL/L (ref 98–107)
CHOLESTEROL, TOTAL: 135 MG/DL (ref 0–199)
CO2: 28 MMOL/L (ref 22–29)
CREAT SERPL-MCNC: 0.7 MG/DL (ref 0.5–1)
EOSINOPHILS ABSOLUTE: 0.18 E9/L (ref 0.05–0.5)
EOSINOPHILS RELATIVE PERCENT: 2.2 % (ref 0–6)
GFR AFRICAN AMERICAN: >60
GFR NON-AFRICAN AMERICAN: >60 ML/MIN/1.73
GLUCOSE BLD-MCNC: 94 MG/DL (ref 74–99)
HBA1C MFR BLD: 5.2 % (ref 4–5.6)
HCT VFR BLD CALC: 44 % (ref 34–48)
HDLC SERPL-MCNC: 38 MG/DL
HEMOGLOBIN: 14.4 G/DL (ref 11.5–15.5)
IMMATURE GRANULOCYTES #: 0.06 E9/L
IMMATURE GRANULOCYTES %: 0.7 % (ref 0–5)
LDL CHOLESTEROL CALCULATED: 75 MG/DL (ref 0–99)
LYMPHOCYTES ABSOLUTE: 2.11 E9/L (ref 1.5–4)
LYMPHOCYTES RELATIVE PERCENT: 25.4 % (ref 20–42)
MAGNESIUM: 2.1 MG/DL (ref 1.6–2.6)
MCH RBC QN AUTO: 31.4 PG (ref 26–35)
MCHC RBC AUTO-ENTMCNC: 32.7 % (ref 32–34.5)
MCV RBC AUTO: 95.9 FL (ref 80–99.9)
MONOCYTES ABSOLUTE: 0.9 E9/L (ref 0.1–0.95)
MONOCYTES RELATIVE PERCENT: 10.8 % (ref 2–12)
NEUTROPHILS ABSOLUTE: 4.98 E9/L (ref 1.8–7.3)
NEUTROPHILS RELATIVE PERCENT: 60.1 % (ref 43–80)
PDW BLD-RTO: 13 FL (ref 11.5–15)
PHOSPHORUS: 3.2 MG/DL (ref 2.5–4.5)
PLATELET # BLD: 218 E9/L (ref 130–450)
PMV BLD AUTO: 10.5 FL (ref 7–12)
POTASSIUM SERPL-SCNC: 4 MMOL/L (ref 3.5–5)
RBC # BLD: 4.59 E12/L (ref 3.5–5.5)
SODIUM BLD-SCNC: 140 MMOL/L (ref 132–146)
TRIGL SERPL-MCNC: 111 MG/DL (ref 0–149)
VLDLC SERPL CALC-MCNC: 22 MG/DL
WBC # BLD: 8.3 E9/L (ref 4.5–11.5)

## 2021-06-10 PROCEDURE — 84100 ASSAY OF PHOSPHORUS: CPT

## 2021-06-10 PROCEDURE — 83735 ASSAY OF MAGNESIUM: CPT

## 2021-06-10 PROCEDURE — 97165 OT EVAL LOW COMPLEX 30 MIN: CPT | Performed by: OCCUPATIONAL THERAPIST

## 2021-06-10 PROCEDURE — 6370000000 HC RX 637 (ALT 250 FOR IP): Performed by: NURSE PRACTITIONER

## 2021-06-10 PROCEDURE — 83036 HEMOGLOBIN GLYCOSYLATED A1C: CPT

## 2021-06-10 PROCEDURE — 2580000003 HC RX 258: Performed by: NURSE PRACTITIONER

## 2021-06-10 PROCEDURE — 97161 PT EVAL LOW COMPLEX 20 MIN: CPT | Performed by: PHYSICAL THERAPIST

## 2021-06-10 PROCEDURE — G0378 HOSPITAL OBSERVATION PER HR: HCPCS

## 2021-06-10 PROCEDURE — 85025 COMPLETE CBC W/AUTO DIFF WBC: CPT

## 2021-06-10 PROCEDURE — 80048 BASIC METABOLIC PNL TOTAL CA: CPT

## 2021-06-10 PROCEDURE — 80061 LIPID PANEL: CPT

## 2021-06-10 PROCEDURE — 36415 COLL VENOUS BLD VENIPUNCTURE: CPT

## 2021-06-10 RX ORDER — METOPROLOL SUCCINATE 50 MG/1
50 TABLET, EXTENDED RELEASE ORAL DAILY
Qty: 30 TABLET | Refills: 3 | Status: SHIPPED | OUTPATIENT
Start: 2021-06-11

## 2021-06-10 RX ADMIN — Medication 10 ML: at 09:17

## 2021-06-10 RX ADMIN — LEVOTHYROXINE SODIUM 125 MCG: 125 TABLET ORAL at 09:14

## 2021-06-10 RX ADMIN — PANTOPRAZOLE SODIUM 40 MG: 40 TABLET, DELAYED RELEASE ORAL at 09:16

## 2021-06-10 RX ADMIN — DOCUSATE SODIUM 100 MG: 100 CAPSULE, LIQUID FILLED ORAL at 09:16

## 2021-06-10 RX ADMIN — APIXABAN 5 MG: 5 TABLET, FILM COATED ORAL at 09:16

## 2021-06-10 RX ADMIN — LISINOPRIL 10 MG: 10 TABLET ORAL at 09:16

## 2021-06-10 RX ADMIN — METOPROLOL SUCCINATE 50 MG: 25 TABLET, EXTENDED RELEASE ORAL at 09:15

## 2021-06-10 RX ADMIN — ROSUVASTATIN CALCIUM 10 MG: 10 TABLET, COATED ORAL at 09:16

## 2021-06-10 RX ADMIN — ASPIRIN 81 MG: 81 TABLET, COATED ORAL at 09:14

## 2021-06-10 ASSESSMENT — PAIN SCALES - GENERAL
PAINLEVEL_OUTOF10: 0
PAINLEVEL_OUTOF10: 0

## 2021-06-10 ASSESSMENT — LIFESTYLE VARIABLES: SMOKING_STATUS: 1

## 2021-06-10 ASSESSMENT — PAIN SCALES - WONG BAKER
WONGBAKER_NUMERICALRESPONSE: 0
WONGBAKER_NUMERICALRESPONSE: 0

## 2021-06-10 NOTE — ANESTHESIA PRE PROCEDURE
Department of Anesthesiology  Preprocedure Note       Name:  Chelsea Phillips   Age:  78 y.o.  :  1950                                          MRN:  [id number]         Date:  6/10/2021      Surgeon:     Procedure: DCCV    Medications prior to admission:   Prior to Admission medications    Medication Sig Start Date End Date Taking? Authorizing Provider   SYNTHROID 125 MCG tablet Take 125 mcg by mouth Daily Take 1 tablet daily 21   Historical Provider, MD   metoprolol succinate (TOPROL XL) 25 MG extended release tablet Take 1 tablet by mouth daily 21   Argentina Llamas DO   Calcium Carbonate-Vit D-Min (CALCIUM 1200 PO) Take 1 capsule by mouth    Historical Provider, MD   docusate sodium (COLACE) 100 MG capsule Take 100 mg by mouth nightly     Historical Provider, MD   Echinacea 125 MG CAPS Take 125 mg by mouth daily    Historical Provider, MD   aspirin EC 81 MG EC tablet Take 1 tablet by mouth daily 18   Buster Rinne, MD   pantoprazole (PROTONIX) 40 MG tablet Take 1 tablet by mouth daily 18   Buster Rinne, MD   apixaban (ELIQUIS) 5 MG TABS tablet Take 1 tablet by mouth 2 times daily 18   Mark Llamas DO   ZINC PO Take 1 tablet by mouth daily    Historical Provider, MD   Omega-3 Fatty Acids (FISH OIL) 1000 MG CAPS Take 1,000 mg by mouth 2 times daily     Historical Provider, MD   lisinopril (PRINIVIL;ZESTRIL) 10 MG tablet Take 10 mg by mouth daily. Historical Provider, MD   rosuvastatin (CRESTOR) 10 MG tablet Take 10 mg by mouth daily. Historical Provider, MD       Current medications:    No current facility-administered medications for this encounter. No current outpatient medications on file.      Facility-Administered Medications Ordered in Other Encounters   Medication Dose Route Frequency Provider Last Rate Last Admin    dilTIAZem 125 mg in dextrose 5 % 125 mL infusion  5-15 mg/hr Intravenous Continuous Juan J Hoover DO   Stopped at 21 1410    levothyroxine (SYNTHROID) tablet 125 mcg  125 mcg Oral Daily Junita , APRN - CNP   125 mcg at 06/10/21 0914    rosuvastatin (CRESTOR) tablet 10 mg  10 mg Oral Daily Junita , APRN - CNP   10 mg at 06/10/21 0916    pantoprazole (PROTONIX) tablet 40 mg  40 mg Oral Daily Junita , APRN - CNP   40 mg at 06/10/21 0916    metoprolol succinate (TOPROL XL) extended release tablet 50 mg  50 mg Oral Daily Junita , APRN - CNP   50 mg at 06/10/21 0915    lisinopril (PRINIVIL;ZESTRIL) tablet 10 mg  10 mg Oral Daily Junita , APRN - CNP   10 mg at 06/10/21 2842    docusate sodium (COLACE) capsule 100 mg  100 mg Oral BID Junita , APRN - CNP   100 mg at 06/10/21 0916    aspirin EC tablet 81 mg  81 mg Oral Daily Junita , APRN - CNP   81 mg at 06/10/21 0914    apixaban (ELIQUIS) tablet 5 mg  5 mg Oral BID Junita , APRN - CNP   5 mg at 06/10/21 0916    magnesium sulfate 1000 mg in dextrose 5% 100 mL IVPB  1,000 mg Intravenous PRN Junita , APRN - CNP        sodium phosphate 9.45 mmol in dextrose 5 % 250 mL IVPB  0.16 mmol/kg Intravenous PRN Junita , APRN - CNP        Or    sodium phosphate 18.87 mmol in dextrose 5 % 250 mL IVPB  0.32 mmol/kg Intravenous PRN Junita , APRN - CNP        potassium chloride (KLOR-CON M) extended release tablet 40 mEq  40 mEq Oral PRN Junita , APRN - CNP        Or    potassium bicarb-citric acid (EFFER-K) effervescent tablet 40 mEq  40 mEq Oral PRN Junita , APRN - CNP        Or    potassium chloride 10 mEq/100 mL IVPB (Peripheral Line)  10 mEq Intravenous PRN Junita , APRN - CNP        sodium chloride flush 0.9 % injection 5-40 mL  5-40 mL Intravenous 2 times per day Junita , APRN - CNP   10 mL at 06/10/21 0917    sodium chloride flush 0.9 % injection 5-40 mL  5-40 mL Intravenous PRN Junita , APRN - CNP        0.9 % sodium chloride infusion  25 mL Intravenous PRN Junita , APRN - CNP        ondansetron (ZOFRAN-ODT) disintegrating tablet 4 mg  4 mg Oral Q8H PRN Rubio Fragmin, APRN - CNP        Or    [facility]ron [facility]) injection 4 mg  4 mg Intravenous Q6H PRN Rubio Fragmin, APRN - CNP        acetaminophen (TYLENOL) tablet 650 mg  650 mg Oral Q6H PRN Rubio Fragmin, APRN - CNP        Or    acetaminophen (TYLENOL) suppository 650 mg  650 mg Rectal Q6H PRN Rubio Fragmin, APRN - CNP        polyethylene glycol (GLYCOLAX) packet 17 g  17 g Oral Daily PRN Rubio Fragmin, APRN - CNP        perflutren lipid microspheres (DEFINITY) injection 1.65 mg  1.5 mL Intravenous ONCE PRN Rubio Fragmin, APRN - CNP           Allergies:  No Known Allergies    Problem List:    Patient Active Problem List   Diagnosis Code    Spinal stenosis M48.00    Degeneration of lumbosacral intervertebral disc M51.37    Lumbago M54.5    Colonic diverticular abscess K57.20    CAD in native artery I25.10    S/P colon resection Z90.49    Diverticulitis of large intestine with perforation and abscess without bleeding K57.20    Pelvic abscess in female N73.9    Colostomy reversal K57.92    SBO (small bowel obstruction) ([facility]) K56.609    Bradycardia R00.1    Chest pain R07.9    Atrial fibrillation with rapid ventricular response ([facility]) I48.91       Past Medical History:        Diagnosis Date    CAD in native artery 66/40/7632    Diastolic heart failure ([address]     Diverticulitis 08/03/2018    H/O cardiovascular stress test 05/05/2021    Lexiscan    HIGH CHOLESTEROL     History of atrial fibrillation 08/2018    with episode of diverticulosis    History of stress test 08/07/2018    Lexiscan stress test    Hypertension     Thyroid disease        Past Surgical History:        Procedure Laterality Date    COLONOSCOPY      COLONOSCOPY N/A 4/8/2019    COLONOSCOPY performed by Dariela Mooney MD at [facility] CATH LAB PROCEDURE  08/09/2018    NERVE BLOCK  12 27 2011    NERVE BLOCK  1/24/12    lumbar epidural    NERVE BLOCK  02/21/12    lumbar epidural with flouro    CT COLONOSCOPY FLX DX W/COLLJ SPEC WHEN PFRMD N/A 10/4/2018    COLONOSCOPY performed by Jocelyn Lazo MD at [facility], PARTIAL, W/ANAST N/A 11/13/2018    LAPAROSCOPIC ROBOT XI ASSISTED SIGMOID COLON RESECTION WITH OSTOMY performed by Jocelyn Lazo MD at [address] [place] [address] N/A 4/9/2019    COLOSTOMY CLOSURE LAPAROSCOPIC ROBOTIC XI performed by Jocelyn Lazo MD at [address] [place] [address] N/A 8/13/2020    DIAGNOSTIC LAPAROSCOPIY POSS BOWEL RESECTION performed by Isiah Kocher, MD at [facility] History:    Social History     Tobacco Use    Smoking status: Current Every Day Smoker     Packs/day: 0.50     Types: Cigarettes    Smokeless tobacco: Never Used   Substance Use Topics    Alcohol use: No     Comment: 5 cups of coffee decaf/regular                                Ready to quit: Not Answered  Counseling given: Not Answered      Vital Signs (Current): There were no vitals filed for this visit.                                            BP Readings from Last 3 Encounters:   06/10/21 (!) 129/98   05/10/21 130/84   05/06/21 121/81       NPO Status:                                                                                 BMI:   Wt Readings from Last 3 Encounters:   06/09/21 130 lb (59 kg)   05/10/21 130 lb (59 kg)   05/04/21 130 lb (59 kg)     There is no height or weight on file to calculate BMI.    CBC:   Lab Results   Component Value Date    WBC 8.3 06/10/2021    RBC 4.59 06/10/2021    HGB 14.4 06/10/2021    HCT 44.0 06/10/2021    MCV 95.9 06/10/2021    RDW 13.0 06/10/2021     06/10/2021       CMP:   Lab Results   Component Value Date     06/10/2021    K 4.0 06/10/2021    K 3.7 08/15/2020     06/10/2021    CO2 28 06/10/2021    BUN 12 06/10/2021    CREATININE 0.7 06/10/2021    GFRAA >60 06/10/2021    LABGLOM >60 06/10/2021    GLUCOSE 94 06/10/2021 GLUCOSE 94 12/13/2011    PROT 7.0 05/21/2021    CALCIUM 8.8 06/10/2021    BILITOT 0.5 05/21/2021    ALKPHOS 62 05/21/2021    AST 20 05/21/2021    ALT 13 05/21/2021       POC Tests: No results for input(s): POCGLU, POCNA, POCK, POCCL, POCBUN, POCHEMO, POCHCT in the last 72 hours. Coags:   Lab Results   Component Value Date    PROTIME 12.6 08/03/2018    INR 1.1 08/03/2018       HCG (If Applicable): No results found for: PREGTESTUR, PREGSERUM, HCG, HCGQUANT     ABGs: No results found for: PHART, PO2ART, UVP0KHV, XSC2LVF, BEART, I5WXMSEA     Type & Screen (If Applicable):  No results found for: LABABO, LABRH    Drug/Infectious Status (If Applicable):  No results found for: HIV, HEPCAB    COVID-19 Screening (If Applicable): No results found for: COVID19        Anesthesia Evaluation  Patient summary reviewed and Nursing notes reviewed no history of anesthetic complications:   Airway:         Dental:          Pulmonary:   (+) current smoker                           Cardiovascular:  Exercise tolerance: poor (<4 METS),   (+) hypertension: mild, CAD:, dysrhythmias (RVR): atrial fibrillation, CHF: diastolic, hyperlipidemia      ECG reviewed      Echocardiogram reviewed         Beta Blocker:  Order written      ROS comment: Atrial fibrillation with rapid ventricular response  ST depression, consider subendocardial injury  Nonspecific T wave abnormality  Abnormal ECG  When compared with ECG of 04-MAY-2021 21:05,  Atrial fibrillation has replaced Sinus rhythm  Vent. rate has increased BY 67 BPM  ST now depressed in Anterolateral leads  Nonspecific T wave abnormality now evident in Inferior leads  Nonspecific T wave abnormality now evident in Lateral leads. Summary  Normal left ventricular chamber size. Normal left ventricular systolic function, LVEF is 65%. Indeterminate LV diastolic function. Left atrium is normal size. Interatrial septum not well visualized but appears intact. No thrombus in left atrium.   Normal right ventricle size and function. There is trace mitral regurgitation. No mitral valve prolapse. The aortic valve appears mildly sclerotic. No hemodynamically significant aortic stenosis - mean gradient 6mmHg, DLI 0.57  There is trace tricuspid regurgitation, RVSP 23mmHg. Normal aortic root size. No evidence of pericardial effusion. No intra cardiac mass or thrombus. Compared to prior echo from 8/6/2018. Neuro/Psych:   Negative Neuro/Psych ROS              GI/Hepatic/Renal:   (+) GERD: well controlled,          ROS comment: Diverticular disease with diverticular abscess s/p partial colectomy with colostomy and reversal.   Endo/Other:    (+) hypothyroidism, blood dyscrasia (Eliquis): anticoagulation therapy, arthritis: OA., . Pt had no PAT visit        ROS comment: OA, DJD, DDD, spinal stenosis Abdominal:           Vascular: negative vascular ROS. Anesthesia Plan      MAC     ASA 3       Induction: intravenous. MIPS: Prophylactic antiemetics administered. Anesthetic plan and risks discussed with patient. Plan discussed with CRNA. Callie Cueva DO   6/10/2021    History, data, and pertinent studies from chart review. Above represents information available via the shared medical record including previous anesthetic, medication, and allergy history. Confirmation of above and final disposition per DOS anesthesiologist.    Informed by PACU staff that the procedure has been cancelled for today.     Callie Cueva DO  Staff Anesthesiologist  Rosalind 10, 2021  9:54 AM

## 2021-06-10 NOTE — DISCHARGE SUMMARY
Internal Medicine Progress Note     [facility]     Brittni Brasher. Jenna Fermin., F.A.CAartiOAartiI. Saul Anaya D.O., LUCIAOFRIEDA Salguero D.O. Valentina Ken, MSN, APRN, NP-C  Kraig Greggo. Ryan Greene, MSN, [id number] Formerly named [facility]       Internal Medicine  Discharge Summary    NAME: Janie Robin  :  1950  MRN:  [id number]  [address], DO  ADMITTED: 2021      DISCHARGED: 6/10/21    ADMITTING PHYSICIAN: Danny Piper DO    CONSULTANT(S):   IP CONSULT TO SOCIAL WORK  IP CONSULT TO CARDIOLOGY     ADMITTING DIAGNOSIS:   Atrial fibrillation with rapid ventricular response ([address] [I48.91]     DISCHARGE DIAGNOSES:   · Atrial fibrillation with rapid ventricular response on Eliquis  · Coronary artery disease with known RCA occlusion and abnormal stress test May 2021  · Mild aortic stenosis  · Essential hypertension  · Hyperlipidemia  · Hypothyroidism with mildly elevated TSH and free T4 pending  · Current tobacco abuse    BRIEF HISTORY OF PRESENT ILLNESS:   Nilam Pro is a 80-year-old female patient who presented to the emergency department with palpitations and the sensation that she was in atrial fibrillation with RVR. She states that the symptoms onset while she was at rest.  Although being out in the heat yesterday for a baseball game, she did not exert herself substantially. She has been compliant with medications. She does follow with cardiology and electrophysiology as an outpatient. She states that during the last hospitalization her metoprolol was changed to Toprol-XL due to bradycardia. She states that she was slated to have an echocardiogram within the next week as an outpatient. The last hospitalization was reviewed with the patient including the abnormal stress test that was felt to reflect a known RCA occlusion. She is having no chest pains. She is not lightheaded or dizzy. There is no more fatigue than usual.  No fevers, chills, sick contacts or exposures. No headache or acute neurological symptoms. No cough, wheezing or sputum. She continues to smoke cigarettes. No abdominal, urinary or genital complaints. LABS[de-identified]  Lab Results   Component Value Date    WBC 8.3 06/10/2021    HGB 14.4 06/10/2021    HCT 44.0 06/10/2021     06/10/2021     06/10/2021    K 4.0 06/10/2021     06/10/2021    CREATININE 0.7 06/10/2021    BUN 12 06/10/2021    CO2 28 06/10/2021    GLUCOSE 94 06/10/2021    ALT 13 05/21/2021    AST 20 05/21/2021    INR 1.1 08/03/2018     Lab Results   Component Value Date    INR 1.1 08/03/2018    PROTIME 12.6 (H) 08/03/2018      Lab Results   Component Value Date    TSH 5.940 (H) 06/09/2021     Lab Results   Component Value Date    TRIG 111 06/10/2021    TRIG 86 07/15/2019    TRIG 85 07/29/2016     Lab Results   Component Value Date    HDL 38 06/10/2021    HDL 51 05/21/2021    HDL 45 06/26/2020     Lab Results   Component Value Date    LDLCALC 75 06/10/2021    [address] 75 05/21/2021    [address] 72 06/26/2020     No results found for: LABA1C    IMAGING:  Echo Complete    Result Date: 6/9/2021  Transthoracic Echocardiography Report (TTE)  Demographics   Patient Name    Donato Suarez        Gender            Female                  [address] Record  [id number]     Room Number       0615  Number   Account #       [de-identified]    Procedure Date    06/09/2021   Corporate ID                 Ordering          Victoria Sloan MD                               Physician   Accession       [id number]   Referring  Number                       Physician   Date of Birth   1950   Sonographer       Delores Cantrell [facility]   Age             78 year(s)   Interpreting      [address]                               Physician         Physician Cardiology                                                 Pamela Holloway MD                                Any Other  Procedure Type of Study   TTE procedure:Echo Complete W/Doppler & Color Flow.   Procedure Date Date: 06/09/2021 Start: 01:28 PM Study Location: Echo Lab Technical Quality: Adequate visualization Indications:Cardiomyopathy and LV function. Patient Status: Routine Height: 63 inches Weight: 130 pounds BSA: 1.61 m^2 BMI: 23.03 kg/m^2 BP: 116/75 mmHg  Findings   Left Ventricle  Normal left ventricular chamber size. Normal left ventricular systolic function, LVEF is 65%. Indeterminate LV diastolic function. Normal left atrial pressure. Right Ventricle  Normal right ventricle size and function. Left Atrium  Left atrium is normal size. Interatrial septum not well visualized but appears intact. No thrombus in left atrium. Right Atrium  Normal right atrium. Mitral Valve  Normal mitral valve structure. There is trace mitral regurgitation. No mitral valve prolapse. Tricuspid Valve  Normal tricuspid valve structure. There is trace tricuspid regurgitation, RVSP 23mmHg. Aortic Valve  The aortic valve appears mildly sclerotic. No hemodynamically significant aortic stenosis - mean gradient 6mmHg, DLI  0.57   Pulmonic Valve  The pulmonic valve was not well visualized. Pericardial Effusion  No evidence of pericardial effusion. Pericardium appears normal.   Pleural Effusion  No evidence of pleural effusion. Aorta  Normal aortic root size and ascending aorta. Miscellaneous  The inferior vena cava diameter is normal with normal respiratory  variation. Conclusions   Summary  Normal left ventricular chamber size. Normal left ventricular systolic function, LVEF is 65%. Indeterminate LV diastolic function. Left atrium is normal size. Interatrial septum not well visualized but appears intact. No thrombus in left atrium. Normal right ventricle size and function. There is trace mitral regurgitation. No mitral valve prolapse. The aortic valve appears mildly sclerotic. No hemodynamically significant aortic stenosis - mean gradient 6mmHg, DLI  0.57  There is trace tricuspid regurgitation, RVSP 23mmHg.   Normal aortic root size. No evidence of pericardial effusion. No intra cardiac mass or thrombus. Compared to prior echo from 8/6/2018. Signature   ----------------------------------------------------------------  Electronically signed by Cindy Patel MD(Interpreting  physician) on 06/09/2021 04:56 PM  ----------------------------------------------------------------  M-Mode/2D Measurements & Calculations   LV Diastolic    LV Systolic Dimension: 2.1  AV Cusp Separation: 1.4 cmLA  Dimension: 3.6  cm                          Dimension: 3.4 cmAO Root  cm              LV Volume Diastolic: 96.6   Dimension: 2.9 cm  LV FS:41.7 %    ml  LV PW           LV Volume Systolic: 15 ml  Diastolic: 1.2  LV EDV/LV EDV Index: 53.3  cm              ml/33 ml/m^2LV ESV/LV ESV   RV Diastolic Dimension: 2.8 cm  LV PW Systolic: Index: 15 SN/7FK/ m^2       RV Systolic Dimension: 1.7 cm  1.6 cm          EF Calculated: 71.9 %       LA/Aorta: 1.1  Septum          LV Mass Index: 82 l/min*m^2  Diastolic: 1.1                              LA volume/Index: 41.6 ml  cm  Septum          LVOT: 2 cm  Systolic: 1.7  cm   LV Mass: 132.66  g  Doppler Measurements & Calculations   MV Peak E-Wave:   AV Peak Velocity: 1.47 m/s    LVOT Peak Velocity: 0.86  0.93 m/s          AV Peak Gradient: 8.69 mmHg   m/s  MV Peak A-Wave:   AV Mean Velocity: 1.15 m/s    LVOT Mean Velocity: 0.65  0.02 m/s          AV Mean Gradient: 5.6 mmHg    m/s  MV E/A Ratio:     AV VTI: 29 cm                 LVOT Peak Gradient: 3  62.27             AV Area (Continuity):1.8 cm^2 mmHgLVOT Mean Gradient:  MV Peak Gradient:                               1.8 mmHg  4.7 mmHg          LVOT VTI: 16.6 cm             Estimated RVSP: 23 mmHg  MV Mean Gradient:                               Estimated RAP:10 mmHg  1.6 mmHg          Estimated PASP: 22.99 mmHg  MV Mean Velocity: Pulm. Vein A Reversal  0.55 m/s          Duration:475.2 msec           TR Velocity:1.8 m/s  MV Deceleration   Pulm.  Vein D Velocity:0.37    TR Gradient:12.99 mmHg  Time: 205.5 msec  m/sPulm. Vein A Reversal      PV Peak Velocity: 0.83 m/s  MV P1/2t: 55.1    Velocity:0.26 m/s             PV Peak Gradient: 2.74  msec              Pulm. Vein S Velocity: 0.29   mmHg  MVA by PHT:3.99   m/s                           PV Mean Velocity: 0.6 m/s  cm^2                                            PV Mean Gradient: 1.6 mmHg  MV Area  (continuity): 2.2  cm^2  [email or web address] [id number]    XR CHEST PORTABLE    Result Date: 6/9/2021  EXAMINATION: ONE XRAY VIEW OF THE CHEST 6/9/2021 1:03 am COMPARISON: 05/04/2021 portable chest.  Also two-view study from 04/15/2021. HISTORY: ORDERING SYSTEM PROVIDED HISTORY: palpitations TECHNOLOGIST PROVIDED HISTORY: Reason for exam:->palpitations FINDINGS: Overlying EKG leads. Normal cardiomediastinal silhouette and pulmonary vasculature. No pneumothorax, pleural effusion or consolidative focal pneumonia. Lungs appear clear bilaterally. Pulmonary hyperinflation. No acute osseous abnormality. No pneumonia, pneumothorax or CHF. Pulmonary hyperinflation, similar to prior studies. HOSPITAL COURSE:   Julissa Pablo did very well throughout the hospitalization. She was found to be in refractory atrial fibrillation with rapid ventricular response on presentation. She was initially placed on Cardizem infusion and beta-blocker therapy was ultimately uptitrated. She converted to normal sinus rhythm. She was evaluated by the cardiovascular team whom she follows with regularly on an outpatient basis. She also follows with electrophysiology. As she was anxious for discharge home, Tikosyn will be considered down the line. Otherwise, she has responded well to her current treatment strategy. She understands importance of close outpatient follow-up. She is acceptable for discharge home.      BRIEF PHYSICAL (COLACE) 100 MG capsule  Take 100 mg by mouth nightly              Echinacea 125 MG CAPS  Take 125 mg by mouth daily             lisinopril (PRINIVIL;ZESTRIL) 10 MG tablet  Take 10 mg by mouth daily. metoprolol succinate (TOPROL XL) 50 MG extended release tablet  Take 1 tablet by mouth daily             Omega-3 Fatty Acids (FISH OIL) 1000 MG CAPS  Take 1,000 mg by mouth 2 times daily              pantoprazole (PROTONIX) 40 MG tablet  Take 1 tablet by mouth daily             rosuvastatin (CRESTOR) 10 MG tablet  Take 10 mg by mouth daily. SYNTHROID 125 MCG tablet  Take 125 mcg by mouth Daily Take 1 tablet daily             ZINC PO  Take 1 tablet by mouth daily                 FOLLOW UP/INSTRUCTIONS:  · This patient is instructed to follow-up with her primary care physician. · Patient is instructed to follow-up with the consults listed above as directed by them. · she is instructed to resume home medications and take new medications as indicated in the list above. · If the patient has a recurrence of symptoms, she is instructed to go to the ED. Preparing for this patient's discharge, including paperwork, orders, instructions, and meeting with patient did require > 40 minutes.     Joleen Dominguez DO     6/10/2021  10:13 AM

## 2021-06-10 NOTE — PROGRESS NOTES
[address] [facility]  [facility] [address] [place]         Date:6/10/2021                                                   Patient Name: Jluis Jean     MRN: [id number]     : 1950     Room: [address] [place] [address]       Evaluating OT: Marcelino Benson, OTR/L; [id number]       Referring Provider:WAQAR Gonzalez CNP       Specific Provider Orders/Date:OT eval and treat Start: 21 0700, End: 21 0700, ONE TIME, Standing Count: 1 Occurrences, R          Diagnosis:   1.  Atrial fibrillation with rapid ventricular response [facility])         Pertinent Medical History:        Past Medical History:   Diagnosis Date    CAD in native artery     Diastolic heart failure ([facility] [address]     Diverticulitis 2018    H/O cardiovascular stress test 2021    Lexiscan    HIGH CHOLESTEROL     History of atrial fibrillation 2018    with episode of diverticulosis    History of stress test 2018    Lexiscan stress test    Hypertension     Thyroid disease           Past Surgical History:   Procedure Laterality Date    COLONOSCOPY      COLONOSCOPY N/A 2019    COLONOSCOPY performed by Alejandro Rowan MD at [facility] CATH LAB PROCEDURE  2018    NERVE BLOCK  2011    NERVE BLOCK  12    lumbar epidural    NERVE BLOCK  12    lumbar epidural with flouro    [place] COLONOSCOPY FLX DX W/COLLJ SPEC WHEN PFRMD N/A 10/4/2018    [facility]    LAPAROSCOPIC ROBOT XI ASSISTED SIGMOID COLON RESECTION WITH OSTOMY performed by Alejandro Rowan MD at [address] [place] [address] N/A 2019    COLOSTOMY CLOSURE LAPAROSCOPIC ROBOTIC XI performed by Alejandro Rowan MD at [address] [place] [address] N/A 2020    DIAGNOSTIC LAPAROSCOPIY POSS BOWEL RESECTION performed by Hope Connors MD at [facility] OR       Precautions:  none    Recommended placement: home indep without OT serviced    Assessment of current deficits [x] No deficits    [] Functional mobility  []ADLs  [] Strength               []Cognition     [] Functional transfers   [] IADLs         [] Safety Awareness   []Endurance     [] Fine Coordination              [] Balance      [] Vision/perception   []Sensation      []Gross Motor Coordination  [] ROM  [] Delirium                   [] Motor Control     OT PLAN OF CARE   OT POC based on physician orders, patient diagnosis and results of clinical assessment    Frequency/Duration and OT treatment intervention: none recommended     Recommended Adaptive Equipment: none      Home Living: Pt lives alone in a 2 story home with 4 steps to enter with michael rails. Bed and bath are on the second floor with 16 steps and right ascending rails. Bathroom setup: tub shower without bars, hand held shower head, and standard toilet    Equipment owned: none     Prior Level of Function: indep with ADLs , indep with IADLs; ambulated indep without AD   Driving: yes   Occupation: works for [employer]    Pain Level: none  Cognition: A&O: 4/4; Follows 4 step directions   Memory:  Intact    Sequencing: Intact    Problem solving:Intact    Judgement/safety:Intact      Functional Assessment:  AM-PAC Daily Activity Raw Score: 24/24   Initial Eval Status  Date: 6/10/21 Treatment Status  Date: STGs = LTGs  Time frame: 10-14 days   Feeding Indep  Indep   Grooming Indep  Indep   UB Dressing Indep  Indep   LB Dressing Indep  Indep   Bathing Indep  Indep   Toileting Indep   Indep   Bed Mobility  Supine to sit: Indep  Sit to supine: Indep   Supine to sit:  Indep  Sit to supine: Indep   Functional Transfers Indep  Indep   Functional Mobility Indep  Indep   Balance Sitting:     Static:  Good    Dynamic: Good  Standing: Good  Sitting:     Static:  Good    Dynamic: Good  Standing: Good   Activity Tolerance good  good   Visual/  Perceptual Glasses: reading only; WFL: Change in vision since admission: no     NA           Hand Dominance right   AROM (PROM) Strength Additional Info:    RUE  WFL 5/5 good  and wfl FMC/dexterity noted during ADL tasks       LUE WFL 5/5 good  and wfl FMC/dexterity noted during ADL tasks       Hearing: WFL   Sensation:  No c/o numbness or tingling   Tone: WFL   Edema: none    Comments: Upon arrival patient was long sitting in bed. At end of session, patient sitting up in arm chair with call light and phone within reach. She reports DC today per physician. Overall patient demonstrated independence and safety during completion of ADL/functional transfer/mobility tasks that matches that of her PLOF. Pt would not benefit from continued skilled OT services at this time due to intact safety and independence with completion of ADL/IADL tasks for functional independence and quality of life. Patient / Family Goal: DC home today. Patient and/or family were instructed on functional diagnosis, prognosis/goals and OT plan of care. Demonstrated good understanding. Eval Complexity: Low  · History: Brief review of medical records and additional review of physical, cognitive, or psychosocial history related to current functional performance  · Exam: No performance deficits  · Assistance/Modification: No assistance or modifications required to perform tasks. May have comorbidities that affect occupational performance.     Time In: 0915  Time Out: 9620  Total Treatment Time: 12    Min Units   OT Eval Low 97165  x  1   OT Eval Medium 74098      OT Eval High 63212      OT Re-Eval [id number]       Therapeutic Ex [id number]       Therapeutic Activities 40554       ADL/Self Care 23098       Orthotic Management 82352       Manual 94077     Neuro Re-Ed 91594       Non-Billable Time          Evaluation Time additionally includes thorough review of current medical information, gathering

## 2021-06-10 NOTE — PLAN OF CARE
Problem:  Activity:  Goal: Ability to tolerate increased activity will improve  Description: Ability to tolerate increased activity will improve  Outcome: Met This Shift     Problem: Discharge Planning:  Goal: Participates in care planning  Description: Participates in care planning  Outcome: Met This Shift  Goal: Discharged to appropriate level of care  Description: Discharged to appropriate level of care  Outcome: Met This Shift     Problem: Sleep Pattern Disturbance:  Goal: Appears well-rested  Description: Appears well-rested  Outcome: Met This Shift

## 2021-06-10 NOTE — PROGRESS NOTES
Physical Therapy Initial Evaluation/Plan of Care    Room #:  0615/0615-02  Patient Name: Domingo Mcneill  YOB: 1950  MRN: [id number]    Date of Service: 6/10/2021      Tentative placement recommendation: Home    Equipment recommendation: None      Evaluating Physical Therapist: Rolando Samson, PT #[id number]      Specific Provider Orders/Date/Referring Provider :  06/09/21 0700   PT evaluation and treat Start: 06/09/21 0700, End: 06/09/21 0700, ONE TIME, Standing Count: 1 Occurrences, R    Jenita Pulse, APRN - CNP      Admitting Diagnosis:   Atrial fibrillation with rapid ventricular response (HCC) [I48.91]    palpitations    Surgery:  -    Patient Active Problem List   Diagnosis    Spinal stenosis    Degeneration of lumbosacral intervertebral disc    Lumbago    Colonic diverticular abscess    CAD in native artery    S/P colon resection    Diverticulitis of large intestine with perforation and abscess without bleeding    Pelvic abscess in female    Colostomy reversal    SBO (small bowel obstruction) (HCC)    Bradycardia    Chest pain    Atrial fibrillation with rapid ventricular response (HCC)        ASSESSMENT of Current Deficits  No PT needs at this time. Nursing to ambulate patient every shift. PHYSICAL THERAPY  PLAN OF CARE     Discharge skilled Physical Therapy. Patient and or family understand(s) diagnosis, prognosis, and plan of care.     Prior Level of Function: Patient ambulated independently    Rehab Potential: good    for baseline    Past medical history:   Past Medical History:   Diagnosis Date    CAD in native artery 67/59/8257    Diastolic heart failure ([facility] [address]     Diverticulitis 08/03/2018    H/O cardiovascular stress test 05/05/2021    Lexiscan    HIGH CHOLESTEROL     History of atrial fibrillation 08/2018    with episode of diverticulosis    History of stress test 08/07/2018    Lexiscan stress test    Hypertension     Thyroid disease      Past Surgical History: Procedure Laterality Date    COLONOSCOPY      COLONOSCOPY N/A 4/8/2019    COLONOSCOPY performed by Malachi Dorantes MD at [facility] CATH LAB PROCEDURE  08/09/2018    NERVE BLOCK  12 27 2011    NERVE BLOCK  1/24/12    lumbar epidural    NERVE BLOCK  02/21/12    lumbar epidural with flouro    [place] COLONOSCOPY FLX DX W/COLLJ SPEC WHEN PFRMD N/A 10/4/2018    COLONOSCOPY performed by Malachi [facility] MD at [facility], PARTIAL, Shemar Story N/A 11/13/2018    LAPAROSCOPIC ROBOT XI ASSISTED SIGMOID COLON RESECTION WITH OSTOMY performed by Malachi Dorantes MD at [facility] N/A 4/9/2019    COLOSTOMY CLOSURE [facility]COPIC [facility]OTIC [facility] N/A 8/13/2020    DIAGNOSTIC LAPAROSCOPIY POSS BOWEL RESECTION performed by Ahsan Merino MD at [facility]:    Precautions: Up with assistance, falls ,    Social history: Patient lives alone   in a two story home bedroom and bathroom 2nd floor full flight with rail  With 4- 5 steps  to enter with Rail  Tub shower      Equipment owned: None,       [facility]   How much difficulty turning over in bed?: None  How much difficulty sitting down on / standing up from a chair with arms?: None  How much difficulty moving from lying on back to sitting on side of bed?: None  How much help from another person moving to and from a bed to a chair?: None  How much help from another person needed to walk in hospital room?: None  How much help from another person for climbing 3-5 steps with a railing?: A Little  AM-PAC Inpatient Mobility Raw Score : 23  AM-PAC Inpatient T-Scale Score : 56.93  Mobility Inpatient CMS 0-100% Score: 11.2  Mobility Inpatient CMS G-Code Modifier : CI    Nursing cleared patient for PT evaluation.  The admitting diagnosis and active problem list as listed above have been reviewed prior to the initiation of this evaluation. OBJECTIVE;   Initial Evaluation  Date: 6/10/2021   Was pt agreeable to Eval/treatment? Yes     Pain level   0/10      Bed Mobility    Rolling: Independent    Supine to sit: Independent    Sit to supine: Independent    Scooting: Independent     Transfers Sit to stand: Independent     Ambulation    2 x 120 feet using  no device with Independent       Stair negotiation: ascended and descended   10 steps 1 rail with supervision     ROM Within functional limits     Strength BUE:  wfl  RLE: 4+/5  LLE:  4+/5   Balance Sitting EOB:  good    Dynamic Standing:  good       Patient is Alert & Oriented x person, place, time and situation and follows directions    Sensation:  Patient  reports tingling left lower extremity  toes  Edema:  no    Endurance: good  -    Vitals: room air    Blood Pressure at rest   Blood Pressure during session     Heart Rate at rest 62 Heart Rate during session     SPO2 at rest 97%  SPO2 during session  %     Patient education  Patient educated on role of Physical Therapy, risks of immobility, safety and plan of care,  importance of mobility while in hospital  and stair training       Patient response to education:   Pt verbalized understanding Pt demonstrated skill Pt requires further education in this area   Yes Yes No      Treatment:  Patient practiced and was instructed/facilitated in the following treatment: Patient transferred to edge of bed  Sat edge of bed 5 minutes with No assist to increase dynamic sitting balance and activity tolerance. ambulated in hallway, performed stairs, ambulated back to room, returned to bed. Therapeutic Exercises:  not performed     At end of session, patient in bed with   call light and phone within reach,   all lines and tubes intact, nursing notified. Patient would benefit from continued skilled Physical Therapy to improve functional independence and quality of life.           Patient's/ family goals   home        Time in  0825  Time out  0840    Total Treatment Time  0 minutes    Evaluation time includes thorough review of current medical information, gathering information on past medical history/social history and prior level of function, completion of standardized testing/informal observation of tasks, assessment of data, and development of Plan of care and goals.      CPT codes:  Low Complexity PT evaluation (78367)  No treatment    Jaylen Gonzáles, PT

## 2021-06-10 NOTE — CARE COORDINATION
6/10/21 1106 CM note: NO COVID TESTING. Discharge order noted. Met with patient at the bedside to discuss discharge planning. Discharge plan remains home and patient declines need for HHC. Pts son will provide transportation home.  Electronically signed by Keren Newton RN on 6/10/2021 at 11:17 AM

## 2021-06-10 NOTE — PROGRESS NOTES
[facility]        CARDIOLOGY                 INPATIENT PROGRESS NOTE          PATIENT SEEN IN FOLLOW UP FOR: A fib    Hospital Day: 2     Parveen Maxwell is a 78year old patient known to Dr. Loreta Cooks: Denies any chest pain or SOB, No palpitations  Or dizzy. No PND or orthopnea - Converted to sinus yesterday    ROS: Review of rest of 8 systems negative except as mentioned above    OBJECTIVE: No acute distress. See Assessment     Diagnostics:       Telemetry: Reviewed, sinus, 11 beat WCT at night    Echo 5/9/21:    Summary   Normal left ventricular chamber size. Normal left ventricular systolic function, LVEF is 65%. Indeterminate LV diastolic function. Left atrium is normal size. Interatrial septum not well visualized but appears intact. No thrombus in left atrium. Normal right ventricle size and function. There is trace mitral regurgitation. No mitral valve prolapse. The aortic valve appears mildly sclerotic. No hemodynamically significant aortic stenosis - mean gradient 6mmHg, DLI 0.57   There is trace tricuspid regurgitation, RVSP 23mmHg. Normal aortic root size. No evidence of pericardial effusion. No intra cardiac mass or thrombus. Compared to prior echo from 8/6/2018. No intake or output data in the 24 hours ending 06/10/21 1023    Labs:   CBC:   Recent Labs     06/09/21  0043 06/10/21  0606   WBC 11.8* 8.3   HGB 15.2 14.4   HCT 45.1 44.0    218     BMP:   Recent Labs     06/09/21  0043 06/10/21  0606    140   K 3.9 4.0   CO2 25 28   BUN 8 12   CREATININE 0.7 0.7   LABGLOM >60 >60   CALCIUM 9.7 8.8     Mag:   Recent Labs     06/10/21  0606   MG 2.1     Phos:   Recent Labs     06/10/21  0606   PHOS 3.2     TSH:   Recent Labs     06/09/21  0043   TSH 5.940*     HgA1c:     BNP: No results for input(s): BNP in the last 72 hours. PT/INR: No results for input(s): PROTIME, INR in the last 72 hours.   APTT:No results for input(s): tone.  : Deferred  Rectal Exam: Deferred  SKIN: warm and dry   NEURO / PSYCH: oriented to person, place           ASSESSMENT/PLAN:     Atrial fibrillation with rapid ventricular response (HCC) - Dx since 2018, High [id number] sore 5 - Converted to Sinus yesterday; Seen by Nicole Covarrubias, Dr. Abi Hill on May 10, 2021(Recommended Echo, AAD with Tikosyn). Continue Metoprolol 50mg - Tolerating well.      Brief Wide complex tachycardia - Desiree nonsustained V Tach - Asymptomatic, Mag+ levels normal; LV EF normal, Continue Metoprolol - Event monitor if recurrent palpitations    Mild CAD - Cath 2018; Continue BB, Statin, Stress test 5/5/21     Essential HTN - Controlled, Monitor BP     Tobacco use  - Counseled to quit smoking          Home today      Echo findings, telemetry findings and above recommendations discussed with him/her.     F/u by Dr Ale Segovia 2-4 weeks and Dr Abi Hill    Electronically signed by Aleyda Dennison MD on 6/10/2021 at 10:23 AM  [facility]) Cardiology

## 2021-06-11 ENCOUNTER — TELEPHONE (OUTPATIENT)
Dept: NON INVASIVE DIAGNOSTICS | Age: 71
End: 2021-06-11

## 2021-06-11 DIAGNOSIS — I48.91 ATRIAL FIBRILLATION, UNSPECIFIED TYPE (HCC): ICD-10-CM

## 2021-06-11 NOTE — TELEPHONE ENCOUNTER
Lm to call the office back to schedule a 1 month follow up with Neelima RUBI/albino DURHAM at [facility].

## 2021-06-11 NOTE — TELEPHONE ENCOUNTER
----- Message from Seng Cordon MD sent at 6/11/2021  3:43 PM EDT -----  Please let her know that no PAF seen. Only brief irregular heart beats from the top and bottom of the hearts.  Thanks.  ----- Message -----  From: Darby Capellan  Sent: 6/11/2021  10:54 AM EDT  To: Seng Cordon MD

## 2021-07-10 ENCOUNTER — HOSPITAL ENCOUNTER (EMERGENCY)
Age: 71
Discharge: HOME | End: 2021-07-11
Attending: EMERGENCY MEDICINE
Payer: COMMERCIAL

## 2021-07-10 DIAGNOSIS — I48.0 PAROXYSMAL ATRIAL FIBRILLATION WITH RAPID VENTRICULAR RESPONSE (HCC): Primary | ICD-10-CM

## 2021-07-10 PROCEDURE — 96374 THER/PROPH/DIAG INJ IV PUSH: CPT

## 2021-07-10 PROCEDURE — 99282 EMERGENCY DEPT VISIT SF MDM: CPT

## 2021-07-11 ENCOUNTER — APPOINTMENT (OUTPATIENT)
Dept: GENERAL RADIOLOGY | Age: 71
End: 2021-07-11
Payer: COMMERCIAL

## 2021-07-11 VITALS
OXYGEN SATURATION: 100 % | TEMPERATURE: 98.3 F | HEIGHT: 63 IN | SYSTOLIC BLOOD PRESSURE: 141 MMHG | DIASTOLIC BLOOD PRESSURE: 106 MMHG | BODY MASS INDEX: 23.04 KG/M2 | HEART RATE: 77 BPM | WEIGHT: 130 LBS | RESPIRATION RATE: 20 BRPM

## 2021-07-11 LAB
ALBUMIN SERPL-MCNC: 3.8 G/DL (ref 3.5–5.2)
ALP BLD-CCNC: 58 U/L (ref 35–104)
ALT SERPL-CCNC: 11 U/L (ref 0–32)
ANION GAP SERPL CALCULATED.3IONS-SCNC: 10 MMOL/L (ref 7–16)
AST SERPL-CCNC: 18 U/L (ref 0–31)
BASOPHILS ABSOLUTE: 0.08 E9/L (ref 0–0.2)
BASOPHILS RELATIVE PERCENT: 0.7 % (ref 0–2)
BILIRUB SERPL-MCNC: 0.2 MG/DL (ref 0–1.2)
BUN BLDV-MCNC: 9 MG/DL (ref 6–23)
CALCIUM SERPL-MCNC: 9.2 MG/DL (ref 8.6–10.2)
CHLORIDE BLD-SCNC: 107 MMOL/L (ref 98–107)
CO2: 24 MMOL/L (ref 22–29)
CREAT SERPL-MCNC: 0.6 MG/DL (ref 0.5–1)
EOSINOPHILS ABSOLUTE: 0.23 E9/L (ref 0.05–0.5)
EOSINOPHILS RELATIVE PERCENT: 2.2 % (ref 0–6)
GFR AFRICAN AMERICAN: >60
GFR NON-AFRICAN AMERICAN: >60 ML/MIN/1.73
GLUCOSE BLD-MCNC: 116 MG/DL (ref 74–99)
HCT VFR BLD CALC: 44.1 % (ref 34–48)
HEMOGLOBIN: 14.8 G/DL (ref 11.5–15.5)
IMMATURE GRANULOCYTES #: 0.04 E9/L
IMMATURE GRANULOCYTES %: 0.4 % (ref 0–5)
LYMPHOCYTES ABSOLUTE: 2.29 E9/L (ref 1.5–4)
LYMPHOCYTES RELATIVE PERCENT: 21.5 % (ref 20–42)
MAGNESIUM: 2 MG/DL (ref 1.6–2.6)
MCH RBC QN AUTO: 31.9 PG (ref 26–35)
MCHC RBC AUTO-ENTMCNC: 33.6 % (ref 32–34.5)
MCV RBC AUTO: 95 FL (ref 80–99.9)
MONOCYTES ABSOLUTE: 1.03 E9/L (ref 0.1–0.95)
MONOCYTES RELATIVE PERCENT: 9.7 % (ref 2–12)
NEUTROPHILS ABSOLUTE: 7 E9/L (ref 1.8–7.3)
NEUTROPHILS RELATIVE PERCENT: 65.5 % (ref 43–80)
PDW BLD-RTO: 12.5 FL (ref 11.5–15)
PLATELET # BLD: 220 E9/L (ref 130–450)
PMV BLD AUTO: 10.7 FL (ref 7–12)
POTASSIUM SERPL-SCNC: 3.5 MMOL/L (ref 3.5–5)
PRO-BNP: 426 PG/ML (ref 0–125)
RBC # BLD: 4.64 E12/L (ref 3.5–5.5)
SODIUM BLD-SCNC: 141 MMOL/L (ref 132–146)
T4 FREE: 1.64 NG/DL (ref 0.93–1.7)
TOTAL PROTEIN: 6.6 G/DL (ref 6.4–8.3)
TROPONIN, HIGH SENSITIVITY: 8 NG/L (ref 0–9)
TSH SERPL DL<=0.05 MIU/L-ACNC: 3.52 UIU/ML (ref 0.27–4.2)
WBC # BLD: 10.7 E9/L (ref 4.5–11.5)

## 2021-07-11 PROCEDURE — 2500000003 HC RX 250 WO HCPCS: Performed by: EMERGENCY MEDICINE

## 2021-07-11 PROCEDURE — 83735 ASSAY OF MAGNESIUM: CPT

## 2021-07-11 PROCEDURE — 84484 ASSAY OF TROPONIN QUANT: CPT

## 2021-07-11 PROCEDURE — 85025 COMPLETE CBC W/AUTO DIFF WBC: CPT

## 2021-07-11 PROCEDURE — 2580000003 HC RX 258: Performed by: EMERGENCY MEDICINE

## 2021-07-11 PROCEDURE — 84439 ASSAY OF FREE THYROXINE: CPT

## 2021-07-11 PROCEDURE — 84443 ASSAY THYROID STIM HORMONE: CPT

## 2021-07-11 PROCEDURE — 71045 X-RAY EXAM CHEST 1 VIEW: CPT

## 2021-07-11 PROCEDURE — 83880 ASSAY OF NATRIURETIC PEPTIDE: CPT

## 2021-07-11 PROCEDURE — 36415 COLL VENOUS BLD VENIPUNCTURE: CPT

## 2021-07-11 PROCEDURE — 80053 COMPREHEN METABOLIC PANEL: CPT

## 2021-07-11 RX ORDER — 0.9 % SODIUM CHLORIDE 0.9 %
500 INTRAVENOUS SOLUTION INTRAVENOUS ONCE
Status: COMPLETED | OUTPATIENT
Start: 2021-07-11 | End: 2021-07-11

## 2021-07-11 RX ORDER — METOPROLOL TARTRATE 5 MG/5ML
5 INJECTION INTRAVENOUS ONCE
Status: COMPLETED | OUTPATIENT
Start: 2021-07-11 | End: 2021-07-11

## 2021-07-11 RX ORDER — DILTIAZEM HYDROCHLORIDE 5 MG/ML
15 INJECTION INTRAVENOUS ONCE
Status: DISCONTINUED | OUTPATIENT
Start: 2021-07-11 | End: 2021-07-11 | Stop reason: HOSPADM

## 2021-07-11 RX ADMIN — METOPROLOL TARTRATE 5 MG: 5 INJECTION INTRAVENOUS at 00:19

## 2021-07-11 RX ADMIN — SODIUM CHLORIDE 500 ML: 9 INJECTION, SOLUTION INTRAVENOUS at 00:20

## 2021-07-11 NOTE — ED NOTES
Pt converted to NSR prior to administration of cardizem bolus. Dr. Evangelina Ahumada notified. Cardizem held.      Miriam Ray RN  07/11/21 5848

## 2021-07-12 ASSESSMENT — ENCOUNTER SYMPTOMS
SHORTNESS OF BREATH: 0
CHEST TIGHTNESS: 0

## 2021-07-12 NOTE — PROGRESS NOTES
[facility] [address] [place] [address] Electrophysiology  Outpatient Progress Note  PATIENT: Adina Ryan  MEDICAL RECORD NUMBER: <[id number]>  DATE OF SERVICE:  7/13/2021  ELECTROPHYSIOLOGIST: Pietro Blanchard MD  PCP: Morenita Morton DO  CHIEF COMPLAINT: Paroxysmal atrial fibrillation and bradycardia    HPI: This is a 78 y.o. female with a history of   Patient Active Problem List   Diagnosis    Spinal stenosis    Degeneration of lumbosacral intervertebral disc    Lumbago    Colonic diverticular abscess    CAD in native artery    S/P colon resection    Diverticulitis of large intestine with perforation and abscess without bleeding    Pelvic abscess in female    Colostomy reversal    SBO (small bowel obstruction) ([address]    Bradycardia    Chest pain    Atrial fibrillation with rapid ventricular response ([address]   who presents to cardiac electrophysiology clinic for follow up and management of paroxysmal atrial fibrillation and sinus bradycardia. Since last office visit the patient underwent echocardiogram on 6/9/21 which showed    LV EF of 65% and normal LA size. She underwent 14 day cardiac monitor which showed  86 SVTs with the longest episode was 21.5 seconds. No PAF seen. Her symptoms correlated mostly with NSR with PACs. She states that she had to be seen in ED 3 times due to AF with RVR and last episode was 7/10/21 which rhythm converted spontaneously after IV Cardizem before she was discharged home. She presents today in NSR. The patient denies any chest pain, dyspnea, dizziness, syncope, orthopnea or paroxysmal nocturnal dyspnea. Discussed with the patient at great length regarding options of treatment including AAD therapy versus AF ablation. She states that she could not afford long term AAD therapy, Sotalol $38.35 and Tikosyn $130 for 30 day supply, and would like to move forward with AF ablation.      Patient Active Problem List    Diagnosis Date Noted    Atrial fibrillation with rapid ventricular response ([facility] 06/09/2021    Chest pain     Bradycardia 05/04/2021    SBO (small bowel obstruction) ([facility] 08/09/2020    Colostomy reversal 04/09/2019    Diverticulitis of large intestine with perforation and abscess without bleeding     Pelvic abscess in female     S/P colon resection 11/13/2018    CAD in native artery 08/09/2018    Colonic diverticular abscess 08/03/2018    Spinal stenosis 12/07/2011    Degeneration of lumbosacral intervertebral disc 12/07/2011    Lumbago 12/07/2011       Past Medical History:   Diagnosis Date    CAD in native artery 67/01/2246    Diastolic heart failure ([facility]     Diverticulitis 08/03/2018    H/O cardiovascular stress test 05/05/2021    Lexiscan    HIGH CHOLESTEROL     History of atrial fibrillation 08/2018    with episode of diverticulosis    History of stress test 08/07/2018    Lexiscan stress test    Hypertension     Thyroid disease        Family History   Problem Relation Age of Onset    Cancer Other     Heart Disease Other     Cancer Mother         brain    Breast Cancer Mother     Heart Attack Father         age 48     Heart Attack Sister         age 46     Colon Cancer Sister        Social History     Tobacco Use    Smoking status: Current Every Day Smoker     Packs/day: 0.50     Types: Cigarettes    Smokeless tobacco: Never Used   Substance Use Topics    Alcohol use: No     Comment: 5 cups of coffee decaf/regular       Current Outpatient Medications   Medication Sig Dispense Refill    alendronate (FOSAMAX) 70 MG tablet Take 70 mg by mouth daily      metoprolol succinate (TOPROL XL) 50 MG extended release tablet Take 1 tablet by mouth daily 30 tablet 3    SYNTHROID 125 MCG tablet Take 125 mcg by mouth Daily Take 1 tablet daily      Calcium Carbonate-Vit D-Min (CALCIUM 1200 PO) Take 1 capsule by mouth      docusate sodium (COLACE) 100 MG capsule Take 100 mg by mouth nightly       Echinacea 125 MG CAPS Take 125 mg by mouth daily      aspirin EC 81 MG EC tablet Take 1 tablet by mouth daily 30 tablet 3    pantoprazole (PROTONIX) 40 MG tablet Take 1 tablet by mouth daily 30 tablet 3    apixaban (ELIQUIS) 5 MG TABS tablet Take 1 tablet by mouth 2 times daily 60 tablet 0    ZINC PO Take 1 tablet by mouth daily      Omega-3 Fatty Acids (FISH OIL) 1000 MG CAPS Take 1,000 mg by mouth 2 times daily       lisinopril (PRINIVIL;ZESTRIL) 10 MG tablet Take 10 mg by mouth daily.  rosuvastatin (CRESTOR) 10 MG tablet Take 10 mg by mouth daily. No current facility-administered medications for this visit. No Known Allergies    ROS:   Constitutional: Negative for fever, activity change and appetite change. HENT: Negative for epistaxis. Eyes: Negative for diploplia, blurred vision. Respiratory: Negative for cough, chest tightness, shortness of breath and wheezing. Cardiovascular: pertinent positives in HPI  Gastrointestinal: Negative for abdominal pain and blood in stool. All other review of systems are negative     PHYSICAL EXAM:   Vitals:    07/13/21 1334   BP: 124/82   Site: Left Upper Arm   Position: Sitting   Cuff Size: Medium Adult   Pulse: 59   Resp: 16   SpO2: 99%   Weight: 131 lb (59.4 kg)   Height: 5' 3\" (1.6 m)      Constitutional: Well-developed, no acute distress  Eyes: conjunctivae normal, no xanthelasma   Ears, Nose, Throat: oral mucosa moist, no cyanosis   CV: no JVD. Regular rate and rhythm. Normal S1S2 and no S3. No murmurs, rubs, or gallops.  PMI is nondisplaced  Lungs: clear to auscultation bilaterally, normal respiratory effort without used of accessory muscles  Abdomen: soft, non-tender, bowel sounds present, no masses or hepatomegaly   Musculoskeletal: no digital clubbing, no edema   Skin: warm, no rashes     I have personally reviewed the laboratory, cardiac diagnostic and radiographic testing as outlined below:    Data:    Recent Labs     07/11/21  0010   WBC 10.7   HGB 14.8 HCT 44.1        Recent Labs     21  0010      K 3.5      CO2 24   BUN 9   CREATININE 0.6   CALCIUM 9.2      Lab Results   Component Value Date    MG 2.0 2021     Recent Labs     21  0010   TSH 3.520     No results for input(s): INR in the last 72 hours. CXR 21:   FINDINGS:   The lungs are without acute focal process.  There is no effusion or   pneumothorax. The cardiomediastinal silhouette is without acute process. The   osseous structures are without acute process.           Impression   No acute process. EK21: SB, rate: 59 bpm, QTc 431 msec - Please see scan in Cardiology. Echocardiogram 21:  Findings      Left Ventricle   Normal left ventricular chamber size. Normal left ventricular systolic function, LVEF is 65%. Indeterminate LV diastolic function. Normal left atrial pressure. Right Ventricle   Normal right ventricle size and function. Left Atrium   Left atrium is normal size. Interatrial septum not well visualized but appears intact. No thrombus in left atrium. Right Atrium   Normal right atrium. Mitral Valve   Normal mitral valve structure. There is trace mitral regurgitation. No mitral valve prolapse. Tricuspid Valve   Normal tricuspid valve structure. There is trace tricuspid regurgitation, RVSP 23mmHg. Aortic Valve   The aortic valve appears mildly sclerotic. No hemodynamically significant aortic stenosis - mean gradient 6mmHg, DLI   0.57      Pulmonic Valve   The pulmonic valve was not well visualized. Pericardial Effusion   No evidence of pericardial effusion. Pericardium appears normal.      Pleural Effusion   No evidence of pleural effusion. Aorta   Normal aortic root size and ascending aorta. Miscellaneous   The inferior vena cava diameter is normal with normal respiratory   variation. Conclusions      Summary   Normal left ventricular chamber size.    Normal left ventricular systolic function, LVEF is 65%. Indeterminate LV diastolic function. Left atrium is normal size. Interatrial septum not well visualized but appears intact. No thrombus in left atrium. Normal right ventricle size and function. There is trace mitral regurgitation. No mitral valve prolapse. The aortic valve appears mildly sclerotic. No hemodynamically significant aortic stenosis - mean gradient 6mmHg, DLI   0.57   There is trace tricuspid regurgitation, RVSP 23mmHg. Normal aortic root size. No evidence of pericardial effusion. No intra cardiac mass or thrombus. Compared to prior echo from 8/6/2018. Signature      ----------------------------------------------------------------   Electronically signed by Slick Marie MD(Interpreting   physician) on 06/09/2021 04:56 PM   ----------------------------------------------------------------      Echocardiogram 8/6/18: Findings      Left Ventricle   Left ventricle size is normal.   Proximal septal thickening. Ejection fraction is visually estimated at 65%. No regional wall motion abnormalities seen. E/A flow reversal noted. Suggestive of diastolic dysfunction. No evidence of left ventricular mass or thrombus noted. Right Ventricle   Normal right ventricular size and function. Left Atrium   Normal sized left atrium. Interatrial septum appears intact. Right Atrium   Normal right atrium size. Mitral Valve   Structurally normal mitral valve. Physiologic and/or trace mitral regurgitation is present. No evidence of mitral valve stenosis. Tricuspid Valve   The tricuspid valve appears structurally normal.   Physiologic and/or trace tricuspid regurgitation. RVSP is normal.      Aortic Valve   The aortic valve appears mildly sclerotic. Aortic valve opens well. No evidence of aortic valve regurgitation. Mild aortic stenosis is present.       Pulmonic Valve   The pulmonic valve was not well projections.     ANGIOGRAPHIC FINDINGS:  The left main coronary artery arising from the left  sinus of Valsalva. It is a large vessel that has no significant disease. It trifurcates into left anterior descending artery, left circumflex artery  and ramus intermedius artery.     The left anterior descending artery is a very tortuous vessel, actually all  of her vessels are very tortuous and the overlapped significantly, the LAD  has no significant disease. It gives off a two large diagonal branches. The LAD and its diagonal branches have no significant disease. The second  diagonal branch may be has like 30% disease.     The ramus intermedius artery also a tortuous vessel without any significant  disease. The left circumflex artery is a large vessel that gives off a  large OM branch and that has like 30% proximal disease.     There was grade 3 left-to-right collaterals.     The right coronary artery arising from the right sinus of Valsalva. It has  proximal total occlusion.     At the end of the procedure, the catheter and the wire were pulled out from  the body, Vasc band was applied to the right wrist area with effective  hemostasis.     MEDICATIONS USED DURING THE PROCEDURE:  Intraarterial verapamil to prevent  vasospasm, intra-arterial heparin for anticoagulation.     We used Versed and fentanyl for conscious sedation. First dose was given  at [phone], procedure ended at 1815, there was 20 minutes of direct  face-to-face supervision during conscious sedation administration.     Total fluoroscopy time was 1.7 minutes.     Total contrast used was 50 mL.     IMPRESSION:  1. 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 2.  Significantly tortuous and overlapping left coronary arteries. 3.  Mild disease involving a second diagonal and first OM branch.     RECOMMENDATIONS:  1. Aggressive coronary artery disease risk-factor modification.   2.  Smoking physician) on 08/06/2018 07:46 PM   ----------------------------------------------------------------    14 day cardiac monitor 6/11/21:      I have independently reviewed all of the ECGs and rhythm strips per above     Assessment/Plan: This is a 78 y.o. female with a history of   Patient Active Problem List   Diagnosis    Spinal stenosis    Degeneration of lumbosacral intervertebral disc    Lumbago    Colonic diverticular abscess    CAD in native artery    S/P colon resection    Diverticulitis of large intestine with perforation and abscess without bleeding    Pelvic abscess in female    Colostomy reversal    SBO (small bowel obstruction) ([facility])    Bradycardia    Chest pain    Atrial fibrillation with rapid ventricular response ([facility]     1. Paroxysmal atrial fibrillation  - Diagnosed 8/2018.   - [id number] = 5 (age, gender, CAD, CHF, HTN)  - Current [address] regiment includes Eliquis. - Current medical therapy includes Toprol XL.  - Thus far has had 1 cardioversions and the last one was 12/5/20.  - Previous attempts at rhythm control have been DC-cardioversion  - Had an extensive discussion regarding options between rate and rhythm control and the patient has chosen rhythm control. - 14 day cardiac monitor 6/11/21 showed no PAF and symptoms correlated with PACs. - Three ED visits since June 2021 due to AF with RVR while on Toprol XL.  - States that she could not afford long term AAD therapy, Sotalol $38.35 and Tikosyn $130 for 30 day supply, and would like to move forward with AF ablation.   - A detailed discussion was had regarding the risks, benefits, and alternatives to the atrial fibrillation ablation procedure. The procedure was described in detail.  I quoted the patient an overall 4-6% risk of major complications which include but are not limited to: bleeding, infection, blood clot, vascular injury requiring surgical repair, phrenic nerve injury, pulmonary vein stenosis, valvular injury, damage to the cardiac conduction system requiring pacemaker implantation, cardiac perforation and tamponade, heart attack, stroke, atrioesophgeal fistula, and death. The patient understands these risks and wishes to proceed with the procedure. They will need a preop cardiac CT within 1 month, we have discussed taryn-procedural anticoagulation management and the potential need for a trans-esophageal echocardiogram on the day of the procedure. We have discussed the use of adjunctive technologies including cryo-ablation and contact force guided ablation. Based on this patient's individual needs, we have chosen to utilize cryo-ablation.  - Re-education on importance of well controlled HTN (goal BP < 130/80), adequate weight control (goal BMI of < 27), physical activity consisting of moderate cardiopulmonary exercise up to a goal of 250 min/wk, daily compliance with CPAP in treating sleep apnea, smoking cessation and limited ETOH intake       2. Sinus bradycardia  - Probable underlying sinus node dysfunction which worsening by Beta-blocker. - Presents today in SB on Toprol XL.  - Limit option of AAD therapy so AAD of choice would be Tikosyn but can try Sotalol without Toprol XL. 3. Coronary artery disease   - Chronic total occlusion of the right coronary artery with collaterals per [facility] 8/9/18.  - On ASA, Toprol XL and Crestor. 4. Chronic HFpEF  - LV EF 65% on TTE 8/6/18.  - Euvolemic and compensated. - On Toprol XL and Zestril. 5. Valvular heart disease  - Mild aortic stenosis on TTE 8/6/18. 6. Hypertension  - Well controlled. - O Toprol XL and Zestril. 7. Hyperlipidemia   - On Crestor. 8. Hypothyroidism   - On Synthroid. Recommendations:    1. Schedule cryo-balloon AF ablation with Angelica Bautista and Carto. She will need a cardiac CT prior to procedure. JEANNA the day of the procedure. Hold Eliquis for 2 doses prior to procedure.  If she has breakthrough AF prior the procedure, will consider starting short-term oral Amiodarone. 2. Continue Toprol XL 50 mg daily. 3. Continue Eliquis 5 mg bid. 4. Lifestyle modifications as above. including caffeine avoidance. 5. Follow up in 3 months or after the procedure. Encouraged the patient to call the office for any questions or concerns. I have spent a total of 40 minutes with the patient and the family reviewing the above stated recommendations. And a total of >50% of that time involved face-to-face time providing counseling and/or coordination of care with the other providers, preparation for the clinic visit, reviewing records/tests, counseling/education of the patient, ordering medications/tests/procedures, coordinating care, and documenting clinical information in the EHR. Thank you for allowing me to participate in your patient's care. Please call me if there are any questions or concerns.       David Ramirez MD  Cardiac Electrophysiology  [address]  [facility] [place]: [facility]  1:50 PM  7/13/2021

## 2021-07-12 NOTE — ED PROVIDER NOTES
56-year-old female presenting with concern about palpitations. She has a history of atrial fibrillation with rapid reticular response. This has occurred on occasion for her and she been admitted the hospital previously. Is currently on blood thinning medication. Does not feel short of breath or chest pain at this time. She is in no distress. This is usually a sudden onset for her, persistent, moderate severity, 1 day duration, associate with her history of A. fib. Family History   Problem Relation Age of Onset    Cancer Other     Heart Disease Other     Cancer Mother         brain    Breast Cancer Mother     Heart Attack Father         age 48     Heart Attack Sister         age 46     Colon Cancer Sister      Past Surgical History:   Procedure Laterality Date    COLONOSCOPY      COLONOSCOPY N/A 4/8/2019    COLONOSCOPY performed by Raiza Donato MD at [facility] CATH LAB PROCEDURE  08/09/2018    NERVE BLOCK  12 27 2011    NERVE BLOCK  1/24/12    lumbar epidural    NERVE BLOCK  02/21/12    lumbar epidural with flouro    [place] COLONOSCOPY FLX DX W/COLLJ SPEC WHEN PFRMD N/A 10/4/2018    COLONOSCOPY performed by Raiza Donato MD at [facility], PARTIAL, Kimmie Milligan N/A 11/13/2018    LAPAROSCOPIC ROBOT XI ASSISTED SIGMOID COLON RESECTION WITH OSTOMY performed by Raiza Donato MD at [address] [place] [address] N/A 4/9/2019    COLOSTOMY CLOSURE LAPAROSCOPIC ROBOTIC XI performed by Raiza Donato MD at [address] [place] [address] N/A 8/13/2020    DIAGNOSTIC LAPAROSCOPIY POSS BOWEL RESECTION performed by Spike Nava MD at [address] [place] [address] [address] of [address]   Constitutional: Negative for chills and fever. Respiratory: Negative for chest tightness and shortness of breath. Cardiovascular: Positive for palpitations. Negative for chest pain. All other systems reviewed and are negative.        Physical Exam  Constitutional:       General: She is not in acute distress. Appearance: She is well-developed. HENT:      Head: Normocephalic and atraumatic. Eyes:      Conjunctiva/sclera: Conjunctivae normal.      Pupils: Pupils are equal, round, and reactive to light. Neck:      Thyroid: No thyromegaly. Cardiovascular:      Rate and Rhythm: Tachycardia present. Rhythm irregular. Pulmonary:      Effort: Pulmonary effort is normal. No respiratory distress. Breath sounds: Normal breath sounds. Abdominal:      General: There is no distension. Palpations: Abdomen is soft. Tenderness: There is no abdominal tenderness. There is no guarding or rebound. Musculoskeletal:         General: No tenderness. Normal range of motion. Cervical back: Normal range of motion. Skin:     General: Skin is warm and dry. Findings: No erythema. Neurological:      Mental Status: She is alert and oriented to person, place, and time. Cranial Nerves: No cranial nerve deficit. Coordination: Coordination normal.          Procedures     MDM     ED Course as of Jul 12 0727   Sun Jul 11, 2021 0111 Patient still tachycardic, no improvement with the beta-blocker medication. Will start Cardizem. [SO]      ED Course User Index  [SO] Madie Kolb DO        ED Course as of Jul 12 0730   Sun Jul 11, 2021 0111 Patient still tachycardic, no improvement with the beta-blocker medication. Will start Cardizem. [SO]      ED Course User Index  [SO] Madie Kolb, DO       --------------------------------------------- PAST HISTORY ---------------------------------------------  Past Medical History:  has a past medical history of CAD in native artery, Diastolic heart failure ([address] Diverticulitis, H/O cardiovascular stress test, HIGH CHOLESTEROL, History of atrial fibrillation, History of stress test, Hypertension, and Thyroid disease.     Past Surgical History:  has a past surgical history that includes Nerve Block (12 27 2011); Nerve Block (1/24/12); Nerve Block (02/21/12); pr colonoscopy flx dx w/collj spec when pfrmd (N/A, 10/4/2018); pr lap,surg,colectomy, partial, w/anast (N/A, 11/13/2018); Colonoscopy; Colonoscopy (N/A, 4/8/2019); Small intestine surgery (N/A, 4/9/2019); Diagnostic Cardiac Cath Lab Procedure (08/09/2018); and Small intestine surgery (N/A, 8/13/2020). Social History:  reports that she has been smoking cigarettes. She has been smoking about 0.50 packs per day. She has never used smokeless tobacco. She reports that she does not drink alcohol and does not use drugs. Family History: family history includes Breast Cancer in her mother; Cancer in her mother and another family member; Merrilyn Crystal in her sister; Heart Attack in her father and sister; Heart Disease in an other family member. The patients home medications have been reviewed. Allergies: Patient has no known allergies.     -------------------------------------------------- RESULTS -------------------------------------------------    Lab  Results for orders placed or performed during the hospital encounter of 07/10/21   CBC auto differential   Result Value Ref Range    WBC 10.7 4.5 - 11.5 E9/L    RBC 4.64 3.50 - 5.50 E12/L    Hemoglobin 14.8 11.5 - 15.5 g/dL    Hematocrit 44.1 34.0 - 48.0 %    MCV 95.0 80.0 - 99.9 fL    MCH 31.9 26.0 - 35.0 pg    MCHC 33.6 32.0 - 34.5 %    RDW 12.5 11.5 - 15.0 fL    Platelets 570 159 - 908 E9/L    MPV 10.7 7.0 - 12.0 fL    Neutrophils % 65.5 43.0 - 80.0 %    Immature Granulocytes % 0.4 0.0 - 5.0 %    Lymphocytes % 21.5 20.0 - 42.0 %    Monocytes % 9.7 2.0 - 12.0 %    Eosinophils % 2.2 0.0 - 6.0 %    Basophils % 0.7 0.0 - 2.0 %    Neutrophils Absolute 7.00 1.80 - 7.30 E9/L    Immature Granulocytes # 0.04 E9/L    Lymphocytes Absolute 2.29 1.50 - 4.00 E9/L    Monocytes Absolute 1.03 (H) 0.10 - 0.95 E9/L    Eosinophils Absolute 0.23 0.05 - 0.50 E9/L    Basophils Absolute 0.08 0.00 - 0.20 E9/L Comprehensive metabolic panel   Result Value Ref Range    Sodium 141 132 - 146 mmol/L    Potassium 3.5 3.5 - 5.0 mmol/L    Chloride 107 98 - 107 mmol/L    CO2 24 22 - 29 mmol/L    Anion Gap 10 7 - 16 mmol/L    Glucose 116 (H) 74 - 99 mg/dL    BUN 9 6 - 23 mg/dL    CREATININE 0.6 0.5 - 1.0 mg/dL    GFR Non-African American >60 >=60 mL/min/1.73    GFR African American >60     Calcium 9.2 8.6 - 10.2 mg/dL    Total Protein 6.6 6.4 - 8.3 g/dL    Albumin 3.8 3.5 - 5.2 g/dL    Total Bilirubin 0.2 0.0 - 1.2 mg/dL    Alkaline Phosphatase 58 35 - 104 U/L    ALT 11 0 - 32 U/L    AST 18 0 - 31 U/L   Troponin   Result Value Ref Range    Troponin, High Sensitivity 8 0 - 9 ng/L   Magnesium   Result Value Ref Range    Magnesium 2.0 1.6 - 2.6 mg/dL   Brain Natriuretic Peptide   Result Value Ref Range    Pro- (H) 0 - 125 pg/mL   TSH without Reflex   Result Value Ref Range    TSH 3.520 0.270 - 4.200 uIU/mL   T4, Free   Result Value Ref Range    T4 Free 1.64 0.93 - 1.70 ng/dL       Radiology  XR CHEST PORTABLE   Final Result   No acute process. ------------------------- NURSING NOTES AND VITALS REVIEWED ---------------------------  Date / Time Roomed:  7/10/2021 11:29 PM  ED Bed Assignment:  03/03    The nursing notes within the ED encounter and vital signs as below have been reviewed. No data found. Oxygen Saturation Interpretation: Normal      ------------------------------------------ PROGRESS NOTES ------------------------------------------  Re-evaluation(s):   Patients symptoms show no change  Repeat physical examination is not changed     Patients symptoms are improving  Repeat physical examination is significantly improved    I have spoken with the patient and discussed todays results, in addition to providing specific details for the plan of care and counseling regarding the diagnosis and prognosis.   Their questions are answered at this time and they are agreeable with the plan.      --------------------------------- ADDITIONAL PROVIDER NOTES ---------------------------------  Consultations: This patient's ED course included: a personal history and physicial examination, re-evaluation prior to disposition, multiple bedside re-evaluations and IV medications    This patient has remained hemodynamically stable, improved and been closely monitored during their ED course. Clinical Impression  1.  Paroxysmal atrial fibrillation with rapid ventricular response ([facility] [address]          Disposition  Patient's disposition: Discharge to home  Patient's condition is stable.]         Vanda Hendrickson DO  07/12/21 6921

## 2021-07-13 ENCOUNTER — OFFICE VISIT (OUTPATIENT)
Dept: NON INVASIVE DIAGNOSTICS | Age: 71
End: 2021-07-13
Payer: COMMERCIAL

## 2021-07-13 ENCOUNTER — TELEPHONE (OUTPATIENT)
Dept: NON INVASIVE DIAGNOSTICS | Age: 71
End: 2021-07-13

## 2021-07-13 VITALS
HEIGHT: 63 IN | OXYGEN SATURATION: 99 % | RESPIRATION RATE: 16 BRPM | HEART RATE: 59 BPM | DIASTOLIC BLOOD PRESSURE: 82 MMHG | WEIGHT: 131 LBS | SYSTOLIC BLOOD PRESSURE: 124 MMHG | BODY MASS INDEX: 23.21 KG/M2

## 2021-07-13 DIAGNOSIS — I48.91 ATRIAL FIBRILLATION, UNSPECIFIED TYPE (HCC): Primary | ICD-10-CM

## 2021-07-13 DIAGNOSIS — R00.1 BRADYCARDIA: ICD-10-CM

## 2021-07-13 DIAGNOSIS — R94.31 EKG ABNORMALITIES: ICD-10-CM

## 2021-07-13 PROCEDURE — 93000 ELECTROCARDIOGRAM COMPLETE: CPT | Performed by: INTERNAL MEDICINE

## 2021-07-13 PROCEDURE — 99215 OFFICE O/P EST HI 40 MIN: CPT | Performed by: INTERNAL MEDICINE

## 2021-07-13 RX ORDER — ALENDRONATE SODIUM 70 MG/1
70 TABLET ORAL DAILY
COMMUNITY
Start: 2021-05-15

## 2021-07-13 NOTE — TELEPHONE ENCOUNTER
----- Message from Bala Mckoy MD sent at 7/13/2021  1:58 PM EDT -----  Schedule cryo-ablation w/ Ilia Garica and Carto. She will need a cardiac CT prior to procedure. JEANNA the day of the procedure. Hold Eliquis for 2 doses prior to procedure. Thanks.

## 2021-07-15 NOTE — TELEPHONE ENCOUNTER
Patient is scheduled for procedure on 08/02/2021 at 9:30, she is to arrive at main at 7:30. Patient to bring COVID vaccine card. Instructions mailed to patient. She verbalized understanding.

## 2021-07-29 NOTE — PROGRESS NOTES
[facility] Cardiology Progress Note  Dr. Kateryna Best      Referring Physician: Issa Carlson DO  CHIEF COMPLAINT:   Chief Complaint   Patient presents with    Atrial Fibrillation     Post hospital from [facility] with AF. Patient denies any cp sob or dizziness/. HISTORY OF PRESENT ILLNESS:   Patient is 78year old female with CAD, paroxysmal atrial fibrillation, mild aortic valve stenosis, chronic diastolic congestive heart failure, hypertension, hypothyroidism, hyperlipidemia, is here for follow-up appointment  Patient denies any chest pain, no shortness of breath, no lightheadedness, no dizziness, no palpitations, no pedal edema, no PND, no orthopnea, no syncope, no presyncopal episodes.     Functional capacity is at baseline    Past Medical History:   Diagnosis Date    Arrhythmia     CAD in native artery 43/59/2460    Diastolic heart failure ([address]     Diverticulitis 08/03/2018    H/O cardiovascular stress test 05/05/2021    Lexiscan    HIGH CHOLESTEROL     History of atrial fibrillation 08/2018    with episode of diverticulosis    History of stress test 08/07/2018    Lexiscan stress test    Hypertension     Thyroid disease          Past Surgical History:   Procedure Laterality Date    COLONOSCOPY      COLONOSCOPY N/A 4/8/2019    COLONOSCOPY performed by Lary Orlando MD at [facility] CATH LAB PROCEDURE  08/09/2018    NERVE BLOCK  12 27 2011   [address] BLOCK  1/24/12    lumbar epidural    NERVE BLOCK  02/21/12    lumbar epidural with flouro    [place] COLONOSCOPY FLX DX W/COLLJ SPEC WHEN PFRMD N/A 10/4/2018    COLONOSCOPY performed by Lary Orlando MD at [facility], PARTIAL, Roshan Roger N/A 11/13/2018    LAPAROSCOPIC ROBOT XI ASSISTED SIGMOID COLON RESECTION WITH OSTOMY performed by Lary Orlando MD at [address] N/A 4/9/2019    COLOSTOMY CLOSURE LAPAROSCOPIC ROBOTIC XI performed by Lary Orlando MD at [address] [facility] SMALL INTESTINE SURGERY N/A 8/13/2020    DIAGNOSTIC LAPAROSCOPIY POSS BOWEL RESECTION performed by Faviola Orantes MD at [address]         Current Outpatient Medications   Medication Sig Dispense Refill    alendronate (FOSAMAX) 70 MG tablet Take 70 mg by mouth daily      metoprolol succinate (TOPROL XL) 50 MG extended release tablet Take 1 tablet by mouth daily 30 tablet 3    SYNTHROID 125 MCG tablet Take 125 mcg by mouth Daily Take 1 tablet daily      Calcium Carbonate-Vit D-Min (CALCIUM 1200 PO) Take 1 capsule by mouth      docusate sodium (COLACE) 100 MG capsule Take 100 mg by mouth nightly       Echinacea 125 MG CAPS Take 125 mg by mouth daily      aspirin EC 81 MG EC tablet Take 1 tablet by mouth daily 30 tablet 3    pantoprazole (PROTONIX) 40 MG tablet Take 1 tablet by mouth daily 30 tablet 3    apixaban (ELIQUIS) 5 MG TABS tablet Take 1 tablet by mouth 2 times daily 60 tablet 0    ZINC PO Take 1 tablet by mouth daily      Omega-3 Fatty Acids (FISH OIL) 1000 MG CAPS Take 1,000 mg by mouth 2 times daily       lisinopril (PRINIVIL;ZESTRIL) 10 MG tablet Take 10 mg by mouth daily.  rosuvastatin (CRESTOR) 10 MG tablet Take 10 mg by mouth daily. No current facility-administered medications for this visit.          Allergies as of 07/30/2021    (No Known Allergies)       Social History     Socioeconomic History    Marital status:      Spouse name: Not on file    Number of children: Not on file    Years of education: Not on file    Highest education level: Not on file   Occupational History    Not on file   Tobacco Use    Smoking status: Current Every Day Smoker     Packs/day: 0.50     Types: Cigarettes    Smokeless tobacco: Never Used   Vaping Use    Vaping Use: Never used   Substance and Sexual Activity    Alcohol use: No     Comment: 5 cups of coffee decaf/regular    Drug use: No    Sexual activity: Not on file   Other Topics Concern    Not on file   Social History Narrative    Not on file     Social Determinants of Health     Financial Resource Strain:     Difficulty of Paying Living Expenses:    Food Insecurity:     Worried About Running Out of Food in the Last Year:     [address] in the Last Year:    Transportation Needs:     Lack of Transportation (Medical):      Lack of Transportation (Non-Medical):    Physical Activity:     Days of Exercise per Week:     Minutes of Exercise per Session:    Stress:     Feeling of Stress :    Social Connections:     Frequency of Communication with Friends and Family:     Frequency of Social Gatherings with Friends and Family:     Attends Sikhism Services:     Active Member of Clubs or Organizations:     Attends Club or Organization Meetings:     Marital Status:    Intimate Partner Violence:     Fear of Current or Ex-Partner:     Emotionally Abused:     Physically Abused:     Sexually Abused:        Family History   Problem Relation Age of Onset    Cancer Other     Heart Disease Other     Cancer Mother         brain    Breast Cancer Mother     Heart Attack Father         age 48     Heart Attack Sister         age 46     Colon Cancer Sister        REVIEW OF SYSTEMS:     CONSTITUTIONAL:  negative for  fevers, chills, sweats and fatigue  HEENT:  negative for  tinnitus, earaches, nasal congestion and epistaxis  RESPIRATORY:  negative for  dry cough, cough with sputum, wheezing and hemoptysis  GASTROINTESTINAL:  negative for nausea, vomiting, diarrhea, constipation, pruritus and jaundice  HEMATOLOGIC/LYMPHATIC:  negative for easy bruising, bleeding, lymphadenopathy and petechiae  ENDOCRINE:  negative for heat intolerance, cold intolerance, tremor, hair loss and diabetic symptoms including neither polyuria nor polydipsia nor blurred vision  MUSCULOSKELETAL:  negative for  myalgias, arthralgias, joint swelling, stiff joints and decreased range of motion  NEUROLOGICAL:  negative for memory problems, speech problems, visual disturbance, dysphagia, weakness and numbness      PHYSICAL EXAM:   CONSTITUTIONAL:  awake, alert, cooperative, no apparent distress, and appears stated age  HEENT:  Moist and pink mucous membranes, normocephalic, without obvious abnormality, atraumatic  NECK:  Supple, symmetrical, trachea midline, no adenopathy, thyroid symmetric, not enlarged and no tenderness, skin normal  LUNGS:  No increased work of breathing, good air exchange, clear to auscultation bilaterally, no crackles or wheezing  CARDIOVASCULAR:  Normal apical impulse, regular rate and rhythm, normal S1 and S2, no S3 or S4, 2 to 3/6 systolic ejection murmur at the right upper central border, no pedal edema, no carotid bruit, no JVD, no pulsating masses. ABDOMEN:  soft, nontender, no hepatomegaly, no splenomegaly, bowel sounds positive. MUSCULOSKELETAL:  No clubbing, no cyanosis, no pedal edema,there is no redness, warmth, or swelling of the joints, full range of motion noted. NEUROLOGIC:  Alert, awake, oriented  3.    /84 (Site: Right Upper Arm, Position: Sitting, Cuff Size: Medium Adult)   Pulse 60   Ht 5' 3\" (1.6 m)   Wt 132 lb (59.9 kg)   BMI 23.38 kg/m²     DATA:   I personally reviewed the visit EKG with the following interpretation: Sinus rhythm    EKG - 6/9/21 Atrial fibrillation with rapid ventricular response  ST depression, consider subendocardial injury  Nonspecific T wave abnormality  Abnormal ECG  When compared with ECG of 04-MAY-2021 21:05,  Atrial fibrillation has replaced Sinus rhythm  Vent. rate has increased BY  67 BPM  ST now depressed in Anterolateral leads  Nonspecific T wave abnormality now evident in Inferior leads  Nonspecific T wave abnormality now evident in Lateral leads    ECHO: 6/9/21 Summary   Normal left ventricular chamber size. Normal left ventricular systolic function, LVEF is 65%. Indeterminate LV diastolic function. Left atrium is normal size.    Interatrial septum not well visualized but appears intact. No thrombus in left atrium. Normal right ventricle size and function. There is trace mitral regurgitation. No mitral valve prolapse. The aortic valve appears mildly sclerotic. No hemodynamically significant aortic stenosis - mean gradient 6mmHg, DLI   0.57   There is trace tricuspid regurgitation, RVSP 23mmHg. Normal aortic root size. No evidence of pericardial effusion. No intra cardiac mass or thrombus. Compared to prior echo from 8/6/2018. Stress Test: 5/5/21   1. Moderate-sized reversible inferior perfusion defect (mild defect). 2. Gated SPECT left ventricular ejection fraction was calculated to be   81% with normal myocardial wall motion       Angiography: 8/9/18 IMPRESSION:  1. 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 2.  Significantly tortuous and overlapping left coronary arteries. 3.  Mild disease involving a second diagonal and first OM branch.     Cardiology Labs: BMP:    Lab Results   Component Value Date     07/11/2021    K 3.5 07/11/2021    K 3.7 08/15/2020     07/11/2021    CO2 24 07/11/2021    BUN 9 07/11/2021    CREATININE 0.6 07/11/2021     CMP:    Lab Results   Component Value Date     07/11/2021    K 3.5 07/11/2021    K 3.7 08/15/2020     07/11/2021    CO2 24 07/11/2021    BUN 9 07/11/2021    CREATININE 0.6 07/11/2021    PROT 6.6 07/11/2021     CBC:    Lab Results   Component Value Date    WBC 10.7 07/11/2021    RBC 4.64 07/11/2021    HGB 14.8 07/11/2021    HCT 44.1 07/11/2021    MCV 95.0 07/11/2021    RDW 12.5 07/11/2021     07/11/2021     PT/INR:  No results found for: PTINR  PT/INR Warfarin:  No components found for: PTPATWAR, PTINRWAR  PTT:  No results found for: APTT  PTT Heparin:  No components found for: APTTHEP  Magnesium:    Lab Results   Component Value Date    MG 2.0 07/11/2021     TSH:    Lab Results   Component Value Date    TSH 3.520 07/11/2021 TROPONIN:  No components found for: TROP  BNP:  No results found for: BNP  FASTING LIPID PANEL:    Lab Results   Component Value Date    CHOL 135 06/10/2021    HDL 38 06/10/2021    TRIG 111 06/10/2021     No orders to display     I have personally reviewed the laboratory, cardiac diagnostic and radiographic testing as outlined above:      IMPRESSION:  1: CAD: Patient had cardiac catheterization on(8/9/2018) which revealed:  #Chronic total occlusion of the right coronary artery with a grade 3 left to right collaterals. #Significantly tortuous and overlapping left coronary arteries. #Mild disease involving a second diagonal and first OM branch. Will continue medical therapy            2: Paroxysmal atrial fibrillation: Was admitted to the hospital in June 2021 with A. fib with RVR, now in sinus rhythm, will continue anticoagulation with Eliquis             3: Nonrheumatic aortic (valve) stenosis: Mild           4:  Long term (current) use of anticoagulants           5: Hypertension: Controlled              6: Tobacco use :  Patient was counseled regarding smoking cessation              RECOMMENDATIONS:   1. Continue current treatment  2. Preventive Cardiology: low salt, low cholesterol diet, daily exercise, cholesterol goal of total cholesterol less than 200, LDL less than 70, smoking cessation were all advised. 3.increased risk of bleeding, symptoms and signs of bleeding discussed with patient, patient was advised to seek medical attention at the earliest symptoms or signs of bleeding. 4.  Follow-up with Dr. Jenny Mcgee as scheduled  5. Follow-up with Dr. Re Hill in 1 year, sooner if symptomatic for any reason    I have reviewed my findings and recommendations with patient    Electronically signed by Annette Aguilar MD on 8/5/2021 at 8:08 PM  NOTE: This report was transcribed using voice recognition software.  Every effort was made to ensure accuracy; however, inadvertent computerized transcription errors may be present

## 2021-07-30 ENCOUNTER — OFFICE VISIT (OUTPATIENT)
Dept: CARDIOLOGY CLINIC | Age: 71
End: 2021-07-30
Payer: COMMERCIAL

## 2021-07-30 VITALS
BODY MASS INDEX: 23.39 KG/M2 | DIASTOLIC BLOOD PRESSURE: 84 MMHG | SYSTOLIC BLOOD PRESSURE: 122 MMHG | HEART RATE: 60 BPM | HEIGHT: 63 IN | WEIGHT: 132 LBS

## 2021-07-30 DIAGNOSIS — I48.91 ATRIAL FIBRILLATION, UNSPECIFIED TYPE (HCC): Primary | ICD-10-CM

## 2021-07-30 PROCEDURE — 93000 ELECTROCARDIOGRAM COMPLETE: CPT | Performed by: INTERNAL MEDICINE

## 2021-07-30 PROCEDURE — 99214 OFFICE O/P EST MOD 30 MIN: CPT | Performed by: INTERNAL MEDICINE

## 2021-08-02 ENCOUNTER — HOSPITAL ENCOUNTER (OUTPATIENT)
Dept: CT IMAGING | Age: 71
Discharge: HOME | End: 2021-08-04
Payer: COMMERCIAL

## 2021-08-02 DIAGNOSIS — I48.91 ATRIAL FIBRILLATION, UNSPECIFIED TYPE (HCC): ICD-10-CM

## 2021-08-02 PROCEDURE — 6360000004 HC RX CONTRAST MEDICATION: Performed by: RADIOLOGY

## 2021-08-02 PROCEDURE — 75572 CT HRT W/3D IMAGE: CPT

## 2021-08-02 PROCEDURE — 2580000003 HC RX 258: Performed by: RADIOLOGY

## 2021-08-02 RX ORDER — SODIUM CHLORIDE 0.9 % (FLUSH) 0.9 %
10 SYRINGE (ML) INJECTION ONCE
Status: COMPLETED | OUTPATIENT
Start: 2021-08-02 | End: 2021-08-02

## 2021-08-02 RX ADMIN — Medication 10 ML: at 13:49

## 2021-08-02 RX ADMIN — IOPAMIDOL 100 ML: 755 INJECTION, SOLUTION INTRAVENOUS at 13:49

## 2021-08-09 ENCOUNTER — TELEPHONE (OUTPATIENT)
Dept: NON INVASIVE DIAGNOSTICS | Age: 71
End: 2021-08-09

## 2021-08-09 NOTE — TELEPHONE ENCOUNTER
----- Message from Dhiraj Lynch MD sent at 8/8/2021  9:43 PM EDT -----  Please let her know that CTA looked good. Will proceed with the plan.  Thanks.  ----- Message -----  From: Debra Hansen Incoming Radiant Results From [facility]  Sent: 8/7/2021   9:44 PM EDT  To: Dhiraj Lynch MD

## 2021-08-31 ENCOUNTER — TELEPHONE (OUTPATIENT)
Dept: NON INVASIVE DIAGNOSTICS | Age: 71
End: 2021-08-31

## 2021-09-01 ENCOUNTER — ANESTHESIA EVENT (OUTPATIENT)
Dept: NON INVASIVE DIAGNOSTICS | Age: 71
End: 2021-09-01

## 2021-09-02 ENCOUNTER — ANESTHESIA (OUTPATIENT)
Dept: NON INVASIVE DIAGNOSTICS | Age: 71
End: 2021-09-02

## 2021-09-02 ENCOUNTER — HOSPITAL ENCOUNTER (OUTPATIENT)
Dept: GENERAL RADIOLOGY | Age: 71
Discharge: HOME | End: 2021-09-04
Payer: COMMERCIAL

## 2021-09-02 ENCOUNTER — HOSPITAL ENCOUNTER (OUTPATIENT)
Dept: NON INVASIVE DIAGNOSTICS | Age: 71
Setting detail: OBSERVATION
Discharge: HOME | End: 2021-09-03
Attending: INTERNAL MEDICINE | Admitting: INTERNAL MEDICINE
Payer: COMMERCIAL

## 2021-09-02 VITALS
DIASTOLIC BLOOD PRESSURE: 103 MMHG | OXYGEN SATURATION: 100 % | SYSTOLIC BLOOD PRESSURE: 142 MMHG | TEMPERATURE: 95.2 F | RESPIRATION RATE: 1 BRPM

## 2021-09-02 DIAGNOSIS — I48.0 PAROXYSMAL ATRIAL FIBRILLATION (HCC): ICD-10-CM

## 2021-09-02 LAB
ABO/RH: NORMAL
ACTIVATED CLOTTING TIME: 118 SECONDS (ref 99–130)
ACTIVATED CLOTTING TIME: 247 SECONDS (ref 99–130)
ACTIVATED CLOTTING TIME: 305 SECONDS (ref 99–130)
ACTIVATED CLOTTING TIME: 346 SECONDS (ref 99–130)
ACTIVATED CLOTTING TIME: 374 SECONDS (ref 99–130)
ACTIVATED CLOTTING TIME: 391 SECONDS (ref 99–130)
ANION GAP SERPL CALCULATED.3IONS-SCNC: 10 MMOL/L (ref 7–16)
ANTIBODY SCREEN: NORMAL
BUN BLDV-MCNC: 8 MG/DL (ref 6–23)
CALCIUM SERPL-MCNC: 9.2 MG/DL (ref 8.6–10.2)
CHLORIDE BLD-SCNC: 105 MMOL/L (ref 98–107)
CO2: 29 MMOL/L (ref 22–29)
CREAT SERPL-MCNC: 0.6 MG/DL (ref 0.5–1)
EKG ATRIAL RATE: 65 BPM
EKG P AXIS: 77 DEGREES
EKG P-R INTERVAL: 142 MS
EKG Q-T INTERVAL: 444 MS
EKG QRS DURATION: 88 MS
EKG QTC CALCULATION (BAZETT): 461 MS
EKG R AXIS: 80 DEGREES
EKG T AXIS: 71 DEGREES
EKG VENTRICULAR RATE: 65 BPM
GFR AFRICAN AMERICAN: >60
GFR NON-AFRICAN AMERICAN: >60 ML/MIN/1.73
GLUCOSE BLD-MCNC: 81 MG/DL (ref 74–99)
HCT VFR BLD CALC: 46.5 % (ref 34–48)
HEMOGLOBIN: 15.6 G/DL (ref 11.5–15.5)
MAGNESIUM: 2.1 MG/DL (ref 1.6–2.6)
MCH RBC QN AUTO: 31.3 PG (ref 26–35)
MCHC RBC AUTO-ENTMCNC: 33.5 % (ref 32–34.5)
MCV RBC AUTO: 93.2 FL (ref 80–99.9)
PDW BLD-RTO: 12.5 FL (ref 11.5–15)
PLATELET # BLD: 240 E9/L (ref 130–450)
PMV BLD AUTO: 10.6 FL (ref 7–12)
POTASSIUM SERPL-SCNC: 3.8 MMOL/L (ref 3.5–5)
RBC # BLD: 4.99 E12/L (ref 3.5–5.5)
SODIUM BLD-SCNC: 144 MMOL/L (ref 132–146)
WBC # BLD: 11.7 E9/L (ref 4.5–11.5)

## 2021-09-02 PROCEDURE — 85347 COAGULATION TIME ACTIVATED: CPT

## 2021-09-02 PROCEDURE — 6370000000 HC RX 637 (ALT 250 FOR IP): Performed by: INTERNAL MEDICINE

## 2021-09-02 PROCEDURE — 99220 PR INITIAL OBSERVATION CARE/DAY 70 MINUTES: CPT | Performed by: INTERNAL MEDICINE

## 2021-09-02 PROCEDURE — C1894 INTRO/SHEATH, NON-LASER: HCPCS

## 2021-09-02 PROCEDURE — C1733 CATH, EP, OTHR THAN COOL-TIP: HCPCS

## 2021-09-02 PROCEDURE — G0379 DIRECT REFER HOSPITAL OBSERV: HCPCS

## 2021-09-02 PROCEDURE — 3700000000 HC ANESTHESIA ATTENDED CARE

## 2021-09-02 PROCEDURE — C1893 INTRO/SHEATH, FIXED,NON-PEEL: HCPCS

## 2021-09-02 PROCEDURE — 2500000003 HC RX 250 WO HCPCS: Performed by: NURSE ANESTHETIST, CERTIFIED REGISTERED

## 2021-09-02 PROCEDURE — 6370000000 HC RX 637 (ALT 250 FOR IP): Performed by: STUDENT IN AN ORGANIZED HEALTH CARE EDUCATION/TRAINING PROGRAM

## 2021-09-02 PROCEDURE — 86901 BLOOD TYPING SEROLOGIC RH(D): CPT

## 2021-09-02 PROCEDURE — C1769 GUIDE WIRE: HCPCS

## 2021-09-02 PROCEDURE — 93325 DOPPLER ECHO COLOR FLOW MAPG: CPT

## 2021-09-02 PROCEDURE — 83735 ASSAY OF MAGNESIUM: CPT

## 2021-09-02 PROCEDURE — 2500000003 HC RX 250 WO HCPCS

## 2021-09-02 PROCEDURE — 7100000000 HC PACU RECOVERY - FIRST 15 MIN

## 2021-09-02 PROCEDURE — 86850 RBC ANTIBODY SCREEN: CPT

## 2021-09-02 PROCEDURE — 93656 COMPRE EP EVAL ABLTJ ATR FIB: CPT | Performed by: INTERNAL MEDICINE

## 2021-09-02 PROCEDURE — 93005 ELECTROCARDIOGRAM TRACING: CPT | Performed by: INTERNAL MEDICINE

## 2021-09-02 PROCEDURE — 2580000003 HC RX 258: Performed by: INTERNAL MEDICINE

## 2021-09-02 PROCEDURE — 3700000001 HC ADD 15 MINUTES (ANESTHESIA)

## 2021-09-02 PROCEDURE — 93613 INTRACARDIAC EPHYS 3D MAPG: CPT | Performed by: INTERNAL MEDICINE

## 2021-09-02 PROCEDURE — 80048 BASIC METABOLIC PNL TOTAL CA: CPT

## 2021-09-02 PROCEDURE — G0378 HOSPITAL OBSERVATION PER HR: HCPCS

## 2021-09-02 PROCEDURE — C1730 CATH, EP, 19 OR FEW ELECT: HCPCS

## 2021-09-02 PROCEDURE — C1732 CATH, EP, DIAG/ABL, 3D/VECT: HCPCS

## 2021-09-02 PROCEDURE — C1766 INTRO/SHEATH,STRBLE,NON-PEEL: HCPCS

## 2021-09-02 PROCEDURE — 93662 INTRACARDIAC ECG (ICE): CPT | Performed by: INTERNAL MEDICINE

## 2021-09-02 PROCEDURE — 2580000003 HC RX 258

## 2021-09-02 PROCEDURE — 93312 ECHO TRANSESOPHAGEAL: CPT

## 2021-09-02 PROCEDURE — C1759 CATH, INTRA ECHOCARDIOGRAPHY: HCPCS

## 2021-09-02 PROCEDURE — 86900 BLOOD TYPING SEROLOGIC ABO: CPT

## 2021-09-02 PROCEDURE — 71045 X-RAY EXAM CHEST 1 VIEW: CPT

## 2021-09-02 PROCEDURE — 85027 COMPLETE CBC AUTOMATED: CPT

## 2021-09-02 PROCEDURE — 6360000002 HC RX W HCPCS

## 2021-09-02 PROCEDURE — 93005 ELECTROCARDIOGRAM TRACING: CPT | Performed by: STUDENT IN AN ORGANIZED HEALTH CARE EDUCATION/TRAINING PROGRAM

## 2021-09-02 PROCEDURE — 6360000002 HC RX W HCPCS: Performed by: NURSE ANESTHETIST, CERTIFIED REGISTERED

## 2021-09-02 PROCEDURE — 7100000001 HC PACU RECOVERY - ADDTL 15 MIN

## 2021-09-02 PROCEDURE — 93321 DOPPLER ECHO F-UP/LMTD STD: CPT

## 2021-09-02 PROCEDURE — 36415 COLL VENOUS BLD VENIPUNCTURE: CPT

## 2021-09-02 PROCEDURE — 2709999900 HC NON-CHARGEABLE SUPPLY

## 2021-09-02 RX ORDER — MORPHINE SULFATE 2 MG/ML
1 INJECTION, SOLUTION INTRAMUSCULAR; INTRAVENOUS EVERY 5 MIN PRN
Status: DISCONTINUED | OUTPATIENT
Start: 2021-09-02 | End: 2021-09-02

## 2021-09-02 RX ORDER — GLYCOPYRROLATE 1 MG/5 ML
SYRINGE (ML) INTRAVENOUS PRN
Status: DISCONTINUED | OUTPATIENT
Start: 2021-09-02 | End: 2021-09-02 | Stop reason: SDUPTHER

## 2021-09-02 RX ORDER — CALCIUM CHLORIDE 100 MG/ML
INJECTION INTRAVENOUS; INTRAVENTRICULAR PRN
Status: DISCONTINUED | OUTPATIENT
Start: 2021-09-02 | End: 2021-09-02 | Stop reason: SDUPTHER

## 2021-09-02 RX ORDER — PANTOPRAZOLE SODIUM 40 MG/1
40 TABLET, DELAYED RELEASE ORAL
Status: DISCONTINUED | OUTPATIENT
Start: 2021-09-02 | End: 2021-09-03 | Stop reason: HOSPADM

## 2021-09-02 RX ORDER — OYSTER SHELL CALCIUM WITH VITAMIN D 500; 200 MG/1; [IU]/1
2 TABLET, FILM COATED ORAL DAILY
Status: DISCONTINUED | OUTPATIENT
Start: 2021-09-02 | End: 2021-09-03 | Stop reason: HOSPADM

## 2021-09-02 RX ORDER — SODIUM CHLORIDE 9 MG/ML
25 INJECTION, SOLUTION INTRAVENOUS PRN
Status: DISCONTINUED | OUTPATIENT
Start: 2021-09-02 | End: 2021-09-03 | Stop reason: HOSPADM

## 2021-09-02 RX ORDER — SODIUM CHLORIDE 9 MG/ML
INJECTION, SOLUTION INTRAVENOUS CONTINUOUS PRN
Status: DISCONTINUED | OUTPATIENT
Start: 2021-09-02 | End: 2021-09-02 | Stop reason: SDUPTHER

## 2021-09-02 RX ORDER — ACETAMINOPHEN 325 MG/1
650 TABLET ORAL EVERY 4 HOURS PRN
Status: DISCONTINUED | OUTPATIENT
Start: 2021-09-02 | End: 2021-09-03 | Stop reason: HOSPADM

## 2021-09-02 RX ORDER — PROPOFOL 10 MG/ML
INJECTION, EMULSION INTRAVENOUS PRN
Status: DISCONTINUED | OUTPATIENT
Start: 2021-09-02 | End: 2021-09-02 | Stop reason: SDUPTHER

## 2021-09-02 RX ORDER — METOPROLOL SUCCINATE 50 MG/1
50 TABLET, EXTENDED RELEASE ORAL DAILY
Status: DISCONTINUED | OUTPATIENT
Start: 2021-09-02 | End: 2021-09-03 | Stop reason: HOSPADM

## 2021-09-02 RX ORDER — SODIUM CHLORIDE 0.9 % (FLUSH) 0.9 %
5-40 SYRINGE (ML) INJECTION PRN
Status: DISCONTINUED | OUTPATIENT
Start: 2021-09-02 | End: 2021-09-03 | Stop reason: HOSPADM

## 2021-09-02 RX ORDER — LISINOPRIL 10 MG/1
10 TABLET ORAL DAILY
Status: DISCONTINUED | OUTPATIENT
Start: 2021-09-02 | End: 2021-09-03 | Stop reason: HOSPADM

## 2021-09-02 RX ORDER — FENTANYL CITRATE 50 UG/ML
50 INJECTION, SOLUTION INTRAMUSCULAR; INTRAVENOUS EVERY 5 MIN PRN
Status: DISCONTINUED | OUTPATIENT
Start: 2021-09-02 | End: 2021-09-02

## 2021-09-02 RX ORDER — CHLORAL HYDRATE 500 MG
1000 CAPSULE ORAL 2 TIMES DAILY
Status: DISCONTINUED | OUTPATIENT
Start: 2021-09-02 | End: 2021-09-02 | Stop reason: CLARIF

## 2021-09-02 RX ORDER — ROSUVASTATIN CALCIUM 10 MG/1
10 TABLET, COATED ORAL NIGHTLY
Status: DISCONTINUED | OUTPATIENT
Start: 2021-09-02 | End: 2021-09-03 | Stop reason: HOSPADM

## 2021-09-02 RX ORDER — LIDOCAINE HYDROCHLORIDE 20 MG/ML
INJECTION, SOLUTION INTRAVENOUS PRN
Status: DISCONTINUED | OUTPATIENT
Start: 2021-09-02 | End: 2021-09-02 | Stop reason: SDUPTHER

## 2021-09-02 RX ORDER — PROTAMINE SULFATE 10 MG/ML
INJECTION, SOLUTION INTRAVENOUS PRN
Status: DISCONTINUED | OUTPATIENT
Start: 2021-09-02 | End: 2021-09-02 | Stop reason: SDUPTHER

## 2021-09-02 RX ORDER — FENTANYL CITRATE 50 UG/ML
25 INJECTION, SOLUTION INTRAMUSCULAR; INTRAVENOUS EVERY 5 MIN PRN
Status: DISCONTINUED | OUTPATIENT
Start: 2021-09-02 | End: 2021-09-02

## 2021-09-02 RX ORDER — MORPHINE SULFATE 2 MG/ML
2 INJECTION, SOLUTION INTRAMUSCULAR; INTRAVENOUS EVERY 5 MIN PRN
Status: DISCONTINUED | OUTPATIENT
Start: 2021-09-02 | End: 2021-09-02

## 2021-09-02 RX ORDER — ONDANSETRON 2 MG/ML
4 INJECTION INTRAMUSCULAR; INTRAVENOUS
Status: DISCONTINUED | OUTPATIENT
Start: 2021-09-02 | End: 2021-09-02

## 2021-09-02 RX ORDER — FENTANYL CITRATE 50 UG/ML
INJECTION, SOLUTION INTRAMUSCULAR; INTRAVENOUS PRN
Status: DISCONTINUED | OUTPATIENT
Start: 2021-09-02 | End: 2021-09-02 | Stop reason: SDUPTHER

## 2021-09-02 RX ORDER — PANTOPRAZOLE SODIUM 40 MG/1
40 TABLET, DELAYED RELEASE ORAL DAILY
Status: DISCONTINUED | OUTPATIENT
Start: 2021-09-02 | End: 2021-09-02

## 2021-09-02 RX ORDER — LEVOTHYROXINE SODIUM 0.12 MG/1
125 TABLET ORAL DAILY
Status: DISCONTINUED | OUTPATIENT
Start: 2021-09-02 | End: 2021-09-03 | Stop reason: HOSPADM

## 2021-09-02 RX ORDER — HEPARIN SODIUM 1000 [USP'U]/ML
INJECTION, SOLUTION INTRAVENOUS; SUBCUTANEOUS PRN
Status: DISCONTINUED | OUTPATIENT
Start: 2021-09-02 | End: 2021-09-02 | Stop reason: SDUPTHER

## 2021-09-02 RX ORDER — DOCUSATE SODIUM 100 MG/1
100 CAPSULE, LIQUID FILLED ORAL NIGHTLY
Status: DISCONTINUED | OUTPATIENT
Start: 2021-09-02 | End: 2021-09-03 | Stop reason: HOSPADM

## 2021-09-02 RX ORDER — ONDANSETRON 2 MG/ML
INJECTION INTRAMUSCULAR; INTRAVENOUS PRN
Status: DISCONTINUED | OUTPATIENT
Start: 2021-09-02 | End: 2021-09-02 | Stop reason: SDUPTHER

## 2021-09-02 RX ORDER — PHENYLEPHRINE HCL IN 0.9% NACL 1 MG/10 ML
SYRINGE (ML) INTRAVENOUS PRN
Status: DISCONTINUED | OUTPATIENT
Start: 2021-09-02 | End: 2021-09-02 | Stop reason: SDUPTHER

## 2021-09-02 RX ORDER — METHYLDOPA/HYDROCHLOROTHIAZIDE 250MG-25MG
125 TABLET ORAL DAILY
Status: DISCONTINUED | OUTPATIENT
Start: 2021-09-02 | End: 2021-09-02 | Stop reason: CLARIF

## 2021-09-02 RX ORDER — SUCCINYLCHOLINE/SOD CL,ISO/PF 200MG/10ML
SYRINGE (ML) INTRAVENOUS PRN
Status: DISCONTINUED | OUTPATIENT
Start: 2021-09-02 | End: 2021-09-02 | Stop reason: SDUPTHER

## 2021-09-02 RX ORDER — OXYCODONE HYDROCHLORIDE AND ACETAMINOPHEN 5; 325 MG/1; MG/1
1 TABLET ORAL
Status: DISCONTINUED | OUTPATIENT
Start: 2021-09-02 | End: 2021-09-02

## 2021-09-02 RX ORDER — DEXAMETHASONE SODIUM PHOSPHATE 10 MG/ML
INJECTION INTRAMUSCULAR; INTRAVENOUS PRN
Status: DISCONTINUED | OUTPATIENT
Start: 2021-09-02 | End: 2021-09-02 | Stop reason: SDUPTHER

## 2021-09-02 RX ORDER — SODIUM CHLORIDE 0.9 % (FLUSH) 0.9 %
5-40 SYRINGE (ML) INJECTION EVERY 12 HOURS SCHEDULED
Status: DISCONTINUED | OUTPATIENT
Start: 2021-09-02 | End: 2021-09-03 | Stop reason: HOSPADM

## 2021-09-02 RX ORDER — ASPIRIN 81 MG/1
81 TABLET ORAL DAILY
Status: DISCONTINUED | OUTPATIENT
Start: 2021-09-02 | End: 2021-09-03 | Stop reason: HOSPADM

## 2021-09-02 RX ORDER — ALENDRONATE SODIUM 70 MG/1
70 TABLET ORAL DAILY
Status: DISCONTINUED | OUTPATIENT
Start: 2021-09-02 | End: 2021-09-02 | Stop reason: CLARIF

## 2021-09-02 RX ORDER — MEPERIDINE HYDROCHLORIDE 25 MG/ML
12.5 INJECTION INTRAMUSCULAR; INTRAVENOUS; SUBCUTANEOUS EVERY 5 MIN PRN
Status: DISCONTINUED | OUTPATIENT
Start: 2021-09-02 | End: 2021-09-02

## 2021-09-02 RX ORDER — MIDAZOLAM HYDROCHLORIDE 1 MG/ML
INJECTION INTRAMUSCULAR; INTRAVENOUS PRN
Status: DISCONTINUED | OUTPATIENT
Start: 2021-09-02 | End: 2021-09-02 | Stop reason: SDUPTHER

## 2021-09-02 RX ADMIN — PHENYLEPHRINE HYDROCHLORIDE 100 MCG/MIN: 10 INJECTION, SOLUTION INTRAMUSCULAR; INTRAVENOUS; SUBCUTANEOUS at 10:59

## 2021-09-02 RX ADMIN — FENTANYL CITRATE 25 MCG: 50 INJECTION INTRAMUSCULAR; INTRAVENOUS at 10:40

## 2021-09-02 RX ADMIN — HEPARIN SODIUM 3000 UNITS: 1000 INJECTION INTRAVENOUS; SUBCUTANEOUS at 11:25

## 2021-09-02 RX ADMIN — DOCUSATE SODIUM 100 MG: 100 CAPSULE, LIQUID FILLED ORAL at 21:41

## 2021-09-02 RX ADMIN — SODIUM CHLORIDE, PRESERVATIVE FREE 10 ML: 5 INJECTION INTRAVENOUS at 21:41

## 2021-09-02 RX ADMIN — PROPOFOL 80 MG: 10 INJECTION, EMULSION INTRAVENOUS at 09:28

## 2021-09-02 RX ADMIN — FENTANYL CITRATE 50 MCG: 50 INJECTION INTRAMUSCULAR; INTRAVENOUS at 09:29

## 2021-09-02 RX ADMIN — SODIUM CHLORIDE: 9 INJECTION, SOLUTION INTRAVENOUS at 12:42

## 2021-09-02 RX ADMIN — PROTAMINE SULFATE 50 MG: 10 INJECTION, SOLUTION INTRAVENOUS at 12:30

## 2021-09-02 RX ADMIN — CALCIUM CHLORIDE 0.2 G: 100 INJECTION, SOLUTION INTRAVENOUS at 12:30

## 2021-09-02 RX ADMIN — ONDANSETRON 4 MG: 2 INJECTION, SOLUTION INTRAMUSCULAR; INTRAVENOUS at 12:40

## 2021-09-02 RX ADMIN — HEPARIN SODIUM 5000 UNITS: 1000 INJECTION INTRAVENOUS; SUBCUTANEOUS at 10:46

## 2021-09-02 RX ADMIN — LIDOCAINE HYDROCHLORIDE 80 MG: 20 INJECTION, SOLUTION INTRAVENOUS at 09:29

## 2021-09-02 RX ADMIN — Medication 120 MG: at 09:29

## 2021-09-02 RX ADMIN — BENZOCAINE AND MENTHOL 1 LOZENGE: 15; 3.6 LOZENGE ORAL at 21:41

## 2021-09-02 RX ADMIN — APIXABAN 5 MG: 5 TABLET, FILM COATED ORAL at 21:41

## 2021-09-02 RX ADMIN — Medication 100 MCG: at 10:38

## 2021-09-02 RX ADMIN — HEPARIN SODIUM 8000 UNITS: 1000 INJECTION INTRAVENOUS; SUBCUTANEOUS at 10:36

## 2021-09-02 RX ADMIN — ROSUVASTATIN 10 MG: 10 TABLET, FILM COATED ORAL at 21:41

## 2021-09-02 RX ADMIN — FENTANYL CITRATE 25 MCG: 50 INJECTION INTRAMUSCULAR; INTRAVENOUS at 10:36

## 2021-09-02 RX ADMIN — Medication 100 MCG: at 10:15

## 2021-09-02 RX ADMIN — DEXAMETHASONE SODIUM PHOSPHATE 10 MG: 10 INJECTION INTRAMUSCULAR; INTRAVENOUS at 09:29

## 2021-09-02 RX ADMIN — SODIUM CHLORIDE: 9 INJECTION, SOLUTION INTRAVENOUS at 09:26

## 2021-09-02 RX ADMIN — Medication 0.1 MG: at 11:07

## 2021-09-02 RX ADMIN — MIDAZOLAM 2 MG: 1 INJECTION INTRAMUSCULAR; INTRAVENOUS at 09:29

## 2021-09-02 RX ADMIN — PROPOFOL 50 MG: 10 INJECTION, EMULSION INTRAVENOUS at 11:42

## 2021-09-02 ASSESSMENT — PULMONARY FUNCTION TESTS
PIF_VALUE: 10
PIF_VALUE: 10
PIF_VALUE: 23
PIF_VALUE: 1
PIF_VALUE: 20
PIF_VALUE: 0
PIF_VALUE: 1
PIF_VALUE: 10
PIF_VALUE: 19
PIF_VALUE: 15
PIF_VALUE: 15
PIF_VALUE: 1
PIF_VALUE: 15
PIF_VALUE: 13
PIF_VALUE: 1
PIF_VALUE: 14
PIF_VALUE: 23
PIF_VALUE: 22
PIF_VALUE: 13
PIF_VALUE: 15
PIF_VALUE: 15
PIF_VALUE: 19
PIF_VALUE: 10
PIF_VALUE: 18
PIF_VALUE: 1
PIF_VALUE: 1
PIF_VALUE: 20
PIF_VALUE: 19
PIF_VALUE: 13
PIF_VALUE: 10
PIF_VALUE: 10
PIF_VALUE: 13
PIF_VALUE: 10
PIF_VALUE: 15
PIF_VALUE: 20
PIF_VALUE: 1
PIF_VALUE: 15
PIF_VALUE: 13
PIF_VALUE: 15
PIF_VALUE: 20
PIF_VALUE: 15
PIF_VALUE: 10
PIF_VALUE: 11
PIF_VALUE: 10
PIF_VALUE: 10
PIF_VALUE: 15
PIF_VALUE: 20
PIF_VALUE: 13
PIF_VALUE: 15
PIF_VALUE: 10
PIF_VALUE: 13
PIF_VALUE: 15
PIF_VALUE: 19
PIF_VALUE: 15
PIF_VALUE: 13
PIF_VALUE: 20
PIF_VALUE: 15
PIF_VALUE: 17
PIF_VALUE: 24
PIF_VALUE: 13
PIF_VALUE: 20
PIF_VALUE: 12
PIF_VALUE: 13
PIF_VALUE: 21
PIF_VALUE: 10
PIF_VALUE: 10
PIF_VALUE: 15
PIF_VALUE: 10
PIF_VALUE: 19
PIF_VALUE: 15
PIF_VALUE: 10
PIF_VALUE: 18
PIF_VALUE: 19
PIF_VALUE: 10
PIF_VALUE: 10
PIF_VALUE: 25
PIF_VALUE: 11
PIF_VALUE: 21
PIF_VALUE: 10
PIF_VALUE: 1
PIF_VALUE: 13
PIF_VALUE: 15
PIF_VALUE: 15
PIF_VALUE: 4
PIF_VALUE: 13
PIF_VALUE: 13
PIF_VALUE: 19
PIF_VALUE: 13
PIF_VALUE: 19
PIF_VALUE: 1
PIF_VALUE: 11
PIF_VALUE: 10
PIF_VALUE: 15
PIF_VALUE: 13
PIF_VALUE: 13
PIF_VALUE: 1
PIF_VALUE: 13
PIF_VALUE: 15
PIF_VALUE: 13
PIF_VALUE: 13
PIF_VALUE: 20
PIF_VALUE: 10
PIF_VALUE: 29
PIF_VALUE: 21
PIF_VALUE: 18
PIF_VALUE: 25
PIF_VALUE: 21
PIF_VALUE: 13
PIF_VALUE: 15
PIF_VALUE: 13
PIF_VALUE: 15
PIF_VALUE: 15
PIF_VALUE: 10
PIF_VALUE: 15
PIF_VALUE: 41
PIF_VALUE: 19
PIF_VALUE: 19
PIF_VALUE: 15
PIF_VALUE: 13
PIF_VALUE: 15
PIF_VALUE: 15
PIF_VALUE: 20
PIF_VALUE: 15
PIF_VALUE: 13
PIF_VALUE: 15
PIF_VALUE: 18
PIF_VALUE: 2
PIF_VALUE: 15
PIF_VALUE: 10
PIF_VALUE: 10
PIF_VALUE: 36
PIF_VALUE: 2
PIF_VALUE: 1
PIF_VALUE: 13
PIF_VALUE: 10
PIF_VALUE: 22
PIF_VALUE: 22
PIF_VALUE: 13
PIF_VALUE: 15
PIF_VALUE: 10
PIF_VALUE: 21
PIF_VALUE: 15
PIF_VALUE: 21
PIF_VALUE: 15
PIF_VALUE: 18
PIF_VALUE: 10
PIF_VALUE: 14
PIF_VALUE: 20
PIF_VALUE: 20
PIF_VALUE: 14
PIF_VALUE: 2
PIF_VALUE: 15
PIF_VALUE: 15
PIF_VALUE: 1
PIF_VALUE: 15
PIF_VALUE: 13
PIF_VALUE: 15
PIF_VALUE: 19
PIF_VALUE: 0
PIF_VALUE: 20
PIF_VALUE: 15
PIF_VALUE: 13
PIF_VALUE: 13
PIF_VALUE: 21
PIF_VALUE: 20
PIF_VALUE: 2
PIF_VALUE: 13
PIF_VALUE: 10
PIF_VALUE: 15
PIF_VALUE: 10
PIF_VALUE: 15
PIF_VALUE: 19
PIF_VALUE: 13
PIF_VALUE: 17
PIF_VALUE: 13
PIF_VALUE: 13
PIF_VALUE: 15
PIF_VALUE: 13
PIF_VALUE: 15
PIF_VALUE: 10
PIF_VALUE: 1
PIF_VALUE: 15
PIF_VALUE: 15
PIF_VALUE: 14
PIF_VALUE: 19
PIF_VALUE: 15
PIF_VALUE: 13
PIF_VALUE: 18
PIF_VALUE: 20
PIF_VALUE: 13
PIF_VALUE: 22
PIF_VALUE: 15
PIF_VALUE: 13
PIF_VALUE: 15
PIF_VALUE: 15
PIF_VALUE: 20
PIF_VALUE: 14
PIF_VALUE: 13
PIF_VALUE: 21
PIF_VALUE: 15
PIF_VALUE: 10
PIF_VALUE: 15
PIF_VALUE: 18
PIF_VALUE: 15
PIF_VALUE: 24
PIF_VALUE: 23
PIF_VALUE: 15
PIF_VALUE: 10
PIF_VALUE: 10
PIF_VALUE: 21

## 2021-09-02 ASSESSMENT — PAIN SCALES - GENERAL
PAINLEVEL_OUTOF10: 0

## 2021-09-02 ASSESSMENT — LIFESTYLE VARIABLES: SMOKING_STATUS: 1

## 2021-09-02 NOTE — H&P
[address] [facility] Electrophysiology  History and Physical Examination  PATIENT: Julius Epstein  MEDICAL RECORD NUMBER: [id number]  DATE OF SERVICE:  9/2/2021  ELECTROPHYSIOLOGIST: Marc Gibbs MD  PCP: Janell Shrestha DO  CHIEF COMPLAINT: Paroxysmal atrial fibrillation and bradycardia    HPI: This is a 78 y.o. female with a history of   Patient Active Problem List   Diagnosis    Spinal stenosis    Degeneration of lumbosacral intervertebral disc    Lumbago    Colonic diverticular abscess    CAD in native artery    S/P colon resection    Diverticulitis of large intestine with perforation and abscess without bleeding    Pelvic abscess in female    Colostomy reversal    SBO (small bowel obstruction) ([address]    Bradycardia    Chest pain    Atrial fibrillation with rapid ventricular response ([address]   who presents to the hospital for elective cryballoon AF ablation for treatment of paroxysmal atrial fibrillation and sinus bradycardia. The patient underwent echocardiogram on 6/9/21 which showed  LV EF of 65% and normal LA size. She underwent 14 day cardiac monitor which showed  86 SVTs with the longest episode was 21.5 seconds. No PAF seen. Her symptoms correlated mostly with NSR with PACs. She states that she had to be seen in ED 3 times due to AF with RVR and last episode was 7/10/21 which rhythm converted spontaneously after IV Cardizem before she was discharged home. She presents today in NSR. The patient denies any chest pain, dyspnea, dizziness, syncope, orthopnea or paroxysmal nocturnal dyspnea. Discussed with the patient at great length regarding options of treatment including AAD therapy versus AF ablation. She states that she could not afford long term AAD therapy, Sotalol $38.35 and Tikosyn $130 for 30 day supply, and would like to move forward with AF ablation.      Patient Active Problem List    Diagnosis Date Noted    Atrial fibrillation with rapid ventricular response ([facility] 06/09/2021    Chest pain     Bradycardia 05/04/2021    SBO (small bowel obstruction) ([facility] 08/09/2020    Colostomy reversal 04/09/2019    Diverticulitis of large intestine with perforation and abscess without bleeding     Pelvic abscess in female     S/P colon resection 11/13/2018    CAD in native artery 08/09/2018    Colonic diverticular abscess 08/03/2018    Spinal stenosis 12/07/2011    Degeneration of lumbosacral intervertebral disc 12/07/2011    Lumbago 12/07/2011       Past Medical History:   Diagnosis Date    Arrhythmia     CAD in native artery 96/28/5518    Diastolic heart failure ([facility]     Diverticulitis 08/03/2018    H/O cardiovascular stress test 05/05/2021    Lexiscan    HIGH CHOLESTEROL     History of atrial fibrillation 08/2018    with episode of diverticulosis    History of stress test 08/07/2018    Lexiscan stress test    Hypertension     Thyroid disease        Family History   Problem Relation Age of Onset    Cancer Other     Heart Disease Other     Cancer Mother         brain    Breast Cancer Mother     Heart Attack Father         age 48     Heart Attack Sister         age 46     Colon Cancer Sister        Social History     Tobacco Use    Smoking status: Current Every Day Smoker     Packs/day: 0.50     Types: Cigarettes    Smokeless tobacco: Never Used   Substance Use Topics    Alcohol use: No     Comment: 5 cups of coffee decaf/regular       Current Outpatient Medications   Medication Sig Dispense Refill    alendronate (FOSAMAX) 70 MG tablet Take 70 mg by mouth daily      metoprolol succinate (TOPROL XL) 50 MG extended release tablet Take 1 tablet by mouth daily 30 tablet 3    SYNTHROID 125 MCG tablet Take 125 mcg by mouth Daily Take 1 tablet daily      Calcium Carbonate-Vit D-Min (CALCIUM 1200 PO) Take 1 capsule by mouth      docusate sodium (COLACE) 100 MG capsule Take 100 mg by mouth nightly       Echinacea 125 MG CAPS Take 125 mg by mouth daily      aspirin EC 81 MG EC tablet Take 1 tablet by mouth daily 30 tablet 3    pantoprazole (PROTONIX) 40 MG tablet Take 1 tablet by mouth daily 30 tablet 3    apixaban (ELIQUIS) 5 MG TABS tablet Take 1 tablet by mouth 2 times daily 60 tablet 0    ZINC PO Take 1 tablet by mouth daily      Omega-3 Fatty Acids (FISH OIL) 1000 MG CAPS Take 1,000 mg by mouth 2 times daily       lisinopril (PRINIVIL;ZESTRIL) 10 MG tablet Take 10 mg by mouth daily.  rosuvastatin (CRESTOR) 10 MG tablet Take 10 mg by mouth daily. No current facility-administered medications for this encounter. No Known Allergies    ROS:   Constitutional: Negative for fever, activity change and appetite change. HENT: Negative for epistaxis. Eyes: Negative for diploplia, blurred vision. Respiratory: Negative for cough, chest tightness, shortness of breath and wheezing. Cardiovascular: pertinent positives in HPI  Gastrointestinal: Negative for abdominal pain and blood in stool. All other review of systems are negative     PHYSICAL EXAM:   There were no vitals filed for this visit. Constitutional: Well-developed, no acute distress  Eyes: conjunctivae normal, no xanthelasma   Ears, Nose, Throat: oral mucosa moist, no cyanosis   CV: no JVD. Regular rate and rhythm. Normal S1S2 and no S3. No murmurs, rubs, or gallops. PMI is nondisplaced  Lungs: clear to auscultation bilaterally, normal respiratory effort without used of accessory muscles  Abdomen: soft, non-tender, bowel sounds present, no masses or hepatomegaly   Musculoskeletal: no digital clubbing, no edema   Skin: warm, no rashes     I have personally reviewed the laboratory, cardiac diagnostic and radiographic testing as outlined below:    Data:    No results for input(s): WBC, HGB, HCT, PLT in the last 72 hours. No results for input(s): NA, K, CL, CO2, BUN, CREATININE, CALCIUM in the last 72 hours.     Invalid input(s): GLU, MAGNESIUM   Lab Results Component Value Date    MG 2.0 2021     No results for input(s): TSH in the last 72 hours. No results for input(s): INR in the last 72 hours. CXR 21:   FINDINGS:   The lungs are without acute focal process.  There is no effusion or   pneumothorax. The cardiomediastinal silhouette is without acute process. The   osseous structures are without acute process.           Impression   No acute process. EK21: SB, rate: 59 bpm, QTc 431 msec - Please see scan in Cardiology. Echocardiogram 21:  Findings      Left Ventricle   Normal left ventricular chamber size. Normal left ventricular systolic function, LVEF is 65%. Indeterminate LV diastolic function. Normal left atrial pressure. Right Ventricle   Normal right ventricle size and function. Left Atrium   Left atrium is normal size. Interatrial septum not well visualized but appears intact. No thrombus in left atrium. Right Atrium   Normal right atrium. Mitral Valve   Normal mitral valve structure. There is trace mitral regurgitation. No mitral valve prolapse. Tricuspid Valve   Normal tricuspid valve structure. There is trace tricuspid regurgitation, RVSP 23mmHg. Aortic Valve   The aortic valve appears mildly sclerotic. No hemodynamically significant aortic stenosis - mean gradient 6mmHg, DLI   0.57      Pulmonic Valve   The pulmonic valve was not well visualized. Pericardial Effusion   No evidence of pericardial effusion. Pericardium appears normal.      Pleural Effusion   No evidence of pleural effusion. Aorta   Normal aortic root size and ascending aorta. Miscellaneous   The inferior vena cava diameter is normal with normal respiratory   variation. Conclusions      Summary   Normal left ventricular chamber size. Normal left ventricular systolic function, LVEF is 65%. Indeterminate LV diastolic function. Left atrium is normal size.    Interatrial septum not well visualized but appears intact. No thrombus in left atrium. Normal right ventricle size and function. There is trace mitral regurgitation. No mitral valve prolapse. The aortic valve appears mildly sclerotic. No hemodynamically significant aortic stenosis - mean gradient 6mmHg, DLI   0.57   There is trace tricuspid regurgitation, RVSP 23mmHg. Normal aortic root size. No evidence of pericardial effusion. No intra cardiac mass or thrombus. Compared to prior echo from 8/6/2018. Signature      ----------------------------------------------------------------   Electronically signed by Cher Rose MD(Interpreting   physician) on 06/09/2021 04:56 PM   ----------------------------------------------------------------      Echocardiogram 8/6/18: Findings      Left Ventricle   Left ventricle size is normal.   Proximal septal thickening. Ejection fraction is visually estimated at 65%. No regional wall motion abnormalities seen. E/A flow reversal noted. Suggestive of diastolic dysfunction. No evidence of left ventricular mass or thrombus noted. Right Ventricle   Normal right ventricular size and function. Left Atrium   Normal sized left atrium. Interatrial septum appears intact. Right Atrium   Normal right atrium size. Mitral Valve   Structurally normal mitral valve. Physiologic and/or trace mitral regurgitation is present. No evidence of mitral valve stenosis. Tricuspid Valve   The tricuspid valve appears structurally normal.   Physiologic and/or trace tricuspid regurgitation. RVSP is normal.      Aortic Valve   The aortic valve appears mildly sclerotic. Aortic valve opens well. No evidence of aortic valve regurgitation. Mild aortic stenosis is present. Pulmonic Valve   The pulmonic valve was not well visualized. No evidence of any pulmonic regurgitation. Pericardial Effusion   No evidence of pericardial effusion.       Aorta   Aortic root within normal limits. Conclusions      Summary   No previous echo for comparison. Technically adequate study. Proximal septal thickening. Ejection fraction is visually estimated at 65%. No regional wall motion abnormalities seen. E/A flow reversal noted. Suggestive of diastolic dysfunction. Physiologic and/or trace mitral regurgitation is present. Mild aortic stenosis is present. Physiologic and/or trace tricuspid regurgitation. RVSP is normal.      Signature      ----------------------------------------------------------------   Electronically signed by Marya Hensley MD(Interpreting   physician) on 08/06/2018 07:46 PM   ----------------------------------------------------------------    Cardiac Catheterization 08/9/18:  PROCEDURES:  1. Coronary angiography. 2.  Right radial approach. 3.  Conscious sedation using Versed and fentanyl.     INDICATION:  #4 with score of seven.     HISTORY:  The patient is a 51-year-old lady who presented to the hospital  with shortness of breath. She was found to have stress test that was read  as normal, upon personal review of the stress test showed significant  inferior and lateral wall reversible defect, the patient was brought to the  cath lab to evaluate the anatomy of her coronary arteries with the  intention to intervene as warranted.     DESCRIPTION OF PROCEDURE:  After the appropriate informed consent, the  right wrist area was prepped and draped in the usual sterile fashion. A  time-out was called. The right wrist area was locally anesthetized with 80  ml of 2% lidocaine. A 21-gauge needle was used to access the right radial  artery. A 6-Bhutanese introducer sheath was used to cannulate the right  radial artery. A 5-Bhutanese JL-3.5 and 5-Bhutanese JR-4 catheter were used for  coronary angiography in multiple projections.     ANGIOGRAPHIC FINDINGS:  The left main coronary artery arising from the left  sinus of Valsalva.   It is a large vessel that has no significant disease. It trifurcates into left anterior descending artery, left circumflex artery  and ramus intermedius artery.     The left anterior descending artery is a very tortuous vessel, actually all  of her vessels are very tortuous and the overlapped significantly, the LAD  has no significant disease. It gives off a two large diagonal branches. The LAD and its diagonal branches have no significant disease. The second  diagonal branch may be has like 30% disease.     The ramus intermedius artery also a tortuous vessel without any significant  disease. The left circumflex artery is a large vessel that gives off a  large OM branch and that has like 30% proximal disease.     There was grade 3 left-to-right collaterals.     The right coronary artery arising from the right sinus of Valsalva. It has  proximal total occlusion.     At the end of the procedure, the catheter and the wire were pulled out from  the body, Vasc band was applied to the right wrist area with effective  hemostasis.     MEDICATIONS USED DURING THE PROCEDURE:  Intraarterial verapamil to prevent  vasospasm, intra-arterial heparin for anticoagulation.     We used Versed and fentanyl for conscious sedation. First dose was given  at [phone], procedure ended at 1815, there was 20 minutes of direct  face-to-face supervision during conscious sedation administration.     Total fluoroscopy time was 1.7 minutes.     Total contrast used was 50 mL.     IMPRESSION:  1. 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 2.  Significantly tortuous and overlapping left coronary arteries. 3.  Mild disease involving a second diagonal and first OM branch.     RECOMMENDATIONS:  1. Aggressive coronary artery disease risk-factor modification. 2.  Smoking cessation.   3.  The patient will be observed for 2 hours, discharged home if she meets  discharge criteria.           Satnam Johnson MD    Stress Test: 5/5/21 Findings      Left Ventricle   Left ventricle size is normal.   Proximal septal thickening. Ejection fraction is visually estimated at 65%. No regional wall motion abnormalities seen. E/A flow reversal noted. Suggestive of diastolic dysfunction. No evidence of left ventricular mass or thrombus noted. Right Ventricle   Normal right ventricular size and function. Left Atrium   Normal sized left atrium. Interatrial septum appears intact. Right Atrium   Normal right atrium size. Mitral Valve   Structurally normal mitral valve. Physiologic and/or trace mitral regurgitation is present. No evidence of mitral valve stenosis. Tricuspid Valve   The tricuspid valve appears structurally normal.   Physiologic and/or trace tricuspid regurgitation. RVSP is normal.      Aortic Valve   The aortic valve appears mildly sclerotic. Aortic valve opens well. No evidence of aortic valve regurgitation. Mild aortic stenosis is present. Pulmonic Valve   The pulmonic valve was not well visualized. No evidence of any pulmonic regurgitation. Pericardial Effusion   No evidence of pericardial effusion. Aorta   Aortic root within normal limits. Conclusions      Summary   No previous echo for comparison. Technically adequate study. Proximal septal thickening. Ejection fraction is visually estimated at 65%. No regional wall motion abnormalities seen. E/A flow reversal noted. Suggestive of diastolic dysfunction. Physiologic and/or trace mitral regurgitation is present. Mild aortic stenosis is present. Physiologic and/or trace tricuspid regurgitation.    RVSP is normal.      Signature      ----------------------------------------------------------------   Electronically signed by Deysi Yin MD(Interpreting   physician) on 08/06/2018 07:46 PM   ----------------------------------------------------------------    14 day cardiac monitor 6/11/21:      I have independently reviewed all of the ECGs and rhythm strips per above     Assessment/Plan: This is a 78 y.o. female with a history of   Patient Active Problem List   Diagnosis    Spinal stenosis    Degeneration of lumbosacral intervertebral disc    Lumbago    Colonic diverticular abscess    CAD in native artery    S/P colon resection    Diverticulitis of large intestine with perforation and abscess without bleeding    Pelvic abscess in female    Colostomy reversal    SBO (small bowel obstruction) ([facility])    Bradycardia    Chest pain    Atrial fibrillation with rapid ventricular response ([facility]     1. Paroxysmal atrial fibrillation  - Diagnosed 8/2018.   - [id number] = 5 (age, gender, CAD, CHF, HTN)  - Current [address] regiment includes Eliquis. - Current medical therapy includes Toprol XL.  - Thus far has had 1 cardioversions and the last one was 12/5/20.  - Previous attempts at rhythm control have been DC-cardioversion  - Had an extensive discussion regarding options between rate and rhythm control and the patient has chosen rhythm control. - 14 day cardiac monitor 6/11/21 showed no PAF and symptoms correlated with PACs. - Three ED visits since June 2021 due to AF with RVR while on Toprol XL.  - States that she could not afford long term AAD therapy, Sotalol $38.35 and Tikosyn $130 for 30 day supply, and would like to move forward with AF ablation.   - A detailed discussion was had regarding the risks, benefits, and alternatives to the atrial fibrillation ablation procedure. The procedure was described in detail. I quoted the patient an overall 4-6% risk of major complications which include but are not limited to: bleeding, infection, blood clot, vascular injury requiring surgical repair, phrenic nerve injury, pulmonary vein stenosis, valvular injury, damage to the cardiac conduction system requiring pacemaker implantation, cardiac perforation and tamponade, heart attack, stroke, atrioesophgeal fistula, and death.  The patient understands these risks and wishes to proceed with the procedure. They will need a preop cardiac CT within 1 month, we have discussed taryn-procedural anticoagulation management and the potential need for a trans-esophageal echocardiogram on the day of the procedure. We have discussed the use of adjunctive technologies including cryo-ablation and contact force guided ablation. Based on this patient's individual needs, we have chosen to utilize cryo-ablation.  - Re-education on importance of well controlled HTN (goal BP < 130/80), adequate weight control (goal BMI of < 27), physical activity consisting of moderate cardiopulmonary exercise up to a goal of 250 min/wk, daily compliance with CPAP in treating sleep apnea, smoking cessation and limited ETOH intake       2. Sinus bradycardia  - Probable underlying sinus node dysfunction which worsening by Beta-blocker. - Presents today in SB on Toprol XL.  - Limit option of AAD therapy so AAD of choice would be Tikosyn but can try Sotalol without Toprol XL. 3. Coronary artery disease   - Chronic total occlusion of the right coronary artery with collaterals per [facility] 8/9/18.  - On ASA, Toprol XL and Crestor. 4. Chronic HFpEF  - LV EF 65% on TTE 8/6/18.  - Euvolemic and compensated. - On Toprol XL and Zestril. 5. Valvular heart disease  - Mild aortic stenosis on TTE 8/6/18. 6. Hypertension  - Well controlled. - O Toprol XL and Zestril. 7. Hyperlipidemia   - On Crestor. 8. Hypothyroidism   - On Synthroid. Recommendations:    1. Schedule cryo-balloon AF ablation with Dolph Prader and Carto. 2. Follow up after the procedure. Encouraged the patient to call the office for any questions or concerns. Thank you for allowing me to participate in your patient's care. Please call me if there are any questions or concerns.       Talita Wells MD  Cardiac Electrophysiology  [address] [facility]  [facility] [place]: [facility] Elect[facility]  7:11 AM  9/2/2021

## 2021-09-02 NOTE — OP NOTE
[address]  [place] [address] [place] [facility]  Procedure Report  Patient: Soraya Richardson  Medical Record Number: [id number]  Date of Procedure:  9/2/2021  Operating Electrophysiologist: Talita Wells MD  Primary Electrophysiologist: Talita Wells MD  Referring Physician: Humphrey Lai DO and Slcik Marie    Procedure(s) Performed:  Cryoballoon Catheter Ablation of Atrial Fibrillation:  1. Atrial Fibrillation Ablation (90778)   2. Intracardiac Echocardiography (92842-82)   3. 3-dimensional intracardiac mapping (42425)   4. Vascular ultrasound for venous access (46133-84)  5. Transesophageal Echocardiography (18843-32) w/Color Doppler (29781-10) and pulsed/continuous wave Doppler (42328-72)    INDICATION FOR PROCEDURE:  1. Symptomatic, drug-refractory paroxysmal atrial fibrillation    BRIEF CLINICAL HISTORY:   The patient is a 78y.o.-year-old female with symptomatic, drug-refractory paroxysmal atrial fibrillation who presents today for elective cryoballoon catheter ablation procedure. PROCEDURE PERFORMED BY: Talita Wells MD    ASSISTANT: N/A    MEDICATIONS:  1. Heparin 11001 units. 2. Protamine 50 mg. CONTRAST:   Isovue 25 mL    ESTIMATED BLOOD LOSS: Approximately <100 mL including ACTs    COMPLICATIONS:  None immediately apparent    FLUOROSCOPY TIME: 44.4 minutes. MINIMUM ESOPHAGEAL TEMPERATURE: 26.5 degrees Celsius    DESCRIPTION OF PROCEDURE: The patient came to the cardiac electrophysiology laboratory in a fasting and nonsedated state. Informed consent was obtained in advance. The patient was moved to the laboratory table and prepped and draped in the usual sterile fashion for electrophysiology study and catheter ablation. The patient was endotracheally intubated and placed under general anesthesia. The skin and fascia overlying the right and left femoral veins were infiltrated with 2% lidocaine using a 25-gauge needle.  We utilized vascular ultrasound to document venous patency and to allow for direct visualization of vascular needle entry with permanent recording using the ultrasound system. Using the modified Seldinger technique, the right femoral vein was accessed x 2, and the left femoral vein x 2, respectively using an 18-gauge Cook needle and J-tip guidewires. One 8-Fr sheath and one 7-Fr sheath were introduced to the right femoral vein, and one 7-Fr and one 9-Fr sidearm sheath were introduced into the left femoral vein. Through the 9-Fr and 7-Fr, a 9-Fr Biosense Soundstar intracardiac echocardiography catheter and a Bard 6-Fr dynamic decapolar catheter were introduced and advanced to the right atrium under fluoroscopic guidance. The Bard catheter was advanced to the coronary sinus. A second Bard 6-Fr dynamic decapolar catheter was placed through the 7-Fr sheath in the RFV up to the SVC and was to be used for phrenic nerve pacing while performing Cryo in the right sided veins per protocol. The 8-Fr sheath was exchanged over  wire for a Converse VersaCross RF transseptal sheath and dilator apparatus which were advanced over a Converse wire to the SVC. A Converse VersaCross RF transseptal wire was pulled back into the sheath. The apparatus was withdrawn under fluoroscopic guidance to the fossa ovalis. Using intracardiac echocargraphy, tenting of the interatrial septum by the sheath and the dilator was confirmed. The Converse VersaCross RF transseptal wire was then advanced to engage the septum. A radiofrequency energy was delivered and left atrial access was confirmed with visualization of microbubbles in the LA under ICE visualization. Using fluoroscopy, and intracardiac echography, the Converse sheath was easily crossed over the wire into the left atrium. The wire was retained in the left atrium and the dilator and the sheath were removed. An 8000 unit bolus of heparin was given at this time.  Continuous peripheral IV heparin was started at 1000 units/hr and an ACT was checked intermittently and additional boluses were given and adjustments to the heparin infusion were made in order to maintain an ACT between 350 and 400 seconds. Over the Barafon wire, a 12 Sami dilator was used to dilate and then a Medtronic 15 Sami flex catheter sheath and dilator apparatus were advanced under fluoroscopic guidance into the right atrium. The apparatus was advanced across the interatrial septum using fluoroscopic guidance, and the dilator and wire were then removed. The sheath was carefully aspirated and flushed with heparinized saline. Next, 3D intracardiac mapping was performed using the Nippo system using a 7-Fr Terracotta Pentaray catheter. Left atrial and pulmonary vein geometry were created. The catheter was then removed.  After prepping the cryo-balloon catheter and loading the achieve circular mapping catheter, the apparatus was introduced through the sheath into the left atrium. Baseline pulmonary vein electrograms were stored. Pulmonary vein isolation was then undertaken after confirming adequate venous occlusion by using contrast injection under fluoroscopic guidance, as well as intracardiac echo color Doppler. The left superior pulmonary vein antrum was treated with 2 lesions for 180 and 120 seconds with the lowest temperature of -50 degrees. The left inferior pulmonary vein antrum was treated with 2 lesions for 180 and 180 seconds with the lowest temperature of -42 degrees. After obtaining a stable phrenic nerve pacing site high in the SVC, the right superior pulmonary vein antrum was treated with 2 lesions for 79 and 98 seconds with the lowest temperature of -47 degrees.  The right inferior pulmonary vein antrum was treated with 2 lesions for 101 and 70 seconds with the lowest temperature of -40 degrees.  There was slightly weakening of phrenic nerve capture during ablation at the [facility] which fully recovered after the ablation was abruptly terminated and no loss of phrenic nerve capture noted. Esophageal temperature monitoring was monitored throughout the procedure and the lowest esophageal temperature was noted during ablation of RIPV. Left atrial pacing and recording was performed to document entrance and exit block from the pulmonary veins. All PVs found to be isolated. Comprehensive EP study was performed and the Bard Decapolar catheter was placed at the His bundle region and the Bard decapolar coronary sinus catheter was left in coronary sinus for EP study. Right and left atrial pacing recording, his bundle recording, and A-V node function testing were then performed. Laddie Beery was then advanced into the right ventricle for right ventricular pacing and recording. Burst atrial pacing from the coronary sinus was performed down to 250 msec and brief atrial fibrillation/flutter was induced and was self terminated. After at least 30 minutes following the last ablation lesion, pulmonary vein isolation was reconfirmed.     FINAL ELECTROPHYSIOLOGIC DATA: The sinus cycle length was 920 ms, the AH interval was 65 ms, and the HV interval was 35 ms. The AV Wenckebach cycle length was 320 ms, the AV node ERP was less than or equal to 250 ms at 600 ms.  AERP was 250 ms CX 661 KG. WHJ VA Wenckebach cycle length was 390 ms.  VERP was 340 ms at 600 ms. No inducible SVT.       Catheters were removed under flourosopic guidance and final ICE imaging was performed which showed no evidence of significant pericardial effusion or intra-cardiac thrombus.  Protamine was administered and once the ACT was <180 s sheaths were removed.  Figure of eight sutures were performed and the manual pressures were held for hemostasis. The patient was hemodynamically stable and under the care of the anesthesiologist and will be brought back to the recovery area.     SUMMARY: Successful cryoballoon catheter ablation of paroxysmal atrial fibrillaion                        RECOMMENDATIONS:      1. 6 hours bedrest after sheath removal and observation overnight. 2. Figure of eight sutures will be removed 3 hours after sheath removal.  3. Continue anticoagulation as directed. 4. Increase Protonix to 40 mg mg bid for 1 month then once daily. 5. Follow up in office in 1 week for EKG and then in 4 to 6 weeks.     Alyssa Benoit MD  Cardiac Electrophysiology  [address]  [facility] [place]: [place] [facility]  8:17 AM  9/2/2021

## 2021-09-02 NOTE — ANESTHESIA PRE PROCEDURE
Department of Anesthesiology  Preprocedure Note       Name:  Tavia Hendricks   Age:  78 y.o.  :  1950                                          MRN:  [id number]         Date:  2021      Surgeon:   Procedure: JEANNA/ AFIB ABLATION W/ ANES    Medications prior to admission:   Prior to Admission medications    Medication Sig Start Date End Date Taking? Authorizing Provider   alendronate (FOSAMAX) 70 MG tablet Take 70 mg by mouth daily 5/15/21  Yes Historical Provider, MD   metoprolol succinate (TOPROL XL) 50 MG extended release tablet Take 1 tablet by mouth daily 21  Yes Luisa Duran DO   SYNTHROID 125 MCG tablet Take 125 mcg by mouth Daily Take 1 tablet daily 21  Yes Historical Provider, MD   Calcium Carbonate-Vit D-Min (CALCIUM 1200 PO) Take 1 capsule by mouth   Yes Historical Provider, MD   docusate sodium (COLACE) 100 MG capsule Take 100 mg by mouth nightly    Yes Historical Provider, MD   Echinacea 125 MG CAPS Take 125 mg by mouth daily   Yes Historical Provider, MD   aspirin EC 81 MG EC tablet Take 1 tablet by mouth daily 18  Yes Annette Aguilar MD   pantoprazole (PROTONIX) 40 MG tablet Take 1 tablet by mouth daily 18  Yes Annette Aguilar MD   ZINC PO Take 1 tablet by mouth daily   Yes Historical Provider, MD   Omega-3 Fatty Acids (FISH OIL) 1000 MG CAPS Take 1,000 mg by mouth 2 times daily    Yes Historical Provider, MD   lisinopril (PRINIVIL;ZESTRIL) 10 MG tablet Take 10 mg by mouth daily. Yes Historical Provider, MD   rosuvastatin (CRESTOR) 10 MG tablet Take 10 mg by mouth daily.      Yes Historical Provider, MD   apixaban (ELIQUIS) 5 MG TABS tablet Take 1 tablet by mouth 2 times daily 18   Charis Sher DO       Current medications:    Current Outpatient Medications   Medication Sig Dispense Refill    alendronate (FOSAMAX) 70 MG tablet Take 70 mg by mouth daily      metoprolol succinate (TOPROL XL) 50 MG extended release tablet Take 1 tablet by mouth daily 30 tablet 3    SYNTHROID 125 MCG tablet Take 125 mcg by mouth Daily Take 1 tablet daily      Calcium Carbonate-Vit D-Min (CALCIUM 1200 PO) Take 1 capsule by mouth      docusate sodium (COLACE) 100 MG capsule Take 100 mg by mouth nightly       Echinacea 125 MG CAPS Take 125 mg by mouth daily      aspirin EC 81 MG EC tablet Take 1 tablet by mouth daily 30 tablet 3    pantoprazole (PROTONIX) 40 MG tablet Take 1 tablet by mouth daily 30 tablet 3    ZINC PO Take 1 tablet by mouth daily      Omega-3 Fatty Acids (FISH OIL) 1000 MG CAPS Take 1,000 mg by mouth 2 times daily       lisinopril (PRINIVIL;ZESTRIL) 10 MG tablet Take 10 mg by mouth daily.  rosuvastatin (CRESTOR) 10 MG tablet Take 10 mg by mouth daily.  apixaban (ELIQUIS) 5 MG TABS tablet Take 1 tablet by mouth 2 times daily 60 tablet 0     No current facility-administered medications for this encounter.        Allergies:  No Known Allergies    Problem List:    Patient Active Problem List   Diagnosis Code    Spinal stenosis M48.00    Degeneration of lumbosacral intervertebral disc M51.37    Lumbago M54.5    Colonic diverticular abscess K57.20    CAD in native artery I25.10    S/P colon resection Z90.49    Diverticulitis of large intestine with perforation and abscess without bleeding K57.20    Pelvic abscess in female N73.9    Colostomy reversal K57.92    SBO (small bowel obstruction) ([facility]) K56.609    Bradycardia R00.1    Chest pain R07.9    Atrial fibrillation with rapid ventricular response (HCC) I48.91    Paroxysmal atrial fibrillation (HCC) I48.0       Past Medical History:        Diagnosis Date    Arrhythmia     CAD in native artery 31/03/8232    Diastolic heart failure [facility] [address]     Diverticulitis 08/03/2018    H/O cardiovascular stress test 05/05/2021    Lexiscan    HIGH CHOLESTEROL     History of atrial fibrillation 08/2018    with episode of diverticulosis    History of stress test 08/07/2018    Lexiscan stress test    Hypertension  Thyroid disease        Past Surgical History:        Procedure Laterality Date    COLONOSCOPY      COLONOSCOPY N/A 4/8/2019    COLONOSCOPY performed by Channing Johns MD at [facility] CATH LAB PROCEDURE  08/09/2018    NERVE BLOCK  12 27 2011    NERVE BLOCK  1/24/12    lumbar epidural    NERVE BLOCK  02/21/12    lumbar epidural with flouro    DC COLONOSCOPY FLX DX W/COLLJ SPEC WHEN PFRMD N/A 10/4/2018    COLONOSCOPY performed by Channing Johns MD at [facility], PARTIAL, W/ANAST N/A 11/13/2018    LAPAROSCOPIC ROBOT XI ASSISTED SIGMOID COLON RESECTION WITH OSTOMY performed by Channing Johns MD at [address] [place] [address] N/A 4/9/2019    COLOSTOMY CLOSURE LAPAROSCOPIC ROBOTIC XI performed by Channing Johns MD at [address] [place] [address] N/A 8/13/2020    DIAGNOSTIC LAPAROSCOPIY POSS BOWEL RESECTION performed by Juve Cadena MD at [facility] History:    Social History     Tobacco Use    Smoking status: Current Every Day Smoker     Packs/day: 0.50     Types: Cigarettes    Smokeless tobacco: Never Used   Substance Use Topics    Alcohol use: No     Comment: 5 cups of coffee decaf/regular                                Ready to quit: Not Answered  Counseling given: Not Answered      Vital Signs (Current):   Vitals:    09/02/21 0808   BP: (!) 182/94   Pulse: 61   Resp: 16   Temp: 36.6 °C (97.9 °F)   SpO2: 99%   Weight: 130 lb (59 kg)   Height: 5' 3\" (1.6 m)                                              BP Readings from Last 3 Encounters:   09/02/21 (!) 182/94   07/30/21 122/84   07/13/21 124/82       NPO Status:  8+ hours                                                                               BMI:   Wt Readings from Last 3 Encounters:   09/02/21 130 lb (59 kg)   07/30/21 132 lb (59.9 kg)   07/13/21 131 lb (59.4 kg)     Body mass index is 23.03 kg/m².     CBC:   Lab Results   Component Value Date    WBC 10.7 07/11/2021    RBC 4.64 07/11/2021    HGB 14.8 07/11/2021    HCT 44.1 07/11/2021    MCV 95.0 07/11/2021    RDW 12.5 07/11/2021     07/11/2021       CMP:   Lab Results   Component Value Date     07/11/2021    K 3.5 07/11/2021    K 3.7 08/15/2020     07/11/2021    CO2 24 07/11/2021    BUN 9 07/11/2021    CREATININE 0.6 07/11/2021    GFRAA >60 07/11/2021    LABGLOM >60 07/11/2021    GLUCOSE 116 07/11/2021    GLUCOSE 94 12/13/2011    PROT 6.6 07/11/2021    CALCIUM 9.2 07/11/2021    BILITOT 0.2 07/11/2021    ALKPHOS 58 07/11/2021    AST 18 07/11/2021    ALT 11 07/11/2021       POC Tests: No results for input(s): POCGLU, POCNA, POCK, POCCL, POCBUN, POCHEMO, POCHCT in the last 72 hours. Coags:   Lab Results   Component Value Date    PROTIME 12.6 08/03/2018    INR 1.1 08/03/2018       HCG (If Applicable): No results found for: PREGTESTUR, PREGSERUM, HCG, HCGQUANT     ABGs: No results found for: PHART, PO2ART, IFV4VFK, LWT3FAM, BEART, T2QTYVCF     Type & Screen (If Applicable):  No results found for: LABABO, LABRH    Drug/Infectious Status (If Applicable):  No results found for: HIV, HEPCAB    COVID-19 Screening (If Applicable): No results found for: COVID19        Anesthesia Evaluation  Patient summary reviewed and Nursing notes reviewed no history of anesthetic complications:   Airway: Mallampati: II  TM distance: >3 FB   Neck ROM: full  Mouth opening: > = 3 FB Dental:    (+) upper dentures  Comment: Lowers, nothing loose, chipped or missing    Pulmonary: breath sounds clear to auscultation  (+) current smoker          Patient did not smoke on day of surgery.                  Cardiovascular:    (+) hypertension:, CAD:, dysrhythmias: atrial fibrillation, hyperlipidemia      ECG reviewed  Rhythm: regular  Rate: normal  Echocardiogram reviewed  Stress test reviewed                Neuro/Psych:   Negative Neuro/Psych ROS              GI/Hepatic/Renal: Neg GI/Hepatic/Renal ROS            Endo/Other:    (+) hypothyroidism::., .                 Abdominal:             Vascular: negative vascular ROS. Other Findings:      6/9/21ECHO      Conclusions      Summary   Normal left ventricular chamber size. Normal left ventricular systolic function, LVEF is 65%. Indeterminate LV diastolic function. Left atrium is normal size. Interatrial septum not well visualized but appears intact. No thrombus in left atrium. Normal right ventricle size and function. There is trace mitral regurgitation. No mitral valve prolapse. The aortic valve appears mildly sclerotic. No hemodynamically significant aortic stenosis - mean gradient 6mmHg, DLI   0.57   There is trace tricuspid regurgitation, RVSP 23mmHg. Normal aortic root size. No evidence of pericardial effusion. No intra cardiac mass or thrombus. Compared to prior echo from 8/6/2018. Anesthesia Plan      general     ASA 3       Induction: intravenous. Anesthetic plan and risks discussed with patient. Use of blood products discussed with patient whom consented to blood products. Plan discussed with CRNA and attending. [facility] [place]   9/2/2021    Patient seen and examined, chart reviewed, agree with above findings. Anesthetic plan, risks, benefits, alternatives, and personnel involved discussed with patient. Patient verbalized an understanding and agreed to proceed. NPO status confirmed. Anesthetic plan discussed with care team members and agreed upon.     Sona Lemos DO   9/2/2021  9:40 AM

## 2021-09-03 VITALS
BODY MASS INDEX: 23.04 KG/M2 | HEIGHT: 63 IN | WEIGHT: 130 LBS | DIASTOLIC BLOOD PRESSURE: 58 MMHG | OXYGEN SATURATION: 99 % | HEART RATE: 62 BPM | RESPIRATION RATE: 16 BRPM | TEMPERATURE: 98.5 F | SYSTOLIC BLOOD PRESSURE: 107 MMHG

## 2021-09-03 LAB
ANION GAP SERPL CALCULATED.3IONS-SCNC: 7 MMOL/L (ref 7–16)
BUN BLDV-MCNC: 12 MG/DL (ref 6–23)
CALCIUM SERPL-MCNC: 8.4 MG/DL (ref 8.6–10.2)
CHLORIDE BLD-SCNC: 102 MMOL/L (ref 98–107)
CO2: 26 MMOL/L (ref 22–29)
CREAT SERPL-MCNC: 0.7 MG/DL (ref 0.5–1)
EKG ATRIAL RATE: 62 BPM
EKG P AXIS: 65 DEGREES
EKG P-R INTERVAL: 124 MS
EKG Q-T INTERVAL: 432 MS
EKG QRS DURATION: 96 MS
EKG QTC CALCULATION (BAZETT): 438 MS
EKG R AXIS: 69 DEGREES
EKG T AXIS: 63 DEGREES
EKG VENTRICULAR RATE: 62 BPM
GFR AFRICAN AMERICAN: >60
GFR NON-AFRICAN AMERICAN: >60 ML/MIN/1.73
GLUCOSE BLD-MCNC: 105 MG/DL (ref 74–99)
HCT VFR BLD CALC: 37.6 % (ref 34–48)
HEMOGLOBIN: 12.7 G/DL (ref 11.5–15.5)
MAGNESIUM: 1.8 MG/DL (ref 1.6–2.6)
MCH RBC QN AUTO: 31.5 PG (ref 26–35)
MCHC RBC AUTO-ENTMCNC: 33.8 % (ref 32–34.5)
MCV RBC AUTO: 93.3 FL (ref 80–99.9)
PDW BLD-RTO: 12.5 FL (ref 11.5–15)
PLATELET # BLD: 207 E9/L (ref 130–450)
PMV BLD AUTO: 10.2 FL (ref 7–12)
POTASSIUM SERPL-SCNC: 3.9 MMOL/L (ref 3.5–5)
RBC # BLD: 4.03 E12/L (ref 3.5–5.5)
SODIUM BLD-SCNC: 135 MMOL/L (ref 132–146)
WBC # BLD: 19.1 E9/L (ref 4.5–11.5)

## 2021-09-03 PROCEDURE — 6370000000 HC RX 637 (ALT 250 FOR IP): Performed by: INTERNAL MEDICINE

## 2021-09-03 PROCEDURE — 99217 PR OBSERVATION CARE DISCHARGE MANAGEMENT: CPT | Performed by: STUDENT IN AN ORGANIZED HEALTH CARE EDUCATION/TRAINING PROGRAM

## 2021-09-03 PROCEDURE — 6360000002 HC RX W HCPCS: Performed by: NURSE PRACTITIONER

## 2021-09-03 PROCEDURE — 80048 BASIC METABOLIC PNL TOTAL CA: CPT

## 2021-09-03 PROCEDURE — 36415 COLL VENOUS BLD VENIPUNCTURE: CPT

## 2021-09-03 PROCEDURE — 83735 ASSAY OF MAGNESIUM: CPT

## 2021-09-03 PROCEDURE — 2580000003 HC RX 258: Performed by: INTERNAL MEDICINE

## 2021-09-03 PROCEDURE — G0378 HOSPITAL OBSERVATION PER HR: HCPCS

## 2021-09-03 PROCEDURE — 93005 ELECTROCARDIOGRAM TRACING: CPT | Performed by: NURSE PRACTITIONER

## 2021-09-03 PROCEDURE — 96366 THER/PROPH/DIAG IV INF ADDON: CPT

## 2021-09-03 PROCEDURE — 96365 THER/PROPH/DIAG IV INF INIT: CPT

## 2021-09-03 PROCEDURE — 85027 COMPLETE CBC AUTOMATED: CPT

## 2021-09-03 RX ORDER — PANTOPRAZOLE SODIUM 40 MG/1
40 TABLET, DELAYED RELEASE ORAL
Qty: 30 TABLET | Refills: 3 | Status: SHIPPED | OUTPATIENT
Start: 2021-09-03 | End: 2021-10-14

## 2021-09-03 RX ORDER — MAGNESIUM SULFATE IN WATER 40 MG/ML
2000 INJECTION, SOLUTION INTRAVENOUS ONCE
Status: COMPLETED | OUTPATIENT
Start: 2021-09-03 | End: 2021-09-03

## 2021-09-03 RX ADMIN — PANTOPRAZOLE SODIUM 40 MG: 40 TABLET, DELAYED RELEASE ORAL at 06:25

## 2021-09-03 RX ADMIN — METOPROLOL SUCCINATE 50 MG: 50 TABLET, EXTENDED RELEASE ORAL at 10:37

## 2021-09-03 RX ADMIN — APIXABAN 5 MG: 5 TABLET, FILM COATED ORAL at 08:26

## 2021-09-03 RX ADMIN — SODIUM CHLORIDE, PRESERVATIVE FREE 10 ML: 5 INJECTION INTRAVENOUS at 08:29

## 2021-09-03 RX ADMIN — LEVOTHYROXINE SODIUM 125 MCG: 0.12 TABLET ORAL at 06:25

## 2021-09-03 RX ADMIN — CALCIUM CARBONATE-VITAMIN D TAB 500 MG-200 UNIT 2 TABLET: 500-200 TAB at 08:27

## 2021-09-03 RX ADMIN — ASPIRIN 81 MG: 81 TABLET, COATED ORAL at 08:26

## 2021-09-03 RX ADMIN — MAGNESIUM SULFATE HEPTAHYDRATE 2000 MG: 40 INJECTION, SOLUTION INTRAVENOUS at 10:02

## 2021-09-03 RX ADMIN — BENZOCAINE AND MENTHOL 1 LOZENGE: 15; 3.6 LOZENGE ORAL at 04:46

## 2021-09-03 RX ADMIN — BENZOCAINE AND MENTHOL 1 LOZENGE: 15; 3.6 LOZENGE ORAL at 02:16

## 2021-09-03 RX ADMIN — LISINOPRIL 10 MG: 10 TABLET ORAL at 08:28

## 2021-09-03 ASSESSMENT — PAIN SCALES - GENERAL
PAINLEVEL_OUTOF10: 0
PAINLEVEL_OUTOF10: 0

## 2021-09-03 NOTE — DISCHARGE SUMMARY
[address]  [place] [address] [place] [facility]  Discharge Summary  Patient: Natalie Sutherland  Medical Record Number: [id number]  Date of Procedure:  9/3/2021  Operating Electrophysiologist: Deven Desai MD  Primary Electrophysiologist: Deven Desai MD  Referring Physician: Samm Salguero DO     Admission Date:9/2/2021      Discharge Date:  09/03/21    Patient Active Problem List   Diagnosis    Spinal stenosis    Degeneration of lumbosacral intervertebral disc    Lumbago    Colonic diverticular abscess    CAD in native artery    S/P colon resection    Diverticulitis of large intestine with perforation and abscess without bleeding    Pelvic abscess in female    Colostomy reversal    SBO (small bowel obstruction) ([address]    Bradycardia    Chest pain    Atrial fibrillation with rapid ventricular response ([address]    Paroxysmal atrial fibrillation ([address]        Arsh Conception \"Rosenn\"   Home Medication Instructions TFQ:[id number]    Printed on:09/03/21 0951   Medication Information                      alendronate (FOSAMAX) 70 MG tablet  Take 70 mg by mouth daily             apixaban (ELIQUIS) 5 MG TABS tablet  Take 1 tablet by mouth 2 times daily             aspirin EC 81 MG EC tablet  Take 1 tablet by mouth daily             Calcium Carbonate-Vit D-Min (CALCIUM 1200 PO)  Take 1 capsule by mouth             docusate sodium (COLACE) 100 MG capsule  Take 100 mg by mouth nightly              Echinacea 125 MG CAPS  Take 125 mg by mouth daily             lisinopril (PRINIVIL;ZESTRIL) 10 MG tablet  Take 10 mg by mouth daily.                metoprolol succinate (TOPROL XL) 50 MG extended release tablet  Take 1 tablet by mouth daily             Omega-3 Fatty Acids (FISH OIL) 1000 MG CAPS  Take 1,000 mg by mouth 2 times daily              pantoprazole (PROTONIX) 40 MG tablet  Take 1 tablet by mouth 2 times daily (before meals) Twice a day for 30 day then return to daily use (10/03/21). rosuvastatin (CRESTOR) 10 MG tablet  Take 10 mg by mouth daily. SYNTHROID 125 MCG tablet  Take 125 mcg by mouth Daily Take 1 tablet daily             ZINC PO  Take 1 tablet by mouth daily                 Hospital Course: Marcela Griffith presented to the hospital for management of a paroxysmal atrial fibrillation and underwent successful cryoballoon catheter ablation. Since the time of the procedure she has maintained sinus. Today POD #1 the bilateral groin sites are soft and compressible. She has no cardiac complaints and is for discharge. Procedures Performed (09/02/21):    Cryoballoon Catheter Ablation of Atrial Fibrillation:  1. Atrial Fibrillation Ablation (98033)   2. Intracardiac Echocardiography (59779-48)   3. 3-dimensional intracardiac mapping (18111)   4. Vascular ultrasound for venous access (70556-89)  5. Transesophageal Echocardiography (01606-12) w/Color Doppler (52051-11) and pulsed/continuous wave Doppler (34338-86)    Consultants: none     Disposition: The patient was discharged to home in stable condition on the above medications. Clinical follow-up in the office in 4 weeks with Dr. Emerald Tripathi or a provider.       Diomedes Milligan, APRN - CNP  Cardiac Electrophysiology  [facility]) Phys[facility]  [facility] [place]: [place] [facility]  9:49 AM  9/3/2021    Attending Supervising Physicians Attestation Statement  I was present with the nurse practitioner during the history and exam. I discussed the findings and plans with the nurse practitioner and agree as documented in his note     Electronically signed by Virginia Marie DO on 9/3/21 at 12:05 PM EDT

## 2021-09-03 NOTE — CARE COORDINATION
Discharge order noted. Pt lives alone [address], 2 story. Bed/bath on 1st floor. Pt anticipates no needs upon discharge. Son to transport home.      Crystal, Jered Overlook Benny

## 2021-09-03 NOTE — PROGRESS NOTES
no cardiac complaints. Scheduled Medications:   magnesium sulfate  2,000 mg IntraVENous Once    rosuvastatin  10 mg Oral Nightly    lisinopril  10 mg Oral Daily    apixaban  5 mg Oral BID    aspirin EC  81 mg Oral Daily    docusate sodium  100 mg Oral Nightly    levothyroxine  125 mcg Oral Daily    metoprolol succinate  50 mg Oral Daily    calcium-vitamin D  2 tablet Oral Daily    sodium chloride flush  5-40 mL IntraVENous 2 times per day    pantoprazole  40 mg Oral BID AC       Infusion Medications:   sodium chloride         PRN Medications:  sodium chloride flush, sodium chloride, acetaminophen, benzocaine-menthol    No Known Allergies    Wt Readings from Last 3 Encounters:   09/02/21 130 lb (59 kg)   07/30/21 132 lb (59.9 kg)   07/13/21 131 lb (59.4 kg)     Temp Readings from Last 3 Encounters:   09/03/21 98.4 °F (36.9 °C) (Temporal)   09/02/21 95.2 °F (35.1 °C)   07/10/21 98.3 °F (36.8 °C) (Infrared)     BP Readings from Last 3 Encounters:   09/03/21 117/62   09/02/21 (!) 142/103   07/30/21 122/84     Pulse Readings from Last 3 Encounters:   09/03/21 75   07/30/21 60   07/13/21 59         Intake/Output Summary (Last 24 hours) at 9/3/2021 0920  Last data filed at 9/3/2021 0647  Gross per 24 hour   Intake 2480 ml   Output 2085 ml   Net 395 ml       ROS:   Constitutional: Negative for fever, activity change and appetite change. HENT: Negative for epistaxis. Eyes: Negative for diploplia, blurred vision. Respiratory: Negative for cough, chest tightness, shortness of breath and wheezing. Cardiovascular: pertinent positives in HPI  Gastrointestinal: Negative for abdominal pain and blood in stool.    All other review of systems are negative       PHYSICAL EXAM:  Vitals:    09/02/21 1815 09/02/21 1930 09/02/21 2315 09/03/21 0430   BP: 118/64 119/62 100/63 117/62   Pulse: 64 64 56 75   Resp:  14 14 14   Temp:  97.5 °F (36.4 °C) 97.4 °F (36.3 °C) 98.4 °F (36.9 °C)   TempSrc:  Temporal Temporal Temporal SpO2:  94% 95%    Weight:       Height:       Constitutional: Well-developed, no acute distress  Eyes: conjunctivae normal, no xanthelasma   Ears, Nose, Throat: oral mucosa moist, no cyanosis   CV: no JVD. Regular rate and rhythm. Normal S1S2 and no S3. No murmurs, rubs, or gallops. PMI is nondisplaced  Lungs: clear to auscultation bilaterally, normal respiratory effort without used of accessory muscles  Abdomen: soft, non-tender, bowel sounds present, no masses or hepatomegaly   Musculoskeletal: no digital clubbing, no edema   Skin: warm, no rashes     Pertinent Labs:  CBC:   Recent Labs     09/02/21  0810 09/03/21  0453   WBC 11.7* 19.1*   HGB 15.6* 12.7   HCT 46.5 37.6    207     BMP:  Recent Labs     09/02/21  0810 09/03/21  0453    135   K 3.8 3.9    102   CO2 29 26   BUN 8 12   CREATININE 0.6 0.7   CALCIUM 9.2 8.4*       TSH:   Lab Results   Component Value Date    TSH 3.520 07/11/2021      Lipid Profile:   Lab Results   Component Value Date    TRIG 111 06/10/2021    HDL 38 06/10/2021    LDLCALC 75 06/10/2021    CHOL 135 06/10/2021       Pertinent Cardiac Testing:   Echocardiogram 6/9/21:  Findings      Left Ventricle   Normal left ventricular chamber size. Normal left ventricular systolic function, LVEF is 65%. Indeterminate LV diastolic function. Normal left atrial pressure. Right Ventricle   Normal right ventricle size and function. Left Atrium   Left atrium is normal size. Interatrial septum not well visualized but appears intact. No thrombus in left atrium. Right Atrium   Normal right atrium. Mitral Valve   Normal mitral valve structure. There is trace mitral regurgitation. No mitral valve prolapse. Tricuspid Valve   Normal tricuspid valve structure. There is trace tricuspid regurgitation, RVSP 23mmHg. Aortic Valve   The aortic valve appears mildly sclerotic.    No hemodynamically significant aortic stenosis - mean gradient 6mmHg, DLI 0.57      Pulmonic Valve   The pulmonic valve was not well visualized. Pericardial Effusion   No evidence of pericardial effusion. Pericardium appears normal.      Pleural Effusion   No evidence of pleural effusion. Aorta   Normal aortic root size and ascending aorta. Miscellaneous   The inferior vena cava diameter is normal with normal respiratory   variation. Conclusions      Summary   Normal left ventricular chamber size. Normal left ventricular systolic function, LVEF is 65%. Indeterminate LV diastolic function. Left atrium is normal size. Interatrial septum not well visualized but appears intact. No thrombus in left atrium. Normal right ventricle size and function. There is trace mitral regurgitation. No mitral valve prolapse. The aortic valve appears mildly sclerotic. No hemodynamically significant aortic stenosis - mean gradient 6mmHg, DLI   0.57   There is trace tricuspid regurgitation, RVSP 23mmHg. Normal aortic root size. No evidence of pericardial effusion. No intra cardiac mass or thrombus. Compared to prior echo from 8/6/2018. Signature      ----------------------------------------------------------------   Electronically signed by Samara Mills MD(Interpreting   physician) on 06/09/2021 04:56 PM   ----------------------------------------------------------------        Echocardiogram 8/6/18: Findings      Left Ventricle   Left ventricle size is normal.   Proximal septal thickening. Ejection fraction is visually estimated at 65%. No regional wall motion abnormalities seen. E/A flow reversal noted. Suggestive of diastolic dysfunction. No evidence of left ventricular mass or thrombus noted. Right Ventricle   Normal right ventricular size and function. Left Atrium   Normal sized left atrium. Interatrial septum appears intact. Right Atrium   Normal right atrium size. Mitral Valve   Structurally normal mitral valve. Physiologic and/or trace mitral regurgitation is present. No evidence of mitral valve stenosis. Tricuspid Valve   The tricuspid valve appears structurally normal.   Physiologic and/or trace tricuspid regurgitation. RVSP is normal.      Aortic Valve   The aortic valve appears mildly sclerotic. Aortic valve opens well. No evidence of aortic valve regurgitation. Mild aortic stenosis is present. Pulmonic Valve   The pulmonic valve was not well visualized. No evidence of any pulmonic regurgitation. Pericardial Effusion   No evidence of pericardial effusion. Aorta   Aortic root within normal limits. Conclusions      Summary   No previous echo for comparison. Technically adequate study. Proximal septal thickening. Ejection fraction is visually estimated at 65%. No regional wall motion abnormalities seen. E/A flow reversal noted. Suggestive of diastolic dysfunction. Physiologic and/or trace mitral regurgitation is present. Mild aortic stenosis is present. Physiologic and/or trace tricuspid regurgitation. RVSP is normal.      Signature      ----------------------------------------------------------------   Electronically signed by Peri Obrien MD(Interpreting   physician) on 08/06/2018 07:46 PM   ----------------------------------------------------------------     Cardiac Catheterization 08/9/18:  PROCEDURES:  1. Coronary angiography. 2.  Right radial approach. 3.  Conscious sedation using Versed and fentanyl. INDICATION:  #4 with score of seven. HISTORY:  The patient is a 61-year-old lady who presented to the hospital  with shortness of breath. She was found to have stress test that was read  as normal, upon personal review of the stress test showed significant  inferior and lateral wall reversible defect, the patient was brought to the  cath lab to evaluate the anatomy of her coronary arteries with the  intention to intervene as warranted.      DESCRIPTION OF PROCEDURE:  After the appropriate informed consent, the  right wrist area was prepped and draped in the usual sterile fashion. A  time-out was called. The right wrist area was locally anesthetized with 80  ml of 2% lidocaine. A 21-gauge needle was used to access the right radial  artery. A 6-Tongan introducer sheath was used to cannulate the right  radial artery. A 5-Tongan JL-3.5 and 5-Tongan JR-4 catheter were used for  coronary angiography in multiple projections. ANGIOGRAPHIC FINDINGS:  The left main coronary artery arising from the left  sinus of Valsalva. It is a large vessel that has no significant disease. It trifurcates into left anterior descending artery, left circumflex artery  and ramus intermedius artery. The left anterior descending artery is a very tortuous vessel, actually all  of her vessels are very tortuous and the overlapped significantly, the LAD  has no significant disease. It gives off a two large diagonal branches. The LAD and its diagonal branches have no significant disease. The second  diagonal branch may be has like 30% disease. The ramus intermedius artery also a tortuous vessel without any significant  disease. The left circumflex artery is a large vessel that gives off a  large OM branch and that has like 30% proximal disease. There was grade 3 left-to-right collaterals. The right coronary artery arising from the right sinus of Valsalva. It has  proximal total occlusion. At the end of the procedure, the catheter and the wire were pulled out from  the body, Vasc band was applied to the right wrist area with effective  hemostasis. MEDICATIONS USED DURING THE PROCEDURE:  Intraarterial verapamil to prevent  vasospasm, intra-arterial heparin for anticoagulation. We used Versed and fentanyl for conscious sedation.   First dose was given  at [id number], procedure ended at 1815, there was 20 minutes of direct  face-to-face supervision during conscious sedation administration. Total fluoroscopy time was 1.7 minutes. Total contrast used was 50 mL. IMPRESSION:  1. 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 2.  Significantly tortuous and overlapping left coronary arteries. 3.  Mild disease involving a second diagonal and first OM branch. RECOMMENDATIONS:  1. Aggressive coronary artery disease risk-factor modification. 2.  Smoking cessation. 3.  The patient will be observed for 2 hours, discharged home if she meets  discharge criteria. Jassi Nova MD     Stress Test: 5/5/21   Findings      Left Ventricle   Left ventricle size is normal.   Proximal septal thickening. Ejection fraction is visually estimated at 65%. No regional wall motion abnormalities seen. E/A flow reversal noted. Suggestive of diastolic dysfunction. No evidence of left ventricular mass or thrombus noted. Right Ventricle   Normal right ventricular size and function. Left Atrium   Normal sized left atrium. Interatrial septum appears intact. Right Atrium   Normal right atrium size. Mitral Valve   Structurally normal mitral valve. Physiologic and/or trace mitral regurgitation is present. No evidence of mitral valve stenosis. Tricuspid Valve   The tricuspid valve appears structurally normal.   Physiologic and/or trace tricuspid regurgitation. RVSP is normal.      Aortic Valve   The aortic valve appears mildly sclerotic. Aortic valve opens well. No evidence of aortic valve regurgitation. Mild aortic stenosis is present. Pulmonic Valve   The pulmonic valve was not well visualized. No evidence of any pulmonic regurgitation. Pericardial Effusion   No evidence of pericardial effusion. Aorta   Aortic root within normal limits. Conclusions      Summary   No previous echo for comparison. Technically adequate study. Proximal septal thickening.    Ejection fraction is visually estimated at 65%. No regional wall motion abnormalities seen. E/A flow reversal noted. Suggestive of diastolic dysfunction. Physiologic and/or trace mitral regurgitation is present. Mild aortic stenosis is present. Physiologic and/or trace tricuspid regurgitation. RVSP is normal.      Signature      ----------------------------------------------------------------   Electronically signed by Chip Sousa MD(Interpreting   physician) on 08/06/2018 07:46 PM   ----------------------------------------------------------------     14 day cardiac monitor 6/11/21:           Telemetry9/3/2021: sinus with PACs rate 50-65 bpm      I have independently reviewed all of the ECGs and rhythm strips per above. I have personally reviewed the laboratory, cardiac diagnostic and radiographic testing as outlined above. I have reviewed previous records noted in [address]. Impression:    1. Paroxysmal atrial fibrillation  - Diagnosed 8/2018.   - [id number] = 5 (age, gender, CAD, CHF, HTN)  - Current [facility] regiment includes Eliquis. - Current medical therapy includes Toprol XL.  - Thus far has had 1 cardioversions and the last one was 12/5/20.  - Previous attempts at rhythm control have been DC-cardioversion  - Had an extensive discussion regarding options between rate and rhythm control and the patient has chosen rhythm control. - 14 day cardiac monitor 6/11/21 showed no PAF and symptoms correlated with PACs.   - Three ED visits since June 2021 due to AF with RVR while on Toprol XL.  - 09/02/21: Successful cryoballoon catheter ablation of paroxysmal atrial fibrillaion.  - Currently in sinus.    - Re-education on importance of well controlled HTN (goal BP < 130/80), adequate weight control (goal BMI of < 27), physical activity consisting of moderate cardiopulmonary exercise up to a goal of 250 min/wk, daily compliance with CPAP in treating sleep apnea, smoking cessation and limited ETOH intake       2. Sinus bradycardia  - Probable underlying sinus node dysfunction which worsening by Beta-blocker. - Presents today in SB on Toprol XL.  - Limit option of AAD therapy so AAD of choice would be Tikosyn but can try Sotalol without Toprol XL. 3. Coronary artery disease   - Chronic total occlusion of the right coronary artery with collaterals per [address] [facility] 8/9/18.  - On ASA, Toprol XL and Crestor. 4. Chronic HFpEF  - LV EF 65% on TTE 8/6/18.  - Euvolemic and compensated. - On Toprol XL and Zestril. 5. Valvular heart disease  - Mild aortic stenosis on TTE 8/6/18. 6. Hypertension  - Well controlled. - O Toprol XL and Zestril. 7. Hyperlipidemia   - On Crestor. 8. Hypothyroidism   - On Synthroid. Recommendations:    1. Continue Protonix 40mg BID for 1 month then daily. 2. Continue Eliquis   3. Continue Toprol   4. Follow-up in office in 4 weeks with Dr. Janie Hurtado or a provider. Thank you for allowing me to participate in your patient's care.     Electronically signed by WAQAR Kilgore CNP on 9/3/2021 at 9:46 AM   Francisco Salinas

## 2021-09-03 NOTE — PROGRESS NOTES
CLINICAL PHARMACY NOTE: MEDS TO BEDS    Total # of Prescriptions Filled: 1   The following medications were delivered to the patient:  · Protonix 40mg    Additional Documentation:    Delivered to patient 9/3 @11:12am

## 2021-09-03 NOTE — PROGRESS NOTES
Monitor dcd cleaned and placed in slot in nurses station. discharge instructions and meds reviewed with patient. care of groins also reviewed with patient.

## 2021-09-04 LAB
EKG ATRIAL RATE: 53 BPM
EKG P AXIS: 63 DEGREES
EKG P-R INTERVAL: 144 MS
EKG Q-T INTERVAL: 474 MS
EKG QRS DURATION: 86 MS
EKG QTC CALCULATION (BAZETT): 444 MS
EKG R AXIS: 66 DEGREES
EKG T AXIS: 73 DEGREES
EKG VENTRICULAR RATE: 53 BPM

## 2021-09-28 ENCOUNTER — TELEPHONE (OUTPATIENT)
Dept: ADMINISTRATIVE | Age: 71
End: 2021-09-28

## 2021-10-13 NOTE — PROGRESS NOTES
Cardiac Electrophysiology Outpatient Progress Note    Jenna Cope  1950  Date of Service: 10/14/2021  PCP: Alexi Wright DO  ELECTROPHYSIOLOGIST: Radha Giordano MD        Subjective: Jenna Cope is seen in cardiac electrophysiology clinic for follow-up and management of paroxysmal atrial fibrillation and bradycardia status post cryo-balloon PVI on 9/2/21. The patient states that she feels well but does report two very short episodes of palpitations right after the procedure that do not last long. The patient denies any chest pain, dyspnea, dizziness, syncope, orthopnea or paroxysmal nocturnal dyspnea. She presents today in [facility]. She remains on Toprol XL for rate control and Eliquis for stroke risk reduction.     Patient Active Problem List   Diagnosis    Spinal stenosis    Degeneration of lumbosacral intervertebral disc    Lumbago    Colonic diverticular abscess    CAD in native artery    S/P colon resection    Diverticulitis of large intestine with perforation and abscess without bleeding    Pelvic abscess in female    Colostomy reversal    SBO (small bowel obstruction) (HCC)    Bradycardia    Chest pain    Atrial fibrillation with rapid ventricular response (HCC)    Paroxysmal atrial fibrillation (HCC)     Current Outpatient Medications   Medication Sig Dispense Refill    metoprolol succinate (TOPROL XL) 50 MG extended release tablet Take 1 tablet by mouth daily 30 tablet 3    SYNTHROID 125 MCG tablet Take 125 mcg by mouth Daily Take 1 tablet daily      Calcium Carbonate-Vit D-Min (CALCIUM 1200 PO) Take 1 capsule by mouth      docusate sodium (COLACE) 100 MG capsule Take 100 mg by mouth nightly       Echinacea 125 MG CAPS Take 125 mg by mouth daily      aspirin EC 81 MG EC tablet Take 1 tablet by mouth daily 30 tablet 3    apixaban (ELIQUIS) 5 MG TABS tablet Take 1 tablet by mouth 2 times daily 60 tablet 0    ZINC PO Take 1 tablet by mouth daily      Omega-3 Fatty Acids (FISH OIL) 1000 MG CAPS Take 1,000 mg by mouth 2 times daily       lisinopril (PRINIVIL;ZESTRIL) 10 MG tablet Take 10 mg by mouth daily.  rosuvastatin (CRESTOR) 10 MG tablet Take 10 mg by mouth daily.  alendronate (FOSAMAX) 70 MG tablet Take 70 mg by mouth daily (Patient not taking: Reported on 10/14/2021)       No current facility-administered medications for this visit. No Known Allergies    ROS:   Constitutional: Negative for fever, activity change and appetite change. HENT: Negative for epistaxis. Eyes: Negative for diploplia, blurred vision. Respiratory: Negative for cough, chest tightness, shortness of breath and wheezing. Cardiovascular: pertinent positives in HPI  Gastrointestinal: Negative for abdominal pain and blood in stool. All other review of systems are negative       PHYSICAL EXAM:  Vitals:    10/14/21 1413   BP: 128/78   Pulse: 52   Resp: 16   SpO2: 98%   Weight: 129 lb 4.8 oz (58.7 kg)   Height: 5' 3\" (1.6 m)      Constitutional: Well-developed, no acute distress  Eyes: conjunctivae normal, no xanthelasma   Ears, Nose, Throat: oral mucosa moist, no cyanosis   CV: no JVD. Regular rate and rhythm. Normal S1S2 and no S3. No murmurs, rubs, or gallops. PMI is nondisplaced  Lungs: clear to auscultation bilaterally, normal respiratory effort without used of accessory muscles  Abdomen: soft, non-tender, bowel sounds present, no masses or hepatomegaly   Musculoskeletal: no digital clubbing, no edema , no groin hematoma  Skin: warm, no rashes     Pertinent Labs:  CBC:   No results for input(s): WBC, HGB, HCT, PLT in the last 72 hours. BMP:  No results for input(s): NA, K, CL, CO2, BUN, CREATININE, CALCIUM in the last 72 hours.     Invalid input(s): GLU    TSH:   Lab Results   Component Value Date    TSH 3.520 07/11/2021      Lipid Profile:   Lab Results   Component Value Date    TRIG 111 06/10/2021    HDL 38 06/10/2021    LDLCALC 75 06/10/2021    CHOL 135 06/10/2021 Pertinent Cardiac Testing:     JEANNA 9/2/21:  Findings      Left Ventricle   Normal left ventricular size and systolic function. Right Ventricle   Normal right ventricular size and function. Left Atrium   Normal sized left atrium. There is no left atrial appendage thrombus. No evidence of patent foramen ovale during color flow doppler study. Right Atrium   Normal right atrium size. Mitral Valve   Normal mitral valve structure and function. Mild mitral regurgitation is present. Tricuspid Valve   The tricuspid valve appears structurally normal.   Physiologic and/or trace tricuspid regurgitation. Aortic Valve   Structurally normal aortic valve. No evidence of aortic valve regurgitation. No hemodynamically significant aortic stenosis is present. Pulmonic Valve   Pulmonic valve is structurally normal.   Physiologic and/or trace pulmonic regurgitation present. Pericardial Effusion   No pericardial effusion. Aorta   Mild atherosclerotic change of ascending aorta. Conclusions      Summary   Normal left ventricular size and systolic function. Normal sized left atrium. There is no left atrial appendage thrombus. No evidence of patent foramen ovale during color flow doppler study. Mild mitral regurgitation is present. Physiologic and/or trace tricuspid regurgitation. Mild atherosclerotic change of ascending aorta. No pericardial effusion. Signature      ----------------------------------------------------------------   Electronically signed by Hero Salinas MD(Interpreting   physician) on 09/02/2021 01:50 PM   ----------------------------------------------------------------    Echocardiogram 6/9/21:  Findings      Left Ventricle   Normal left ventricular chamber size. Normal left ventricular systolic function, LVEF is 65%. Indeterminate LV diastolic function. Normal left atrial pressure.       Right Ventricle   Normal right ventricle size and function. Left Atrium   Left atrium is normal size. Interatrial septum not well visualized but appears intact. No thrombus in left atrium. Right Atrium   Normal right atrium. Mitral Valve   Normal mitral valve structure. There is trace mitral regurgitation. No mitral valve prolapse. Tricuspid Valve   Normal tricuspid valve structure. There is trace tricuspid regurgitation, RVSP 23mmHg. Aortic Valve   The aortic valve appears mildly sclerotic. No hemodynamically significant aortic stenosis - mean gradient 6mmHg, DLI   0.57      Pulmonic Valve   The pulmonic valve was not well visualized. Pericardial Effusion   No evidence of pericardial effusion. Pericardium appears normal.      Pleural Effusion   No evidence of pleural effusion. Aorta   Normal aortic root size and ascending aorta. Miscellaneous   The inferior vena cava diameter is normal with normal respiratory   variation. Conclusions      Summary   Normal left ventricular chamber size. Normal left ventricular systolic function, LVEF is 65%. Indeterminate LV diastolic function. Left atrium is normal size. Interatrial septum not well visualized but appears intact. No thrombus in left atrium. Normal right ventricle size and function. There is trace mitral regurgitation. No mitral valve prolapse. The aortic valve appears mildly sclerotic. No hemodynamically significant aortic stenosis - mean gradient 6mmHg, DLI   0.57   There is trace tricuspid regurgitation, RVSP 23mmHg. Normal aortic root size. No evidence of pericardial effusion. No intra cardiac mass or thrombus. Compared to prior echo from 8/6/2018.       Signature      ----------------------------------------------------------------   Electronically signed by Tristan Gooden MD(Interpreting   physician) on 06/09/2021 04:56 PM   ----------------------------------------------------------------        Echocardiogram 8/6/18: Findings      Left Ventricle   Left ventricle size is normal.   Proximal septal thickening. Ejection fraction is visually estimated at 65%. No regional wall motion abnormalities seen. E/A flow reversal noted. Suggestive of diastolic dysfunction. No evidence of left ventricular mass or thrombus noted. Right Ventricle   Normal right ventricular size and function. Left Atrium   Normal sized left atrium. Interatrial septum appears intact. Right Atrium   Normal right atrium size. Mitral Valve   Structurally normal mitral valve. Physiologic and/or trace mitral regurgitation is present. No evidence of mitral valve stenosis. Tricuspid Valve   The tricuspid valve appears structurally normal.   Physiologic and/or trace tricuspid regurgitation. RVSP is normal.      Aortic Valve   The aortic valve appears mildly sclerotic. Aortic valve opens well. No evidence of aortic valve regurgitation. Mild aortic stenosis is present. Pulmonic Valve   The pulmonic valve was not well visualized. No evidence of any pulmonic regurgitation. Pericardial Effusion   No evidence of pericardial effusion. Aorta   Aortic root within normal limits. Conclusions      Summary   No previous echo for comparison. Technically adequate study. Proximal septal thickening. Ejection fraction is visually estimated at 65%. No regional wall motion abnormalities seen. E/A flow reversal noted. Suggestive of diastolic dysfunction. Physiologic and/or trace mitral regurgitation is present. Mild aortic stenosis is present. Physiologic and/or trace tricuspid regurgitation. RVSP is normal.      Signature      ----------------------------------------------------------------   Electronically signed by Justina Velazquez MD(Interpreting   physician) on 08/06/2018 07:46 PM   ----------------------------------------------------------------     Cardiac Catheterization 08/9/18:  PROCEDURES:  1. Coronary angiography. 2.  Right radial approach. 3.  Conscious sedation using Versed and fentanyl. INDICATION:  #4 with score of seven. HISTORY:  The patient is a 29-year-old lady who presented to the hospital  with shortness of breath. She was found to have stress test that was read  as normal, upon personal review of the stress test showed significant  inferior and lateral wall reversible defect, the patient was brought to the  cath lab to evaluate the anatomy of her coronary arteries with the  intention to intervene as warranted. DESCRIPTION OF PROCEDURE:  After the appropriate informed consent, the  right wrist area was prepped and draped in the usual sterile fashion. A  time-out was called. The right wrist area was locally anesthetized with 80  ml of 2% lidocaine. A 21-gauge needle was used to access the right radial  artery. A 6-Colombian introducer sheath was used to cannulate the right  radial artery. A 5-Colombian JL-3.5 and 5-Colombian JR-4 catheter were used for  coronary angiography in multiple projections. ANGIOGRAPHIC FINDINGS:  The left main coronary artery arising from the left  sinus of Valsalva. It is a large vessel that has no significant disease. It trifurcates into left anterior descending artery, left circumflex artery  and ramus intermedius artery. The left anterior descending artery is a very tortuous vessel, actually all  of her vessels are very tortuous and the overlapped significantly, the LAD  has no significant disease. It gives off a two large diagonal branches. The LAD and its diagonal branches have no significant disease. The second  diagonal branch may be has like 30% disease. The ramus intermedius artery also a tortuous vessel without any significant  disease. The left circumflex artery is a large vessel that gives off a  large OM branch and that has like 30% proximal disease. There was grade 3 left-to-right collaterals.      The right coronary artery arising from the right sinus of Valsalva. It has  proximal total occlusion. At the end of the procedure, the catheter and the wire were pulled out from  the body, Vasc band was applied to the right wrist area with effective  hemostasis. MEDICATIONS USED DURING THE PROCEDURE:  Intraarterial verapamil to prevent  vasospasm, intra-arterial heparin for anticoagulation. We used Versed and fentanyl for conscious sedation. First dose was given  at [phone], procedure ended at 1815, there was 20 minutes of direct  face-to-face supervision during conscious sedation administration. Total fluoroscopy time was 1.7 minutes. Total contrast used was 50 mL. IMPRESSION:  1. 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 2.  Significantly tortuous and overlapping left coronary arteries. 3.  Mild disease involving a second diagonal and first OM branch. RECOMMENDATIONS:  1. Aggressive coronary artery disease risk-factor modification. 2.  Smoking cessation. 3.  The patient will be observed for 2 hours, discharged home if she meets  discharge criteria. Tc Harvey MD     Stress Test: 5/5/21   Findings      Left Ventricle   Left ventricle size is normal.   Proximal septal thickening. Ejection fraction is visually estimated at 65%. No regional wall motion abnormalities seen. E/A flow reversal noted. Suggestive of diastolic dysfunction. No evidence of left ventricular mass or thrombus noted. Right Ventricle   Normal right ventricular size and function. Left Atrium   Normal sized left atrium. Interatrial septum appears intact. Right Atrium   Normal right atrium size. Mitral Valve   Structurally normal mitral valve. Physiologic and/or trace mitral regurgitation is present. No evidence of mitral valve stenosis.       Tricuspid Valve   The tricuspid valve appears structurally normal.   Physiologic and/or trace tricuspid regurgitation. RVSP is normal.      Aortic Valve   The aortic valve appears mildly sclerotic. Aortic valve opens well. No evidence of aortic valve regurgitation. Mild aortic stenosis is present. Pulmonic Valve   The pulmonic valve was not well visualized. No evidence of any pulmonic regurgitation. Pericardial Effusion   No evidence of pericardial effusion. Aorta   Aortic root within normal limits. Conclusions      Summary   No previous echo for comparison. Technically adequate study. Proximal septal thickening. Ejection fraction is visually estimated at 65%. No regional wall motion abnormalities seen. E/A flow reversal noted. Suggestive of diastolic dysfunction. Physiologic and/or trace mitral regurgitation is present. Mild aortic stenosis is present. Physiologic and/or trace tricuspid regurgitation. RVSP is normal.      Signature      ----------------------------------------------------------------   Electronically signed by Gallo Romero MD(Interpreting   physician) on 08/06/2018 07:46 PM   ----------------------------------------------------------------     14 day cardiac monitor 6/11/21:           EKG 10/14/2021: SB, rate: 52 bpm - see scanned cardiology    Impression:    1. Paroxysmal atrial fibrillation  - Diagnosed 8/2018.   - [id number] = 5 (age, gender, CAD, CHF, HTN)  - Current [address] [place] [address] Road regiment includes Eliquis. - Current medical therapy includes Toprol XL.  - Thus far has had 1 cardioversion on 12/5/20.  - 14 day cardiac monitor 6/11/21 showed no PAF and symptoms correlated with PACs. - Three ED visits since June 2021 due to AF with RVR while on Toprol XL.  - Successful cryoballoon PVI ablation on 09/02/21.  - Presents in sinus rhythm.     - Re-education on importance of well controlled HTN (goal BP < 130/80), adequate weight control (goal BMI of < 27), physical activity consisting of moderate cardiopulmonary exercise up to a goal of 250 min/wk, daily compliance with CPAP in treating sleep apnea, smoking cessation and limited ETOH intake       2. Sinus bradycardia  - Probable underlying sinus node dysfunction which worsening by Beta-blocker. - Presents today in SB on Toprol XL. 3. Coronary artery disease   - Chronic total occlusion of the right coronary artery with collaterals per [facility] 8/9/18.  - On ASA, Toprol XL and Crestor. 4. Chronic HFpEF  - LV EF 65% on TTE 8/6/18.  - Normal LV EF on JEANNA 9/2/21  - Euvolemic and compensated. - On Toprol XL and Zestril. 5. Valvular heart disease  - Mild aortic stenosis on TTE 8/6/18. 6. Hypertension  - Well controlled. - O Toprol XL and Zestril. 7. Hyperlipidemia   - On Crestor. 8. Hypothyroidism   - On Synthroid. Recommendations:    1. Continue Toprol XL 50 mg daily. 2. Continue Eliquis 5 mg bid. 3. Continue risk factor modifications as above. 4. Follow-up in 3 months or sooner PRN. Encouraged the patient to call the office for any questions or concerns. Thank you for allowing me to participate in your patient's care. I have spent a total of 40 minutes with the patient and the family reviewing the above stated recommendations. And a total of >50% of that time involved face-to-face time providing counseling and/or coordination of care with the other providers, preparation for the clinic visit, reviewing records/tests, counseling/education of the patient, ordering medications/tests/procedures, coordinating care, and documenting clinical information in the EHR.      Chelly Faith MD  Cardiac Electrophysiology  [facility]  [facility] [place]: [address] [facility],  [address] Electrophysiology  5:11 PM  10/14/2021

## 2021-10-14 ENCOUNTER — OFFICE VISIT (OUTPATIENT)
Dept: NON INVASIVE DIAGNOSTICS | Age: 71
End: 2021-10-14
Payer: COMMERCIAL

## 2021-10-14 VITALS
DIASTOLIC BLOOD PRESSURE: 78 MMHG | HEIGHT: 63 IN | WEIGHT: 129.3 LBS | BODY MASS INDEX: 22.91 KG/M2 | RESPIRATION RATE: 16 BRPM | OXYGEN SATURATION: 98 % | SYSTOLIC BLOOD PRESSURE: 128 MMHG | HEART RATE: 52 BPM

## 2021-10-14 DIAGNOSIS — I48.91 ATRIAL FIBRILLATION WITH RAPID VENTRICULAR RESPONSE (HCC): Primary | ICD-10-CM

## 2021-10-14 PROCEDURE — 93000 ELECTROCARDIOGRAM COMPLETE: CPT | Performed by: INTERNAL MEDICINE

## 2021-10-14 PROCEDURE — 99215 OFFICE O/P EST HI 40 MIN: CPT | Performed by: INTERNAL MEDICINE

## 2021-11-19 ENCOUNTER — TELEPHONE (OUTPATIENT)
Dept: NON INVASIVE DIAGNOSTICS | Age: 71
End: 2021-11-19

## 2021-11-20 ENCOUNTER — HOSPITAL ENCOUNTER (EMERGENCY)
Age: 71
Discharge: HOME | End: 2021-11-20
Attending: EMERGENCY MEDICINE
Payer: COMMERCIAL

## 2021-11-20 VITALS
RESPIRATION RATE: 16 BRPM | DIASTOLIC BLOOD PRESSURE: 80 MMHG | WEIGHT: 130 LBS | SYSTOLIC BLOOD PRESSURE: 116 MMHG | HEIGHT: 63 IN | OXYGEN SATURATION: 97 % | TEMPERATURE: 98.1 F | HEART RATE: 79 BPM | BODY MASS INDEX: 23.04 KG/M2

## 2021-11-20 DIAGNOSIS — I48.91 ATRIAL FIBRILLATION WITH RAPID VENTRICULAR RESPONSE (HCC): Primary | ICD-10-CM

## 2021-11-20 LAB
ALBUMIN SERPL-MCNC: 4.1 G/DL (ref 3.5–5.2)
ALP BLD-CCNC: 62 U/L (ref 35–104)
ALT SERPL-CCNC: 13 U/L (ref 0–32)
ANION GAP SERPL CALCULATED.3IONS-SCNC: 12 MMOL/L (ref 7–16)
AST SERPL-CCNC: 22 U/L (ref 0–31)
BASOPHILS ABSOLUTE: 0.08 E9/L (ref 0–0.2)
BASOPHILS RELATIVE PERCENT: 0.8 % (ref 0–2)
BILIRUB SERPL-MCNC: 0.3 MG/DL (ref 0–1.2)
BUN BLDV-MCNC: 8 MG/DL (ref 6–23)
CALCIUM SERPL-MCNC: 9.1 MG/DL (ref 8.6–10.2)
CHLORIDE BLD-SCNC: 106 MMOL/L (ref 98–107)
CO2: 24 MMOL/L (ref 22–29)
CREAT SERPL-MCNC: 0.6 MG/DL (ref 0.5–1)
EOSINOPHILS ABSOLUTE: 0.2 E9/L (ref 0.05–0.5)
EOSINOPHILS RELATIVE PERCENT: 2 % (ref 0–6)
GFR AFRICAN AMERICAN: >60
GFR NON-AFRICAN AMERICAN: >60 ML/MIN/1.73
GLUCOSE BLD-MCNC: 148 MG/DL (ref 74–99)
HCT VFR BLD CALC: 40.4 % (ref 34–48)
HEMOGLOBIN: 13.9 G/DL (ref 11.5–15.5)
IMMATURE GRANULOCYTES #: 0.03 E9/L
IMMATURE GRANULOCYTES %: 0.3 % (ref 0–5)
LYMPHOCYTES ABSOLUTE: 2.5 E9/L (ref 1.5–4)
LYMPHOCYTES RELATIVE PERCENT: 24.7 % (ref 20–42)
MAGNESIUM: 1.9 MG/DL (ref 1.6–2.6)
MCH RBC QN AUTO: 31.5 PG (ref 26–35)
MCHC RBC AUTO-ENTMCNC: 34.4 % (ref 32–34.5)
MCV RBC AUTO: 91.6 FL (ref 80–99.9)
MONOCYTES ABSOLUTE: 0.89 E9/L (ref 0.1–0.95)
MONOCYTES RELATIVE PERCENT: 8.8 % (ref 2–12)
NEUTROPHILS ABSOLUTE: 6.43 E9/L (ref 1.8–7.3)
NEUTROPHILS RELATIVE PERCENT: 63.4 % (ref 43–80)
PDW BLD-RTO: 12.5 FL (ref 11.5–15)
PLATELET # BLD: 207 E9/L (ref 130–450)
PMV BLD AUTO: 10.1 FL (ref 7–12)
POTASSIUM REFLEX MAGNESIUM: 3.3 MMOL/L (ref 3.5–5)
RBC # BLD: 4.41 E12/L (ref 3.5–5.5)
SODIUM BLD-SCNC: 142 MMOL/L (ref 132–146)
TOTAL PROTEIN: 6.6 G/DL (ref 6.4–8.3)
TROPONIN, HIGH SENSITIVITY: 11 NG/L (ref 0–9)
TROPONIN, HIGH SENSITIVITY: 13 NG/L (ref 0–9)
WBC # BLD: 10.1 E9/L (ref 4.5–11.5)

## 2021-11-20 PROCEDURE — 84484 ASSAY OF TROPONIN QUANT: CPT

## 2021-11-20 PROCEDURE — 2500000003 HC RX 250 WO HCPCS

## 2021-11-20 PROCEDURE — 99285 EMERGENCY DEPT VISIT HI MDM: CPT

## 2021-11-20 PROCEDURE — 83735 ASSAY OF MAGNESIUM: CPT

## 2021-11-20 PROCEDURE — 80053 COMPREHEN METABOLIC PANEL: CPT

## 2021-11-20 PROCEDURE — 96374 THER/PROPH/DIAG INJ IV PUSH: CPT

## 2021-11-20 PROCEDURE — 93005 ELECTROCARDIOGRAM TRACING: CPT

## 2021-11-20 PROCEDURE — 36415 COLL VENOUS BLD VENIPUNCTURE: CPT

## 2021-11-20 PROCEDURE — 93005 ELECTROCARDIOGRAM TRACING: CPT | Performed by: EMERGENCY MEDICINE

## 2021-11-20 PROCEDURE — 85025 COMPLETE CBC W/AUTO DIFF WBC: CPT

## 2021-11-20 RX ORDER — DILTIAZEM HYDROCHLORIDE 5 MG/ML
15 INJECTION INTRAVENOUS ONCE
Status: COMPLETED | OUTPATIENT
Start: 2021-11-20 | End: 2021-11-20

## 2021-11-20 RX ADMIN — DILTIAZEM HYDROCHLORIDE 15 MG: 5 INJECTION INTRAVENOUS at 21:24

## 2021-11-20 ASSESSMENT — ENCOUNTER SYMPTOMS
VOMITING: 0
NAUSEA: 0
SHORTNESS OF BREATH: 0
COUGH: 0
DIARRHEA: 0
CONSTIPATION: 0
BLOOD IN STOOL: 0
BACK PAIN: 0
CHEST TIGHTNESS: 0
ABDOMINAL PAIN: 0
WHEEZING: 0

## 2021-11-21 LAB
EKG ATRIAL RATE: 111 BPM
EKG Q-T INTERVAL: 366 MS
EKG QRS DURATION: 90 MS
EKG QTC CALCULATION (BAZETT): 457 MS
EKG R AXIS: 69 DEGREES
EKG T AXIS: 57 DEGREES
EKG VENTRICULAR RATE: 94 BPM

## 2021-11-21 NOTE — ED PROVIDER NOTES
[address]      Pt Name: Audrea Seip  MRN: [id number]  [place] 1950  Date of evaluation: 11/20/2021      CHIEF COMPLAINT       Chief Complaint   Patient presents with    Palpitations     started 20 minutes ago, stopped xarelto for procedure        HPI  Audrea Seip is a 73 y.o. female presents with palpitations. Patient has history of A. fib with RVR several visits to the ED, states that she had an ablation done in September and no new episodes since then. Patient also states that she has a procedure on Monday for polyp removal of her vocal cord and she stopped Eliquis after being cleared by cardiology, states that she has missed 2 doses of Eliquis. Patient states that today she started feeling the palpitations for which she is very familiar with being A. fib with RVR but denies any other symptoms. Describes her symptoms as constant,with no alleviating or exacerbating factors. denies any fever, chills, n/v, headache, dizziness, weakness, cp,  sob, cough, abd pain, dysuria, hematuria, diarrhea, constipation. Except as noted above the remainder of the review of systems was reviewed and negative. Review of Systems   Constitutional: Negative for activity change, appetite change, chills, fatigue and fever. HENT: Negative for congestion, nosebleeds and tinnitus. Eyes: Negative for visual disturbance. Respiratory: Negative for cough, chest tightness, shortness of breath and wheezing. Cardiovascular: Positive for palpitations. Negative for chest pain and leg swelling. Gastrointestinal: Negative for abdominal pain, blood in stool, constipation, diarrhea, nausea and vomiting. Endocrine: Negative for polydipsia and polyphagia. Genitourinary: Negative for dysuria, flank pain, hematuria, vaginal bleeding, vaginal discharge and vaginal pain.    Musculoskeletal: Negative for back pain, gait problem, joint swelling and myalgias. Skin: Negative for rash. Allergic/Immunologic: Negative for immunocompromised state. Neurological: Negative for dizziness, syncope, weakness, numbness and headaches. Hematological: Negative for adenopathy. Psychiatric/Behavioral: Negative for behavioral problems, confusion and hallucinations. Physical Exam  Constitutional:       General: She is not in acute distress. Appearance: Normal appearance. She is normal weight. She is not ill-appearing, toxic-appearing or diaphoretic. HENT:      Head: Normocephalic and atraumatic. Right Ear: External ear normal.      Left Ear: External ear normal.      Nose: Nose normal. No congestion or rhinorrhea. Mouth/Throat:      Mouth: Mucous membranes are moist.      Pharynx: Oropharynx is clear. No oropharyngeal exudate or posterior oropharyngeal erythema. Eyes:      Conjunctiva/sclera: Conjunctivae normal.      Pupils: Pupils are equal, round, and reactive to light. Cardiovascular:      Rate and Rhythm: Tachycardia present. Rhythm irregular. Pulses: Normal pulses. Heart sounds: Normal heart sounds. Pulmonary:      Effort: Pulmonary effort is normal.      Breath sounds: Normal breath sounds. Abdominal:      General: Abdomen is flat. Bowel sounds are normal. There is no distension. Palpations: Abdomen is soft. There is no mass. Tenderness: There is no abdominal tenderness. There is no right CVA tenderness, left CVA tenderness or guarding. Hernia: No hernia is present. Musculoskeletal:      Cervical back: Normal range of motion. Skin:     General: Skin is warm and dry. Capillary Refill: Capillary refill takes less than 2 seconds. Neurological:      General: No focal deficit present. Mental Status: She is alert and oriented to person, place, and time. Mental status is at baseline.    Psychiatric:         Mood and Affect: Mood normal.         Behavior: Behavior normal.         Thought 0. 89 0.10 - 0.95 E9/L    Eosinophils Absolute 0.20 0.05 - 0.50 E9/L    Basophils Absolute 0.08 0.00 - 0.20 E9/L   Comprehensive Metabolic Panel w/ Reflex to MG   Result Value Ref Range    Sodium 142 132 - 146 mmol/L    Potassium reflex Magnesium 3.3 (L) 3.5 - 5.0 mmol/L    Chloride 106 98 - 107 mmol/L    CO2 24 22 - 29 mmol/L    Anion Gap 12 7 - 16 mmol/L    Glucose 148 (H) 74 - 99 mg/dL    BUN 8 6 - 23 mg/dL    CREATININE 0.6 0.5 - 1.0 mg/dL    GFR Non-African American >60 >=60 mL/min/1.73    GFR African American >60     Calcium 9.1 8.6 - 10.2 mg/dL    Total Protein 6.6 6.4 - 8.3 g/dL    Albumin 4.1 3.5 - 5.2 g/dL    Total Bilirubin 0.3 0.0 - 1.2 mg/dL    Alkaline Phosphatase 62 35 - 104 U/L    ALT 13 0 - 32 U/L    AST 22 0 - 31 U/L   Troponin   Result Value Ref Range    Troponin, High Sensitivity 11 (H) 0 - 9 ng/L   Magnesium   Result Value Ref Range    Magnesium 1.9 1.6 - 2.6 mg/dL   Troponin   Result Value Ref Range    Troponin, High Sensitivity 13 (H) 0 - 9 ng/L   EKG 12 Lead   Result Value Ref Range    Ventricular Rate 94 BPM    Atrial Rate 111 BPM    QRS Duration 90 ms    Q-T Interval 366 ms    QTc Calculation (Bazett) 457 ms    R Axis 69 degrees    T Axis 57 degrees   EKG 12 Lead   Result Value Ref Range    Ventricular Rate 127 BPM    Atrial Rate 94 BPM    QRS Duration 88 ms    Q-T Interval 312 ms    QTc Calculation (Bazett) 453 ms    R Axis 63 degrees    T Axis -3 degrees       Radiology:  No orders to display       ------------------------- NURSING NOTES AND VITALS REVIEWED ---------------------------  Date / Time Roomed:  11/20/2021  8:56 PM  ED Bed Assignment:  MORA/MORA    The nursing notes within the ED encounter and vital signs as below have been reviewed.    /80   Pulse 79   Temp 98.1 °F (36.7 °C) (Temporal)   Resp 16   Ht 5' 3\" (1.6 m)   Wt 130 lb (59 kg)   SpO2 97%   BMI 23.03 kg/m²   Oxygen Saturation Interpretation: Normal      ------------------------------------------ PROGRESS NOTES ------------------------------------------  10:36 PM EST  I have spoken with the patient and discussed todays results, in addition to providing specific details for the plan of care and counseling regarding the diagnosis and prognosis. Their questions are answered at this time and they are agreeable with the plan. I discussed at length with them reasons for immediate return here for re evaluation. They will followup with their cardiology and primary care physician by calling their office tomorrow. --------------------------------- ADDITIONAL PROVIDER NOTES ---------------------------------  At this time the patient is without objective evidence of an acute process requiring hospitalization or inpatient management. They have remained hemodynamically stable throughout their entire ED visit and are stable for discharge with outpatient follow-up. The plan has been discussed in detail and they are aware of the specific conditions for emergent return, as well as the importance of follow-up. Discharge Medication List as of 11/20/2021 11:10 PM          Diagnosis:  1. Atrial fibrillation with rapid ventricular response (HCC)        Disposition:  Patient's disposition: Discharge to home  Patient's condition is stable. Linda Lainez MD  Resident  11/24/21 [id number]    ATTENDING PROVIDER ATTESTATION:     I have personally performed and/or participated in the history, exam, medical decision making, and procedures and agree with all pertinent clinical information. I have also reviewed and agree with the past medical, family and social history unless otherwise noted. I have discussed this patient in detail with the resident, and provided the instruction and education regarding palpitations and atrial fibrillation. My findings/Plan: Tachycardic. Irregularly irregular rhythm. Lungs CTA bilaterally. Abdomen soft, nontender. Bowel sounds normal. Supportive care.  Discharge for outpatient follow up.           Martita Burkett, DO  12/21/21 1289

## 2021-11-22 LAB
EKG ATRIAL RATE: 94 BPM
EKG Q-T INTERVAL: 312 MS
EKG QRS DURATION: 88 MS
EKG QTC CALCULATION (BAZETT): 453 MS
EKG R AXIS: 63 DEGREES
EKG T AXIS: -3 DEGREES
EKG VENTRICULAR RATE: 127 BPM

## 2022-03-18 NOTE — PROGRESS NOTES
Cardiac Electrophysiology Outpatient Progress Note    Carolyn Rodriguez  1950  Date of Service: 3/22/2022  PCP: Flex Muñoz DO  ELECTROPHYSIOLOGIST: Gabo Boyd MD        Subjective: Carolyn Rodriguez is seen in cardiac electrophysiology clinic for follow-up and management of paroxysmal atrial fibrillation and bradycardia status post cryo-balloon PVI on 9/2/21. The patient states that she feels well from a EP POV and reports having very sporadic brief episodes of palpitations. The patient denies any chest pain, dyspnea, dizziness, syncope, orthopnea or paroxysmal nocturnal dyspnea. She presents today in SR. She remains on Toprol XL for rate control and Eliquis for stroke risk reduction.     Patient Active Problem List   Diagnosis    Spinal stenosis    Degeneration of lumbosacral intervertebral disc    Lumbago    Colonic diverticular abscess    CAD in native artery    S/P colon resection    Diverticulitis of large intestine with perforation and abscess without bleeding    Pelvic abscess in female    Colostomy reversal    SBO (small bowel obstruction) (HCC)    Bradycardia    Chest pain    Atrial fibrillation with rapid ventricular response (HCC)    Paroxysmal atrial fibrillation (HCC)     Current Outpatient Medications   Medication Sig Dispense Refill    pantoprazole (PROTONIX) 40 MG tablet       ferrous sulfate (IRON 325) 325 (65 Fe) MG tablet Take 325 mg by mouth daily (with breakfast)      alendronate (FOSAMAX) 70 MG tablet Take 70 mg by mouth daily       metoprolol succinate (TOPROL XL) 50 MG extended release tablet Take 1 tablet by mouth daily 30 tablet 3    SYNTHROID 125 MCG tablet Take 125 mcg by mouth Daily Take 1 tablet daily      Calcium Carbonate-Vit D-Min (CALCIUM 1200 PO) Take 1 capsule by mouth      docusate sodium (COLACE) 100 MG capsule Take 100 mg by mouth nightly       Echinacea 125 MG CAPS Take 125 mg by mouth daily      aspirin EC 81 MG EC tablet Take 1 tablet by mouth daily 30 tablet 3    apixaban (ELIQUIS) 5 MG TABS tablet Take 1 tablet by mouth 2 times daily 60 tablet 0    ZINC PO Take 1 tablet by mouth daily      Omega-3 Fatty Acids (FISH OIL) 1000 MG CAPS Take 1,000 mg by mouth 2 times daily       lisinopril (PRINIVIL;ZESTRIL) 10 MG tablet Take 10 mg by mouth daily.  rosuvastatin (CRESTOR) 10 MG tablet Take 10 mg by mouth daily. No current facility-administered medications for this visit. No Known Allergies    ROS:   Constitutional: Negative for fever, activity change and appetite change. HENT: Negative for epistaxis. Eyes: Negative for diploplia, blurred vision. Respiratory: Negative for cough, chest tightness, shortness of breath and wheezing. Cardiovascular: pertinent positives in HPI  Gastrointestinal: Negative for abdominal pain and blood in stool. All other review of systems are negative       PHYSICAL EXAM:  Vitals:    03/22/22 1308   BP: 132/82   Site: Left Upper Arm   Position: Sitting   Cuff Size: Medium Adult   Pulse: 69   Resp: 18   Weight: 132 lb 3.2 oz (60 kg)   Height: 5' 3\" (1.6 m)      Constitutional: Well-developed, no acute distress  Eyes: conjunctivae normal, no xanthelasma   Ears, Nose, Throat: oral mucosa moist, no cyanosis   CV: no JVD. Regular rate and rhythm. Normal S1S2 and no S3. No murmurs, rubs, or gallops. PMI is nondisplaced  Lungs: clear to auscultation bilaterally, normal respiratory effort without used of accessory muscles  Abdomen: soft, non-tender, bowel sounds present, no masses or hepatomegaly   Musculoskeletal: no digital clubbing, no edema , no groin hematoma  Skin: warm, no rashes     Pertinent Labs:  CBC:   No results for input(s): WBC, HGB, HCT, PLT in the last 72 hours. BMP:  No results for input(s): NA, K, CL, CO2, BUN, CREATININE, GLU, CALCIUM in the last 72 hours.     TSH:   Lab Results   Component Value Date    TSH 3.520 07/11/2021      Lipid Profile:   Lab Results   Component Value Date TRIG 111 06/10/2021    HDL 38 06/10/2021    LDLCALC 75 06/10/2021    CHOL 135 06/10/2021       Pertinent Cardiac Testing:     JEANNA 9/2/21:  Findings      Left Ventricle   Normal left ventricular size and systolic function. Right Ventricle   Normal right ventricular size and function. Left Atrium   Normal sized left atrium. There is no left atrial appendage thrombus. No evidence of patent foramen ovale during color flow doppler study. Right Atrium   Normal right atrium size. Mitral Valve   Normal mitral valve structure and function. Mild mitral regurgitation is present. Tricuspid Valve   The tricuspid valve appears structurally normal.   Physiologic and/or trace tricuspid regurgitation. Aortic Valve   Structurally normal aortic valve. No evidence of aortic valve regurgitation. No hemodynamically significant aortic stenosis is present. Pulmonic Valve   Pulmonic valve is structurally normal.   Physiologic and/or trace pulmonic regurgitation present. Pericardial Effusion   No pericardial effusion. Aorta   Mild atherosclerotic change of ascending aorta. Conclusions      Summary   Normal left ventricular size and systolic function. Normal sized left atrium. There is no left atrial appendage thrombus. No evidence of patent foramen ovale during color flow doppler study. Mild mitral regurgitation is present. Physiologic and/or trace tricuspid regurgitation. Mild atherosclerotic change of ascending aorta. No pericardial effusion. Signature      ----------------------------------------------------------------   Electronically signed by Nitesh Malagon MD(Interpreting   physician) on 09/02/2021 01:50 PM   ----------------------------------------------------------------    Echocardiogram 6/9/21:  Findings      Left Ventricle   Normal left ventricular chamber size. Normal left ventricular systolic function, LVEF is 65%.    Indeterminate LV PM   ----------------------------------------------------------------        Echocardiogram 8/6/18: Findings      Left Ventricle   Left ventricle size is normal.   Proximal septal thickening. Ejection fraction is visually estimated at 65%. No regional wall motion abnormalities seen. E/A flow reversal noted. Suggestive of diastolic dysfunction. No evidence of left ventricular mass or thrombus noted. Right Ventricle   Normal right ventricular size and function. Left Atrium   Normal sized left atrium. Interatrial septum appears intact. Right Atrium   Normal right atrium size. Mitral Valve   Structurally normal mitral valve. Physiologic and/or trace mitral regurgitation is present. No evidence of mitral valve stenosis. Tricuspid Valve   The tricuspid valve appears structurally normal.   Physiologic and/or trace tricuspid regurgitation. RVSP is normal.      Aortic Valve   The aortic valve appears mildly sclerotic. Aortic valve opens well. No evidence of aortic valve regurgitation. Mild aortic stenosis is present. Pulmonic Valve   The pulmonic valve was not well visualized. No evidence of any pulmonic regurgitation. Pericardial Effusion   No evidence of pericardial effusion. Aorta   Aortic root within normal limits. Conclusions      Summary   No previous echo for comparison. Technically adequate study. Proximal septal thickening. Ejection fraction is visually estimated at 65%. No regional wall motion abnormalities seen. E/A flow reversal noted. Suggestive of diastolic dysfunction. Physiologic and/or trace mitral regurgitation is present. Mild aortic stenosis is present. Physiologic and/or trace tricuspid regurgitation.    RVSP is normal.      Signature      ----------------------------------------------------------------   Electronically signed by Tayler Llanes MD(Interpreting   physician) on 08/06/2018 07:46 PM ----------------------------------------------------------------     Cardiac Catheterization 08/9/18:  PROCEDURES:  1. Coronary angiography. 2.  Right radial approach. 3.  Conscious sedation using Versed and fentanyl. INDICATION:  #4 with score of seven. HISTORY:  The patient is a 53-year-old lady who presented to the hospital  with shortness of breath. She was found to have stress test that was read  as normal, upon personal review of the stress test showed significant  inferior and lateral wall reversible defect, the patient was brought to the  cath lab to evaluate the anatomy of her coronary arteries with the  intention to intervene as warranted. DESCRIPTION OF PROCEDURE:  After the appropriate informed consent, the  right wrist area was prepped and draped in the usual sterile fashion. A  time-out was called. The right wrist area was locally anesthetized with 80  ml of 2% lidocaine. A 21-gauge needle was used to access the right radial  artery. A 6-Beninese introducer sheath was used to cannulate the right  radial artery. A 5-Beninese JL-3.5 and 5-Beninese JR-4 catheter were used for  coronary angiography in multiple projections. ANGIOGRAPHIC FINDINGS:  The left main coronary artery arising from the left  sinus of Valsalva. It is a large vessel that has no significant disease. It trifurcates into left anterior descending artery, left circumflex artery  and ramus intermedius artery. The left anterior descending artery is a very tortuous vessel, actually all  of her vessels are very tortuous and the overlapped significantly, the LAD  has no significant disease. It gives off a two large diagonal branches. The LAD and its diagonal branches have no significant disease. The second  diagonal branch may be has like 30% disease. The ramus intermedius artery also a tortuous vessel without any significant  disease.   The left circumflex artery is a large vessel that gives off a  large OM branch and that has like 30% proximal disease. There was grade 3 left-to-right collaterals. The right coronary artery arising from the right sinus of Valsalva. It has  proximal total occlusion. At the end of the procedure, the catheter and the wire were pulled out from  the body, Vasc band was applied to the right wrist area with effective  hemostasis. MEDICATIONS USED DURING THE PROCEDURE:  Intraarterial verapamil to prevent  vasospasm, intra-arterial heparin for anticoagulation. We used Versed and fentanyl for conscious sedation. First dose was given  at [phone], procedure ended at 1815, there was 20 minutes of direct  face-to-face supervision during conscious sedation administration. Total fluoroscopy time was 1.7 minutes. Total contrast used was 50 mL. IMPRESSION:  1. 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 2.  Significantly tortuous and overlapping left coronary arteries. 3.  Mild disease involving a second diagonal and first OM branch. RECOMMENDATIONS:  1. Aggressive coronary artery disease risk-factor modification. 2.  Smoking cessation. 3.  The patient will be observed for 2 hours, discharged home if she meets  discharge criteria. Marycruz Gonzalez MD     Stress Test: 5/5/21   Findings      Left Ventricle   Left ventricle size is normal.   Proximal septal thickening. Ejection fraction is visually estimated at 65%. No regional wall motion abnormalities seen. E/A flow reversal noted. Suggestive of diastolic dysfunction. No evidence of left ventricular mass or thrombus noted. Right Ventricle   Normal right ventricular size and function. Left Atrium   Normal sized left atrium. Interatrial septum appears intact. Right Atrium   Normal right atrium size. Mitral Valve   Structurally normal mitral valve. Physiologic and/or trace mitral regurgitation is present.    No evidence of mitral valve stenosis. Tricuspid Valve   The tricuspid valve appears structurally normal.   Physiologic and/or trace tricuspid regurgitation. RVSP is normal.      Aortic Valve   The aortic valve appears mildly sclerotic. Aortic valve opens well. No evidence of aortic valve regurgitation. Mild aortic stenosis is present. Pulmonic Valve   The pulmonic valve was not well visualized. No evidence of any pulmonic regurgitation. Pericardial Effusion   No evidence of pericardial effusion. Aorta   Aortic root within normal limits. Conclusions      Summary   No previous echo for comparison. Technically adequate study. Proximal septal thickening. Ejection fraction is visually estimated at 65%. No regional wall motion abnormalities seen. E/A flow reversal noted. Suggestive of diastolic dysfunction. Physiologic and/or trace mitral regurgitation is present. Mild aortic stenosis is present. Physiologic and/or trace tricuspid regurgitation. RVSP is normal.      Signature      ----------------------------------------------------------------   Electronically signed by Leanna Floyd MD(Interpreting   physician) on 08/06/2018 07:46 PM   ----------------------------------------------------------------     14 day cardiac monitor 6/11/21:           EKG 3/22/2022: SR, rate: 69bpm - see scanned cardiology    Impression:    1. Paroxysmal atrial fibrillation  - Diagnosed 8/2018.   - [id number] = 5 (age, gender, CAD, CHF, HTN)  - Current [address] [place] [address] Road regiment includes Eliquis. - Current medical therapy includes Toprol XL.  - Thus far has had 1 cardioversion on 12/5/20.  - 14 day cardiac monitor 6/11/21 showed no PAF and symptoms correlated with PACs.   - Three ED visits since June 2021 due to AF with RVR while on Toprol XL.  - Successful cryoballoon PVI ablation on 09/02/21.  - Presents in NSR.   - Re-education on importance of well controlled HTN (goal BP < 130/80), adequate weight control (goal BMI of < 27), physical activity consisting of moderate cardiopulmonary exercise up to a goal of 250 min/wk, daily compliance with CPAP in treating sleep apnea, smoking cessation and limited ETOH intake       2. Sinus bradycardia  - Probable underlying sinus node dysfunction which worsening by Beta-blocker. - Presents today in SR on Toprol XL. 3. Coronary artery disease   - Chronic total occlusion of the right coronary artery with collaterals per [facility] 8/9/18.  - On ASA, Toprol XL and Crestor. 4. Chronic HFpEF  - LV EF 65% on TTE 8/6/18.  - Normal LV EF on JEANNA 9/2/21  - Euvolemic and compensated. - On Toprol XL and Zestril. 5. Valvular heart disease  - Mild aortic stenosis on TTE 8/6/18. 6. Hypertension  - Controlled. - O Toprol XL and Zestril. 7. Hyperlipidemia   - On Crestor. 8. Hypothyroidism   - On Synthroid. Recommendations:    1. Continue Toprol XL 50 mg daily. 2. Continue Eliquis 5 mg bid. 3. Continue risk factor modifications as above. 4. Follow-up in 6 months or sooner PRN. Encouraged the patient to call the office for any questions or concerns. Thank you for allowing me to participate in your patient's care. I have spent a total of 40 minutes with the patient and the family reviewing the above stated recommendations. And a total of >50% of that time involved face-to-face time providing counseling and/or coordination of care with the other providers, preparation for the clinic visit, reviewing records/tests, counseling/education of the patient, ordering medications/tests/procedures, coordinating care, and documenting clinical information in the EHR.      Clara Greene MD  Cardiac Electrophysiology  [address]  [facility] [place]  3/22/22   1:37 PM

## 2022-03-22 ENCOUNTER — OFFICE VISIT (OUTPATIENT)
Dept: NON INVASIVE DIAGNOSTICS | Age: 72
End: 2022-03-22
Payer: COMMERCIAL

## 2022-03-22 VITALS
WEIGHT: 132.2 LBS | HEART RATE: 69 BPM | BODY MASS INDEX: 23.42 KG/M2 | RESPIRATION RATE: 18 BRPM | DIASTOLIC BLOOD PRESSURE: 82 MMHG | SYSTOLIC BLOOD PRESSURE: 132 MMHG | HEIGHT: 63 IN

## 2022-03-22 DIAGNOSIS — I48.91 ATRIAL FIBRILLATION, UNSPECIFIED TYPE (HCC): ICD-10-CM

## 2022-03-22 DIAGNOSIS — I48.91 ATRIAL FIBRILLATION WITH RAPID VENTRICULAR RESPONSE (HCC): Primary | ICD-10-CM

## 2022-03-22 PROCEDURE — 93000 ELECTROCARDIOGRAM COMPLETE: CPT | Performed by: INTERNAL MEDICINE

## 2022-03-22 PROCEDURE — 99215 OFFICE O/P EST HI 40 MIN: CPT | Performed by: INTERNAL MEDICINE

## 2022-03-22 RX ORDER — FERROUS SULFATE 325(65) MG
325 TABLET ORAL
COMMUNITY

## 2022-03-22 RX ORDER — PANTOPRAZOLE SODIUM 40 MG/1
TABLET, DELAYED RELEASE ORAL
COMMUNITY
Start: 2022-02-12

## 2022-05-06 ENCOUNTER — HOSPITAL ENCOUNTER (OUTPATIENT)
Age: 72
Discharge: HOME | End: 2022-05-06
Payer: COMMERCIAL

## 2022-05-06 LAB
ALBUMIN SERPL-MCNC: 3.9 G/DL (ref 3.5–5.2)
ALP BLD-CCNC: 71 U/L (ref 35–104)
ALT SERPL-CCNC: 12 U/L (ref 0–32)
ANION GAP SERPL CALCULATED.3IONS-SCNC: 10 MMOL/L (ref 7–16)
AST SERPL-CCNC: 18 U/L (ref 0–31)
BASOPHILS ABSOLUTE: 0.06 E9/L (ref 0–0.2)
BASOPHILS RELATIVE PERCENT: 0.6 % (ref 0–2)
BILIRUB SERPL-MCNC: 0.4 MG/DL (ref 0–1.2)
BUN BLDV-MCNC: 8 MG/DL (ref 6–23)
CALCIUM SERPL-MCNC: 9.2 MG/DL (ref 8.6–10.2)
CHLORIDE BLD-SCNC: 101 MMOL/L (ref 98–107)
CHOLESTEROL, FASTING: 136 MG/DL (ref 0–199)
CO2: 28 MMOL/L (ref 22–29)
CREAT SERPL-MCNC: 0.7 MG/DL (ref 0.5–1)
EOSINOPHILS ABSOLUTE: 0.22 E9/L (ref 0.05–0.5)
EOSINOPHILS RELATIVE PERCENT: 2.1 % (ref 0–6)
GFR AFRICAN AMERICAN: >60
GFR NON-AFRICAN AMERICAN: >60 ML/MIN/1.73
GLUCOSE FASTING: 97 MG/DL (ref 74–99)
HCT VFR BLD CALC: 43.2 % (ref 34–48)
HDLC SERPL-MCNC: 44 MG/DL
HEMOGLOBIN: 14.5 G/DL (ref 11.5–15.5)
IMMATURE GRANULOCYTES #: 0.07 E9/L
IMMATURE GRANULOCYTES %: 0.7 % (ref 0–5)
LDL CHOLESTEROL CALCULATED: 74 MG/DL (ref 0–99)
LYMPHOCYTES ABSOLUTE: 1.8 E9/L (ref 1.5–4)
LYMPHOCYTES RELATIVE PERCENT: 17.6 % (ref 20–42)
MCH RBC QN AUTO: 31.8 PG (ref 26–35)
MCHC RBC AUTO-ENTMCNC: 33.6 % (ref 32–34.5)
MCV RBC AUTO: 94.7 FL (ref 80–99.9)
MONOCYTES ABSOLUTE: 0.99 E9/L (ref 0.1–0.95)
MONOCYTES RELATIVE PERCENT: 9.7 % (ref 2–12)
NEUTROPHILS ABSOLUTE: 7.11 E9/L (ref 1.8–7.3)
NEUTROPHILS RELATIVE PERCENT: 69.3 % (ref 43–80)
PDW BLD-RTO: 12.6 FL (ref 11.5–15)
PLATELET # BLD: 232 E9/L (ref 130–450)
PMV BLD AUTO: 10 FL (ref 7–12)
POTASSIUM SERPL-SCNC: 3.8 MMOL/L (ref 3.5–5)
RBC # BLD: 4.56 E12/L (ref 3.5–5.5)
SODIUM BLD-SCNC: 139 MMOL/L (ref 132–146)
T4 TOTAL: 10.8 MCG/DL (ref 4.5–11.7)
TOTAL PROTEIN: 7.2 G/DL (ref 6.4–8.3)
TRIGLYCERIDE, FASTING: 89 MG/DL (ref 0–149)
TSH SERPL DL<=0.05 MIU/L-ACNC: 1.37 UIU/ML (ref 0.27–4.2)
VLDLC SERPL CALC-MCNC: 18 MG/DL
WBC # BLD: 10.3 E9/L (ref 4.5–11.5)

## 2022-05-06 PROCEDURE — 80061 LIPID PANEL: CPT

## 2022-05-06 PROCEDURE — 85025 COMPLETE CBC W/AUTO DIFF WBC: CPT

## 2022-05-06 PROCEDURE — 84443 ASSAY THYROID STIM HORMONE: CPT

## 2022-05-06 PROCEDURE — 84436 ASSAY OF TOTAL THYROXINE: CPT

## 2022-05-06 PROCEDURE — 80053 COMPREHEN METABOLIC PANEL: CPT

## 2022-05-06 PROCEDURE — 36415 COLL VENOUS BLD VENIPUNCTURE: CPT

## 2022-08-11 ENCOUNTER — OFFICE VISIT (OUTPATIENT)
Dept: CARDIOLOGY CLINIC | Age: 72
End: 2022-08-11
Payer: COMMERCIAL

## 2022-08-11 VITALS
WEIGHT: 135 LBS | HEIGHT: 63 IN | DIASTOLIC BLOOD PRESSURE: 72 MMHG | SYSTOLIC BLOOD PRESSURE: 108 MMHG | RESPIRATION RATE: 16 BRPM | BODY MASS INDEX: 23.92 KG/M2 | HEART RATE: 65 BPM

## 2022-08-11 DIAGNOSIS — I48.91 ATRIAL FIBRILLATION, UNSPECIFIED TYPE (HCC): Primary | ICD-10-CM

## 2022-08-11 PROCEDURE — 93000 ELECTROCARDIOGRAM COMPLETE: CPT | Performed by: INTERNAL MEDICINE

## 2022-08-11 PROCEDURE — 99214 OFFICE O/P EST MOD 30 MIN: CPT | Performed by: INTERNAL MEDICINE

## 2022-08-11 PROCEDURE — 1123F ACP DISCUSS/DSCN MKR DOCD: CPT | Performed by: INTERNAL MEDICINE

## 2022-08-11 NOTE — PROGRESS NOTES
[facility] Cardiology Progress Note  Dr. Luz Maria Pickering      Referring Physician: Merlyn Riddle DO  CHIEF COMPLAINT:   Chief Complaint   Patient presents with    Coronary Artery Disease     Annual        HISTORY OF PRESENT ILLNESS:   Patient is 78year old female with CAD, paroxysmal atrial fibrillation, mild aortic valve stenosis, chronic diastolic congestive heart failure, hypertension, hypothyroidism, hyperlipidemia, is here for follow-up appointment  Patient denies any chest pain, no shortness of breath, no lightheadedness, no dizziness, no palpitations, no pedal edema, no PND, no orthopnea, no syncope, no presyncopal episodes.     Functional capacity is at baseline    Past Medical History:   Diagnosis Date    AF (atrial fibrillation) ([address] 09/2021    Arrhythmia     CAD in native artery 97/08/0919    Diastolic heart failure (HCC)     Diverticulitis 08/03/2018    H/O cardiovascular stress test 05/05/2021    Lexiscan    HIGH CHOLESTEROL     History of atrial fibrillation 08/2018    with episode of diverticulosis    History of stress test 08/07/2018    Lexiscan stress test    Hypertension     Thyroid disease          Past Surgical History:   Procedure Laterality Date    ABLATION OF DYSRHYTHMIC FOCUS  09/02/2021    successful AF Cryo-Ablation   (Dr. Duran Jones)    COLONOSCOPY      COLONOSCOPY N/A 04/08/2019    COLONOSCOPY performed by Deysi Muñoz MD at [facility] CATH LAB PROCEDURE  08/09/2018    NERVE BLOCK  12/27/2011    NERVE BLOCK  01/24/2012    lumbar epidural    NERVE BLOCK  02/21/2012    lumbar epidural with flouro    NE COLONOSCOPY FLX DX W/COLLJ SPEC WHEN PFRMD N/A 10/04/2018    [facility] PART[facility], Radha Gregg N/A 11/13/2018    LAPAROSCOPIC ROBOT XI ASSISTED SIGMOID COLON RESECTION WITH OSTOMY performed by Deysi Muñoz MD at [address] N/A 04/09/2019    COLOSTOMY CLOSURE LAPAROSCOPIC ROBOTIC XI performed by Radha Escobar MD at [address] N/A 08/13/2020    DIAGNOSTIC LAPAROSCOPIY POSS BOWEL RESECTION performed by Gavin Prescott MD at [address]         Current Outpatient Medications   Medication Sig Dispense Refill    pantoprazole (PROTONIX) 40 MG tablet       ferrous sulfate (IRON 325) 325 (65 Fe) MG tablet Take 325 mg by mouth daily (with breakfast)      alendronate (FOSAMAX) 70 MG tablet Take 70 mg by mouth daily       metoprolol succinate (TOPROL XL) 50 MG extended release tablet Take 1 tablet by mouth daily 30 tablet 3    SYNTHROID 125 MCG tablet Take 125 mcg by mouth Daily Take 1 tablet daily      Calcium Carbonate-Vit D-Min (CALCIUM 1200 PO) Take 1 capsule by mouth      docusate sodium (COLACE) 100 MG capsule Take 100 mg by mouth nightly       Echinacea 125 MG CAPS Take 125 mg by mouth daily      aspirin EC 81 MG EC tablet Take 1 tablet by mouth daily 30 tablet 3    apixaban (ELIQUIS) 5 MG TABS tablet Take 1 tablet by mouth 2 times daily 60 tablet 0    ZINC PO Take 1 tablet by mouth daily      Omega-3 Fatty Acids (FISH OIL) 1000 MG CAPS Take 1,000 mg by mouth 2 times daily       lisinopril (PRINIVIL;ZESTRIL) 10 MG tablet Take 10 mg by mouth daily. rosuvastatin (CRESTOR) 10 MG tablet Take 10 mg by mouth daily. No current facility-administered medications for this visit. Allergies as of 08/11/2022    (No Known Allergies)       Social History     Socioeconomic History    Marital status:      Spouse name: Not on file    Number of children: Not on file    Years of education: Not on file    Highest education level: Not on file   Occupational History    Not on file   Tobacco Use    Smoking status: Every Day     Packs/day: 0.50     Types: Cigarettes    Smokeless tobacco: Never   Vaping Use    Vaping Use: Never used   Substance and Sexual Activity    Alcohol use: No     Comment: 5 cups of coffee decaf/regular    Drug use:  No Sexual activity: Not on file   Other Topics Concern    Not on file   Social History Narrative    Not on file     Social Determinants of Health     Financial Resource Strain: Not on file   Food Insecurity: Not on file   Transportation Needs: Not on file   Physical Activity: Not on file   Stress: Not on file   Social Connections: Not on file   Intimate Partner Violence: Not on file   Housing Stability: Not on file       Family History   Problem Relation Age of Onset    Cancer Other     Heart Disease Other     Cancer Mother         brain    Breast Cancer Mother     Heart Attack Father         age 48     Heart Attack Sister         age 46     Colon Cancer Sister        REVIEW OF SYSTEMS:     CONSTITUTIONAL:  negative for  fevers, chills, sweats and fatigue  HEENT:  negative for  tinnitus, earaches, nasal congestion and epistaxis  RESPIRATORY:  negative for  dry cough, cough with sputum, wheezing and hemoptysis  GASTROINTESTINAL:  negative for nausea, vomiting, diarrhea, constipation, pruritus and jaundice  HEMATOLOGIC/LYMPHATIC:  negative for easy bruising, bleeding, lymphadenopathy and petechiae  ENDOCRINE:  negative for heat intolerance, cold intolerance, tremor, hair loss and diabetic symptoms including neither polyuria nor polydipsia nor blurred vision  MUSCULOSKELETAL:  negative for  myalgias, arthralgias, joint swelling, stiff joints and decreased range of motion  NEUROLOGICAL:  negative for memory problems, speech problems, visual disturbance, dysphagia, weakness and numbness      PHYSICAL EXAM:   CONSTITUTIONAL:  awake, alert, cooperative, no apparent distress, and appears stated age  HEENT:  Moist and pink mucous membranes, normocephalic, without obvious abnormality, atraumatic  NECK:  Supple, symmetrical, trachea midline, no adenopathy, thyroid symmetric, not enlarged and no tenderness, skin normal  LUNGS:  No increased work of breathing, good air exchange, clear to auscultation bilaterally, no crackles or wheezing  CARDIOVASCULAR:  Normal apical impulse, regular rate and rhythm, normal S1 and S2, no S3 or S4, 2 to 3/6 systolic ejection murmur at the right upper central border, no pedal edema, no carotid bruit, no JVD, no pulsating masses. ABDOMEN:  soft, nontender, no hepatomegaly, no splenomegaly, bowel sounds positive. MUSCULOSKELETAL:  No clubbing, no cyanosis, no pedal edema,there is no redness, warmth, or swelling of the joints, full range of motion noted. NEUROLOGIC:  Alert, awake, oriented  3.    /72   Pulse 65   Resp 16   Ht 5' 3\" (1.6 m)   Wt 135 lb (61.2 kg)   BMI 23.91 kg/m²     DATA:   I personally reviewed the visit EKG with the following interpretation: Sinus rhythm    EKG - 6/9/21 Atrial fibrillation with rapid ventricular response  ST depression, consider subendocardial injury  Nonspecific T wave abnormality  Abnormal ECG  When compared with ECG of 04-MAY-2021 21:05,  Atrial fibrillation has replaced Sinus rhythm  Vent. rate has increased BY  67 BPM  ST now depressed in Anterolateral leads  Nonspecific T wave abnormality now evident in Inferior leads  Nonspecific T wave abnormality now evident in Lateral leads    ECHO: 6/9/21 Summary   Normal left ventricular chamber size. Normal left ventricular systolic function, LVEF is 65%. Indeterminate LV diastolic function. Left atrium is normal size. Interatrial septum not well visualized but appears intact. No thrombus in left atrium. Normal right ventricle size and function. There is trace mitral regurgitation. No mitral valve prolapse. The aortic valve appears mildly sclerotic. No hemodynamically significant aortic stenosis - mean gradient 6mmHg, DLI   0.57   There is trace tricuspid regurgitation, RVSP 23mmHg. Normal aortic root size. No evidence of pericardial effusion. No intra cardiac mass or thrombus. Compared to prior echo from 8/6/2018. Stress Test: 5/5/21   1.  Moderate-sized reversible inferior perfusion defect (mild defect). 2. Gated SPECT left ventricular ejection fraction was calculated to be   81% with normal myocardial wall motion       Angiography: 8/9/18 IMPRESSION:  1. 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 2.  Significantly tortuous and overlapping left coronary arteries. 3.  Mild disease involving a second diagonal and first OM branch.     Cardiology Labs: BMP:    Lab Results   Component Value Date/Time     05/06/2022 07:04 AM    K 3.8 05/06/2022 07:04 AM    K 3.3 11/20/2021 09:14 PM     05/06/2022 07:04 AM    CO2 28 05/06/2022 07:04 AM    BUN 8 05/06/2022 07:04 AM    CREATININE 0.7 05/06/2022 07:04 AM     CMP:    Lab Results   Component Value Date/Time     05/06/2022 07:04 AM    K 3.8 05/06/2022 07:04 AM    K 3.3 11/20/2021 09:14 PM     05/06/2022 07:04 AM    CO2 28 05/06/2022 07:04 AM    BUN 8 05/06/2022 07:04 AM    CREATININE 0.7 05/06/2022 07:04 AM    PROT 7.2 05/06/2022 07:04 AM     CBC:    Lab Results   Component Value Date/Time    WBC 10.3 05/06/2022 07:04 AM    RBC 4.56 05/06/2022 07:04 AM    HGB 14.5 05/06/2022 07:04 AM    HCT 43.2 05/06/2022 07:04 AM    MCV 94.7 05/06/2022 07:04 AM    RDW 12.6 05/06/2022 07:04 AM     05/06/2022 07:04 AM     PT/INR:  No results found for: PTINR  PT/INR Warfarin:  No components found for: PTPATWAR, PTINRWAR  PTT:  No results found for: APTT  PTT Heparin:  No components found for: APTTHEP  Magnesium:    Lab Results   Component Value Date/Time    MG 1.9 11/20/2021 09:14 PM     TSH:    Lab Results   Component Value Date/Time    TSH 1.370 05/06/2022 07:04 AM     TROPONIN:  No components found for: TROP  BNP:  No results found for: BNP  FASTING LIPID PANEL:    Lab Results   Component Value Date/Time    CHOL 135 06/10/2021 06:06 AM    HDL 44 05/06/2022 07:04 AM    TRIG 111 06/10/2021 06:06 AM     No orders to display     I have personally reviewed the laboratory, cardiac diagnostic and radiographic testing as outlined above:      IMPRESSION:  1: CAD: Patient had cardiac catheterization on(8/9/2018) which revealed:  #Chronic total occlusion of the right coronary artery with a grade 3 left to right collaterals. #Significantly tortuous and overlapping left coronary arteries. #Mild disease involving a second diagonal and first OM branch. Will continue medical therapy            2: Paroxysmal atrial fibrillation: S/p ablation on 9/2/2021, on anticoagulation with Eliquis             3: Aortic valve stenosis: Mild       4:  Long term (current) use of anticoagulants           5: Hypertension: Controlled              6: Tobacco use :  Patient was counseled regarding smoking cessation              RECOMMENDATIONS:   1. Continue current treatment  2. Preventive Cardiology: low salt, low cholesterol diet, daily exercise, cholesterol goal of total cholesterol less than 200, LDL less than 70, smoking cessation were all advised. 3.increased risk of bleeding, symptoms and signs of bleeding discussed with patient, patient was advised to seek medical attention at the earliest symptoms or signs of bleeding. 4.  Follow-up with Dr. Joselo Davis as scheduled  5. Follow-up with Dr. Milli Prater in 9 months, sooner if symptomatic for any reason    I have reviewed my findings and recommendations with patient    Electronically signed by Keyur Nolen MD on 8/11/2022 at 3:57 PM  NOTE: This report was transcribed using voice recognition software.  Every effort was made to ensure accuracy; however, inadvertent computerized transcription errors may be present

## 2022-08-27 ENCOUNTER — APPOINTMENT (OUTPATIENT)
Dept: GENERAL RADIOLOGY | Age: 72
End: 2022-08-27
Payer: COMMERCIAL

## 2022-08-27 ENCOUNTER — HOSPITAL ENCOUNTER (EMERGENCY)
Age: 72
Discharge: HOME | End: 2022-08-27
Attending: EMERGENCY MEDICINE
Payer: COMMERCIAL

## 2022-08-27 VITALS
RESPIRATION RATE: 18 BRPM | BODY MASS INDEX: 23.92 KG/M2 | HEIGHT: 63 IN | WEIGHT: 135 LBS | DIASTOLIC BLOOD PRESSURE: 106 MMHG | HEART RATE: 61 BPM | SYSTOLIC BLOOD PRESSURE: 154 MMHG | OXYGEN SATURATION: 95 % | TEMPERATURE: 97.5 F

## 2022-08-27 DIAGNOSIS — R00.2 PALPITATIONS: Primary | ICD-10-CM

## 2022-08-27 LAB
ALBUMIN SERPL-MCNC: 3.8 G/DL (ref 3.5–5.2)
ALP BLD-CCNC: 70 U/L (ref 35–104)
ALT SERPL-CCNC: 16 U/L (ref 0–32)
ANION GAP SERPL CALCULATED.3IONS-SCNC: 12 MMOL/L (ref 7–16)
AST SERPL-CCNC: 24 U/L (ref 0–31)
BASOPHILS ABSOLUTE: 0.07 E9/L (ref 0–0.2)
BASOPHILS RELATIVE PERCENT: 0.6 % (ref 0–2)
BILIRUB SERPL-MCNC: 0.4 MG/DL (ref 0–1.2)
BUN BLDV-MCNC: 8 MG/DL (ref 6–23)
CALCIUM SERPL-MCNC: 9.5 MG/DL (ref 8.6–10.2)
CHLORIDE BLD-SCNC: 103 MMOL/L (ref 98–107)
CO2: 26 MMOL/L (ref 22–29)
CREAT SERPL-MCNC: 0.7 MG/DL (ref 0.5–1)
EKG ATRIAL RATE: 79 BPM
EKG P AXIS: 68 DEGREES
EKG P-R INTERVAL: 122 MS
EKG Q-T INTERVAL: 404 MS
EKG QRS DURATION: 78 MS
EKG QTC CALCULATION (BAZETT): 463 MS
EKG R AXIS: 79 DEGREES
EKG T AXIS: 81 DEGREES
EKG VENTRICULAR RATE: 79 BPM
EOSINOPHILS ABSOLUTE: 0.13 E9/L (ref 0.05–0.5)
EOSINOPHILS RELATIVE PERCENT: 1.1 % (ref 0–6)
GFR AFRICAN AMERICAN: >60
GFR NON-AFRICAN AMERICAN: >60 ML/MIN/1.73
GLUCOSE BLD-MCNC: 98 MG/DL (ref 74–99)
HCT VFR BLD CALC: 45.1 % (ref 34–48)
HEMOGLOBIN: 15.2 G/DL (ref 11.5–15.5)
IMMATURE GRANULOCYTES #: 0.07 E9/L
IMMATURE GRANULOCYTES %: 0.6 % (ref 0–5)
LYMPHOCYTES ABSOLUTE: 1.42 E9/L (ref 1.5–4)
LYMPHOCYTES RELATIVE PERCENT: 12.5 % (ref 20–42)
MAGNESIUM: 2.1 MG/DL (ref 1.6–2.6)
MCH RBC QN AUTO: 31.8 PG (ref 26–35)
MCHC RBC AUTO-ENTMCNC: 33.7 % (ref 32–34.5)
MCV RBC AUTO: 94.4 FL (ref 80–99.9)
MONOCYTES ABSOLUTE: 0.89 E9/L (ref 0.1–0.95)
MONOCYTES RELATIVE PERCENT: 7.8 % (ref 2–12)
NEUTROPHILS ABSOLUTE: 8.77 E9/L (ref 1.8–7.3)
NEUTROPHILS RELATIVE PERCENT: 77.4 % (ref 43–80)
PDW BLD-RTO: 12.6 FL (ref 11.5–15)
PLATELET # BLD: 210 E9/L (ref 130–450)
PMV BLD AUTO: 11.4 FL (ref 7–12)
POTASSIUM REFLEX MAGNESIUM: 3.7 MMOL/L (ref 3.5–5)
PRO-BNP: 722 PG/ML (ref 0–125)
RBC # BLD: 4.78 E12/L (ref 3.5–5.5)
SODIUM BLD-SCNC: 141 MMOL/L (ref 132–146)
T4 FREE: 1.61 NG/DL (ref 0.93–1.7)
TOTAL PROTEIN: 7.1 G/DL (ref 6.4–8.3)
TROPONIN, HIGH SENSITIVITY: 8 NG/L (ref 0–9)
TSH SERPL DL<=0.05 MIU/L-ACNC: 2.41 UIU/ML (ref 0.27–4.2)
WBC # BLD: 11.4 E9/L (ref 4.5–11.5)

## 2022-08-27 PROCEDURE — 71045 X-RAY EXAM CHEST 1 VIEW: CPT

## 2022-08-27 PROCEDURE — 84484 ASSAY OF TROPONIN QUANT: CPT

## 2022-08-27 PROCEDURE — 36415 COLL VENOUS BLD VENIPUNCTURE: CPT

## 2022-08-27 PROCEDURE — 85025 COMPLETE CBC W/AUTO DIFF WBC: CPT

## 2022-08-27 PROCEDURE — 93005 ELECTROCARDIOGRAM TRACING: CPT | Performed by: STUDENT IN AN ORGANIZED HEALTH CARE EDUCATION/TRAINING PROGRAM

## 2022-08-27 PROCEDURE — 84443 ASSAY THYROID STIM HORMONE: CPT

## 2022-08-27 PROCEDURE — 83880 ASSAY OF NATRIURETIC PEPTIDE: CPT

## 2022-08-27 PROCEDURE — 80053 COMPREHEN METABOLIC PANEL: CPT

## 2022-08-27 PROCEDURE — 83735 ASSAY OF MAGNESIUM: CPT

## 2022-08-27 PROCEDURE — 99285 EMERGENCY DEPT VISIT HI MDM: CPT

## 2022-08-27 PROCEDURE — 84439 ASSAY OF FREE THYROXINE: CPT

## 2022-08-27 ASSESSMENT — ENCOUNTER SYMPTOMS
NAUSEA: 0
SHORTNESS OF BREATH: 0
ABDOMINAL PAIN: 0
BACK PAIN: 0
DIARRHEA: 0
SORE THROAT: 0
VOMITING: 0
COUGH: 0

## 2022-08-27 ASSESSMENT — PAIN - FUNCTIONAL ASSESSMENT
PAIN_FUNCTIONAL_ASSESSMENT: NONE - DENIES PAIN
PAIN_FUNCTIONAL_ASSESSMENT: NONE - DENIES PAIN

## 2022-08-27 NOTE — ED PROVIDER NOTES
[address]   ED Provider Note  Department of Emergency Medicine     ED Room:       Written by: Mony Trammell DO  Patient Name: Sam Tay  Attending Provider: Cheyenne Brunner DO  Admit Date: 2022  7:27 AM  MRN: [id number]    : 1950        Chief Complaint   Patient presents with    Palpitations     Starting this morning when pt woke up. Hx of afib. - Chief complaint    HPI   Sam Tay is a 73 y.o. female presenting to the ED for evaluation of Palpitations (Starting this morning when pt woke up. Hx of afib. )      History obtained from patient. Patient has a past medical history of atrial fibrillation on Eliquis and metoprolol, s/p ablation with EP a few months ago. Patient is presenting for evaluation of palpitations that started around 6 AM today and remitted after about 1 hour; patient states that she felt like she was in A. fib. States that ever since she had her ablation she has not felt any of this but it did recur this morning. Denies any complaints at this time and states that her symptoms are actually resolved. She does take Eliquis and metoprolol in the morning, took both of her doses at 6 AM.  Patient denies any chest pain, shortness of breath, headaches or changes in vision, motor or sensory deficits. He denies any recent illnesses, fevers, nausea or vomiting, abdominal pain, diarrhea, abnormal urinary symptoms, black or bloody stools, lower extremity edema or tenderness. Complaints overall were moderate in severity and have since resolved. There were no specific aggravating or alleviating factors. Review of Systems   Constitutional:  Negative for chills and fever. HENT:  Negative for congestion and sore throat. Eyes:  Negative for visual disturbance. Respiratory:  Negative for cough and shortness of breath. Cardiovascular:  Positive for palpitations. Negative for chest pain and leg swelling.    Gastrointestinal:  Negative for abdominal pain, diarrhea, nausea and vomiting. Genitourinary:  Negative for dysuria and frequency. Musculoskeletal:  Negative for back pain and myalgias. Skin:  Negative for rash and wound. Neurological:  Negative for weakness and numbness. Psychiatric/Behavioral:  Negative for confusion. All other systems reviewed and are negative. Physical Exam  Vitals and nursing note reviewed. Constitutional:       General: She is not in acute distress. Appearance: She is not ill-appearing. HENT:      Head: Normocephalic and atraumatic. Right Ear: External ear normal.      Left Ear: External ear normal.      Nose: Nose normal. No rhinorrhea. Mouth/Throat:      Mouth: Mucous membranes are moist.      Pharynx: Oropharynx is clear. Eyes:      Extraocular Movements: Extraocular movements intact. Conjunctiva/sclera: Conjunctivae normal.      Pupils: Pupils are equal, round, and reactive to light. Cardiovascular:      Rate and Rhythm: Normal rate and regular rhythm. Pulses: Normal pulses. Heart sounds: Normal heart sounds. Pulmonary:      Effort: Pulmonary effort is normal. No respiratory distress. Breath sounds: Normal breath sounds. No wheezing or rales. Abdominal:      General: Bowel sounds are normal.      Palpations: Abdomen is soft. Tenderness: There is no abdominal tenderness. There is no guarding. Musculoskeletal:         General: No tenderness. Normal range of motion. Cervical back: Normal range of motion and neck supple. Right lower leg: No edema. Left lower leg: No edema. Skin:     General: Skin is warm and dry. Capillary Refill: Capillary refill takes less than 2 seconds. Coloration: Skin is not jaundiced or pale. Findings: No rash. Neurological:      General: No focal deficit present. Mental Status: She is alert and oriented to person, place, and time. Sensory: No sensory deficit. Motor: No weakness. Psychiatric:         Mood and Affect: Mood normal.         Behavior: Behavior normal.        Procedures       MDM     Amount and/or Complexity of Data Reviewed  Decide to obtain previous medical records or to obtain history from someone other than the patient: yes         ED Course as of 08/27/22 1931   Sat Aug 27, 2022   5774 Had an ablation with Dr. Silvestre Jensen a few months ago, hasn't had any issues since then.  [VG]      ED Course User Index  [VG] Damion Cormier,          Medical Decision Making: This is a 73 y.o. female presenting for evaluation of palpitations; has history of atrial fibrillation, she is s/p ablation several months ago, also is on metoprolol and Eliquis. Exam as noted above. EKG shows a few PVCs, she is not in atrial fibrillation at this time. Patient is actually asymptomatic since being here in the ED. She did not have recurrence of symptoms during this encounter. Her vitals have been stable. Labs reviewed and generally unremarkable. Patient feels well and is well-appearing and nontoxic in appearance. Given these findings, feel she is stable appropriate for discharge. Given that the patient does go back in atrial fibrillation, her rate is controlled and she is anticoagulated. I feel that she is appropriate for discharge and able to follow-up outpatient with both her PCP and electrophysiologist; she was instructed to do so by calling their office first thing Monday morning. Results and plan were discussed with the patient, she voiced understanding and is amenable. Strict return precautions were discussed. See ED COURSE for additional MDM. EKG reviewed and interpreted by me: This EKG is signed by emergency department physician. Sinus rhythm with premature supraventricular complexes and occasional PVCs; normal axis, no ST elevations. Rate 79, , QRS 78, QTc 463. Changes as compared to previous EKG.     Labs & imaging were reviewed and interpreted, see RESULTS. I have personally reviewed all laboratory and imaging results for this patient. I have discussed this patient with my attending, who has seen the patient and agrees with this disposition. Patient was seen and evaluated by myself and my attending Richard Curiel DO. Assessment and Plan discussed with attending provider, please see attestation for final plan of care.         --------------------------------------------- PAST HISTORY ---------------------------------------------  Past Medical History:  has a past medical history of AF (atrial fibrillation) ([facility] [address] Arrhythmia, CAD in native artery, Diastolic heart failure ([facility] Diverticulitis, H/O cardiovascular stress test, HIGH CHOLESTEROL, History of atrial fibrillation, History of stress test, Hypertension, and Thyroid disease. Past Surgical History:  has a past surgical history that includes Nerve Block (12/27/2011); Nerve Block (01/24/2012); Nerve Block (02/21/2012); pr colonoscopy flx dx w/collj spec when pfrmd (N/A, 10/04/2018); pr lap,surg,colectomy, partial, w/anast (N/A, 11/13/2018); Colonoscopy; Colonoscopy (N/A, 04/08/2019); Small intestine surgery (N/A, 04/09/2019); Diagnostic Cardiac Cath Lab Procedure (08/09/2018); Small intestine surgery (N/A, 08/13/2020); and ablation of dysrhythmic focus (09/02/2021). Social History:  reports that she has been smoking cigarettes. She has been smoking an average of .5 packs per day. She has never used smokeless tobacco. She reports that she does not drink alcohol and does not use drugs. Family History: family history includes Breast Cancer in her mother; Cancer in her mother and another family member; [place] in her sister; Heart Attack in her father and sister; Heart Disease in an other family member. Unless otherwise noted, family history is non contributory. The patients home medications have been reviewed.     Allergies: Patient has no known allergies.     -------------------------------------------------- RESULTS -------------------------------------------------  Labs:  Results for orders placed or performed during the hospital encounter of 08/27/22   CBC with Auto Differential   Result Value Ref Range    WBC 11.4 4.5 - 11.5 E9/L    RBC 4.78 3.50 - 5.50 E12/L    Hemoglobin 15.2 11.5 - 15.5 g/dL    Hematocrit 45.1 34.0 - 48.0 %    MCV 94.4 80.0 - 99.9 fL    MCH 31.8 26.0 - 35.0 pg    MCHC 33.7 32.0 - 34.5 %    RDW 12.6 11.5 - 15.0 fL    Platelets 347 600 - 946 E9/L    MPV 11.4 7.0 - 12.0 fL    Neutrophils % 77.4 43.0 - 80.0 %    Immature Granulocytes % 0.6 0.0 - 5.0 %    Lymphocytes % 12.5 (L) 20.0 - 42.0 %    Monocytes % 7.8 2.0 - 12.0 %    Eosinophils % 1.1 0.0 - 6.0 %    Basophils % 0.6 0.0 - 2.0 %    Neutrophils Absolute 8.77 (H) 1.80 - 7.30 E9/L    Immature Granulocytes # 0.07 E9/L    Lymphocytes Absolute 1.42 (L) 1.50 - 4.00 E9/L    Monocytes Absolute 0.89 0.10 - 0.95 E9/L    Eosinophils Absolute 0.13 0.05 - 0.50 E9/L    Basophils Absolute 0.07 0.00 - 0.20 E9/L   Comprehensive Metabolic Panel w/ Reflex to MG   Result Value Ref Range    Sodium 141 132 - 146 mmol/L    Potassium reflex Magnesium 3.7 3.5 - 5.0 mmol/L    Chloride 103 98 - 107 mmol/L    CO2 26 22 - 29 mmol/L    Anion Gap 12 7 - 16 mmol/L    Glucose 98 74 - 99 mg/dL    BUN 8 6 - 23 mg/dL    Creatinine 0.7 0.5 - 1.0 mg/dL    GFR Non-African American >60 >=60 mL/min/1.73    GFR African American >60     Calcium 9.5 8.6 - 10.2 mg/dL    Total Protein 7.1 6.4 - 8.3 g/dL    Albumin 3.8 3.5 - 5.2 g/dL    Total Bilirubin 0.4 0.0 - 1.2 mg/dL    Alkaline Phosphatase 70 35 - 104 U/L    ALT 16 0 - 32 U/L    AST 24 0 - 31 U/L   Troponin   Result Value Ref Range    Troponin, High Sensitivity 8 0 - 9 ng/L   Brain Natriuretic Peptide   Result Value Ref Range    Pro- (H) 0 - 125 pg/mL   TSH   Result Value Ref Range    TSH 2.410 0.270 - 4.200 uIU/mL   T4, Free   Result Value Ref Range    T4 Free 1.61 0.93 - 1.70 ng/dL   Magnesium   Result Value Ref Range    Magnesium 2.1 1.6 - 2.6 mg/dL   EKG 12 Lead   Result Value Ref Range    Ventricular Rate 79 BPM    Atrial Rate 79 BPM    P-R Interval 122 ms    QRS Duration 78 ms    Q-T Interval 404 ms    QTc Calculation (Bazett) 463 ms    P Axis 68 degrees    R Axis 79 degrees    T Axis 81 degrees       Radiology:  XR CHEST PORTABLE   Final Result   No acute cardiopulmonary process. Emphysematous changes. Interpreted by the radiologist unless otherwise specified.      ------------------------- NURSING NOTES AND VITALS REVIEWED ---------------------------  Date / Time Roomed:  8/27/2022  7:27 AM  ED Bed Assignment:  06/06    The nursing notes within the ED encounter and vital signs as below have been reviewed by myself. BP (!) 154/106   Pulse 61   Temp 97.5 °F (36.4 °C)   Resp 18   Ht 5' 3\" (1.6 m)   Wt 135 lb (61.2 kg)   SpO2 95%   BMI 23.91 kg/m²   Oxygen Saturation Interpretation: Normal    The patients available past medical records and past encounters were reviewed. ------------------------------------------ PROGRESS NOTES ------------------------------------------  7:31 PM EDT  I have spoken with the patient and discussed todays results, in addition to providing specific details for the plan of care and counseling regarding the diagnosis and prognosis. Their questions are answered at this time and they are agreeable with the plan. I discussed at length with them reasons for immediate return here for re evaluation. They will followup with their  electrophysiologist and primary care physician by calling their office on Monday.      --------------------------------- ADDITIONAL PROVIDER NOTES ---------------------------------  At this time the patient is without objective evidence of an acute process requiring hospitalization or inpatient management.   They have remained hemodynamically stable throughout their entire ED visit and are stable for discharge with outpatient follow-up. The plan has been discussed in detail and they are aware of the specific conditions for emergent return, as well as the importance of follow-up. Discharge Medication List as of 8/27/2022 12:04 PM          Diagnosis:  1. Palpitations        Disposition:  Patient's disposition: Discharge to home  Patient's condition is stable. Lissa Lacy D.O. PGY-3     Resident Physician     Emergency Medicine      8/27/2022 7:54 AM      NOTE: This report was transcribed using voice recognition software.  Every effort was made to ensure accuracy; however, inadvertent computerized transcription errors may be present             Lissa Lacy DO  Resident  08/27/22 [id number]

## 2022-11-04 ENCOUNTER — HOSPITAL ENCOUNTER (OUTPATIENT)
Age: 72
Discharge: HOME | End: 2022-11-04
Payer: COMMERCIAL

## 2022-11-04 LAB
ALBUMIN SERPL-MCNC: 4.2 G/DL (ref 3.5–5.2)
ALP BLD-CCNC: 77 U/L (ref 35–104)
ALT SERPL-CCNC: 14 U/L (ref 0–32)
ANION GAP SERPL CALCULATED.3IONS-SCNC: 11 MMOL/L (ref 7–16)
AST SERPL-CCNC: 19 U/L (ref 0–31)
BASOPHILS ABSOLUTE: 0.06 E9/L (ref 0–0.2)
BASOPHILS RELATIVE PERCENT: 0.6 % (ref 0–2)
BILIRUB SERPL-MCNC: 0.5 MG/DL (ref 0–1.2)
BUN BLDV-MCNC: 7 MG/DL (ref 6–23)
CALCIUM SERPL-MCNC: 10.2 MG/DL (ref 8.6–10.2)
CHLORIDE BLD-SCNC: 101 MMOL/L (ref 98–107)
CHOLESTEROL, FASTING: 139 MG/DL (ref 0–199)
CO2: 28 MMOL/L (ref 22–29)
CREAT SERPL-MCNC: 0.7 MG/DL (ref 0.5–1)
EOSINOPHILS ABSOLUTE: 0.2 E9/L (ref 0.05–0.5)
EOSINOPHILS RELATIVE PERCENT: 2 % (ref 0–6)
GFR SERPL CREATININE-BSD FRML MDRD: >60 ML/MIN/1.73
GLUCOSE FASTING: 92 MG/DL (ref 74–99)
HCT VFR BLD CALC: 46.1 % (ref 34–48)
HDLC SERPL-MCNC: 41 MG/DL
HEMOGLOBIN: 15.4 G/DL (ref 11.5–15.5)
IMMATURE GRANULOCYTES #: 0.04 E9/L
IMMATURE GRANULOCYTES %: 0.4 % (ref 0–5)
LDL CHOLESTEROL CALCULATED: 78 MG/DL (ref 0–99)
LYMPHOCYTES ABSOLUTE: 1.59 E9/L (ref 1.5–4)
LYMPHOCYTES RELATIVE PERCENT: 15.7 % (ref 20–42)
MCH RBC QN AUTO: 31.8 PG (ref 26–35)
MCHC RBC AUTO-ENTMCNC: 33.4 % (ref 32–34.5)
MCV RBC AUTO: 95.1 FL (ref 80–99.9)
MONOCYTES ABSOLUTE: 1.02 E9/L (ref 0.1–0.95)
MONOCYTES RELATIVE PERCENT: 10.1 % (ref 2–12)
NEUTROPHILS ABSOLUTE: 7.2 E9/L (ref 1.8–7.3)
NEUTROPHILS RELATIVE PERCENT: 71.2 % (ref 43–80)
PDW BLD-RTO: 12.3 FL (ref 11.5–15)
PLATELET # BLD: 228 E9/L (ref 130–450)
PMV BLD AUTO: 10.6 FL (ref 7–12)
POTASSIUM SERPL-SCNC: 4.8 MMOL/L (ref 3.5–5)
RBC # BLD: 4.85 E12/L (ref 3.5–5.5)
SODIUM BLD-SCNC: 140 MMOL/L (ref 132–146)
T4 TOTAL: 13.1 MCG/DL (ref 4.5–11.7)
TOTAL PROTEIN: 7 G/DL (ref 6.4–8.3)
TRIGLYCERIDE, FASTING: 101 MG/DL (ref 0–149)
TSH SERPL DL<=0.05 MIU/L-ACNC: 0.87 UIU/ML (ref 0.27–4.2)
VLDLC SERPL CALC-MCNC: 20 MG/DL
WBC # BLD: 10.1 E9/L (ref 4.5–11.5)

## 2022-11-04 PROCEDURE — 85025 COMPLETE CBC W/AUTO DIFF WBC: CPT

## 2022-11-04 PROCEDURE — 84436 ASSAY OF TOTAL THYROXINE: CPT

## 2022-11-04 PROCEDURE — 36415 COLL VENOUS BLD VENIPUNCTURE: CPT

## 2022-11-04 PROCEDURE — 80061 LIPID PANEL: CPT

## 2022-11-04 PROCEDURE — 84443 ASSAY THYROID STIM HORMONE: CPT

## 2022-11-04 PROCEDURE — 80053 COMPREHEN METABOLIC PANEL: CPT

## 2022-12-16 ENCOUNTER — APPOINTMENT (OUTPATIENT)
Dept: GENERAL RADIOLOGY | Age: 72
End: 2022-12-16
Payer: COMMERCIAL

## 2022-12-16 ENCOUNTER — HOSPITAL ENCOUNTER (OUTPATIENT)
Age: 72
Setting detail: OBSERVATION
Discharge: HOME | End: 2022-12-17
Attending: STUDENT IN AN ORGANIZED HEALTH CARE EDUCATION/TRAINING PROGRAM | Admitting: INTERNAL MEDICINE
Payer: COMMERCIAL

## 2022-12-16 DIAGNOSIS — I48.20 CHRONIC ATRIAL FIBRILLATION (HCC): ICD-10-CM

## 2022-12-16 DIAGNOSIS — R77.8 ELEVATED TROPONIN: ICD-10-CM

## 2022-12-16 DIAGNOSIS — I48.91 ATRIAL FIBRILLATION WITH RAPID VENTRICULAR RESPONSE (HCC): Primary | ICD-10-CM

## 2022-12-16 LAB
ALBUMIN SERPL-MCNC: 4.2 G/DL (ref 3.5–5.2)
ALP BLD-CCNC: 63 U/L (ref 35–104)
ALT SERPL-CCNC: 12 U/L (ref 0–32)
ANION GAP SERPL CALCULATED.3IONS-SCNC: 12 MMOL/L (ref 7–16)
AST SERPL-CCNC: 19 U/L (ref 0–31)
BACTERIA: NORMAL /HPF
BASOPHILS ABSOLUTE: 0.09 E9/L (ref 0–0.2)
BASOPHILS RELATIVE PERCENT: 0.7 % (ref 0–2)
BILIRUB SERPL-MCNC: 0.2 MG/DL (ref 0–1.2)
BILIRUBIN URINE: NEGATIVE
BLOOD, URINE: ABNORMAL
BUN BLDV-MCNC: 10 MG/DL (ref 6–23)
CALCIUM SERPL-MCNC: 10.2 MG/DL (ref 8.6–10.2)
CHLORIDE BLD-SCNC: 105 MMOL/L (ref 98–107)
CLARITY: CLEAR
CO2: 25 MMOL/L (ref 22–29)
COLOR: ABNORMAL
CREAT SERPL-MCNC: 0.6 MG/DL (ref 0.5–1)
EOSINOPHILS ABSOLUTE: 0.32 E9/L (ref 0.05–0.5)
EOSINOPHILS RELATIVE PERCENT: 2.6 % (ref 0–6)
GFR SERPL CREATININE-BSD FRML MDRD: >60 ML/MIN/1.73
GLUCOSE BLD-MCNC: 109 MG/DL (ref 74–99)
GLUCOSE URINE: NEGATIVE MG/DL
HCT VFR BLD CALC: 44.7 % (ref 34–48)
HEMOGLOBIN: 15.5 G/DL (ref 11.5–15.5)
IMMATURE GRANULOCYTES #: 0.05 E9/L
IMMATURE GRANULOCYTES %: 0.4 % (ref 0–5)
INR BLD: 1.2
KETONES, URINE: NEGATIVE MG/DL
LEUKOCYTE ESTERASE, URINE: NEGATIVE
LYMPHOCYTES ABSOLUTE: 1.92 E9/L (ref 1.5–4)
LYMPHOCYTES RELATIVE PERCENT: 15.6 % (ref 20–42)
MAGNESIUM: 2 MG/DL (ref 1.6–2.6)
MCH RBC QN AUTO: 31.8 PG (ref 26–35)
MCHC RBC AUTO-ENTMCNC: 34.7 % (ref 32–34.5)
MCV RBC AUTO: 91.6 FL (ref 80–99.9)
MONOCYTES ABSOLUTE: 1.01 E9/L (ref 0.1–0.95)
MONOCYTES RELATIVE PERCENT: 8.2 % (ref 2–12)
NEUTROPHILS ABSOLUTE: 8.92 E9/L (ref 1.8–7.3)
NEUTROPHILS RELATIVE PERCENT: 72.5 % (ref 43–80)
NITRITE, URINE: NEGATIVE
PDW BLD-RTO: 12.5 FL (ref 11.5–15)
PH UA: 7.5 (ref 5–9)
PLATELET # BLD: 225 E9/L (ref 130–450)
PMV BLD AUTO: 10.8 FL (ref 7–12)
POTASSIUM SERPL-SCNC: 3.6 MMOL/L (ref 3.5–5)
PROTEIN UA: NEGATIVE MG/DL
PROTHROMBIN TIME: 13.5 SEC (ref 9.3–12.4)
RBC # BLD: 4.88 E12/L (ref 3.5–5.5)
RBC UA: NORMAL /HPF (ref 0–2)
REASON FOR REJECTION: NORMAL
REJECTED TEST: NORMAL
SODIUM BLD-SCNC: 142 MMOL/L (ref 132–146)
SPECIFIC GRAVITY UA: 1.01 (ref 1–1.03)
T4 FREE: 1.69 NG/DL (ref 0.93–1.7)
TOTAL PROTEIN: 7 G/DL (ref 6.4–8.3)
TROPONIN, HIGH SENSITIVITY: 15 NG/L (ref 0–9)
TROPONIN, HIGH SENSITIVITY: 22 NG/L (ref 0–9)
TROPONIN, HIGH SENSITIVITY: 25 NG/L (ref 0–9)
TSH SERPL DL<=0.05 MIU/L-ACNC: 2.25 UIU/ML (ref 0.27–4.2)
UROBILINOGEN, URINE: 0.2 E.U./DL
WBC # BLD: 12.3 E9/L (ref 4.5–11.5)
WBC UA: NORMAL /HPF (ref 0–5)

## 2022-12-16 PROCEDURE — 83735 ASSAY OF MAGNESIUM: CPT

## 2022-12-16 PROCEDURE — 96365 THER/PROPH/DIAG IV INF INIT: CPT

## 2022-12-16 PROCEDURE — 2580000003 HC RX 258

## 2022-12-16 PROCEDURE — 85025 COMPLETE CBC W/AUTO DIFF WBC: CPT

## 2022-12-16 PROCEDURE — 93005 ELECTROCARDIOGRAM TRACING: CPT | Performed by: STUDENT IN AN ORGANIZED HEALTH CARE EDUCATION/TRAINING PROGRAM

## 2022-12-16 PROCEDURE — 93005 ELECTROCARDIOGRAM TRACING: CPT

## 2022-12-16 PROCEDURE — 84443 ASSAY THYROID STIM HORMONE: CPT

## 2022-12-16 PROCEDURE — 2500000003 HC RX 250 WO HCPCS: Performed by: STUDENT IN AN ORGANIZED HEALTH CARE EDUCATION/TRAINING PROGRAM

## 2022-12-16 PROCEDURE — 84484 ASSAY OF TROPONIN QUANT: CPT

## 2022-12-16 PROCEDURE — 81001 URINALYSIS AUTO W/SCOPE: CPT

## 2022-12-16 PROCEDURE — 99285 EMERGENCY DEPT VISIT HI MDM: CPT

## 2022-12-16 PROCEDURE — 96375 TX/PRO/DX INJ NEW DRUG ADDON: CPT

## 2022-12-16 PROCEDURE — 80053 COMPREHEN METABOLIC PANEL: CPT

## 2022-12-16 PROCEDURE — 85610 PROTHROMBIN TIME: CPT

## 2022-12-16 PROCEDURE — 71045 X-RAY EXAM CHEST 1 VIEW: CPT

## 2022-12-16 PROCEDURE — 6370000000 HC RX 637 (ALT 250 FOR IP): Performed by: STUDENT IN AN ORGANIZED HEALTH CARE EDUCATION/TRAINING PROGRAM

## 2022-12-16 PROCEDURE — 6360000002 HC RX W HCPCS

## 2022-12-16 PROCEDURE — 84439 ASSAY OF FREE THYROXINE: CPT

## 2022-12-16 RX ORDER — DILTIAZEM HYDROCHLORIDE 5 MG/ML
0.25 INJECTION INTRAVENOUS ONCE
Status: COMPLETED | OUTPATIENT
Start: 2022-12-16 | End: 2022-12-16

## 2022-12-16 RX ORDER — 0.9 % SODIUM CHLORIDE 0.9 %
1000 INTRAVENOUS SOLUTION INTRAVENOUS ONCE
Status: COMPLETED | OUTPATIENT
Start: 2022-12-16 | End: 2022-12-16

## 2022-12-16 RX ORDER — MAGNESIUM SULFATE IN WATER 40 MG/ML
2000 INJECTION, SOLUTION INTRAVENOUS ONCE
Status: COMPLETED | OUTPATIENT
Start: 2022-12-16 | End: 2022-12-16

## 2022-12-16 RX ADMIN — MAGNESIUM SULFATE HEPTAHYDRATE 2000 MG: 40 INJECTION, SOLUTION INTRAVENOUS at 18:57

## 2022-12-16 RX ADMIN — APIXABAN 5 MG: 5 TABLET, FILM COATED ORAL at 18:14

## 2022-12-16 RX ADMIN — DILTIAZEM HYDROCHLORIDE 15.5 MG: 5 INJECTION, SOLUTION INTRAVENOUS at 18:55

## 2022-12-16 RX ADMIN — SODIUM CHLORIDE 1000 ML: 9 INJECTION, SOLUTION INTRAVENOUS at 19:00

## 2022-12-16 ASSESSMENT — PAIN - FUNCTIONAL ASSESSMENT
PAIN_FUNCTIONAL_ASSESSMENT: NONE - DENIES PAIN
PAIN_FUNCTIONAL_ASSESSMENT: NONE - DENIES PAIN

## 2022-12-16 ASSESSMENT — ENCOUNTER SYMPTOMS
EYE ITCHING: 0
EYE DISCHARGE: 0
BACK PAIN: 0
CHEST TIGHTNESS: 0
ABDOMINAL PAIN: 0
ABDOMINAL DISTENTION: 0
APNEA: 0

## 2022-12-16 ASSESSMENT — LIFESTYLE VARIABLES: HOW OFTEN DO YOU HAVE A DRINK CONTAINING ALCOHOL: MONTHLY OR LESS

## 2022-12-16 NOTE — ED PROVIDER NOTES
Pamela Bending 81 y.o. female PHMx of A. fib, current tobacco use, hypertension, hypothyroidism due to thyroid being removed presents to the ED c/o racing heart. Onset: Several hours ago. Location/Radiation: Denies any specific location of pain. Duration: Intermittent. Characterization: Describes it as her heart racing really fast. Aggravating Factors: Says she lifted a bunch of bags today and worked a lot to help get her [employer] decorated for [employer]. Relieving Factors: None. Severity: Moderate to severe. Patient will reports that she had ablation back earlier this year but her cardiologist told her to keep taking her medicines and she still has. She denies missing any of her medications today except for her nightly dose of Eliquis. She denies any other acute concerns or questions. Assx Sxs: Racing heart/palpitations. She Denies: Fever/chills, chest pain, nausea/vomiting, diaphoresis, numbness/tingling. Review of Systems   Constitutional:  Negative for activity change and appetite change. Eyes:  Negative for discharge and itching. Respiratory:  Negative for apnea and chest tightness. Cardiovascular:  Positive for palpitations. Negative for chest pain and leg swelling. Gastrointestinal:  Negative for abdominal distention and abdominal pain. Endocrine: Negative for cold intolerance and heat intolerance. Genitourinary:  Negative for dysuria and enuresis. Musculoskeletal:  Negative for arthralgias and back pain. Allergic/Immunologic: Negative for environmental allergies and food allergies. Neurological:  Negative for dizziness and facial asymmetry. Hematological:  Negative for adenopathy. Does not bruise/bleed easily. Psychiatric/Behavioral:  Negative for agitation and behavioral problems. Physical Exam  Constitutional:       General: She is not in acute distress. Appearance: She is normal weight. She is not ill-appearing or toxic-appearing.    HENT:      Head: racing. She was neurovascularly intact. Initial EKG G showed her in A. fib RVR. She says she had took her morning dose of metoprolol and Eliquis, has not taken her nightly dose of Eliquis. Peripheral IV was started. Patient was given IV fluids. She was then given 15 of Cardizem along with 2 g of magnesium. This brought her out of the A. fib RVR and her heart rate normalized to 60. Repeat EKG showed normal sinus rhythm with normal heart rate. No ST segment elevations or depressions. Her electrolytes including sodium potassium were within acceptable limits, normal kidney function. No leukocytosis, normal H&H. Her initial troponin was 15, second troponin 22, third troponin 35. She remained chest pain-free. She is already on Eliquis. She was given 324 of aspirin. Discussed case with patient's primary team and they agreed admit the patient for further evaluation work-up. Patient verbally consents agreed to the plan. Patient denied any chest pain at time of admission. All of her questions and concerns answered. Patient stable at time of admission. ED Course as of 12/17/22 0044   Fri Dec 16, 2022   2016 EKG #1  This EKG is signed and interpreted by EP     Rate: 141  Rhythm: Atrial fibrillation  Interpretation: atrial fibrillation (chronic)  Comparison: changes compared to previous EKG  [JR]   2016 EKG #2:  This EKG is signed and interpreted by EP    Rate: 59  Rhythm: Sinus  Interpretation: No ST segment elevations or depressions, no T wave abnormalities.   Patient's heart rate was in acceptable limits after receiving Cardizem  Comparison: Improved after treatment [JR]      ED Course User Index  [JR] Unruly Zamora DO         ED Course as of 12/17/22 0044   Fri Dec 16, 2022   2016 EKG #1  This EKG is signed and interpreted by EP     Rate: 141  Rhythm: Atrial fibrillation  Interpretation: atrial fibrillation (chronic)  Comparison: changes compared to previous EKG  [JR]   2016 EKG #2:  This EKG is signed and interpreted by EP    Rate: 59  Rhythm: Sinus  Interpretation: No ST segment elevations or depressions, no T wave abnormalities. Patient's heart rate was in acceptable limits after receiving Cardizem  Comparison: Improved after treatment [JR]      ED Course User Index  [JR] Silvia Palomino DO       --------------------------------------------- PAST HISTORY ---------------------------------------------  Past Medical History:  has a past medical history of AF (atrial fibrillation) ([facility] Arrhythmia, CAD in native artery, Diastolic heart failure ([facility] Diverticulitis, H/O cardiovascular stress test, HIGH CHOLESTEROL, History of atrial fibrillation, History of stress test, Hypertension, and Thyroid disease. Past Surgical History:  has a past surgical history that includes Nerve Block (12/27/2011); Nerve Block (01/24/2012); Nerve Block (02/21/2012); pr colonoscopy flx dx w/collj spec when pfrmd (N/A, 10/04/2018); pr lap,surg,colectomy, partial, w/anast (N/A, 11/13/2018); Colonoscopy; Colonoscopy (N/A, 04/08/2019); Small intestine surgery (N/A, 04/09/2019); Diagnostic Cardiac Cath Lab Procedure (08/09/2018); Small intestine surgery (N/A, 08/13/2020); and ablation of dysrhythmic focus (09/02/2021). Social History:  reports that she has been smoking cigarettes. She has been smoking an average of .5 packs per day. She has never used smokeless tobacco. She reports that she does not drink alcohol and does not use drugs. Family History: family history includes Breast Cancer in her mother; Cancer in her mother and another family member; [place] in her sister; Heart Attack in her father and sister; Heart Disease in an other family member. The patients home medications have been reviewed. Allergies: Patient has no known allergies.     -------------------------------------------------- RESULTS -------------------------------------------------    LABS:  Results for orders placed or performed during the hospital encounter of 12/16/22   CMP   Result Value Ref Range    Sodium 142 132 - 146 mmol/L    Potassium 3.6 3.5 - 5.0 mmol/L    Chloride 105 98 - 107 mmol/L    CO2 25 22 - 29 mmol/L    Anion Gap 12 7 - 16 mmol/L    Glucose 109 (H) 74 - 99 mg/dL    BUN 10 6 - 23 mg/dL    Creatinine 0.6 0.5 - 1.0 mg/dL    Est, Glom Filt Rate >60 >=60 mL/min/1.73    Calcium 10.2 8.6 - 10.2 mg/dL    Total Protein 7.0 6.4 - 8.3 g/dL    Albumin 4.2 3.5 - 5.2 g/dL    Total Bilirubin 0.2 0.0 - 1.2 mg/dL    Alkaline Phosphatase 63 35 - 104 U/L    ALT 12 0 - 32 U/L    AST 19 0 - 31 U/L   CBC with Auto Differential   Result Value Ref Range    WBC 12.3 (H) 4.5 - 11.5 E9/L    RBC 4.88 3.50 - 5.50 E12/L    Hemoglobin 15.5 11.5 - 15.5 g/dL    Hematocrit 44.7 34.0 - 48.0 %    MCV 91.6 80.0 - 99.9 fL    MCH 31.8 26.0 - 35.0 pg    MCHC 34.7 (H) 32.0 - 34.5 %    RDW 12.5 11.5 - 15.0 fL    Platelets 533 775 - 212 E9/L    MPV 10.8 7.0 - 12.0 fL    Neutrophils % 72.5 43.0 - 80.0 %    Immature Granulocytes % 0.4 0.0 - 5.0 %    Lymphocytes % 15.6 (L) 20.0 - 42.0 %    Monocytes % 8.2 2.0 - 12.0 %    Eosinophils % 2.6 0.0 - 6.0 %    Basophils % 0.7 0.0 - 2.0 %    Neutrophils Absolute 8.92 (H) 1.80 - 7.30 E9/L    Immature Granulocytes # 0.05 E9/L    Lymphocytes Absolute 1.92 1.50 - 4.00 E9/L    Monocytes Absolute 1.01 (H) 0.10 - 0.95 E9/L    Eosinophils Absolute 0.32 0.05 - 0.50 E9/L    Basophils Absolute 0.09 0.00 - 0.20 E9/L   Troponin   Result Value Ref Range    Troponin, High Sensitivity 15 (H) 0 - 9 ng/L   TSH   Result Value Ref Range    TSH 2.250 0.270 - 4.200 uIU/mL   T4, Free   Result Value Ref Range    T4 Free 1.69 0.93 - 1.70 ng/dL   Urinalysis   Result Value Ref Range    Color, UA Straw Straw/Yellow    Clarity, UA Clear Clear    Glucose, Ur Negative Negative mg/dL    Bilirubin Urine Negative Negative    Ketones, Urine Negative Negative mg/dL    Specific Gravity, UA 1.010 1.005 - 1.030    Blood, Urine TRACE (A) Negative    pH, UA 7.5 5.0 - 9.0 Protein, UA Negative Negative mg/dL    Urobilinogen, Urine 0.2 <2.0 E.U./dL    Nitrite, Urine Negative Negative    Leukocyte Esterase, Urine Negative Negative   Protime-INR   Result Value Ref Range    Protime 13.5 (H) 9.3 - 12.4 sec    INR 1.2    Magnesium   Result Value Ref Range    Magnesium 2.0 1.6 - 2.6 mg/dL   Microscopic Urinalysis   Result Value Ref Range    WBC, UA 0-1 0 - 5 /HPF    RBC, UA NONE 0 - 2 /HPF    Bacteria, UA NONE SEEN None Seen /HPF   Troponin   Result Value Ref Range    Troponin, High Sensitivity 22 (H) 0 - 9 ng/L   SPECIMEN REJECTION   Result Value Ref Range    Rejected Test trp     Reason for Rejection see below    Troponin   Result Value Ref Range    Troponin, High Sensitivity 25 (H) 0 - 9 ng/L   EKG 12 Lead   Result Value Ref Range    Ventricular Rate 145 BPM    Atrial Rate 258 BPM    QRS Duration 84 ms    Q-T Interval 302 ms    QTc Calculation (Bazett) 469 ms    R Axis 72 degrees    T Axis 93 degrees       RADIOLOGY:  XR CHEST PORTABLE   Final Result   No pneumonia or pleural effusion.                 ------------------------- NURSING NOTES AND VITALS REVIEWED ---------------------------  Date / Time Roomed:  12/16/2022  5:53 PM  ED Bed Assignment:  07/07    The nursing notes within the ED encounter and vital signs as below have been reviewed.      Patient Vitals for the past 24 hrs:   BP Temp Temp src Pulse Resp SpO2 Weight   12/17/22 0005 (!) 152/86 -- -- 67 14 98 % --   12/16/22 2355 -- -- -- 59 20 93 % --   12/16/22 2345 -- -- -- 59 20 93 % --   12/16/22 2335 (!) 140/94 -- -- 60 21 96 % --   12/16/22 2325 -- -- -- 61 22 95 % --   12/16/22 2315 -- -- -- 54 22 94 % --   12/16/22 2305 136/80 -- -- 60 20 94 % --   12/16/22 2256 -- -- -- 58 20 95 % --   12/16/22 2255 -- -- -- 58 20 96 % --   12/16/22 2246 -- -- -- 55 21 95 % --   12/16/22 2240 -- -- -- 56 20 95 % --   12/16/22 2236 (!) 152/94 -- -- 58 20 96 % --   12/16/22 2225 -- -- -- 65 20 97 % --   12/16/22 2155 -- -- -- 64 17 96 % --   12/16/22 2140 -- -- -- 53 21 96 % --   12/16/22 2125 -- -- -- 56 23 95 % --   12/16/22 2110 -- -- -- 54 18 95 % --   12/16/22 2055 112/72 -- -- 55 24 94 % --   12/16/22 2050 122/72 98.1 °F (36.7 °C) Oral 60 18 97 % --   12/16/22 1954 -- -- -- 64 23 96 % --   12/16/22 1939 119/83 -- -- 95 21 95 % --   12/16/22 1924 -- -- -- (!) 101 20 94 % --   12/16/22 1909 -- -- -- 91 20 92 % --   12/16/22 1900 122/87 -- -- (!) 106 21 93 % --   12/16/22 1854 -- -- -- (!) 125 19 93 % --   12/16/22 1845 -- -- -- (!) 120 23 94 % --   12/16/22 1839 -- -- -- (!) 136 21 95 % --   12/16/22 1830 (!) 173/116 -- -- (!) 142 21 95 % --   12/16/22 1740 (!) 190/100 -- -- -- 20 96 % 135 lb (61.2 kg)   12/16/22 1654 -- 98.3 °F (36.8 °C) -- 64 17 99 % --       Oxygen Saturation Interpretation: Normal    ------------------------------------------ PROGRESS NOTES ------------------------------------------  Re-evaluation(s):  Time: 2300  Patients symptoms show no change  Repeat physical examination is improved    Counseling:  I have spoken with the patient and discussed todays results, in addition to providing specific details for the plan of care and counseling regarding the diagnosis and prognosis. Their questions are answered at this time and they are agreeable with the plan of admission.    --------------------------------- ADDITIONAL PROVIDER NOTES ---------------------------------  Consultations:  Time: 0100. Spoke with Dr. Clifton Ayala. Discussed case. They will admit the patient. This patient's ED course included: a personal history and physicial examination, re-evaluation prior to disposition, multiple bedside re-evaluations, IV medications, cardiac monitoring, and continuous pulse oximetry    This patient has remained hemodynamically stable during their ED course. Diagnosis:  1. Atrial fibrillation with rapid ventricular response ([address]    2.  Chronic atrial fibrillation (HCC)    3. Elevated troponin        Disposition:  Patient's disposition: Admit to med/surg floor  Patient's condition is stable.         Susana Query, DO  Resident  12/17/22 9724

## 2022-12-17 VITALS
SYSTOLIC BLOOD PRESSURE: 170 MMHG | WEIGHT: 135 LBS | HEIGHT: 63 IN | BODY MASS INDEX: 23.92 KG/M2 | TEMPERATURE: 97.9 F | DIASTOLIC BLOOD PRESSURE: 80 MMHG | HEART RATE: 62 BPM | OXYGEN SATURATION: 96 % | RESPIRATION RATE: 16 BRPM

## 2022-12-17 PROBLEM — I48.91 A-FIB (HCC): Status: ACTIVE | Noted: 2022-12-17

## 2022-12-17 LAB
ALBUMIN SERPL-MCNC: 3.8 G/DL (ref 3.5–5.2)
ALP BLD-CCNC: 62 U/L (ref 35–104)
ALT SERPL-CCNC: 10 U/L (ref 0–32)
ANION GAP SERPL CALCULATED.3IONS-SCNC: 10 MMOL/L (ref 7–16)
AST SERPL-CCNC: 19 U/L (ref 0–31)
BASOPHILS ABSOLUTE: 0.09 E9/L (ref 0–0.2)
BASOPHILS RELATIVE PERCENT: 1 % (ref 0–2)
BILIRUB SERPL-MCNC: 0.4 MG/DL (ref 0–1.2)
BUN BLDV-MCNC: 11 MG/DL (ref 6–23)
CALCIUM SERPL-MCNC: 9.2 MG/DL (ref 8.6–10.2)
CHLORIDE BLD-SCNC: 105 MMOL/L (ref 98–107)
CHOLESTEROL, TOTAL: 137 MG/DL (ref 0–199)
CO2: 25 MMOL/L (ref 22–29)
CREAT SERPL-MCNC: 0.6 MG/DL (ref 0.5–1)
EKG ATRIAL RATE: 258 BPM
EKG ATRIAL RATE: 59 BPM
EKG P AXIS: 92 DEGREES
EKG P-R INTERVAL: 154 MS
EKG Q-T INTERVAL: 302 MS
EKG Q-T INTERVAL: 434 MS
EKG QRS DURATION: 84 MS
EKG QRS DURATION: 84 MS
EKG QTC CALCULATION (BAZETT): 429 MS
EKG QTC CALCULATION (BAZETT): 469 MS
EKG R AXIS: 72 DEGREES
EKG R AXIS: 74 DEGREES
EKG T AXIS: 76 DEGREES
EKG T AXIS: 93 DEGREES
EKG VENTRICULAR RATE: 145 BPM
EKG VENTRICULAR RATE: 59 BPM
EOSINOPHILS ABSOLUTE: 0.2 E9/L (ref 0.05–0.5)
EOSINOPHILS RELATIVE PERCENT: 2.2 % (ref 0–6)
GFR SERPL CREATININE-BSD FRML MDRD: >60 ML/MIN/1.73
GLUCOSE BLD-MCNC: 81 MG/DL (ref 74–99)
HCT VFR BLD CALC: 44 % (ref 34–48)
HDLC SERPL-MCNC: 40 MG/DL
HEMOGLOBIN: 15 G/DL (ref 11.5–15.5)
IMMATURE GRANULOCYTES #: 0.03 E9/L
IMMATURE GRANULOCYTES %: 0.3 % (ref 0–5)
LDL CHOLESTEROL CALCULATED: 71 MG/DL (ref 0–99)
LYMPHOCYTES ABSOLUTE: 1.84 E9/L (ref 1.5–4)
LYMPHOCYTES RELATIVE PERCENT: 20 % (ref 20–42)
MAGNESIUM: 2.3 MG/DL (ref 1.6–2.6)
MCH RBC QN AUTO: 32 PG (ref 26–35)
MCHC RBC AUTO-ENTMCNC: 34.1 % (ref 32–34.5)
MCV RBC AUTO: 93.8 FL (ref 80–99.9)
MONOCYTES ABSOLUTE: 0.9 E9/L (ref 0.1–0.95)
MONOCYTES RELATIVE PERCENT: 9.8 % (ref 2–12)
NEUTROPHILS ABSOLUTE: 6.12 E9/L (ref 1.8–7.3)
NEUTROPHILS RELATIVE PERCENT: 66.7 % (ref 43–80)
PDW BLD-RTO: 12.8 FL (ref 11.5–15)
PHOSPHORUS: 3.2 MG/DL (ref 2.5–4.5)
PLATELET # BLD: 222 E9/L (ref 130–450)
PMV BLD AUTO: 10.8 FL (ref 7–12)
POTASSIUM SERPL-SCNC: 3.7 MMOL/L (ref 3.5–5)
RBC # BLD: 4.69 E12/L (ref 3.5–5.5)
SODIUM BLD-SCNC: 140 MMOL/L (ref 132–146)
TOTAL PROTEIN: 6.6 G/DL (ref 6.4–8.3)
TRIGL SERPL-MCNC: 128 MG/DL (ref 0–149)
TROPONIN, HIGH SENSITIVITY: 18 NG/L (ref 0–9)
TROPONIN, HIGH SENSITIVITY: 20 NG/L (ref 0–9)
TSH SERPL DL<=0.05 MIU/L-ACNC: 1.69 UIU/ML (ref 0.27–4.2)
VLDLC SERPL CALC-MCNC: 26 MG/DL
WBC # BLD: 9.2 E9/L (ref 4.5–11.5)

## 2022-12-17 PROCEDURE — 93306 TTE W/DOPPLER COMPLETE: CPT

## 2022-12-17 PROCEDURE — 93010 ELECTROCARDIOGRAM REPORT: CPT | Performed by: INTERNAL MEDICINE

## 2022-12-17 PROCEDURE — G0378 HOSPITAL OBSERVATION PER HR: HCPCS

## 2022-12-17 PROCEDURE — 85025 COMPLETE CBC W/AUTO DIFF WBC: CPT

## 2022-12-17 PROCEDURE — 83735 ASSAY OF MAGNESIUM: CPT

## 2022-12-17 PROCEDURE — 36415 COLL VENOUS BLD VENIPUNCTURE: CPT

## 2022-12-17 PROCEDURE — 80053 COMPREHEN METABOLIC PANEL: CPT

## 2022-12-17 PROCEDURE — 84443 ASSAY THYROID STIM HORMONE: CPT

## 2022-12-17 PROCEDURE — 6370000000 HC RX 637 (ALT 250 FOR IP): Performed by: INTERNAL MEDICINE

## 2022-12-17 PROCEDURE — 6370000000 HC RX 637 (ALT 250 FOR IP)

## 2022-12-17 PROCEDURE — 84484 ASSAY OF TROPONIN QUANT: CPT

## 2022-12-17 PROCEDURE — 80061 LIPID PANEL: CPT

## 2022-12-17 PROCEDURE — 84100 ASSAY OF PHOSPHORUS: CPT

## 2022-12-17 RX ORDER — METOPROLOL SUCCINATE 50 MG/1
50 TABLET, EXTENDED RELEASE ORAL DAILY
Status: DISCONTINUED | OUTPATIENT
Start: 2022-12-17 | End: 2022-12-17 | Stop reason: HOSPADM

## 2022-12-17 RX ORDER — ALENDRONATE SODIUM 70 MG/1
70 TABLET ORAL DAILY
Status: DISCONTINUED | OUTPATIENT
Start: 2022-12-17 | End: 2022-12-17 | Stop reason: CLARIF

## 2022-12-17 RX ORDER — ASPIRIN 81 MG/1
324 TABLET, CHEWABLE ORAL ONCE
Status: COMPLETED | OUTPATIENT
Start: 2022-12-17 | End: 2022-12-17

## 2022-12-17 RX ORDER — DOCUSATE SODIUM 100 MG/1
100 CAPSULE, LIQUID FILLED ORAL NIGHTLY
Status: DISCONTINUED | OUTPATIENT
Start: 2022-12-17 | End: 2022-12-17 | Stop reason: HOSPADM

## 2022-12-17 RX ORDER — LEVOTHYROXINE SODIUM 0.12 MG/1
125 TABLET ORAL DAILY
Status: DISCONTINUED | OUTPATIENT
Start: 2022-12-17 | End: 2022-12-17 | Stop reason: HOSPADM

## 2022-12-17 RX ORDER — ASPIRIN 81 MG/1
81 TABLET ORAL DAILY
Status: DISCONTINUED | OUTPATIENT
Start: 2022-12-17 | End: 2022-12-17 | Stop reason: HOSPADM

## 2022-12-17 RX ORDER — ROSUVASTATIN CALCIUM 10 MG/1
10 TABLET, COATED ORAL DAILY
Status: DISCONTINUED | OUTPATIENT
Start: 2022-12-17 | End: 2022-12-17 | Stop reason: HOSPADM

## 2022-12-17 RX ORDER — PANTOPRAZOLE SODIUM 40 MG/1
40 TABLET, DELAYED RELEASE ORAL
Status: DISCONTINUED | OUTPATIENT
Start: 2022-12-17 | End: 2022-12-17 | Stop reason: HOSPADM

## 2022-12-17 RX ORDER — LISINOPRIL 10 MG/1
10 TABLET ORAL DAILY
Status: DISCONTINUED | OUTPATIENT
Start: 2022-12-17 | End: 2022-12-17 | Stop reason: HOSPADM

## 2022-12-17 RX ORDER — ONDANSETRON 2 MG/ML
4 INJECTION INTRAMUSCULAR; INTRAVENOUS EVERY 6 HOURS PRN
Status: DISCONTINUED | OUTPATIENT
Start: 2022-12-17 | End: 2022-12-17 | Stop reason: HOSPADM

## 2022-12-17 RX ADMIN — METOPROLOL SUCCINATE 50 MG: 50 TABLET, EXTENDED RELEASE ORAL at 10:52

## 2022-12-17 RX ADMIN — PANTOPRAZOLE SODIUM 40 MG: 40 TABLET, DELAYED RELEASE ORAL at 06:23

## 2022-12-17 RX ADMIN — LEVOTHYROXINE SODIUM 125 MCG: 0.12 TABLET ORAL at 06:23

## 2022-12-17 RX ADMIN — LISINOPRIL 10 MG: 10 TABLET ORAL at 10:52

## 2022-12-17 RX ADMIN — ASPIRIN 324 MG: 81 TABLET, CHEWABLE ORAL at 00:05

## 2022-12-17 RX ADMIN — ASPIRIN 81 MG: 81 TABLET, COATED ORAL at 10:51

## 2022-12-17 RX ADMIN — ROSUVASTATIN CALCIUM 10 MG: 10 TABLET, COATED ORAL at 10:51

## 2022-12-17 RX ADMIN — APIXABAN 5 MG: 5 TABLET, FILM COATED ORAL at 10:51

## 2022-12-17 NOTE — H&P
Department of Internal Medicine  History and Physical Examination     Primary Care Physician: Shira Hawkins DO   Admitting Physician:  Bob Amin DO  Admission date and time: 12/16/2022  5:53 PM    Room:  [address] [place] [address]  Admitting diagnosis: A-fib [facility]) [I48.91]  Chronic atrial fibrillation ([facility] [I48.20]  Elevated troponin [R77.8]  Atrial fibrillation with rapid ventricular response [facility]) [I48.91]    Patient Name: Alyse Berman  MRN: [id number]    Date of Service: 12/17/2022     Chief Complaint:  Palpitations    HISTORY OF PRESENT ILLNESS:    Alyse Berman is a 59-year-old female patient presented to [address] [place] [address] for palpitations. She has history of atrial fibrillation and was found to be in RVR. She was given Cardizem bolus and converted to normal sinus rhythm with well-controlled rate. Troponins were elevated and had a slight uptrend. Remainder of her ER work-up returned negative. Case discussed with the ER physician, plan was for admission, observation, further care and management under the service of Dr. Urmila Willis. The patient is seen and examined at bedside today. She is absolutely symptom-free and is requesting discharge home. No fevers or chills. No sick contact or exposures. No headache or acute neurologic complaints. No chest pains, further palpitations, or any untoward symptoms in regards to cardiac complaints. No shortness of breath at rest or on exertion, activity restrictions, cough or URI symptoms. No abdominal pain, nausea, vomiting, bowel changes, hematochezia or melena. No acute urinary complaints.     PAST MEDICAL Hx:  Past Medical History:   Diagnosis Date    AF (atrial fibrillation) ([facility] 09/2021    Arrhythmia     CAD in native artery 89/70/6409    Diastolic heart failure ([facility]     Diverticulitis 08/03/2018    H/O cardiovascular stress test 05/05/2021    Lexiscan    HIGH CHOLESTEROL     History of atrial fibrillation 08/2018    with episode of diverticulosis    History of stress test 08/07/2018    Lexiscan stress test    Hypertension     Thyroid disease        PAST SURGICAL Hx:   Past Surgical History:   Procedure Laterality Date    ABLATION OF DYSRHYTHMIC FOCUS  09/02/2021    successful AF Cryo-Ablation   (Dr. Dotti Aschoff)    COLONOSCOPY      COLONOSCOPY N/A 04/08/2019    COLONOSCOPY performed by Isra Freire MD at [address] CATH LAB PROCEDURE  08/09/2018    NERVE BLOCK  12/27/2011    NERVE BLOCK  01/24/2012    lumbar epidural    NERVE BLOCK  02/21/2012    lumbar epidural with flouro    [place] COLONOSCOPY FLX DX W/COLLJ SPEC WHEN PFRMD N/A 10/04/2018    COLONOSCOPY performed by Isra Freire MD at [address], PARTIAL, Kristyn Guild N/A 11/13/2018    LAPAROSCOPIC ROBOT XI ASSISTED SIGMOID COLON RESECTION WITH OSTOMY performed by Isra Freire MD at [address] N/A 04/09/2019    COLOSTOMY CLOSURE LAPAROSCOPIC ROBOTIC XI performed by Isra Freire MD at [address] N/A 08/13/2020    DIAGNOSTIC LAPAROSCOPIY POSS BOWEL RESECTION performed by Shun Cool MD at [address] Hx:  Family History   Problem Relation Age of Onset    Cancer Other     Heart Disease Other     Cancer Mother         brain    Breast Cancer Mother     Heart Attack Father         age 48     Heart Attack Sister         age 46     Colon Cancer Sister        HOME MEDICATIONS:  Prior to Admission medications    Medication Sig Start Date End Date Taking?  Authorizing Provider   pantoprazole (PROTONIX) 40 MG tablet  2/12/22   Historical Provider, MD   ferrous sulfate (IRON 325) 325 (65 Fe) MG tablet Take 325 mg by mouth daily (with breakfast)  Patient not taking: Reported on 12/17/2022    Historical Provider, MD   alendronate (FOSAMAX) 70 MG tablet Take 70 mg by mouth daily  5/15/21   Historical Provider, MD   metoprolol succinate (TOPROL XL) 50 MG extended release tablet Take 1 tablet by mouth daily 6/11/21   Alexys Amin Chirag,    SYNTHROID 125 MCG tablet Take 125 mcg by mouth Daily Take 1 tablet daily 4/25/21   Historical Provider, MD   Calcium Carbonate-Vit D-Min (CALCIUM 1200 PO) Take 1 capsule by mouth  Patient not taking: Reported on 12/17/2022    Historical Provider, MD   docusate sodium (COLACE) 100 MG capsule Take 100 mg by mouth nightly     Historical Provider, MD   Echinacea 125 MG CAPS Take 125 mg by mouth daily    Historical Provider, MD   aspirin EC 81 MG EC tablet Take 1 tablet by mouth daily 8/9/18   Sharonda Braxton MD   apixaban (ELIQUIS) 5 MG TABS tablet Take 1 tablet by mouth 2 times daily 8/8/18   Mark Llamas DO   ZINC PO Take 1 tablet by mouth daily    Historical Provider, MD   Omega-3 Fatty Acids (FISH OIL) 1000 MG CAPS Take 1,000 mg by mouth 2 times daily     Historical Provider, MD   lisinopril (PRINIVIL;ZESTRIL) 10 MG tablet Take 10 mg by mouth daily. Historical Provider, MD   rosuvastatin (CRESTOR) 10 MG tablet Take 10 mg by mouth daily. Historical Provider, MD       ALLERGIES:  Patient has no known allergies.     SOCIAL Hx:  Social History     Socioeconomic History    Marital status:      Spouse name: Not on file    Number of children: Not on file    Years of education: Not on file    Highest education level: Not on file   Occupational History    Not on file   Tobacco Use    Smoking status: Every Day     Packs/day: 0.50     Types: Cigarettes    Smokeless tobacco: Never   Vaping Use    Vaping Use: Never used   Substance and Sexual Activity    Alcohol use: No     Comment: 5 cups of coffee decaf/regular    Drug use: No    Sexual activity: Not on file   Other Topics Concern    Not on file   Social History Narrative    Not on file     Social Determinants of Health     Financial Resource Strain: Not on file   Food Insecurity: Not on file   Transportation Needs: Not on file   Physical Activity: Not on file   Stress: Not on file   Social Connections: Not on file   Intimate Partner Violence: Not on file   Housing Stability: Not on file     ROS: 12 point review of symptoms was conducted, pertinent positives and negative were reviewed, aside from that all 12 systems were reviewed and negative. PHYSICAL EXAM:  VITALS:  Vitals:    12/17/22 0130   BP: 137/68   Pulse: 61   Resp: 20   Temp: 98.2 °F (36.8 °C)   SpO2: 97%         CONSTITUTIONAL:    Awake, alert, cooperative, no apparent distress    EYES:    PERRL, EOMI, sclera clear without icterus, conjunctiva normal    ENT:    Normocephalic, atraumatic, sinuses nontender on palpation. External ears without lesions. Oral pharynx with moist mucus membranes. NECK:    Supple, symmetrical, trachea midline, no adenopathy, thyroid symmetric, not enlarged and no tenderness, skin normal, no bruits, no JVD    HEMATOLOGIC/LYMPHATICS:    No cervical lymphadenopathy and no supraclavicular lymphadenopathy    LUNGS:    Symmetric. No increased work of breathing, normal air exchange, clear to auscultation bilaterally, no wheezes, rhonchi, or rales,     CARDIOVASCULAR:    Normal apical impulse, regular rate and rhythm, normal S1 and S2, systolic murmur noted    ABDOMEN:    Normal contour, normal bowel sounds, soft, non-distended, non-tender, no rebound or guarding elicited on palpation     MUSCULOSKELETAL:    There is no redness, warmth, or swelling of the joints. Tone is normal.    NEUROLOGIC:    Awake, alert, oriented to name, place and time. Cranial nerves II-XII are grossly intact. Motor is 5 out of 5 bilaterally. SKIN:    No bruising or bleeding. No redness, warmth, or swelling    EXTREMITIES:    Peripheral pulses present. No edema, cyanosis, or swelling.     LABORATORY DATA:  CBC with Differential:    Lab Results   Component Value Date/Time    WBC 12.3 12/16/2022 07:06 PM    RBC 4.88 12/16/2022 07:06 PM    HGB 15.5 12/16/2022 07:06 PM    HCT 44.7 12/16/2022 07:06 PM     12/16/2022 07:06 PM    MCV 91.6 12/16/2022 07:06 PM    MCH 31.8 12/16/2022 07:06 PM MCHC 34.7 12/16/2022 07:06 PM    RDW 12.5 12/16/2022 07:06 PM    SEGSPCT 64 10/31/2013 06:50 AM    METASPCT 0.9 11/13/2018 01:00 PM    LYMPHOPCT 15.6 12/16/2022 07:06 PM    MONOPCT 8.2 12/16/2022 07:06 PM    BASOPCT 0.7 12/16/2022 07:06 PM    MONOSABS 1.01 12/16/2022 07:06 PM    LYMPHSABS 1.92 12/16/2022 07:06 PM    EOSABS 0.32 12/16/2022 07:06 PM    BASOSABS 0.09 12/16/2022 07:06 PM     BMP:    Lab Results   Component Value Date/Time     12/16/2022 07:06 PM    K 3.6 12/16/2022 07:06 PM    K 3.7 08/27/2022 07:45 AM     12/16/2022 07:06 PM    CO2 25 12/16/2022 07:06 PM    BUN 10 12/16/2022 07:06 PM    LABALBU 4.2 12/16/2022 07:06 PM    LABALBU 4.3 12/13/2011 06:50 AM    CREATININE 0.6 12/16/2022 07:06 PM    CALCIUM 10.2 12/16/2022 07:06 PM    GFRAA >60 08/27/2022 07:45 AM    LABGLOM >60 12/16/2022 07:06 PM    GLUCOSE 109 12/16/2022 07:06 PM    GLUCOSE 94 12/13/2011 06:50 AM       ASSESSMENT:  Atrial fibrillation with rapid ventricular response on Eliquis  Mildly elevated troponin secondary to #1 with underlying coronary artery disease   Mild aortic stenosis  Essential hypertension  Hyperlipidemia  Hypothyroidism   Current tobacco abuse    PLAN:  Baldomero Maxwell is a 79-year-old female who presented to [address] [place] [address] with palpitations and was found to be in A. fib RVR. She converted with magnesium and Cardizem. She is asymptomatic. Troponins will be repeated x2 and if stable or downtrending she will be discharged with outpatient follow-up as she does not wish to remain hospitalized any longer. We have reinforced the importance of med compliance and outpatient follow-up. Underlying co-morbidites will be addressed during hospitalization as well. Labs and vital signs will be monitored closely and addressed accordingly. See additional orders for details.      WAQAR Hdz CNP  7:27 AM  12/17/2022    Electronically signed by WAQAR Hdz CNP on 12/17/22 at 7:27 AM EST

## 2022-12-17 NOTE — PROGRESS NOTES
Pharmacy Note    This patient was ordered alendronate. Per the [address] [place] [address] Dr, this medication is non-formulary and not stocked by pharmacy for the reason indicated below. The medication can be reordered at discharge.      Medications in which risks outweigh benefits during hospitalization:           -  oral bisphosphonates         -  raloxifene ([facility])        -  SGLT2 inhibitors (ordered in the hospital for an indication other than heart failure or chronic kidney disease)    Jenifer Torres, PharmD, 12/17/20226:15 AM

## 2022-12-17 NOTE — DISCHARGE SUMMARY
As the patient was admitted for less than 24 hours this will act as my discharge summary. She was admitted for A. fib with RVR and a known history of A. fib. This was precipitated by significant increased activity throughout the day. She converted with magnesium and Cardizem. She remained in normal sinus rhythm and asymptomatic. She did not wish to remain hospitalized. Her troponins had a general but mild upward trend. Repeat troponins x2 were assessed and revealed ongoing stability. Chronic morbidities, labs and vital signs were monitored and addressed accordingly. She remained without cardiac symptomatology. She understands importance of medication adherence and close follow-up. She is medically acceptable for discharge home as discussed.

## 2022-12-17 NOTE — DISCHARGE INSTRUCTIONS
Your information:  Name: Jalil Paz  : 1950    Your instructions:    YOU ARE BEING DISCHARGED HOME. PLEASE MAKE AND KEEP YOUR FOLLOW UP APPOINTMENTS. What to do after you leave the hospital:    Recommended diet: regular diet    Recommended activity: activity as tolerated    IF YOU EXPERIENCE ANY OF THE FOLLOWING SYMPTOMS, CHEST PAIN, SHORTNESS OF BREATH, COUGHING UP BLOOD OR BLOODY SPUTUM, STOMACH PAIN OR CRAMPING, DARK, TARRY STOOLS, LOSS OF APPETITE, GENERAL NOT FEELING WELL, SIGNS AND SYMPTOMS OF INFECTION LIKE FEVER AND OR CHILLS, PLEASE CALL DR Ceci Hickey DO     OR RETURN TO THE EMERGENCY ROOM. The following personal items were collected during your admission and were returned to you:    Belongings  Dental Appliances: Uppers  Vision - Corrective Lenses: Eyeglasses (reading glasses)  Hearing Aid: None  Clothing: Jacket/Coat, Pajamas, Footwear  Jewelry: Necklace (2)  Body Piercings Removed: No  Electronic Devices: Cell Phone  Other Valuables: Wallet, Purse (150 usd)  Home Medications: None  Valuables Given To: Patient  Provide Name(s) of Who Valuable(s) Were Given To: Mylene Guerrero  Responsible person(s) in the waiting room: No  Patient approves for provider to speak to responsible person post operatively: Yes    Information obtained by:  By signing below, I understand that if any problems occur once I leave the hospital I am to contact     Ceci Hickey DO    Or go to emergency room. I understand and acknowledge receipt of the instructions indicated above.

## 2023-01-10 NOTE — PROGRESS NOTES
Cardiac Electrophysiology Outpatient Progress Note    Baldemar Rojas  1950  Date of Service: 1/10/2023  PCP: Sita Malhotra DO  ELECTROPHYSIOLOGIST: Axel Enrique MD        Subjective: Baldemar Rojas is seen in cardiac electrophysiology clinic for follow-up and management of paroxysmal atrial fibrillation and bradycardia status post cryo-balloon PVI on 9/2/21. She was seen 12/16/2022 at [facility] for recurrent AF with RVR rates in the 140's  and was treated in the ER with IV Cardizem and had spontaneous conversion to sinus, this recurrence of AF was in the setting of significant physical exertion and she was getting ready for [place] carrying large boxes around. Overall she reports that she is in sinus rhythm the majority of the time and satisfied with her AF management, today she presents in sinus. She has no complaints of bleeding and remains compliant with her medications.       Patient Active Problem List   Diagnosis    Spinal stenosis    Degeneration of lumbosacral intervertebral disc    Lumbago    Colonic diverticular abscess    CAD in native artery    S/P colon resection    Diverticulitis of large intestine with perforation and abscess without bleeding    Pelvic abscess in female    Colostomy reversal    SBO (small bowel obstruction) (HCC)    Bradycardia    Chest pain    Atrial fibrillation with rapid ventricular response (HCC)    Paroxysmal atrial fibrillation (HCC)    A-fib (HCC)     Current Outpatient Medications   Medication Sig Dispense Refill    pantoprazole (PROTONIX) 40 MG tablet       alendronate (FOSAMAX) 70 MG tablet Take 70 mg by mouth daily       metoprolol succinate (TOPROL XL) 50 MG extended release tablet Take 1 tablet by mouth daily 30 tablet 3    SYNTHROID 125 MCG tablet Take 125 mcg by mouth Daily Take 1 tablet daily      Calcium Carbonate-Vit D-Min (CALCIUM 1200 PO) Take 1 capsule by mouth (Patient not taking: Reported on 12/17/2022)      docusate sodium (COLACE) 100 MG capsule Take 100 mg by mouth nightly       Echinacea 125 MG CAPS Take 125 mg by mouth daily      aspirin EC 81 MG EC tablet Take 1 tablet by mouth daily 30 tablet 3    apixaban (ELIQUIS) 5 MG TABS tablet Take 1 tablet by mouth 2 times daily 60 tablet 0    ZINC PO Take 1 tablet by mouth daily      Omega-3 Fatty Acids (FISH OIL) 1000 MG CAPS Take 1,000 mg by mouth 2 times daily       lisinopril (PRINIVIL;ZESTRIL) 10 MG tablet Take 10 mg by mouth daily. rosuvastatin (CRESTOR) 10 MG tablet Take 10 mg by mouth daily. No current facility-administered medications for this visit. No Known Allergies    ROS:   Constitutional: Negative for fever, activity change and appetite change. HENT: Negative for epistaxis. Eyes: Negative for diploplia, blurred vision. Respiratory: Negative for cough, chest tightness, shortness of breath and wheezing. Cardiovascular: pertinent positives in HPI  Gastrointestinal: Negative for abdominal pain and blood in stool. All other review of systems are negative       PHYSICAL EXAM:  There were no vitals filed for this visit. Constitutional: Well-developed, no acute distress  Eyes: conjunctivae normal, no xanthelasma   Ears, Nose, Throat: oral mucosa moist, no cyanosis   CV: no JVD. Regular rate and rhythm. Normal S1S2 and no S3. No murmurs, rubs, or gallops. PMI is nondisplaced  Lungs: clear to auscultation bilaterally, normal respiratory effort without used of accessory muscles  Abdomen: soft, non-tender, bowel sounds present, no masses or hepatomegaly   Musculoskeletal: no digital clubbing, no edema , no groin hematoma  Skin: warm, no rashes     Pertinent Labs:  CBC:   No results for input(s): WBC, HGB, HCT, PLT in the last 72 hours. BMP:  No results for input(s): NA, K, CL, CO2, BUN, CREATININE, GLU, CALCIUM in the last 72 hours.     TSH:   Lab Results   Component Value Date    TSH 1.690 12/17/2022      Lipid Profile:   Lab Results   Component Value Date/Time TRIG 128 12/17/2022 06:58 AM    HDL 40 12/17/2022 06:58 AM    LDLCALC 71 12/17/2022 06:58 AM    CHOL 137 12/17/2022 06:58 AM       Pertinent Cardiac Testing:     JEANNA 9/2/21:  Findings      Left Ventricle   Normal left ventricular size and systolic function. Right Ventricle   Normal right ventricular size and function. Left Atrium   Normal sized left atrium. There is no left atrial appendage thrombus. No evidence of patent foramen ovale during color flow doppler study. Right Atrium   Normal right atrium size. Mitral Valve   Normal mitral valve structure and function. Mild mitral regurgitation is present. Tricuspid Valve   The tricuspid valve appears structurally normal.   Physiologic and/or trace tricuspid regurgitation. Aortic Valve   Structurally normal aortic valve. No evidence of aortic valve regurgitation. No hemodynamically significant aortic stenosis is present. Pulmonic Valve   Pulmonic valve is structurally normal.   Physiologic and/or trace pulmonic regurgitation present. Pericardial Effusion   No pericardial effusion. Aorta   Mild atherosclerotic change of ascending aorta. Conclusions      Summary   Normal left ventricular size and systolic function. Normal sized left atrium. There is no left atrial appendage thrombus. No evidence of patent foramen ovale during color flow doppler study. Mild mitral regurgitation is present. Physiologic and/or trace tricuspid regurgitation. Mild atherosclerotic change of ascending aorta. No pericardial effusion. Signature      ----------------------------------------------------------------   Electronically signed by Sherly Mccloud MD(Interpreting   physician) on 09/02/2021 01:50 PM   ----------------------------------------------------------------    Echocardiogram 6/9/21:  Findings      Left Ventricle   Normal left ventricular chamber size.    Normal left ventricular systolic function, LVEF is 65%. Indeterminate LV diastolic function. Normal left atrial pressure. Right Ventricle   Normal right ventricle size and function. Left Atrium   Left atrium is normal size. Interatrial septum not well visualized but appears intact. No thrombus in left atrium. Right Atrium   Normal right atrium. Mitral Valve   Normal mitral valve structure. There is trace mitral regurgitation. No mitral valve prolapse. Tricuspid Valve   Normal tricuspid valve structure. There is trace tricuspid regurgitation, RVSP 23mmHg. Aortic Valve   The aortic valve appears mildly sclerotic. No hemodynamically significant aortic stenosis - mean gradient 6mmHg, DLI   0.57      Pulmonic Valve   The pulmonic valve was not well visualized. Pericardial Effusion   No evidence of pericardial effusion. Pericardium appears normal.      Pleural Effusion   No evidence of pleural effusion. Aorta   Normal aortic root size and ascending aorta. Miscellaneous   The inferior vena cava diameter is normal with normal respiratory   variation. Conclusions      Summary   Normal left ventricular chamber size. Normal left ventricular systolic function, LVEF is 65%. Indeterminate LV diastolic function. Left atrium is normal size. Interatrial septum not well visualized but appears intact. No thrombus in left atrium. Normal right ventricle size and function. There is trace mitral regurgitation. No mitral valve prolapse. The aortic valve appears mildly sclerotic. No hemodynamically significant aortic stenosis - mean gradient 6mmHg, DLI   0.57   There is trace tricuspid regurgitation, RVSP 23mmHg. Normal aortic root size. No evidence of pericardial effusion. No intra cardiac mass or thrombus. Compared to prior echo from 8/6/2018.       Signature      ----------------------------------------------------------------   Electronically signed by Charisse Latif MD(Interpreting physician) on 06/09/2021 04:56 PM   ----------------------------------------------------------------        Echocardiogram 8/6/18: Findings      Left Ventricle   Left ventricle size is normal.   Proximal septal thickening. Ejection fraction is visually estimated at 65%. No regional wall motion abnormalities seen. E/A flow reversal noted. Suggestive of diastolic dysfunction. No evidence of left ventricular mass or thrombus noted. Right Ventricle   Normal right ventricular size and function. Left Atrium   Normal sized left atrium. Interatrial septum appears intact. Right Atrium   Normal right atrium size. Mitral Valve   Structurally normal mitral valve. Physiologic and/or trace mitral regurgitation is present. No evidence of mitral valve stenosis. Tricuspid Valve   The tricuspid valve appears structurally normal.   Physiologic and/or trace tricuspid regurgitation. RVSP is normal.      Aortic Valve   The aortic valve appears mildly sclerotic. Aortic valve opens well. No evidence of aortic valve regurgitation. Mild aortic stenosis is present. Pulmonic Valve   The pulmonic valve was not well visualized. No evidence of any pulmonic regurgitation. Pericardial Effusion   No evidence of pericardial effusion. Aorta   Aortic root within normal limits. Conclusions      Summary   No previous echo for comparison. Technically adequate study. Proximal septal thickening. Ejection fraction is visually estimated at 65%. No regional wall motion abnormalities seen. E/A flow reversal noted. Suggestive of diastolic dysfunction. Physiologic and/or trace mitral regurgitation is present. Mild aortic stenosis is present. Physiologic and/or trace tricuspid regurgitation.    RVSP is normal.      Signature      ----------------------------------------------------------------   Electronically signed by Jose Raul Hawthorne MD(Interpreting   physician) on 08/06/2018 07:46 PM   ----------------------------------------------------------------     Cardiac Catheterization 08/9/18:  PROCEDURES:  1. Coronary angiography. 2.  Right radial approach. 3.  Conscious sedation using Versed and fentanyl. INDICATION:  #4 with score of seven. HISTORY:  The patient is a 57-year-old lady who presented to the hospital  with shortness of breath. She was found to have stress test that was read  as normal, upon personal review of the stress test showed significant  inferior and lateral wall reversible defect, the patient was brought to the  cath lab to evaluate the anatomy of her coronary arteries with the  intention to intervene as warranted. DESCRIPTION OF PROCEDURE:  After the appropriate informed consent, the  right wrist area was prepped and draped in the usual sterile fashion. A  time-out was called. The right wrist area was locally anesthetized with 80  ml of 2% lidocaine. A 21-gauge needle was used to access the right radial  artery. A 6-Kyrgyz introducer sheath was used to cannulate the right  radial artery. A 5-Kyrgyz JL-3.5 and 5-Kyrgyz JR-4 catheter were used for  coronary angiography in multiple projections. ANGIOGRAPHIC FINDINGS:  The left main coronary artery arising from the left  sinus of Valsalva. It is a large vessel that has no significant disease. It trifurcates into left anterior descending artery, left circumflex artery  and ramus intermedius artery. The left anterior descending artery is a very tortuous vessel, actually all  of her vessels are very tortuous and the overlapped significantly, the LAD  has no significant disease. It gives off a two large diagonal branches. The LAD and its diagonal branches have no significant disease. The second  diagonal branch may be has like 30% disease. The ramus intermedius artery also a tortuous vessel without any significant  disease.   The left circumflex artery is a large vessel that gives off a  large OM branch and that has like 30% proximal disease. There was grade 3 left-to-right collaterals. The right coronary artery arising from the right sinus of Valsalva. It has  proximal total occlusion. At the end of the procedure, the catheter and the wire were pulled out from  the body, Vasc band was applied to the right wrist area with effective  hemostasis. MEDICATIONS USED DURING THE PROCEDURE:  Intraarterial verapamil to prevent  vasospasm, intra-arterial heparin for anticoagulation. We used Versed and fentanyl for conscious sedation. First dose was given  at [phone], procedure ended at 1815, there was 20 minutes of direct  face-to-face supervision during conscious sedation administration. Total fluoroscopy time was 1.7 minutes. Total contrast used was 50 mL. IMPRESSION:  1. 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 2.  Significantly tortuous and overlapping left coronary arteries. 3.  Mild disease involving a second diagonal and first OM branch. RECOMMENDATIONS:  1. Aggressive coronary artery disease risk-factor modification. 2.  Smoking cessation. 3.  The patient will be observed for 2 hours, discharged home if she meets  discharge criteria. Laly Spencer MD     Stress Test: 5/5/21   Findings      Left Ventricle   Left ventricle size is normal.   Proximal septal thickening. Ejection fraction is visually estimated at 65%. No regional wall motion abnormalities seen. E/A flow reversal noted. Suggestive of diastolic dysfunction. No evidence of left ventricular mass or thrombus noted. Right Ventricle   Normal right ventricular size and function. Left Atrium   Normal sized left atrium. Interatrial septum appears intact. Right Atrium   Normal right atrium size. Mitral Valve   Structurally normal mitral valve. Physiologic and/or trace mitral regurgitation is present.    No evidence of mitral valve stenosis. Tricuspid Valve   The tricuspid valve appears structurally normal.   Physiologic and/or trace tricuspid regurgitation. RVSP is normal.      Aortic Valve   The aortic valve appears mildly sclerotic. Aortic valve opens well. No evidence of aortic valve regurgitation. Mild aortic stenosis is present. Pulmonic Valve   The pulmonic valve was not well visualized. No evidence of any pulmonic regurgitation. Pericardial Effusion   No evidence of pericardial effusion. Aorta   Aortic root within normal limits. Conclusions      Summary   No previous echo for comparison. Technically adequate study. Proximal septal thickening. Ejection fraction is visually estimated at 65%. No regional wall motion abnormalities seen. E/A flow reversal noted. Suggestive of diastolic dysfunction. Physiologic and/or trace mitral regurgitation is present. Mild aortic stenosis is present. Physiologic and/or trace tricuspid regurgitation. RVSP is normal.      Signature      ----------------------------------------------------------------   Electronically signed by Jose Raul Hawthorne MD(Interpreting   physician) on 08/06/2018 07:46 PM   ----------------------------------------------------------------     14 day cardiac monitor 6/11/21:           EKG 1/10/2023:  sinus rate 67 bpm CA 134ms, QRS 98ms QTc 426ms  - see scanned cardiology    Impression:    1. Paroxysmal atrial fibrillation  - Diagnosed 8/2018.   - [id number] = 5 (age, gender, CAD, CHF, HTN)  - Current [facility] regiment includes Eliquis. - Current medical therapy includes Toprol XL.  - Thus far has had 1 cardioversion on 12/5/20.  - 14 day cardiac monitor 6/11/21 showed no PAF and symptoms correlated with PACs.   - Three ED visits since June 2021 due to AF with RVR while on Toprol XL last 12/16/2022.  - Successful cryoballoon PVI ablation on 09/02/21.  - Recurrent AF 12/16/2022 in the setting of physical stress patient reports she is satisfied with her AF management. - Presents in NSR.   - Re-education on importance of well controlled HTN (goal BP < 130/80), adequate weight control (goal BMI of < 27), physical activity consisting of moderate cardiopulmonary exercise up to a goal of 250 min/wk, daily compliance with CPAP in treating sleep apnea, smoking cessation and limited ETOH intake       2. Sinus bradycardia  - Probable underlying sinus node dysfunction which worsening by Beta-blocker. - Presents today in SR on Toprol XL. 3. Coronary artery disease   - Chronic total occlusion of the right coronary artery with collaterals per [facility] 8/9/18.  - On ASA, Toprol XL and Crestor. 4. Chronic HFpEF  - LV EF 65% on TTE 8/6/18.  - Normal LV EF on JEANNA 9/2/21  - Euvolemic and compensated. - On Toprol XL and Zestril. 5. Valvular heart disease  - Mild aortic stenosis on TTE 8/6/18. 6. Hypertension  - Controlled. - On Toprol XL and Zestril. 7. Hyperlipidemia   - On Crestor. 8. Hypothyroidism   - On Synthroid. Recommendations:    1. Continue Toprol XL 50 mg daily. 2. Continue Eliquis 5 mg bid. 3.  Patient satisfied with her current AF management, if AAD is needed will need to be class III given history of CAD. 3. Continue risk factor modifications as above. 4. Follow-up in 6 months with Dr. Ld Barker or sooner PRN. Encouraged the patient to call the office for any questions or concerns. Thank you for allowing me to participate in your patient's care. I have spent a total of 30 minutes with the patient and the family reviewing the above stated recommendations. And a total of >50% of that time involved face-to-face time providing counseling and/or coordination of care with the other providers, preparation for the clinic visit, reviewing records/tests, counseling/education of the patient, ordering medications/tests/procedures, coordinating care, and documenting clinical information in the EHR.      Bettina Desir, APRN - CNP on 1/10/2023 at [facility]  [facility]: [facility]  1/10/23   4:29 PM

## 2023-01-11 ENCOUNTER — OFFICE VISIT (OUTPATIENT)
Dept: NON INVASIVE DIAGNOSTICS | Age: 73
End: 2023-01-11
Payer: COMMERCIAL

## 2023-01-11 VITALS
HEART RATE: 67 BPM | HEIGHT: 63 IN | SYSTOLIC BLOOD PRESSURE: 130 MMHG | WEIGHT: 137.2 LBS | RESPIRATION RATE: 16 BRPM | BODY MASS INDEX: 24.31 KG/M2 | DIASTOLIC BLOOD PRESSURE: 88 MMHG

## 2023-01-11 DIAGNOSIS — R00.1 BRADYCARDIA: ICD-10-CM

## 2023-01-11 DIAGNOSIS — I48.91 ATRIAL FIBRILLATION, UNSPECIFIED TYPE (HCC): ICD-10-CM

## 2023-01-11 DIAGNOSIS — I48.91 ATRIAL FIBRILLATION WITH RAPID VENTRICULAR RESPONSE (HCC): Primary | ICD-10-CM

## 2023-01-11 PROCEDURE — 1123F ACP DISCUSS/DSCN MKR DOCD: CPT | Performed by: NURSE PRACTITIONER

## 2023-01-11 PROCEDURE — 93000 ELECTROCARDIOGRAM COMPLETE: CPT | Performed by: INTERNAL MEDICINE

## 2023-01-11 PROCEDURE — 99214 OFFICE O/P EST MOD 30 MIN: CPT | Performed by: NURSE PRACTITIONER

## 2023-01-11 NOTE — PATIENT INSTRUCTIONS
Ok to use and extra dose of Toprol IF there is recurrent AF. Please buy a BP cuff and if you feel you have recurrent AF take you BP and IF heart rate greater than 120 bpm then ok to take one extra Toprol.

## 2023-04-25 ENCOUNTER — HOSPITAL ENCOUNTER (OUTPATIENT)
Age: 73
Discharge: HOME | End: 2023-04-25
Payer: COMMERCIAL

## 2023-04-25 LAB
T4 SERPL-MCNC: 13.3 MCG/DL (ref 4.5–11.7)
TSH SERPL-MCNC: 0.08 UIU/ML (ref 0.27–4.2)

## 2023-04-25 PROCEDURE — 84436 ASSAY OF TOTAL THYROXINE: CPT

## 2023-04-25 PROCEDURE — 84443 ASSAY THYROID STIM HORMONE: CPT

## 2023-04-25 PROCEDURE — 36415 COLL VENOUS BLD VENIPUNCTURE: CPT

## 2023-05-15 ENCOUNTER — OFFICE VISIT (OUTPATIENT)
Dept: CARDIOLOGY CLINIC | Age: 73
End: 2023-05-15
Payer: COMMERCIAL

## 2023-05-15 VITALS
HEIGHT: 63 IN | HEART RATE: 69 BPM | BODY MASS INDEX: 23.74 KG/M2 | RESPIRATION RATE: 14 BRPM | WEIGHT: 134 LBS | DIASTOLIC BLOOD PRESSURE: 80 MMHG | SYSTOLIC BLOOD PRESSURE: 130 MMHG

## 2023-05-15 DIAGNOSIS — I25.10 CAD IN NATIVE ARTERY: Primary | ICD-10-CM

## 2023-05-15 PROCEDURE — 99214 OFFICE O/P EST MOD 30 MIN: CPT | Performed by: INTERNAL MEDICINE

## 2023-05-15 PROCEDURE — 1123F ACP DISCUSS/DSCN MKR DOCD: CPT | Performed by: INTERNAL MEDICINE

## 2023-05-15 PROCEDURE — 93000 ELECTROCARDIOGRAM COMPLETE: CPT | Performed by: INTERNAL MEDICINE

## 2023-05-15 NOTE — PROGRESS NOTES
[facility] Cardiology Progress Note  Dr. Karol Garcia      Referring Physician: Goyo Solo DO  CHIEF COMPLAINT:   Chief Complaint   Patient presents with    Atrial Fibrillation       HISTORY OF PRESENT ILLNESS:   Patient is 81year old female with CAD, paroxysmal atrial fibrillation, mild aortic valve stenosis, chronic diastolic congestive heart failure, hypertension, hypothyroidism, hyperlipidemia, is here for follow-up appointment    Patient denies any chest pain, no shortness of breath, no lightheadedness, no dizziness, no palpitations, no pedal edema, no PND, no orthopnea, no syncope, no presyncopal episodes.     Functional capacity is at baseline    Past Medical History:   Diagnosis Date    AF (atrial fibrillation) ([address] 09/2021    Arrhythmia     CAD in native artery 82/07/4325    Diastolic heart failure (HCC)     Diverticulitis 08/03/2018    H/O cardiovascular stress test 05/05/2021    Lexiscan    HIGH CHOLESTEROL     History of atrial fibrillation 08/2018    with episode of diverticulosis    History of stress test 08/07/2018    Lexiscan stress test    Hypertension     Thyroid disease          Past Surgical History:   Procedure Laterality Date    ABLATION OF DYSRHYTHMIC FOCUS  09/02/2021    successful AF Cryo-Ablation   (Dr. Farrah Trammell)    COLONOSCOPY      COLONOSCOPY N/A 04/08/2019    COLONOSCOPY performed by Lyn Loving MD at [place] [address] CATH LAB PROCEDURE  08/09/2018    NERVE BLOCK  12/27/2011    NERVE BLOCK  01/24/2012    lumbar epidural    NERVE BLOCK  02/21/2012    lumbar epidural with flouro    CO COLONOSCOPY FLX DX W/COLLJ SPEC WHEN PFRMD N/A 10/04/2018    COLONOSCOPY performed by Lyn Loving MD at [facility] N/A 11/13/2018    LAPAROSCOPIC ROBOT XI ASSISTED SIGMOID COLON RESECTION WITH OSTOMY performed by Lyn Loving MD at [address] [place] [address] 04/09/2019    COLOSTOMY CLOSURE LAPAROSCOPIC

## 2023-06-24 ENCOUNTER — HOSPITAL ENCOUNTER (EMERGENCY)
Age: 73
Discharge: HOME | End: 2023-06-24
Attending: EMERGENCY MEDICINE
Payer: COMMERCIAL

## 2023-06-24 VITALS
HEART RATE: 72 BPM | RESPIRATION RATE: 17 BRPM | OXYGEN SATURATION: 96 % | TEMPERATURE: 97.3 F | WEIGHT: 134 LBS | DIASTOLIC BLOOD PRESSURE: 93 MMHG | BODY MASS INDEX: 23.74 KG/M2 | SYSTOLIC BLOOD PRESSURE: 135 MMHG | HEIGHT: 63 IN

## 2023-06-24 DIAGNOSIS — I48.91 ATRIAL FIBRILLATION, UNSPECIFIED TYPE (HCC): Primary | ICD-10-CM

## 2023-06-24 LAB
ANION GAP SERPL CALCULATED.3IONS-SCNC: 9 MMOL/L (ref 7–16)
BASOPHILS # BLD: 0.09 E9/L (ref 0–0.2)
BASOPHILS NFR BLD: 1 % (ref 0–2)
BUN SERPL-MCNC: 7 MG/DL (ref 6–23)
CALCIUM SERPL-MCNC: 9 MG/DL (ref 8.6–10.2)
CHLORIDE SERPL-SCNC: 102 MMOL/L (ref 98–107)
CO2 SERPL-SCNC: 27 MMOL/L (ref 22–29)
CREAT SERPL-MCNC: 0.6 MG/DL (ref 0.5–1)
EOSINOPHIL # BLD: 0.47 E9/L (ref 0.05–0.5)
EOSINOPHIL NFR BLD: 5.5 % (ref 0–6)
ERYTHROCYTE [DISTWIDTH] IN BLOOD BY AUTOMATED COUNT: 12.3 FL (ref 11.5–15)
GLUCOSE SERPL-MCNC: 123 MG/DL (ref 74–99)
HCT VFR BLD AUTO: 42.3 % (ref 34–48)
HGB BLD-MCNC: 14.5 G/DL (ref 11.5–15.5)
IMM GRANULOCYTES # BLD: 0.05 E9/L
IMM GRANULOCYTES NFR BLD: 0.6 % (ref 0–5)
LYMPHOCYTES # BLD: 1.69 E9/L (ref 1.5–4)
LYMPHOCYTES NFR BLD: 19.6 % (ref 20–42)
MAGNESIUM SERPL-MCNC: 2.1 MG/DL (ref 1.6–2.6)
MCH RBC QN AUTO: 31.5 PG (ref 26–35)
MCHC RBC AUTO-ENTMCNC: 34.3 % (ref 32–34.5)
MCV RBC AUTO: 92 FL (ref 80–99.9)
MONOCYTES # BLD: 0.95 E9/L (ref 0.1–0.95)
MONOCYTES NFR BLD: 11 % (ref 2–12)
NEUTROPHILS # BLD: 5.37 E9/L (ref 1.8–7.3)
NEUTS SEG NFR BLD: 62.3 % (ref 43–80)
PLATELET # BLD AUTO: 222 E9/L (ref 130–450)
PMV BLD AUTO: 10 FL (ref 7–12)
POTASSIUM SERPL-SCNC: 3.8 MMOL/L (ref 3.5–5)
RBC # BLD AUTO: 4.6 E12/L (ref 3.5–5.5)
SODIUM SERPL-SCNC: 138 MMOL/L (ref 132–146)
TROPONIN, HIGH SENSITIVITY: 11 NG/L (ref 0–9)
WBC # BLD: 8.6 E9/L (ref 4.5–11.5)

## 2023-06-24 PROCEDURE — 84484 ASSAY OF TROPONIN QUANT: CPT

## 2023-06-24 PROCEDURE — 83735 ASSAY OF MAGNESIUM: CPT

## 2023-06-24 PROCEDURE — 80048 BASIC METABOLIC PNL TOTAL CA: CPT

## 2023-06-24 PROCEDURE — 93005 ELECTROCARDIOGRAM TRACING: CPT

## 2023-06-24 PROCEDURE — 85025 COMPLETE CBC W/AUTO DIFF WBC: CPT

## 2023-06-24 PROCEDURE — 36415 COLL VENOUS BLD VENIPUNCTURE: CPT

## 2023-06-24 PROCEDURE — 99284 EMERGENCY DEPT VISIT MOD MDM: CPT

## 2023-06-24 ASSESSMENT — LIFESTYLE VARIABLES: HOW OFTEN DO YOU HAVE A DRINK CONTAINING ALCOHOL: NEVER

## 2023-06-24 ASSESSMENT — PAIN - FUNCTIONAL ASSESSMENT
PAIN_FUNCTIONAL_ASSESSMENT: NONE - DENIES PAIN
PAIN_FUNCTIONAL_ASSESSMENT: NONE - DENIES PAIN

## 2023-06-24 NOTE — ED NOTES
Ekg done, iv established and labs drawn, pt placed on cardiac monitor with cont pulse ox     Meagan Vasquez RN  06/24/23 [id number]

## 2023-06-24 NOTE — DISCHARGE INSTRUCTIONS
Please take your medications as prescribed. Recommended follow-up with your primary care physician as well as your cardiologist for further management and follow-up on your symptoms. Reasons to return will be worsening of her symptoms, palpitations, severe chest pain, severe shortness of breath, or uncontrolled fevers.

## 2023-06-24 NOTE — ED PROVIDER NOTES
SpO2: 97%   96%   Weight:    134 lb (60.8 kg)   Height:    5' 3\" (1.6 m)       Patient was given the following medications:  Medications - No data to display        Is this patient to be included in the SEP-1 Core Measure due to severe sepsis or septic shock? No   Exclusion criteria - the patient is NOT to be included for SEP-1 Core Measure due to: Infection is not suspected        Medical Decision Making/Differential Diagnosis:    CC/HPI Summary, Social Determinants of health, Records Reviewed, DDx, testing done/not done, ED Course, Reassessment, disposition considerations/shared decision making with patient, consults, disposition:      ED Course as of 06/24/23 1701   Sat Jun 24, 2023   1542   ATTENDING PROVIDER ATTESTATION:     I have personally performed and/or participated in the history, exam, medical decision making, and procedures and agree with all pertinent clinical information unless otherwise noted. I have also reviewed and agree with the past medical, family and social history unless otherwise noted. I have discussed this patient in detail with the resident and provided the instruction and education regarding the evidence-based evaluation and treatment of palpitations. Any EKG that may have been performed has been personally reviewed by me and I agree with the documentation as noted by the resident. History: patient states she felt herself go into A fib RVR. She took an extra metoprolol prior to her arrival.  She denies CP or SOB. She did feel tremulous. My findings: Ara Leon is a 81 y.o. female whom is in no distress. Physical exam reveals well appearing. Heart RRR without MRG, lungs CTA, abdomen is soft and nontender. No peripheral edema or tenderness. My plan: Symptomatic and supportive care. Patient spontaneously converted between triage and being roomed likely from taking the extra metoprolol. Will evaluate labs.     Electronically signed by Marnie Raymundo DO

## 2023-06-25 LAB
EKG ATRIAL RATE: 163 BPM
EKG ATRIAL RATE: 82 BPM
EKG P AXIS: 86 DEGREES
EKG P-R INTERVAL: 152 MS
EKG Q-T INTERVAL: 292 MS
EKG Q-T INTERVAL: 372 MS
EKG QRS DURATION: 84 MS
EKG QRS DURATION: 88 MS
EKG QTC CALCULATION (BAZETT): 434 MS
EKG QTC CALCULATION (BAZETT): 444 MS
EKG R AXIS: 66 DEGREES
EKG R AXIS: 67 DEGREES
EKG T AXIS: 61 DEGREES
EKG T AXIS: 70 DEGREES
EKG VENTRICULAR RATE: 139 BPM
EKG VENTRICULAR RATE: 82 BPM

## 2023-06-25 PROCEDURE — 93010 ELECTROCARDIOGRAM REPORT: CPT | Performed by: INTERNAL MEDICINE

## 2023-09-06 ENCOUNTER — HOSPITAL ENCOUNTER (EMERGENCY)
Age: 73
Discharge: HOME | End: 2023-09-06
Payer: COMMERCIAL

## 2023-09-06 VITALS
SYSTOLIC BLOOD PRESSURE: 153 MMHG | HEIGHT: 63 IN | TEMPERATURE: 97.9 F | BODY MASS INDEX: 23.57 KG/M2 | OXYGEN SATURATION: 99 % | HEART RATE: 75 BPM | DIASTOLIC BLOOD PRESSURE: 92 MMHG | WEIGHT: 133 LBS | RESPIRATION RATE: 18 BRPM

## 2023-09-06 DIAGNOSIS — L25.5 RHUS DERMATITIS: Primary | ICD-10-CM

## 2023-09-06 PROCEDURE — 99211 OFF/OP EST MAY X REQ PHY/QHP: CPT

## 2023-09-06 PROCEDURE — 6360000002 HC RX W HCPCS: Performed by: PHYSICIAN ASSISTANT

## 2023-09-06 RX ORDER — PREDNISONE 20 MG/1
60 TABLET ORAL DAILY
Qty: 15 TABLET | Refills: 0 | Status: SHIPPED | OUTPATIENT
Start: 2023-09-06 | End: 2023-09-11

## 2023-09-06 RX ORDER — TRIAMCINOLONE ACETONIDE 40 MG/ML
40 INJECTION, SUSPENSION INTRA-ARTICULAR; INTRAMUSCULAR ONCE
Status: COMPLETED | OUTPATIENT
Start: 2023-09-06 | End: 2023-09-06

## 2023-09-06 RX ADMIN — TRIAMCINOLONE ACETONIDE 40 MG: 40 INJECTION, SUSPENSION INTRA-ARTICULAR; INTRAMUSCULAR at 15:12

## 2023-09-06 ASSESSMENT — PAIN DESCRIPTION - FREQUENCY: FREQUENCY: INTERMITTENT

## 2023-09-06 ASSESSMENT — PAIN SCALES - GENERAL: PAINLEVEL_OUTOF10: 0

## 2023-09-06 ASSESSMENT — PAIN - FUNCTIONAL ASSESSMENT: PAIN_FUNCTIONAL_ASSESSMENT: 0-10

## 2023-11-08 ENCOUNTER — HOSPITAL ENCOUNTER (OUTPATIENT)
Age: 73
Discharge: HOME | End: 2023-11-08
Payer: COMMERCIAL

## 2023-11-08 LAB
ALBUMIN SERPL-MCNC: 3.8 G/DL (ref 3.5–5.2)
ALP SERPL-CCNC: 69 U/L (ref 35–104)
ALT SERPL-CCNC: 9 U/L (ref 0–32)
ANION GAP SERPL CALCULATED.3IONS-SCNC: 7 MMOL/L (ref 7–16)
AST SERPL-CCNC: 17 U/L (ref 0–31)
BASOPHILS # BLD: 0.1 K/UL (ref 0–0.2)
BASOPHILS NFR BLD: 1 % (ref 0–2)
BILIRUB SERPL-MCNC: 0.4 MG/DL (ref 0–1.2)
BUN SERPL-MCNC: 7 MG/DL (ref 6–23)
CALCIUM SERPL-MCNC: 8.8 MG/DL (ref 8.6–10.2)
CHLORIDE SERPL-SCNC: 106 MMOL/L (ref 98–107)
CHOLEST SERPL-MCNC: 125 MG/DL
CO2 SERPL-SCNC: 27 MMOL/L (ref 22–29)
CREAT SERPL-MCNC: 0.6 MG/DL (ref 0.5–1)
EOSINOPHIL # BLD: 0.41 K/UL (ref 0.05–0.5)
EOSINOPHILS RELATIVE PERCENT: 4 % (ref 0–6)
ERYTHROCYTE [DISTWIDTH] IN BLOOD BY AUTOMATED COUNT: 12.8 % (ref 11.5–15)
GFR SERPL CREATININE-BSD FRML MDRD: >60 ML/MIN/1.73M2
GLUCOSE SERPL-MCNC: 84 MG/DL (ref 74–99)
HCT VFR BLD AUTO: 44.5 % (ref 34–48)
HDLC SERPL-MCNC: 46 MG/DL
HGB BLD-MCNC: 14.8 G/DL (ref 11.5–15.5)
IMM GRANULOCYTES # BLD AUTO: 0.07 K/UL (ref 0–0.58)
IMM GRANULOCYTES NFR BLD: 1 % (ref 0–5)
LDLC SERPL CALC-MCNC: 65 MG/DL
LYMPHOCYTES NFR BLD: 1.58 K/UL (ref 1.5–4)
LYMPHOCYTES RELATIVE PERCENT: 17 % (ref 20–42)
MCH RBC QN AUTO: 31.2 PG (ref 26–35)
MCHC RBC AUTO-ENTMCNC: 33.3 G/DL (ref 32–34.5)
MCV RBC AUTO: 93.7 FL (ref 80–99.9)
MONOCYTES NFR BLD: 0.83 K/UL (ref 0.1–0.95)
MONOCYTES NFR BLD: 9 % (ref 2–12)
NEUTROPHILS NFR BLD: 69 % (ref 43–80)
NEUTS SEG NFR BLD: 6.57 K/UL (ref 1.8–7.3)
PLATELET # BLD AUTO: 257 K/UL (ref 130–450)
PMV BLD AUTO: 10.1 FL (ref 7–12)
POTASSIUM SERPL-SCNC: 3.6 MMOL/L (ref 3.5–5)
PROT SERPL-MCNC: 7 G/DL (ref 6.4–8.3)
RBC # BLD AUTO: 4.75 M/UL (ref 3.5–5.5)
SODIUM SERPL-SCNC: 140 MMOL/L (ref 132–146)
T4 SERPL-MCNC: 12.4 UG/DL (ref 4.5–11.7)
TRIGL SERPL-MCNC: 72 MG/DL
TSH SERPL DL<=0.05 MIU/L-ACNC: 0.03 UIU/ML (ref 0.27–4.2)
VLDLC SERPL CALC-MCNC: 14 MG/DL
WBC OTHER # BLD: 9.6 K/UL (ref 4.5–11.5)

## 2023-11-08 PROCEDURE — 85025 COMPLETE CBC W/AUTO DIFF WBC: CPT

## 2023-11-08 PROCEDURE — 80053 COMPREHEN METABOLIC PANEL: CPT

## 2023-11-08 PROCEDURE — 84443 ASSAY THYROID STIM HORMONE: CPT

## 2023-11-08 PROCEDURE — 80061 LIPID PANEL: CPT

## 2023-11-08 PROCEDURE — 36415 COLL VENOUS BLD VENIPUNCTURE: CPT

## 2023-11-08 PROCEDURE — 84436 ASSAY OF TOTAL THYROXINE: CPT

## 2023-11-24 ENCOUNTER — HOSPITAL ENCOUNTER (EMERGENCY)
Age: 73
Discharge: HOME | End: 2023-11-24
Attending: EMERGENCY MEDICINE
Payer: COMMERCIAL

## 2023-11-24 VITALS
WEIGHT: 124 LBS | DIASTOLIC BLOOD PRESSURE: 82 MMHG | BODY MASS INDEX: 21.97 KG/M2 | TEMPERATURE: 98.5 F | RESPIRATION RATE: 18 BRPM | HEART RATE: 87 BPM | OXYGEN SATURATION: 100 % | SYSTOLIC BLOOD PRESSURE: 136 MMHG

## 2023-11-24 DIAGNOSIS — I48.20 CHRONIC A-FIB (HCC): Primary | ICD-10-CM

## 2023-11-24 PROCEDURE — 99283 EMERGENCY DEPT VISIT LOW MDM: CPT

## 2023-11-24 ASSESSMENT — LIFESTYLE VARIABLES: HOW MANY STANDARD DRINKS CONTAINING ALCOHOL DO YOU HAVE ON A TYPICAL DAY: PATIENT DOES NOT DRINK

## 2023-11-24 ASSESSMENT — PAIN - FUNCTIONAL ASSESSMENT: PAIN_FUNCTIONAL_ASSESSMENT: NONE - DENIES PAIN

## 2023-11-26 LAB
EKG ATRIAL RATE: 87 BPM
EKG P AXIS: 71 DEGREES
EKG P-R INTERVAL: 150 MS
EKG Q-T INTERVAL: 364 MS
EKG QRS DURATION: 86 MS
EKG QTC CALCULATION (BAZETT): 438 MS
EKG R AXIS: 62 DEGREES
EKG T AXIS: 34 DEGREES
EKG VENTRICULAR RATE: 87 BPM

## 2023-11-29 ENCOUNTER — HOSPITAL ENCOUNTER (EMERGENCY)
Age: 73
Discharge: HOME | End: 2023-11-29
Attending: EMERGENCY MEDICINE
Payer: COMMERCIAL

## 2023-11-29 ENCOUNTER — APPOINTMENT (OUTPATIENT)
Dept: GENERAL RADIOLOGY | Age: 73
End: 2023-11-29
Payer: COMMERCIAL

## 2023-11-29 VITALS
DIASTOLIC BLOOD PRESSURE: 85 MMHG | HEART RATE: 72 BPM | RESPIRATION RATE: 18 BRPM | SYSTOLIC BLOOD PRESSURE: 154 MMHG | OXYGEN SATURATION: 95 % | TEMPERATURE: 97.8 F

## 2023-11-29 DIAGNOSIS — R00.2 PALPITATIONS: Primary | ICD-10-CM

## 2023-11-29 LAB
ANION GAP SERPL CALCULATED.3IONS-SCNC: 10 MMOL/L (ref 7–16)
BNP SERPL-MCNC: 729 PG/ML (ref 0–450)
BUN SERPL-MCNC: 8 MG/DL (ref 6–23)
CALCIUM SERPL-MCNC: 9.3 MG/DL (ref 8.6–10.2)
CHLORIDE SERPL-SCNC: 102 MMOL/L (ref 98–107)
CO2 SERPL-SCNC: 25 MMOL/L (ref 22–29)
CREAT SERPL-MCNC: 0.6 MG/DL (ref 0.5–1)
ERYTHROCYTE [DISTWIDTH] IN BLOOD BY AUTOMATED COUNT: 12.5 % (ref 11.5–15)
GFR SERPL CREATININE-BSD FRML MDRD: >60 ML/MIN/1.73M2
GLUCOSE SERPL-MCNC: 100 MG/DL (ref 74–99)
HCT VFR BLD AUTO: 43.3 % (ref 34–48)
HGB BLD-MCNC: 14.7 G/DL (ref 11.5–15.5)
MCH RBC QN AUTO: 31.3 PG (ref 26–35)
MCHC RBC AUTO-ENTMCNC: 33.9 G/DL (ref 32–34.5)
MCV RBC AUTO: 92.1 FL (ref 80–99.9)
PLATELET # BLD AUTO: 273 K/UL (ref 130–450)
PMV BLD AUTO: 10.2 FL (ref 7–12)
POTASSIUM SERPL-SCNC: 3.7 MMOL/L (ref 3.5–5)
RBC # BLD AUTO: 4.7 M/UL (ref 3.5–5.5)
SODIUM SERPL-SCNC: 137 MMOL/L (ref 132–146)
TROPONIN I SERPL HS-MCNC: 10 NG/L (ref 0–9)
TROPONIN I SERPL HS-MCNC: 11 NG/L (ref 0–9)
TROPONIN I SERPL HS-MCNC: 12 NG/L (ref 0–9)
WBC OTHER # BLD: 12.6 K/UL (ref 4.5–11.5)

## 2023-11-29 PROCEDURE — 93005 ELECTROCARDIOGRAM TRACING: CPT

## 2023-11-29 PROCEDURE — 71045 X-RAY EXAM CHEST 1 VIEW: CPT

## 2023-11-29 PROCEDURE — 99285 EMERGENCY DEPT VISIT HI MDM: CPT

## 2023-11-29 PROCEDURE — 80048 BASIC METABOLIC PNL TOTAL CA: CPT

## 2023-11-29 PROCEDURE — 84484 ASSAY OF TROPONIN QUANT: CPT

## 2023-11-29 PROCEDURE — 83880 ASSAY OF NATRIURETIC PEPTIDE: CPT

## 2023-11-29 PROCEDURE — 85027 COMPLETE CBC AUTOMATED: CPT

## 2023-11-29 ASSESSMENT — ENCOUNTER SYMPTOMS
EYE DISCHARGE: 0
BACK PAIN: 0
WHEEZING: 0
SORE THROAT: 0
NAUSEA: 0
EYE PAIN: 0
ABDOMINAL DISTENTION: 0
COUGH: 0
SHORTNESS OF BREATH: 1
DIARRHEA: 0
VOMITING: 0
EYE REDNESS: 0
SINUS PRESSURE: 0

## 2023-12-01 ENCOUNTER — OFFICE VISIT (OUTPATIENT)
Dept: CARDIOLOGY CLINIC | Age: 73
End: 2023-12-01
Payer: COMMERCIAL

## 2023-12-01 VITALS
BODY MASS INDEX: 21.97 KG/M2 | HEIGHT: 63 IN | SYSTOLIC BLOOD PRESSURE: 120 MMHG | DIASTOLIC BLOOD PRESSURE: 72 MMHG | HEART RATE: 74 BPM | WEIGHT: 124 LBS | RESPIRATION RATE: 16 BRPM

## 2023-12-01 DIAGNOSIS — I25.10 CAD IN NATIVE ARTERY: Primary | ICD-10-CM

## 2023-12-01 PROCEDURE — 93000 ELECTROCARDIOGRAM COMPLETE: CPT | Performed by: INTERNAL MEDICINE

## 2023-12-01 PROCEDURE — 1123F ACP DISCUSS/DSCN MKR DOCD: CPT | Performed by: INTERNAL MEDICINE

## 2023-12-01 PROCEDURE — 99214 OFFICE O/P EST MOD 30 MIN: CPT | Performed by: INTERNAL MEDICINE

## 2023-12-01 NOTE — PROGRESS NOTES
[facility] Cardiology Progress Note  Dr. Zheng Doctor      Referring Physician: Petr Amaya DO  CHIEF COMPLAINT:   Chief Complaint   Patient presents with    Coronary Artery Disease     HFU        HISTORY OF PRESENT ILLNESS:   Patient is 72year old female with CAD, paroxysmal atrial fibrillation, mild aortic valve stenosis, chronic diastolic congestive heart failure, hypertension, hypothyroidism, hyperlipidemia, is here for follow-up appointment    Patient denies any chest pain, no shortness of breath, no lightheadedness, no dizziness, no palpitations, no pedal edema, no PND, no orthopnea, no syncope, no presyncopal episodes.   Functional capacity is at baseline    Past Medical History:   Diagnosis Date    AF (atrial fibrillation) ([address]) 09/2021    Arrhythmia     CAD in native artery 37/15/4698    Diastolic heart failure (HCC)     Diverticulitis 08/03/2018    H/O cardiovascular stress test 05/05/2021    Lexiscan    HIGH CHOLESTEROL     History of atrial fibrillation 08/2018    with episode of diverticulosis    History of stress test 08/07/2018    Lexiscan stress test    Hypertension     Thyroid disease          Past Surgical History:   Procedure Laterality Date    ABLATION OF DYSRHYTHMIC FOCUS  09/02/2021    successful AF Cryo-Ablation   (Dr. Sariah Gonzalez)    COLONOSCOPY      COLONOSCOPY N/A 04/08/2019    COLONOSCOPY performed by Rosezena Bamberger, MD at [facility] CATH LAB PROCEDURE  08/09/2018    NERVE BLOCK  12/27/2011    NERVE BLOCK  01/24/2012    lumbar epidural    NERVE BLOCK  02/21/2012    lumbar epidural with flouro    IL COLONOSCOPY FLX DX W/COLLJ SPEC WHEN PFRMD N/A 10/04/2018    COLONOSCOPY performed by Rosezena Bamberger, MD at [facility] N/A 11/13/2018    LAPAROSCOPIC ROBOT XI ASSISTED SIGMOID COLON RESECTION WITH OSTOMY performed by Rosezena Bamberger, MD at [address] [facility] N/A 04/09/2019    COLOSTOMY CLOSURE

## 2023-12-02 LAB
EKG ATRIAL RATE: 86 BPM
EKG P AXIS: 93 DEGREES
EKG P-R INTERVAL: 142 MS
EKG Q-T INTERVAL: 382 MS
EKG QRS DURATION: 80 MS
EKG QTC CALCULATION (BAZETT): 457 MS
EKG R AXIS: 66 DEGREES
EKG T AXIS: 78 DEGREES
EKG VENTRICULAR RATE: 86 BPM

## 2023-12-02 PROCEDURE — 93010 ELECTROCARDIOGRAM REPORT: CPT | Performed by: INTERNAL MEDICINE

## 2024-01-03 ENCOUNTER — OFFICE VISIT (OUTPATIENT)
Dept: NON INVASIVE DIAGNOSTICS | Age: 74
End: 2024-01-03
Payer: COMMERCIAL

## 2024-01-03 VITALS
DIASTOLIC BLOOD PRESSURE: 100 MMHG | SYSTOLIC BLOOD PRESSURE: 160 MMHG | BODY MASS INDEX: 23.04 KG/M2 | HEART RATE: 66 BPM | HEIGHT: 63 IN | WEIGHT: 130 LBS

## 2024-01-03 DIAGNOSIS — I48.0 PAROXYSMAL ATRIAL FIBRILLATION (HCC): Primary | ICD-10-CM

## 2024-01-03 DIAGNOSIS — I50.30 HEART FAILURE WITH PRESERVED EJECTION FRACTION, UNSPECIFIED HF CHRONICITY (HCC): ICD-10-CM

## 2024-01-03 DIAGNOSIS — I25.10 CAD IN NATIVE ARTERY: ICD-10-CM

## 2024-01-03 DIAGNOSIS — I10 HYPERTENSION, UNSPECIFIED TYPE: ICD-10-CM

## 2024-01-03 PROCEDURE — 3077F SYST BP >= 140 MM HG: CPT

## 2024-01-03 PROCEDURE — 99214 OFFICE O/P EST MOD 30 MIN: CPT

## 2024-01-03 PROCEDURE — 3080F DIAST BP >= 90 MM HG: CPT

## 2024-01-03 PROCEDURE — 1123F ACP DISCUSS/DSCN MKR DOCD: CPT

## 2024-01-03 ASSESSMENT — ENCOUNTER SYMPTOMS
RESPIRATORY NEGATIVE: 1
GASTROINTESTINAL NEGATIVE: 1

## 2024-01-03 NOTE — PROGRESS NOTES
[facility] Electrophysiology  Outpatient Progress Note  Mylene Guerrero  1950  Date of Service: 1/3/2024  PCP: Dustin Vogt DO  Cardiologist: Dr Rivers  Electrophysiologist: Dr. Ford    Subjective:      Patient ID: Mylene Guerrero is a 73 y.o. female.    HPI: Presents today for routine follow-up, past medical history significant for paroxysmal atrial fibrillation status post cryoballoon PVI 9/2/2021, bradycardia, CAD    Last seen in the electrophysiology office by Nitesh Schaeffer CNP    She states she was recently seen in the emergency department for atrial fibrillation, she followed up with her cardiologist at the beginning of December.  She states that she was unpacking [place] boxes at [employer] and had not eaten or drank anything all day.  She took an extra dose of metoprolol, presents to the emergency department and spontaneously converted to sinus rhythm while waiting in the emergency department waiting room.  She states since this episode she has not had further episodes of atrial fibrillation.  She presents today in normal sinus rhythm.  She is on Eliquis and denies bleeding issues.  She is on rate control Toprol-XL 50 mg daily.  She denies anginal chest discomfort, exertional shortness of breath, orthopnea, PND, syncope, presyncope, dizziness, palpitation    Review of Systems   Constitutional: Negative.    Respiratory: Negative.     Cardiovascular: Negative.    Gastrointestinal: Negative.    Genitourinary: Negative.    Neurological: Negative.    All other systems reviewed and are negative.      Objective:   Physical Exam  Vitals and nursing note reviewed.   Constitutional:       General: She is not in acute distress.     Appearance: Normal appearance. She is not toxic-appearing.   Neck:      Vascular: No carotid bruit or JVD.   Cardiovascular:      Rate and Rhythm: Normal rate and regular rhythm.      Heart sounds: S1 normal and S2 normal. No

## 2024-03-26 ENCOUNTER — OFFICE VISIT (OUTPATIENT)
Dept: CARDIOLOGY CLINIC | Age: 74
End: 2024-03-26
Payer: COMMERCIAL

## 2024-03-26 VITALS
BODY MASS INDEX: 21.62 KG/M2 | HEART RATE: 65 BPM | WEIGHT: 122 LBS | SYSTOLIC BLOOD PRESSURE: 116 MMHG | HEIGHT: 63 IN | RESPIRATION RATE: 18 BRPM | DIASTOLIC BLOOD PRESSURE: 80 MMHG

## 2024-03-26 DIAGNOSIS — I25.10 CAD IN NATIVE ARTERY: Primary | ICD-10-CM

## 2024-03-26 PROCEDURE — 99214 OFFICE O/P EST MOD 30 MIN: CPT | Performed by: INTERNAL MEDICINE

## 2024-03-26 PROCEDURE — 1123F ACP DISCUSS/DSCN MKR DOCD: CPT | Performed by: INTERNAL MEDICINE

## 2024-03-26 PROCEDURE — 93000 ELECTROCARDIOGRAM COMPLETE: CPT | Performed by: INTERNAL MEDICINE

## 2024-03-26 NOTE — PROGRESS NOTES
Resource Strain: Not on file   Food Insecurity: Not on file   Transportation Needs: Not on file   Physical Activity: Not on file   Stress: Not on file   Social Connections: Not on file   Intimate Partner Violence: Not on file   Housing Stability: Not on file       Family History   Problem Relation Age of Onset    Cancer Other     Heart Disease Other     Cancer Mother         brain    Breast Cancer Mother     Heart Attack Father         age 50     Heart Attack Sister         age 52     Colon Cancer Sister        REVIEW OF SYSTEMS:   CONSTITUTIONAL:  negative for  fevers, chills, sweats and fatigue  HEENT:  negative for  tinnitus, earaches, nasal congestion and epistaxis  RESPIRATORY:  negative for  dry cough, cough with sputum, wheezing and hemoptysis  GASTROINTESTINAL:  negative for nausea, vomiting, diarrhea, constipation, pruritus and jaundice  HEMATOLOGIC/LYMPHATIC:  negative for easy bruising, bleeding, lymphadenopathy and petechiae  ENDOCRINE:  negative for heat intolerance, cold intolerance, tremor, hair loss and diabetic symptoms including neither polyuria nor polydipsia nor blurred vision  MUSCULOSKELETAL:  negative for  myalgias, arthralgias, joint swelling, stiff joints and decreased range of motion  NEUROLOGICAL:  negative for memory problems, speech problems, visual disturbance, dysphagia, weakness and numbness      PHYSICAL EXAM:   CONSTITUTIONAL:  awake, alert, cooperative, no apparent distress, and appears stated age  HEENT:  Moist and pink mucous membranes, normocephalic, without obvious abnormality, atraumatic  NECK:  Supple, symmetrical, trachea midline, no adenopathy, thyroid symmetric, not enlarged and no tenderness, skin normal  LUNGS:  No increased work of breathing, good air exchange, clear to auscultation bilaterally, no crackles or wheezing  CARDIOVASCULAR:  Normal apical impulse, regular rate and rhythm, normal S1 and S2, no S3 or S4, 2 to 3/6 systolic ejection murmur at the apex, no pedal

## 2024-04-19 ENCOUNTER — HOSPITAL ENCOUNTER (EMERGENCY)
Age: 74
Discharge: HOME | End: 2024-04-19
Payer: COMMERCIAL

## 2024-04-19 VITALS
RESPIRATION RATE: 18 BRPM | OXYGEN SATURATION: 98 % | TEMPERATURE: 97.8 F | HEART RATE: 74 BPM | WEIGHT: 120 LBS | BODY MASS INDEX: 21.26 KG/M2 | DIASTOLIC BLOOD PRESSURE: 92 MMHG | SYSTOLIC BLOOD PRESSURE: 147 MMHG

## 2024-04-19 DIAGNOSIS — K04.7 DENTAL INFECTION: Primary | ICD-10-CM

## 2024-04-19 PROCEDURE — 99211 OFF/OP EST MAY X REQ PHY/QHP: CPT

## 2024-04-19 RX ORDER — AMOXICILLIN 500 MG/1
500 CAPSULE ORAL 3 TIMES DAILY
Qty: 30 CAPSULE | Refills: 0 | Status: SHIPPED | OUTPATIENT
Start: 2024-04-19 | End: 2024-04-29

## 2024-04-19 NOTE — ED PROVIDER NOTES
HPI: Mylene Guerrero 73 y.o. female with a past medical history of   Past Medical History:   Diagnosis Date    AF (atrial fibrillation) (HCC) 09/2021    Arrhythmia     CAD in native artery 08/09/2018    Diastolic heart failure (HCC)     Diverticulitis 08/03/2018    H/O cardiovascular stress test 05/05/2021    Lexiscan    HIGH CHOLESTEROL     History of atrial fibrillation 08/2018    with episode of diverticulosis    History of stress test 08/07/2018    Lexiscan stress test    Hypertension     Thyroid disease     presents with a complaint of dental pain.  She only has 4 teeth on the bottom and she said 1 broke off a long time ago it just now became infected she said it is half gone but she is having swelling in the left lower jaw and left side of the face.  She said it does not hurt only if she touches it.  She does not have a dentist at this point.  Review of Systems:   Pertinent positives and negatives are stated within HPI, all other systems reviewed and are negative.         --------------------------------------------- PAST HISTORY ---------------------------------------------  Past Medical History:  has a past medical history of AF (atrial fibrillation) (HCC), Arrhythmia, CAD in native artery, Diastolic heart failure (HCC), Diverticulitis, H/O cardiovascular stress test, HIGH CHOLESTEROL, History of atrial fibrillation, History of stress test, Hypertension, and Thyroid disease.    Past Surgical History:  has a past surgical history that includes Nerve Block (12/27/2011); Nerve Block (01/24/2012); Nerve Block (02/21/2012); pr colonoscopy flx dx w/collj spec when pfrmd (N/A, 10/04/2018); pr laparoscopy colectomy partial w/anastomosis (N/A, 11/13/2018); Colonoscopy; Colonoscopy (N/A, 04/08/2019); Small intestine surgery (N/A, 04/09/2019); Diagnostic Cardiac Cath Lab Procedure (08/09/2018); Small intestine surgery (N/A, 08/13/2020); and ablation of dysrhythmic focus (09/02/2021).    Social History:  reports that she  has been smoking cigarettes. She has never used smokeless tobacco. She reports that she does not drink alcohol and does not use drugs.    Family History: family history includes Breast Cancer in her mother; Cancer in her mother and another family member; Colon Cancer in her sister; Heart Attack in her father and sister; Heart Disease in an other family member.     The patient’s home medications have been reviewed.    Allergies: Patient has no known allergies.    ------------------------- NURSING NOTES AND VITALS REVIEWED ---------------------------   The nursing notes within the ED encounter and vital signs as below have been reviewed by myself.  BP (!) 147/92   Pulse 74   Temp 97.8 °F (36.6 °C)   Resp 18   Wt 54.4 kg (120 lb)   SpO2 98%   Breastfeeding No   BMI 21.26 kg/m²   Oxygen Saturation Interpretation: Normal    The patient’s available past medical records and past encounters were reviewed.        Physical exam:  Constitutional: The patient is comfortable, alert and oriented x3, well appearing, non toxic in NAD.  Head: Atraumatic and normocephalic.  Eyes: No discharge from the eyes the sclerae are normal.  ENT: The oropharynx is normal. No pharyngeal erythema, uvular edema, tonsillar exudates, asymmetry or trismus..  Tooth number 21 is broken off at the gum line, she has gingival swelling around that tooth and also some swelling on the left lower jaw.  She only has 4 teeth present dentures on the upper jaw . floor of the mouth is soft. No tenderness in the submental or submandibular space. No tongue elevation.  Neck: The neck demonstrates normal range of motion.  No meningeals signs are present. No stridor.  Respiratory/chest Breath sounds are normal. no respiratory distress is noted  Cardiovascular: Heart shows a regular rate and rhythm no murmurs no rubs no gallops.  Skin: The skin exam shows no evidence of rashes  Neuro: GCS is 15  Lymphatic: No cervical lymphadenopathy

## 2024-05-21 ENCOUNTER — HOSPITAL ENCOUNTER (EMERGENCY)
Age: 74
Discharge: HOME | End: 2024-05-21
Payer: COMMERCIAL

## 2024-05-21 VITALS
RESPIRATION RATE: 20 BRPM | BODY MASS INDEX: 22.67 KG/M2 | DIASTOLIC BLOOD PRESSURE: 90 MMHG | OXYGEN SATURATION: 100 % | WEIGHT: 128 LBS | SYSTOLIC BLOOD PRESSURE: 165 MMHG | HEART RATE: 64 BPM | TEMPERATURE: 97.9 F

## 2024-05-21 DIAGNOSIS — L23.7 CONTACT DERMATITIS DUE TO POISON IVY: Primary | ICD-10-CM

## 2024-05-21 PROCEDURE — 6360000002 HC RX W HCPCS: Performed by: NURSE PRACTITIONER

## 2024-05-21 PROCEDURE — 96372 THER/PROPH/DIAG INJ SC/IM: CPT

## 2024-05-21 PROCEDURE — 99211 OFF/OP EST MAY X REQ PHY/QHP: CPT

## 2024-05-21 RX ORDER — SAL ACID/UREA/PETROLATUM,WHITE 5 %-10 %
1 OINTMENT (GRAM) TOPICAL 2 TIMES DAILY
Qty: 12 EACH | Refills: 0 | Status: SHIPPED | OUTPATIENT
Start: 2024-05-21

## 2024-05-21 RX ORDER — TRIAMCINOLONE ACETONIDE 5 MG/G
CREAM TOPICAL
Qty: 45 G | Refills: 0 | Status: SHIPPED | OUTPATIENT
Start: 2024-05-21 | End: 2024-05-28

## 2024-05-21 RX ORDER — DEXAMETHASONE SODIUM PHOSPHATE 10 MG/ML
10 INJECTION, SOLUTION INTRAMUSCULAR; INTRAVENOUS ONCE
Status: COMPLETED | OUTPATIENT
Start: 2024-05-21 | End: 2024-05-21

## 2024-05-21 RX ADMIN — DEXAMETHASONE SODIUM PHOSPHATE 10 MG: 10 INJECTION, SOLUTION INTRAMUSCULAR; INTRAVENOUS at 08:50

## 2024-05-21 NOTE — DISCHARGE INSTRUCTIONS
You have been given a large dose of steroids today.  You will use Domeboro solution soaks, dissolve a packet in a small dish of cool water, soak washcloth of this and apply directly to the rash area avoid your face.  Leave it on for about 10 minutes and then remove the washcloth, do not rinse off the solution.  This will help dry up the poison ivy.  Triamcinolone cream directly to the rash.  You may use Benadryl or Zyrtec at home for any itching.  Please follow-up with your PCP if symptoms do not improve.

## 2024-05-21 NOTE — ED PROVIDER NOTES
[facility]  Department of Emergency Medicine   UC  Encounter Note  Admit Date/RoomTime: 2024  8:19 AM   Room:   NAME: Mylene Guerrero  : 1950  MRN: [id number]     Chief Complaint:  Poison Ivy (Poison ivy located all over body. Started yesterday. )    History of Present Illness       Mylene Guerrero is a 73 y.o. old female who presents to the urgent care with complaints of a 1 day history of a possible poison ivy rash to her left hand, left ear and under her chin.  Patient states that over the weekend she was trimming her bushes, states that she noticed that she had some redness and itching yesterday.  She applied some Benadryl topical cream to the area but states that she was up all night with intense pruritus.  Did not take any oral medications.  No fevers, rash is localized to her left ring finger, left ear and under her chin.  ROS   Pertinent positives and negatives are stated within HPI, all other systems reviewed and are negative.    Past Medical History:  has a past medical history of AF (atrial fibrillation) (HCC), Arrhythmia, CAD in native artery, Diastolic heart failure (HCC), Diverticulitis, H/O cardiovascular stress test, HIGH CHOLESTEROL, History of atrial fibrillation, History of stress test, Hypertension, and Thyroid disease.    Surgical History:  has a past surgical history that includes Nerve Block (2011); Nerve Block (2012); Nerve Block (2012); pr colonoscopy flx dx w/collj spec when pfrmd (N/A, 10/04/2018); pr laparoscopy colectomy partial w/anastomosis (N/A, 2018); Colonoscopy; Colonoscopy (N/A, 2019); Small intestine surgery (N/A, 2019); Diagnostic Cardiac Cath Lab Procedure (2018); Small intestine surgery (N/A, 2020); and ablation of dysrhythmic focus (2021).    Social History:  reports that she has been smoking cigarettes. She has never used smokeless tobacco. She reports that she does not drink alcohol and does

## 2024-06-19 ENCOUNTER — HOSPITAL ENCOUNTER (OUTPATIENT)
Age: 74
Discharge: HOME | End: 2024-06-19
Payer: COMMERCIAL

## 2024-06-19 LAB
ALBUMIN SERPL-MCNC: 4.1 G/DL (ref 3.5–5.2)
ALP SERPL-CCNC: 75 U/L (ref 35–104)
ALT SERPL-CCNC: 11 U/L (ref 0–32)
ANION GAP SERPL CALCULATED.3IONS-SCNC: 9 MMOL/L (ref 7–16)
AST SERPL-CCNC: 22 U/L (ref 0–31)
BASOPHILS # BLD: 0.1 K/UL (ref 0–0.2)
BASOPHILS NFR BLD: 1 % (ref 0–2)
BILIRUB SERPL-MCNC: 0.4 MG/DL (ref 0–1.2)
BUN SERPL-MCNC: 8 MG/DL (ref 6–23)
CALCIUM SERPL-MCNC: 9.5 MG/DL (ref 8.6–10.2)
CHLORIDE SERPL-SCNC: 105 MMOL/L (ref 98–107)
CHOLEST SERPL-MCNC: 142 MG/DL
CO2 SERPL-SCNC: 27 MMOL/L (ref 22–29)
CREAT SERPL-MCNC: 0.7 MG/DL (ref 0.5–1)
EOSINOPHIL # BLD: 0.51 K/UL (ref 0.05–0.5)
EOSINOPHILS RELATIVE PERCENT: 5 % (ref 0–6)
ERYTHROCYTE [DISTWIDTH] IN BLOOD BY AUTOMATED COUNT: 12.9 % (ref 11.5–15)
GFR, ESTIMATED: >90 ML/MIN/1.73M2
GLUCOSE SERPL-MCNC: 95 MG/DL (ref 74–99)
HCT VFR BLD AUTO: 47.3 % (ref 34–48)
HDLC SERPL-MCNC: 52 MG/DL
HGB BLD-MCNC: 15.6 G/DL (ref 11.5–15.5)
IMM GRANULOCYTES # BLD AUTO: 0.03 K/UL (ref 0–0.58)
IMM GRANULOCYTES NFR BLD: 0 % (ref 0–5)
LDLC SERPL CALC-MCNC: 76 MG/DL
LYMPHOCYTES NFR BLD: 1.98 K/UL (ref 1.5–4)
LYMPHOCYTES RELATIVE PERCENT: 20 % (ref 20–42)
MCH RBC QN AUTO: 30.8 PG (ref 26–35)
MCHC RBC AUTO-ENTMCNC: 33 G/DL (ref 32–34.5)
MCV RBC AUTO: 93.5 FL (ref 80–99.9)
MONOCYTES NFR BLD: 0.96 K/UL (ref 0.1–0.95)
MONOCYTES NFR BLD: 10 % (ref 2–12)
NEUTROPHILS NFR BLD: 64 % (ref 43–80)
NEUTS SEG NFR BLD: 6.3 K/UL (ref 1.8–7.3)
PLATELET # BLD AUTO: 234 K/UL (ref 130–450)
PMV BLD AUTO: 10.3 FL (ref 7–12)
POTASSIUM SERPL-SCNC: 5.4 MMOL/L (ref 3.5–5)
PROT SERPL-MCNC: 7.3 G/DL (ref 6.4–8.3)
RBC # BLD AUTO: 5.06 M/UL (ref 3.5–5.5)
SODIUM SERPL-SCNC: 141 MMOL/L (ref 132–146)
T4 SERPL-MCNC: 14.2 UG/DL (ref 4.5–11.7)
TRIGL SERPL-MCNC: 71 MG/DL
TSH SERPL DL<=0.05 MIU/L-ACNC: 0.05 UIU/ML (ref 0.27–4.2)
VLDLC SERPL CALC-MCNC: 14 MG/DL
WBC OTHER # BLD: 9.9 K/UL (ref 4.5–11.5)

## 2024-06-19 PROCEDURE — 84436 ASSAY OF TOTAL THYROXINE: CPT

## 2024-06-19 PROCEDURE — 80061 LIPID PANEL: CPT

## 2024-06-19 PROCEDURE — 85025 COMPLETE CBC W/AUTO DIFF WBC: CPT

## 2024-06-19 PROCEDURE — 36415 COLL VENOUS BLD VENIPUNCTURE: CPT

## 2024-06-19 PROCEDURE — 80053 COMPREHEN METABOLIC PANEL: CPT

## 2024-06-19 PROCEDURE — 84443 ASSAY THYROID STIM HORMONE: CPT

## 2024-07-16 ENCOUNTER — APPOINTMENT (OUTPATIENT)
Dept: GENERAL RADIOLOGY | Age: 74
End: 2024-07-16

## 2024-07-16 ENCOUNTER — HOSPITAL ENCOUNTER (EMERGENCY)
Age: 74
Discharge: HOME | End: 2024-07-16

## 2024-07-16 VITALS
OXYGEN SATURATION: 95 % | BODY MASS INDEX: 21.79 KG/M2 | HEART RATE: 90 BPM | DIASTOLIC BLOOD PRESSURE: 80 MMHG | RESPIRATION RATE: 18 BRPM | SYSTOLIC BLOOD PRESSURE: 163 MMHG | TEMPERATURE: 98.6 F | WEIGHT: 123 LBS

## 2024-07-16 DIAGNOSIS — J18.9 COMMUNITY ACQUIRED PNEUMONIA OF RIGHT LOWER LOBE OF LUNG: Primary | ICD-10-CM

## 2024-07-16 LAB
INFLUENZA A BY PCR: NOT DETECTED
INFLUENZA B BY PCR: NOT DETECTED

## 2024-07-16 PROCEDURE — 87502 INFLUENZA DNA AMP PROBE: CPT

## 2024-07-16 PROCEDURE — 71046 X-RAY EXAM CHEST 2 VIEWS: CPT

## 2024-07-16 PROCEDURE — 99211 OFF/OP EST MAY X REQ PHY/QHP: CPT

## 2024-07-16 RX ORDER — GUAIFENESIN/DEXTROMETHORPHAN 100-10MG/5
10 SYRUP ORAL 3 TIMES DAILY PRN
Qty: 120 ML | Refills: 0 | Status: SHIPPED | OUTPATIENT
Start: 2024-07-16 | End: 2024-07-26

## 2024-07-16 RX ORDER — AZITHROMYCIN 250 MG/1
TABLET, FILM COATED ORAL
Qty: 1 PACKET | Refills: 0 | Status: SHIPPED | OUTPATIENT
Start: 2024-07-16 | End: 2024-07-20

## 2024-07-16 RX ORDER — AMOXICILLIN AND CLAVULANATE POTASSIUM 875; 125 MG/1; MG/1
1 TABLET, FILM COATED ORAL 2 TIMES DAILY
Qty: 20 TABLET | Refills: 0 | Status: SHIPPED | OUTPATIENT
Start: 2024-07-16 | End: 2024-07-26

## 2024-07-16 RX ORDER — ALBUTEROL SULFATE 90 UG/1
2 AEROSOL, METERED RESPIRATORY (INHALATION) 4 TIMES DAILY PRN
Qty: 18 G | Refills: 0 | Status: SHIPPED | OUTPATIENT
Start: 2024-07-16

## 2024-07-16 RX ORDER — METHYLPREDNISOLONE 4 MG/1
TABLET ORAL
Qty: 1 KIT | Refills: 0 | Status: SHIPPED | OUTPATIENT
Start: 2024-07-16 | End: 2024-07-22

## 2024-07-16 ASSESSMENT — PAIN SCALES - GENERAL: PAINLEVEL_OUTOF10: 0

## 2024-07-16 ASSESSMENT — PAIN - FUNCTIONAL ASSESSMENT: PAIN_FUNCTIONAL_ASSESSMENT: 0-10

## 2024-07-16 NOTE — ED PROVIDER NOTES
amoxicillin-clavulanate (AUGMENTIN) 875-125 MG per tablet Take 1 tablet by mouth 2 times daily for 10 days, Disp-20 tablet, R-0Normal      azithromycin (ZITHROMAX Z-ADRIANA) 250 MG tablet Take 2 tablets (500 mg) on Day 1, and then take 1 tablet (250 mg) on days 2 through 5., Disp-1 packet, R-0Normal      methylPREDNISolone (MEDROL, ADRIANA,) 4 MG tablet Take as directed, Disp-1 kit, R-0Normal      guaiFENesin-dextromethorphan (ROBITUSSIN DM) 100-10 MG/5ML syrup Take 10 mLs by mouth 3 times daily as needed for Cough, Disp-120 mL, R-0Normal      albuterol sulfate HFA (VENTOLIN HFA) 108 (90 Base) MCG/ACT inhaler Inhale 2 puffs into the lungs 4 times daily as needed for Wheezing, Disp-18 g, R-0Normal           Electronically signed by WAQAR Siu CNP   DD: 7/16/24  **This report was transcribed using voice recognition software. Every effort was made to ensure accuracy; however, inadvertent computerized transcription errors may be present.  END OF ED PROVIDER NOTE         Charles Lara APRN - CNP  07/16/24 [id number]

## 2024-08-01 ENCOUNTER — HOSPITAL ENCOUNTER (OUTPATIENT)
Age: 74
Discharge: HOME | End: 2024-08-01
Payer: MEDICARE

## 2024-08-01 LAB
T4 SERPL-MCNC: 11.4 UG/DL (ref 4.5–11.7)
TSH SERPL DL<=0.05 MIU/L-ACNC: 0.23 UIU/ML (ref 0.27–4.2)

## 2024-08-01 PROCEDURE — 84443 ASSAY THYROID STIM HORMONE: CPT

## 2024-08-01 PROCEDURE — 84436 ASSAY OF TOTAL THYROXINE: CPT

## 2024-08-01 PROCEDURE — 36415 COLL VENOUS BLD VENIPUNCTURE: CPT

## 2024-12-06 ENCOUNTER — OFFICE VISIT (OUTPATIENT)
Dept: CARDIOLOGY CLINIC | Age: 74
End: 2024-12-06
Payer: MEDICARE

## 2024-12-06 VITALS
RESPIRATION RATE: 16 BRPM | HEART RATE: 62 BPM | SYSTOLIC BLOOD PRESSURE: 118 MMHG | WEIGHT: 124 LBS | DIASTOLIC BLOOD PRESSURE: 80 MMHG | BODY MASS INDEX: 21.97 KG/M2 | HEIGHT: 63 IN

## 2024-12-06 DIAGNOSIS — I25.10 CAD IN NATIVE ARTERY: Primary | ICD-10-CM

## 2024-12-06 PROCEDURE — 1123F ACP DISCUSS/DSCN MKR DOCD: CPT | Performed by: INTERNAL MEDICINE

## 2024-12-06 PROCEDURE — 99214 OFFICE O/P EST MOD 30 MIN: CPT | Performed by: INTERNAL MEDICINE

## 2024-12-06 PROCEDURE — 93000 ELECTROCARDIOGRAM COMPLETE: CPT | Performed by: INTERNAL MEDICINE

## 2024-12-06 NOTE — PROGRESS NOTES
Patient [facility] Cardiology Progress Note  Dr. Yi Rivers      Referring Physician: Dustin Vogt DO  CHIEF COMPLAINT:   Chief Complaint   Patient presents with    Coronary Artery Disease     9 month       HISTORY OF PRESENT ILLNESS:   Patient is 74 year old female with CAD, paroxysmal atrial fibrillation, mild aortic valve stenosis, chronic diastolic congestive heart failure, hypertension, hypothyroidism, hyperlipidemia, is here for follow-up appointment.  Patient denies any chest pain, no shortness of breath, no lightheadedness, no dizziness, no palpitations, no pedal edema, no PND, no orthopnea, no syncope, no presyncopal episodes.  Functional capacity is at baseline    Past Medical History:   Diagnosis Date    AF (atrial fibrillation) ([facility]) 09/2021    Arrhythmia     CAD in native artery 08/09/2018    Diastolic heart failure (HCC)     Diverticulitis 08/03/2018    H/O cardiovascular stress test 05/05/2021    Lexiscan    HIGH CHOLESTEROL     History of atrial fibrillation 08/2018    with episode of diverticulosis    History of stress test 08/07/2018    Lexiscan stress test    Hypertension     Thyroid disease          Past Surgical History:   Procedure Laterality Date    ABLATION OF DYSRHYTHMIC FOCUS  09/02/2021    successful AF Cryo-Ablation   (Dr. Ford)    COLONOSCOPY      COLONOSCOPY N/A 04/08/2019    COLONOSCOPY performed by Bill Vasquez MD at [facility] ENDOSCOPY    DIAGNOSTIC CARDIAC CATH LAB PROCEDURE  08/09/2018    NERVE BLOCK  12/27/2011    NERVE BLOCK  01/24/2012    lumbar epidural    NERVE BLOCK  02/21/2012    lumbar epidural with flouro    NM COLONOSCOPY FLX DX W/COLLJ SPEC WHEN PFRMD N/A 10/04/2018    COLONOSCOPY performed by Bill Vasquez MD at [facility] ENDOSCOPY    NM LAPAROSCOPY COLECTOMY PARTIAL W/ANASTOMOSIS N/A 11/13/2018    LAPAROSCOPIC ROBOT XI ASSISTED SIGMOID COLON RESECTION WITH OSTOMY performed by Bill Vasquez MD at [facility] OR    SMALL INTESTINE SURGERY N/A 04/09/2019    COLOSTOMY

## 2025-03-25 ENCOUNTER — HOSPITAL ENCOUNTER (OUTPATIENT)
Dept: GENERAL RADIOLOGY | Age: 75
Discharge: HOME | End: 2025-03-27
Payer: MEDICARE

## 2025-03-25 ENCOUNTER — HOSPITAL ENCOUNTER (OUTPATIENT)
Age: 75
Discharge: HOME | End: 2025-03-27
Payer: MEDICARE

## 2025-03-25 DIAGNOSIS — R06.02 SHORTNESS OF BREATH: ICD-10-CM

## 2025-03-25 PROCEDURE — 71046 X-RAY EXAM CHEST 2 VIEWS: CPT

## 2025-04-23 NOTE — PROGRESS NOTES
Cardiac Electrophysiology Outpatient Progress Note    Mylene Guerrero  1950  Date of Service: 4/24/2025  PCP: Dustin Vogt DO  ELECTROPHYSIOLOGIST: Deena Ford MD        Subjective: Mylene Guerrero is seen in cardiac electrophysiology clinic for follow-up and management of paroxysmal atrial fibrillation and bradycardia status post cryo-balloon PVI on 9/2/21. She was last seen by SALINA Albarado on 1/3/24. Since last office visit she reports her palpitations have increased in the past month due to life stressors, she continues to use a extra Toprol PRN.  The patient denies any chest pain, dyspnea, dizziness, syncope, orthopnea or paroxysmal nocturnal dyspnea. She presents today in NSR.    Patient Active Problem List   Diagnosis    Spinal stenosis    Degeneration of lumbosacral intervertebral disc    Lumbago    Colonic diverticular abscess    CAD in native artery    S/P colon resection    Diverticulitis of large intestine with perforation and abscess without bleeding    Pelvic abscess in female    Colostomy reversal    SBO (small bowel obstruction) ([facility])    Bradycardia    Chest pain    Atrial fibrillation with rapid ventricular response ([facility])    Paroxysmal atrial fibrillation ([facility])    A-fib ([facility])     Current Outpatient Medications   Medication Sig Dispense Refill    albuterol sulfate HFA (VENTOLIN HFA) 108 (90 Base) MCG/ACT inhaler Inhale 2 puffs into the lungs 4 times daily as needed for Wheezing 18 g 0    pantoprazole (PROTONIX) 40 MG tablet Take 1 tablet by mouth daily      alendronate (FOSAMAX) 70 MG tablet Take 1 tablet by mouth daily      metoprolol succinate (TOPROL XL) 50 MG extended release tablet Take 1 tablet by mouth daily 30 tablet 3    SYNTHROID 125 MCG tablet Take 1 tablet by mouth Daily Take 1 tablet daily      docusate sodium (COLACE) 100 MG capsule Take 1 capsule by mouth nightly      Echinacea 125 MG CAPS Take 125 mg by mouth daily      aspirin EC 81 MG EC tablet Take 1 tablet by

## 2025-04-24 ENCOUNTER — OFFICE VISIT (OUTPATIENT)
Dept: NON INVASIVE DIAGNOSTICS | Age: 75
End: 2025-04-24
Payer: MEDICARE

## 2025-04-24 VITALS
SYSTOLIC BLOOD PRESSURE: 124 MMHG | HEART RATE: 70 BPM | HEIGHT: 62 IN | DIASTOLIC BLOOD PRESSURE: 78 MMHG | RESPIRATION RATE: 18 BRPM | BODY MASS INDEX: 24.22 KG/M2 | TEMPERATURE: 97.5 F | WEIGHT: 131.6 LBS | OXYGEN SATURATION: 99 %

## 2025-04-24 DIAGNOSIS — I48.0 PAROXYSMAL ATRIAL FIBRILLATION (HCC): Primary | ICD-10-CM

## 2025-04-24 PROCEDURE — 93000 ELECTROCARDIOGRAM COMPLETE: CPT | Performed by: INTERNAL MEDICINE

## 2025-04-24 PROCEDURE — 99215 OFFICE O/P EST HI 40 MIN: CPT | Performed by: INTERNAL MEDICINE

## 2025-04-24 PROCEDURE — 1159F MED LIST DOCD IN RCRD: CPT | Performed by: INTERNAL MEDICINE

## 2025-04-24 PROCEDURE — 1160F RVW MEDS BY RX/DR IN RCRD: CPT | Performed by: INTERNAL MEDICINE

## 2025-04-24 PROCEDURE — 1123F ACP DISCUSS/DSCN MKR DOCD: CPT | Performed by: INTERNAL MEDICINE

## 2025-04-27 ENCOUNTER — HOSPITAL ENCOUNTER (EMERGENCY)
Age: 75
Discharge: HOME | End: 2025-04-27
Payer: MEDICARE

## 2025-04-27 ENCOUNTER — APPOINTMENT (OUTPATIENT)
Dept: GENERAL RADIOLOGY | Age: 75
End: 2025-04-27
Payer: MEDICARE

## 2025-04-27 VITALS
HEART RATE: 64 BPM | DIASTOLIC BLOOD PRESSURE: 88 MMHG | RESPIRATION RATE: 16 BRPM | TEMPERATURE: 97.9 F | OXYGEN SATURATION: 98 % | SYSTOLIC BLOOD PRESSURE: 149 MMHG

## 2025-04-27 DIAGNOSIS — L03.012 CELLULITIS OF FINGER OF LEFT HAND: ICD-10-CM

## 2025-04-27 DIAGNOSIS — M79.645 FINGER PAIN, LEFT: Primary | ICD-10-CM

## 2025-04-27 PROCEDURE — 73130 X-RAY EXAM OF HAND: CPT

## 2025-04-27 PROCEDURE — 29130 APPL FINGER SPLINT STATIC: CPT

## 2025-04-27 PROCEDURE — 99211 OFF/OP EST MAY X REQ PHY/QHP: CPT

## 2025-04-27 RX ORDER — CEPHALEXIN 500 MG/1
500 CAPSULE ORAL 4 TIMES DAILY
Qty: 28 CAPSULE | Refills: 0 | Status: SHIPPED | OUTPATIENT
Start: 2025-04-27 | End: 2025-05-04

## 2025-04-27 NOTE — DISCHARGE INSTRUCTIONS
Tylenol 650 to 1000 mg every 8 hours as needed for pain/fever.(Max dosage is 3000 mg per day).  Ice and elevated  Monitor   If worse go to the emergency department.

## 2025-04-27 NOTE — ED PROVIDER NOTES
[facility] URGENT CARE  EMERGENCY DEPARTMENT ENCOUNTER        NAME: Mylene Guerrero  :  1950  MRN:  [id number]  Date of evaluation: 2025  Provider: Maycol Evans PA-C  PCP: Dustin Vogt DO  Note Started : 8:54 AM EDT 25    Chief Complaint: Joint Swelling (Left 4th finger, joint swelling, not sure if she was bit by something, no injury, red, swollen, warm)      This is a 74-year-old female that presents to urgent care complaining of pain redness and swelling to the proximal phalanx of the left ring finger since yesterday.  She was stating that she was doing a lot of housework yesterday.  Unsure of any injury.  There is been no fevers or chills.  Denies other limb injury.  On first contact patient she appears to be in no acute distress        Review of Systems  Pertinent positives and negatives are stated within HPI, all other systems reviewed and are negative.     Allergies: Patient has no known allergies.     --------------------------------------------- PAST HISTORY ---------------------------------------------  Past Medical History:  has a past medical history of AF (atrial fibrillation) (HCC), Arrhythmia, CAD in native artery, Diastolic heart failure (HCC), Diverticulitis, H/O cardiovascular stress test, HIGH CHOLESTEROL, History of atrial fibrillation, History of stress test, Hypertension, and Thyroid disease.    Past Surgical History:  has a past surgical history that includes Nerve Block (2011); Nerve Block (2012); Nerve Block (2012); pr colonoscopy flx dx w/collj spec when pfrmd (N/A, 10/04/2018); pr laparoscopy colectomy partial w/anastomosis (N/A, 2018); Colonoscopy; Colonoscopy (N/A, 2019); Small intestine surgery (N/A, 2019); Diagnostic Cardiac Cath Lab Procedure (2018); Small intestine surgery (N/A, 2020); and ablation of dysrhythmic focus (2021).    Social History:  reports that she has been smoking cigarettes. She has

## 2025-05-29 ENCOUNTER — HOSPITAL ENCOUNTER (OUTPATIENT)
Age: 75
Discharge: HOME | End: 2025-05-29
Payer: MEDICARE

## 2025-05-29 LAB
ALBUMIN SERPL-MCNC: 4.1 G/DL (ref 3.5–5.2)
ALP SERPL-CCNC: 91 U/L (ref 35–104)
ALT SERPL-CCNC: 19 U/L (ref 0–32)
ANION GAP SERPL CALCULATED.3IONS-SCNC: 10 MMOL/L (ref 7–16)
AST SERPL-CCNC: 25 U/L (ref 0–31)
BASOPHILS # BLD: 0.11 K/UL (ref 0–0.2)
BASOPHILS NFR BLD: 1 % (ref 0–2)
BILIRUB SERPL-MCNC: 0.3 MG/DL (ref 0–1.2)
BUN SERPL-MCNC: 8 MG/DL (ref 6–23)
CALCIUM SERPL-MCNC: 9.6 MG/DL (ref 8.6–10.2)
CHLORIDE SERPL-SCNC: 102 MMOL/L (ref 98–107)
CHOLEST SERPL-MCNC: 127 MG/DL
CO2 SERPL-SCNC: 28 MMOL/L (ref 22–29)
CREAT SERPL-MCNC: 0.7 MG/DL (ref 0.5–1)
EOSINOPHIL # BLD: 0.48 K/UL (ref 0.05–0.5)
EOSINOPHILS RELATIVE PERCENT: 5 % (ref 0–6)
ERYTHROCYTE [DISTWIDTH] IN BLOOD BY AUTOMATED COUNT: 12.9 % (ref 11.5–15)
GFR, ESTIMATED: >90 ML/MIN/1.73M2
GLUCOSE SERPL-MCNC: 95 MG/DL (ref 74–99)
HCT VFR BLD AUTO: 44.1 % (ref 34–48)
HDLC SERPL-MCNC: 44 MG/DL
HGB BLD-MCNC: 14.6 G/DL (ref 11.5–15.5)
IMM GRANULOCYTES # BLD AUTO: 0.07 K/UL (ref 0–0.58)
IMM GRANULOCYTES NFR BLD: 1 % (ref 0–5)
LDLC SERPL CALC-MCNC: 71 MG/DL
LYMPHOCYTES NFR BLD: 1.85 K/UL (ref 1.5–4)
LYMPHOCYTES RELATIVE PERCENT: 20 % (ref 20–42)
MCH RBC QN AUTO: 31.3 PG (ref 26–35)
MCHC RBC AUTO-ENTMCNC: 33.1 G/DL (ref 32–34.5)
MCV RBC AUTO: 94.6 FL (ref 80–99.9)
MONOCYTES NFR BLD: 1 K/UL (ref 0.1–0.95)
MONOCYTES NFR BLD: 11 % (ref 2–12)
NEUTROPHILS NFR BLD: 61 % (ref 43–80)
NEUTS SEG NFR BLD: 5.61 K/UL (ref 1.8–7.3)
PLATELET # BLD AUTO: 221 K/UL (ref 130–450)
PMV BLD AUTO: 9.9 FL (ref 7–12)
POTASSIUM SERPL-SCNC: 4.8 MMOL/L (ref 3.5–5)
PROT SERPL-MCNC: 7.2 G/DL (ref 6.4–8.3)
RBC # BLD AUTO: 4.66 M/UL (ref 3.5–5.5)
SODIUM SERPL-SCNC: 140 MMOL/L (ref 132–146)
T4 SERPL-MCNC: 13.3 UG/DL (ref 4.5–11.7)
TRIGL SERPL-MCNC: 59 MG/DL
TSH SERPL DL<=0.05 MIU/L-ACNC: 0.16 UIU/ML (ref 0.27–4.2)
VLDLC SERPL CALC-MCNC: 12 MG/DL
WBC OTHER # BLD: 9.1 K/UL (ref 4.5–11.5)

## 2025-05-29 PROCEDURE — 80053 COMPREHEN METABOLIC PANEL: CPT

## 2025-05-29 PROCEDURE — 80061 LIPID PANEL: CPT

## 2025-05-29 PROCEDURE — 84436 ASSAY OF TOTAL THYROXINE: CPT

## 2025-05-29 PROCEDURE — 36415 COLL VENOUS BLD VENIPUNCTURE: CPT

## 2025-05-29 PROCEDURE — 84443 ASSAY THYROID STIM HORMONE: CPT

## 2025-05-29 PROCEDURE — 85025 COMPLETE CBC W/AUTO DIFF WBC: CPT

## 2025-07-08 ENCOUNTER — TRANSCRIBE ORDERS (OUTPATIENT)
Dept: ADMINISTRATIVE | Age: 75
End: 2025-07-08

## 2025-07-08 DIAGNOSIS — M81.0 AGE-RELATED OSTEOPOROSIS WITHOUT CURRENT PATHOLOGICAL FRACTURE: ICD-10-CM

## 2025-07-08 DIAGNOSIS — Z12.31 ENCOUNTER FOR SCREENING MAMMOGRAM FOR MALIGNANT NEOPLASM OF BREAST: Primary | ICD-10-CM

## 2025-07-18 ENCOUNTER — HOSPITAL ENCOUNTER (OUTPATIENT)
Age: 75
Discharge: HOME | End: 2025-07-18
Payer: MEDICARE

## 2025-07-18 LAB
T4 SERPL-MCNC: 10.4 UG/DL (ref 4.5–11.7)
TSH SERPL DL<=0.05 MIU/L-ACNC: 0.26 UIU/ML (ref 0.27–4.2)

## 2025-07-18 PROCEDURE — 84436 ASSAY OF TOTAL THYROXINE: CPT

## 2025-07-18 PROCEDURE — 84443 ASSAY THYROID STIM HORMONE: CPT

## 2025-07-18 PROCEDURE — 36415 COLL VENOUS BLD VENIPUNCTURE: CPT

## 2025-09-05 ENCOUNTER — HOSPITAL ENCOUNTER (OUTPATIENT)
Dept: MAMMOGRAPHY | Age: 75
Discharge: HOME | End: 2025-09-05
Attending: FAMILY MEDICINE
Payer: MEDICARE

## 2025-09-05 DIAGNOSIS — M81.0 AGE-RELATED OSTEOPOROSIS WITHOUT CURRENT PATHOLOGICAL FRACTURE: ICD-10-CM

## 2025-09-05 PROCEDURE — 77080 DXA BONE DENSITY AXIAL: CPT

## (undated) DEVICE — STAPLER EXT 65MM S STL AUTO DISP PURSTRING

## (undated) DEVICE — COLUMN DRAPE

## (undated) DEVICE — PACK SURG LAP CHOLE CUSTOM

## (undated) DEVICE — ANTI-FOG SOLUTION WITH FOAM PAD: Brand: DEVON

## (undated) DEVICE — GOWN ISOLATN REG YEL M WT MULTIPLY SIDETIE LEV 2

## (undated) DEVICE — MEDI-VAC NON-CONDUCTIVE SUCTION TUBING: Brand: CARDINAL HEALTH

## (undated) DEVICE — CANNULA SEAL

## (undated) DEVICE — SUCTION IRRIGATOR: Brand: ENDOWRIST

## (undated) DEVICE — GARMENT,MEDLINE,DVT,INT,CALF,MED, GEN2: Brand: MEDLINE

## (undated) DEVICE — BLADELESS OBTURATOR: Brand: WECK VISTA

## (undated) DEVICE — BASIC SINGLE BASIN 1-LF: Brand: MEDLINE INDUSTRIES, INC.

## (undated) DEVICE — TIP-UP FENESTRATED GRASPER: Brand: ENDOWRIST

## (undated) DEVICE — SOLUTION IV IRRIG WATER 1000ML POUR BRL 2F7114

## (undated) DEVICE — STAPLER 45 RELOAD: Brand: ENDOWRIST

## (undated) DEVICE — LUBRICANT SURG JELLY ST BACTER TUBE 4.25OZ

## (undated) DEVICE — CORD,CAUTERY,BIPOLAR,STERILE: Brand: MEDLINE

## (undated) DEVICE — SET MAJOR INSTR HOUSE

## (undated) DEVICE — CAMERA STRYKER 1488 HD GEN

## (undated) DEVICE — DOUBLE BASIN SET: Brand: MEDLINE INDUSTRIES, INC.

## (undated) DEVICE — ARM DRAPE

## (undated) DEVICE — SPONGE LAP W18XL18IN WHT COT 4 PLY FLD STRUNG RADPQ DISP ST

## (undated) DEVICE — 40586 ADVANCED TRENDELENBURG POSITIONING KIT: Brand: 40586 ADVANCED TRENDELENBURG POSITIONING KIT

## (undated) DEVICE — MASK,FACE,MAXFLUIDPROTECT,SHIELD/ERLPS: Brand: MEDLINE

## (undated) DEVICE — ELECTRODE PT RET AD L9FT HI MOIST COND ADH HYDRGEL CORDED

## (undated) DEVICE — Z INACTIVE USE 2660664 SOLUTION IRRIG 3000ML 0.9% SOD CHL USP UROMATIC PLAS CONT

## (undated) DEVICE — REDUCER: Brand: ENDOWRIST

## (undated) DEVICE — APPLIER CLP M/L SHFT DIA5MM 15 LIG LIGAMAX 5

## (undated) DEVICE — TOTAL TRAY, 16FR 10ML SIL FOLEY, URN: Brand: MEDLINE

## (undated) DEVICE — [HIGH FLOW INSUFFLATOR,  DO NOT USE IF PACKAGE IS DAMAGED,  KEEP DRY,  KEEP AWAY FROM SUNLIGHT,  PROTECT FROM HEAT AND RADIOACTIVE SOURCES.]: Brand: PNEUMOSURE

## (undated) DEVICE — SYRINGE MED 20ML STD CLR PLAS LUERLOCK TIP N CTRL DISP

## (undated) DEVICE — GAUZE,SPONGE,4"X4",16PLY,XRAY,STRL,LF: Brand: MEDLINE

## (undated) DEVICE — SHEET,DRAPE,40X58,STERILE: Brand: MEDLINE

## (undated) DEVICE — TUBING, SUCTION, 1/4" X 10', STRAIGHT: Brand: MEDLINE

## (undated) DEVICE — GAUZE,SPONGE,4"X4",16PLY,STRL,LF,10/TRAY: Brand: MEDLINE

## (undated) DEVICE — SYRINGE MED 10ML LUERLOCK TIP W/O SFTY DISP

## (undated) DEVICE — KIT BEDSIDE REVITAL OX 500ML

## (undated) DEVICE — VESSEL SEALER EXTEND: Brand: ENDOWRIST

## (undated) DEVICE — STAPLER SKIN L440MM 32MM LNG 12 FIRING B FRM PWR + GRIPPING

## (undated) DEVICE — Z DISCONTINUED SUGG SUB 2622703 KIT OST L12IN FLNG DIA70MM ST TRNSPAR TWO PC MOLD

## (undated) DEVICE — SHEET DRAPE FULL 70X100

## (undated) DEVICE — STAPLER 45: Brand: ENDOWRIST

## (undated) DEVICE — COVER,TABLE,44X90,STERILE: Brand: MEDLINE

## (undated) DEVICE — APPLICATOR MEDICATED 26 CC SOLUTION HI LT ORNG CHLORAPREP

## (undated) DEVICE — Device

## (undated) DEVICE — Device: Brand: DEFENDO VALVE AND CONNECTOR KIT

## (undated) DEVICE — MEDIUM-LARGE CLIP APPLIER: Brand: ENDOWRIST

## (undated) DEVICE — 6 X 9  1.75MIL 4-WALL LABGUARD: Brand: MINIGRIP COMMERCIAL LLC

## (undated) DEVICE — SPONGE GZ 4IN 4IN 4 PLY N WVN AVANT

## (undated) DEVICE — VESSEL SEALER: Brand: ENDOWRIST

## (undated) DEVICE — NEEDLE INSUF L120MM DIA2MM DISP FOR PNEUMOPERI ENDOPATH

## (undated) DEVICE — NEEDLE HYPO 25GA L1.5IN BLU POLYPR HUB S STL REG BVL STR

## (undated) DEVICE — APPLIER CLP L SHFT DIA12MM 20 ROT MULT LIGACLP

## (undated) DEVICE — SET INST DAVINCI XI ACCESSORIES

## (undated) DEVICE — YANKAUER,BULB TIP,W/O VENT,RIGID,STERILE: Brand: MEDLINE

## (undated) DEVICE — HYDROPHILIC COATED RED RUBBER URETHRAL CATHETER, SMOOTH ROUNDED TIP, 20 FR (6.7 MM): Brand: DOVER

## (undated) DEVICE — NDL CNTR 40CT FM MAG: Brand: MEDLINE INDUSTRIES, INC.

## (undated) DEVICE — GOWN,SIRUS,FABRNF,XL,20/CS: Brand: MEDLINE

## (undated) DEVICE — DRAIN SURG 19FR 100% SIL RADPQ RND CHN FULL FLUT

## (undated) DEVICE — TOWEL,OR,DSP,ST,BLUE,STD,6/PK,12PK/CS: Brand: MEDLINE

## (undated) DEVICE — MEDI-VAC YANKAUER SUCTION HANDLE: Brand: CARDINAL HEALTH

## (undated) DEVICE — KENDALL 450 SERIES MONITORING FOAM ELECTRODE - RECTANGULAR SHAPE ( 3/PK): Brand: KENDALL

## (undated) DEVICE — PATIENT RETURN ELECTRODE, SINGLE-USE, CONTACT QUALITY MONITORING, ADULT, WITH 9FT CORD, FOR PATIENTS WEIGING OVER 33LBS. (15KG): Brand: MEGADYNE

## (undated) DEVICE — SEALER TISS L45CM ADV BPLR STR TIP LAP APPRCH ENSEAL G2

## (undated) DEVICE — SCOPE DAVINCI XI 30 DEG W/CORD

## (undated) DEVICE — SMALL GRASPING RETRACTOR: Brand: ENDOWRIST

## (undated) DEVICE — PLUMEPORT LAPAROSCOPIC SMOKE FILTRATION DEVICE: Brand: PLUMEPORT ACTIV

## (undated) DEVICE — CHLORAPREP 26ML ORANGE

## (undated) DEVICE — READY WET SKIN SCRUB TRAY-LF: Brand: MEDLINE INDUSTRIES, INC.

## (undated) DEVICE — CONTAINER SPEC COLL 960ML POLYPR TRIANG GRAD INTAKE/OUTPUT

## (undated) DEVICE — COVER,LIGHT HANDLE,FLX,1/PK: Brand: MEDLINE INDUSTRIES, INC.

## (undated) DEVICE — Device: Brand: INSTRUSAFE

## (undated) DEVICE — BLADE ES ELASTOMERIC COAT INSUL DURABLE BEND UPTO 90DEG

## (undated) DEVICE — STAPLER INT L75MM CUT LN L73MM STPL LN L77MM BLU B FRM 8

## (undated) DEVICE — PMI PTFE COATED LAPAROSCOPIC WIRE L-HOOK 44 CM: Brand: PMI

## (undated) DEVICE — LAPAROSCOPIC ACCESS SYSTEM: Brand: ALEXIS LAPAROSCOPIC SYSTEM WITH KII FIOS FIRST ENTRY

## (undated) DEVICE — STANDARD HYPODERMIC NEEDLE,POLYPROPYLENE HUB: Brand: MONOJECT

## (undated) DEVICE — SUTURE BAG: Brand: DEVON

## (undated) DEVICE — RELOAD STPL L60MM H1-2.6MM MESENTERY THN TISS WHT 6 ROW

## (undated) DEVICE — GLOVE ORANGE PI 7 1/2   MSG9075

## (undated) DEVICE — TROCAR: Brand: KII FIOS FIRST ENTRY

## (undated) DEVICE — INSUFFLATION NEEDLE TO ESTABLISH PNEUMOPERITONEUM.: Brand: INSUFFLATION NEEDLE

## (undated) DEVICE — SET ENDO INSTR RED YEL LAPAROSCOPIC

## (undated) DEVICE — RELOAD STPL L75MM OPN H3.8MM CLS 1.5MM WIRE DIA0.2MM REG

## (undated) DEVICE — COLOSTOMY/ILEOSTOMY KIT,FLEXWEAR: Brand: NEW IMAGE

## (undated) DEVICE — SEALER/DIVIDER LAP SHFT L44CM DIA5MM STR BLNT TIP JAW HND

## (undated) DEVICE — TROCAR: Brand: KII SLEEVE

## (undated) DEVICE — STAPLER-OBTURATOR DAVINCI XI 12MM

## (undated) DEVICE — SYRINGE MED 10ML TRNSLUC BRL PLUNG BLK MRK POLYPR CTRL

## (undated) DEVICE — STAPLER SHEATH: Brand: ENDOWRIST

## (undated) DEVICE — SYRINGE IRRIG 60ML SFT PLIABLE BLB EZ TO GRP 1 HND USE W/

## (undated) DEVICE — SET ENDO INSTR LAPAROSCOPIC STGENLAP

## (undated) DEVICE — GOWN,SIRUS,NONRNF,SETINSLV,XL,20/CS: Brand: MEDLINE

## (undated) DEVICE — GENERATOR ELECSURG FORCETRAID

## (undated) DEVICE — STAPLER INT L28CM DIA29MM CLS STPL H10-2.5MM OPN LEG L5.5MM

## (undated) DEVICE — CADIERE FORCEPS: Brand: ENDOWRIST

## (undated) DEVICE — MARKER,SKIN,WI/RULER AND LABELS: Brand: MEDLINE

## (undated) DEVICE — 3M™ IOBAN™ 2 ANTIMICROBIAL INCISE DRAPE 6640EZ: Brand: IOBAN™ 2

## (undated) DEVICE — TRAY PROCED CUSTOM GASTROINTESTINAL

## (undated) DEVICE — PUMP SUC IRR TBNG L10FT W/ HNDPC ASSEMB STRYKEFLOW 2

## (undated) DEVICE — Z DUP USE 2641840 CLIP INT L POLYMER LOK LIG HEM O LOK

## (undated) DEVICE — SPONGE,LAP,12"X12",XR,ST,5/PK,40PK/CS: Brand: MEDLINE